# Patient Record
Sex: FEMALE | Race: WHITE | NOT HISPANIC OR LATINO
[De-identification: names, ages, dates, MRNs, and addresses within clinical notes are randomized per-mention and may not be internally consistent; named-entity substitution may affect disease eponyms.]

---

## 2017-01-27 ENCOUNTER — APPOINTMENT (OUTPATIENT)
Dept: ULTRASOUND IMAGING | Facility: CLINIC | Age: 77
End: 2017-01-27

## 2017-01-27 ENCOUNTER — APPOINTMENT (OUTPATIENT)
Dept: MAMMOGRAPHY | Facility: CLINIC | Age: 77
End: 2017-01-27

## 2017-01-27 ENCOUNTER — OUTPATIENT (OUTPATIENT)
Dept: OUTPATIENT SERVICES | Facility: HOSPITAL | Age: 77
LOS: 1 days | End: 2017-01-27
Payer: MEDICARE

## 2017-01-27 DIAGNOSIS — Z00.8 ENCOUNTER FOR OTHER GENERAL EXAMINATION: ICD-10-CM

## 2017-01-27 PROCEDURE — 77065 DX MAMMO INCL CAD UNI: CPT

## 2017-01-27 PROCEDURE — 76641 ULTRASOUND BREAST COMPLETE: CPT

## 2017-01-27 PROCEDURE — 77063 BREAST TOMOSYNTHESIS BI: CPT

## 2017-01-27 PROCEDURE — 77067 SCR MAMMO BI INCL CAD: CPT

## 2017-02-05 ENCOUNTER — RESULT REVIEW (OUTPATIENT)
Age: 77
End: 2017-02-05

## 2017-02-06 ENCOUNTER — APPOINTMENT (OUTPATIENT)
Dept: MAMMOGRAPHY | Facility: CLINIC | Age: 77
End: 2017-02-06

## 2017-02-06 ENCOUNTER — APPOINTMENT (OUTPATIENT)
Dept: ULTRASOUND IMAGING | Facility: CLINIC | Age: 77
End: 2017-02-06

## 2017-02-06 ENCOUNTER — OUTPATIENT (OUTPATIENT)
Dept: OUTPATIENT SERVICES | Facility: HOSPITAL | Age: 77
LOS: 1 days | End: 2017-02-06
Payer: MEDICARE

## 2017-02-06 DIAGNOSIS — Z00.8 ENCOUNTER FOR OTHER GENERAL EXAMINATION: ICD-10-CM

## 2017-02-06 PROCEDURE — 19083 BX BREAST 1ST LESION US IMAG: CPT

## 2017-02-06 PROCEDURE — 19081 BX BREAST 1ST LESION STRTCTC: CPT

## 2017-02-06 PROCEDURE — A4648: CPT

## 2017-02-06 PROCEDURE — 19084 BX BREAST ADD LESION US IMAG: CPT

## 2017-02-06 PROCEDURE — 77065 DX MAMMO INCL CAD UNI: CPT

## 2017-02-07 ENCOUNTER — RESULT REVIEW (OUTPATIENT)
Age: 77
End: 2017-02-07

## 2017-02-08 ENCOUNTER — OUTPATIENT (OUTPATIENT)
Dept: OUTPATIENT SERVICES | Facility: HOSPITAL | Age: 77
LOS: 1 days | End: 2017-02-08
Payer: MEDICARE

## 2017-02-08 ENCOUNTER — APPOINTMENT (OUTPATIENT)
Dept: MAMMOGRAPHY | Facility: CLINIC | Age: 77
End: 2017-02-08

## 2017-02-08 DIAGNOSIS — Z00.8 ENCOUNTER FOR OTHER GENERAL EXAMINATION: ICD-10-CM

## 2017-02-08 PROCEDURE — 77065 DX MAMMO INCL CAD UNI: CPT

## 2017-02-08 PROCEDURE — 19081 BX BREAST 1ST LESION STRTCTC: CPT

## 2017-02-10 DIAGNOSIS — R92.0 MAMMOGRAPHIC MICROCALCIFICATION FOUND ON DIAGNOSTIC IMAGING OF BREAST: ICD-10-CM

## 2017-02-10 DIAGNOSIS — R92.8 OTHER ABNORMAL AND INCONCLUSIVE FINDINGS ON DIAGNOSTIC IMAGING OF BREAST: ICD-10-CM

## 2017-02-16 ENCOUNTER — OUTPATIENT (OUTPATIENT)
Dept: OUTPATIENT SERVICES | Facility: HOSPITAL | Age: 77
LOS: 1 days | Discharge: HOME | End: 2017-02-16
Payer: MEDICARE

## 2017-02-16 VITALS
DIASTOLIC BLOOD PRESSURE: 55 MMHG | SYSTOLIC BLOOD PRESSURE: 137 MMHG | RESPIRATION RATE: 18 BRPM | HEIGHT: 63.5 IN | HEART RATE: 75 BPM | TEMPERATURE: 98 F | OXYGEN SATURATION: 98 % | WEIGHT: 175.05 LBS

## 2017-02-16 DIAGNOSIS — Z98.42 CATARACT EXTRACTION STATUS, LEFT EYE: ICD-10-CM

## 2017-02-16 DIAGNOSIS — H35.30 UNSPECIFIED MACULAR DEGENERATION: ICD-10-CM

## 2017-02-16 DIAGNOSIS — Z98.890 OTHER SPECIFIED POSTPROCEDURAL STATES: Chronic | ICD-10-CM

## 2017-02-16 DIAGNOSIS — C50.812 MALIGNANT NEOPLASM OF OVERLAPPING SITES OF LEFT FEMALE BREAST: ICD-10-CM

## 2017-02-16 DIAGNOSIS — Z90.11 ACQUIRED ABSENCE OF RIGHT BREAST AND NIPPLE: Chronic | ICD-10-CM

## 2017-02-16 DIAGNOSIS — M17.0 BILATERAL PRIMARY OSTEOARTHRITIS OF KNEE: ICD-10-CM

## 2017-02-16 DIAGNOSIS — M48.06 SPINAL STENOSIS, LUMBAR REGION: ICD-10-CM

## 2017-02-16 DIAGNOSIS — C50.912 MALIGNANT NEOPLASM OF UNSPECIFIED SITE OF LEFT FEMALE BREAST: ICD-10-CM

## 2017-02-16 DIAGNOSIS — D04.9 CARCINOMA IN SITU OF SKIN, UNSPECIFIED: Chronic | ICD-10-CM

## 2017-02-16 DIAGNOSIS — Z01.818 ENCOUNTER FOR OTHER PREPROCEDURAL EXAMINATION: ICD-10-CM

## 2017-02-16 DIAGNOSIS — C77.3 SECONDARY AND UNSPECIFIED MALIGNANT NEOPLASM OF AXILLA AND UPPER LIMB LYMPH NODES: ICD-10-CM

## 2017-02-16 DIAGNOSIS — Z98.41 CATARACT EXTRACTION STATUS, RIGHT EYE: ICD-10-CM

## 2017-02-16 DIAGNOSIS — Z90.12 ACQUIRED ABSENCE OF LEFT BREAST AND NIPPLE: Chronic | ICD-10-CM

## 2017-02-16 LAB
ANION GAP SERPL CALC-SCNC: 9 MMOL/L — SIGNIFICANT CHANGE UP (ref 5–17)
BASOPHILS # BLD AUTO: 0.1 K/UL — SIGNIFICANT CHANGE UP (ref 0–0.2)
BASOPHILS NFR BLD AUTO: 1.3 % — SIGNIFICANT CHANGE UP (ref 0–2)
BUN SERPL-MCNC: 15 MG/DL — SIGNIFICANT CHANGE UP (ref 7–23)
CALCIUM SERPL-MCNC: 9.1 MG/DL — SIGNIFICANT CHANGE UP (ref 8.5–10.1)
CHLORIDE SERPL-SCNC: 107 MMOL/L — SIGNIFICANT CHANGE UP (ref 96–108)
CO2 SERPL-SCNC: 26 MMOL/L — SIGNIFICANT CHANGE UP (ref 22–31)
CREAT SERPL-MCNC: 0.55 MG/DL — SIGNIFICANT CHANGE UP (ref 0.5–1.3)
EOSINOPHIL # BLD AUTO: 0.4 K/UL — SIGNIFICANT CHANGE UP (ref 0–0.5)
EOSINOPHIL NFR BLD AUTO: 4.1 % — SIGNIFICANT CHANGE UP (ref 0–6)
GLUCOSE SERPL-MCNC: 100 MG/DL — HIGH (ref 70–99)
HCT VFR BLD CALC: 38.2 % — SIGNIFICANT CHANGE UP (ref 34.5–45)
HGB BLD-MCNC: 13.1 G/DL — SIGNIFICANT CHANGE UP (ref 11.5–15.5)
LYMPHOCYTES # BLD AUTO: 3.2 K/UL — SIGNIFICANT CHANGE UP (ref 1–3.3)
LYMPHOCYTES # BLD AUTO: 33.7 % — SIGNIFICANT CHANGE UP (ref 13–44)
MCHC RBC-ENTMCNC: 31.9 PG — SIGNIFICANT CHANGE UP (ref 27–34)
MCHC RBC-ENTMCNC: 34.4 GM/DL — SIGNIFICANT CHANGE UP (ref 32–36)
MCV RBC AUTO: 92.9 FL — SIGNIFICANT CHANGE UP (ref 80–100)
MONOCYTES # BLD AUTO: 0.6 K/UL — SIGNIFICANT CHANGE UP (ref 0–0.9)
MONOCYTES NFR BLD AUTO: 5.8 % — SIGNIFICANT CHANGE UP (ref 2–14)
NEUTROPHILS # BLD AUTO: 5.3 K/UL — SIGNIFICANT CHANGE UP (ref 1.8–7.4)
NEUTROPHILS NFR BLD AUTO: 55.1 % — SIGNIFICANT CHANGE UP (ref 43–77)
PLATELET # BLD AUTO: 364 K/UL — SIGNIFICANT CHANGE UP (ref 150–400)
POTASSIUM SERPL-MCNC: 3.6 MMOL/L — SIGNIFICANT CHANGE UP (ref 3.5–5.3)
POTASSIUM SERPL-SCNC: 3.6 MMOL/L — SIGNIFICANT CHANGE UP (ref 3.5–5.3)
RBC # BLD: 4.11 M/UL — SIGNIFICANT CHANGE UP (ref 3.8–5.2)
RBC # FLD: 12.3 % — SIGNIFICANT CHANGE UP (ref 10.3–14.5)
SODIUM SERPL-SCNC: 142 MMOL/L — SIGNIFICANT CHANGE UP (ref 135–145)
WBC # BLD: 9.6 K/UL — SIGNIFICANT CHANGE UP (ref 3.8–10.5)
WBC # FLD AUTO: 9.6 K/UL — SIGNIFICANT CHANGE UP (ref 3.8–10.5)

## 2017-02-16 PROCEDURE — 93010 ELECTROCARDIOGRAM REPORT: CPT

## 2017-02-16 NOTE — H&P PST ADULT - HISTORY OF PRESENT ILLNESS
77 years old female with an abnormal mammogram . Patient with "three small spots" to breast  and lymph node. Lymph node mass positive for cancer. patient with a history of breast cancer x 2. Planned mastectomy.

## 2017-02-16 NOTE — H&P PST ADULT - PSH
Early stage skin cancer  basal cell carcinoma removed from left side of neck.  H/O colonoscopy  2012  H/O mastectomy, right  with immediate reconstruction with fat grafting. 1988  History of lumpectomy of left breast  1985  S/P left knee arthroscopy  2009

## 2017-02-16 NOTE — H&P PST ADULT - FAMILY HISTORY
Father  Still living? No  Family history of temporal arteritis, Age at diagnosis: Age Unknown     Mother  Still living? No  Family history of stroke, Age at diagnosis: Age Unknown

## 2017-02-16 NOTE — H&P PST ADULT - ASSESSMENT
77 years old female present to PST prior to mastectomy of left breast with axillary node dissection  with Dr. Bennett Smith.  Plan   1. NPO after midnight  2. Take the following medications with sips of water on the day of procedure:  3. Use E-Z sponge as directed  5. Drink a quart of extra  fluids the day before your surgery.  6 Medical clearance with Dr. HAYDEN Reinoso  7. CBC, BMP sent to lab  8. EKG done

## 2017-02-16 NOTE — H&P PST ADULT - PMH
Basal cell carcinoma in situ of skin  removed from neck  Bilateral malignant neoplasm of breast in female, unspecified site of breast  x2 . 1985, 1988  Gastroesophageal reflux disease without esophagitis    History of cataract  bilateral extraction  History of macular degeneration  bilateral  Insomnia, unspecified type    Malignant neoplasm of left female breast, unspecified site of breast  with Lymph node involvement  Osteoarthritis of both knees, unspecified osteoarthritis type    Spinal stenosis of lumbar region    Stress incontinence in female

## 2017-02-21 ENCOUNTER — RESULT REVIEW (OUTPATIENT)
Age: 77
End: 2017-02-21

## 2017-02-21 RX ORDER — FAMOTIDINE 10 MG/ML
20 INJECTION INTRAVENOUS ONCE
Qty: 0 | Refills: 0 | Status: COMPLETED | OUTPATIENT
Start: 2017-02-22 | End: 2017-02-22

## 2017-02-21 RX ORDER — SODIUM CHLORIDE 9 MG/ML
3 INJECTION INTRAMUSCULAR; INTRAVENOUS; SUBCUTANEOUS EVERY 8 HOURS
Qty: 0 | Refills: 0 | Status: DISCONTINUED | OUTPATIENT
Start: 2017-02-22 | End: 2017-02-23

## 2017-02-21 RX ORDER — ACETAMINOPHEN 500 MG
975 TABLET ORAL ONCE
Qty: 0 | Refills: 0 | Status: COMPLETED | OUTPATIENT
Start: 2017-02-22 | End: 2017-02-22

## 2017-02-21 RX ORDER — METOCLOPRAMIDE HCL 10 MG
10 TABLET ORAL ONCE
Qty: 0 | Refills: 0 | Status: DISCONTINUED | OUTPATIENT
Start: 2017-02-22 | End: 2017-02-23

## 2017-02-22 ENCOUNTER — INPATIENT (INPATIENT)
Facility: HOSPITAL | Age: 77
LOS: 0 days | Discharge: HOME | End: 2017-02-23
Attending: SURGERY | Admitting: SURGERY
Payer: MEDICARE

## 2017-02-22 VITALS
OXYGEN SATURATION: 96 % | HEIGHT: 63.5 IN | SYSTOLIC BLOOD PRESSURE: 115 MMHG | WEIGHT: 175.05 LBS | DIASTOLIC BLOOD PRESSURE: 50 MMHG | TEMPERATURE: 98 F | HEART RATE: 66 BPM | RESPIRATION RATE: 16 BRPM

## 2017-02-22 DIAGNOSIS — Z90.11 ACQUIRED ABSENCE OF RIGHT BREAST AND NIPPLE: Chronic | ICD-10-CM

## 2017-02-22 DIAGNOSIS — Z98.890 OTHER SPECIFIED POSTPROCEDURAL STATES: Chronic | ICD-10-CM

## 2017-02-22 DIAGNOSIS — Z90.12 ACQUIRED ABSENCE OF LEFT BREAST AND NIPPLE: Chronic | ICD-10-CM

## 2017-02-22 DIAGNOSIS — D04.9 CARCINOMA IN SITU OF SKIN, UNSPECIFIED: Chronic | ICD-10-CM

## 2017-02-22 PROCEDURE — 88329 PATH CONSLTJ DRG SURG: CPT

## 2017-02-22 PROCEDURE — 88305 TISSUE EXAM BY PATHOLOGIST: CPT | Mod: 26

## 2017-02-22 PROCEDURE — 88307 TISSUE EXAM BY PATHOLOGIST: CPT | Mod: 26

## 2017-02-22 PROCEDURE — 88342 IMHCHEM/IMCYTCHM 1ST ANTB: CPT | Mod: 26

## 2017-02-22 RX ORDER — ONDANSETRON 8 MG/1
4 TABLET, FILM COATED ORAL ONCE
Qty: 0 | Refills: 0 | Status: COMPLETED | OUTPATIENT
Start: 2017-02-22 | End: 2017-02-22

## 2017-02-22 RX ORDER — SODIUM CHLORIDE 9 MG/ML
1000 INJECTION, SOLUTION INTRAVENOUS
Qty: 0 | Refills: 0 | Status: DISCONTINUED | OUTPATIENT
Start: 2017-02-22 | End: 2017-02-22

## 2017-02-22 RX ORDER — FENTANYL CITRATE 50 UG/ML
25 INJECTION INTRAVENOUS
Qty: 0 | Refills: 0 | Status: DISCONTINUED | OUTPATIENT
Start: 2017-02-22 | End: 2017-02-23

## 2017-02-22 RX ORDER — ZOLPIDEM TARTRATE 10 MG/1
5 TABLET ORAL AT BEDTIME
Qty: 0 | Refills: 0 | Status: DISCONTINUED | OUTPATIENT
Start: 2017-02-22 | End: 2017-02-23

## 2017-02-22 RX ORDER — HYDROMORPHONE HYDROCHLORIDE 2 MG/ML
0.5 INJECTION INTRAMUSCULAR; INTRAVENOUS; SUBCUTANEOUS
Qty: 0 | Refills: 0 | Status: DISCONTINUED | OUTPATIENT
Start: 2017-02-22 | End: 2017-02-23

## 2017-02-22 RX ORDER — HYDROMORPHONE HYDROCHLORIDE 2 MG/ML
0.5 INJECTION INTRAMUSCULAR; INTRAVENOUS; SUBCUTANEOUS
Qty: 0 | Refills: 0 | Status: DISCONTINUED | OUTPATIENT
Start: 2017-02-22 | End: 2017-02-22

## 2017-02-22 RX ORDER — DIPHENHYDRAMINE HCL 50 MG
25 CAPSULE ORAL ONCE
Qty: 0 | Refills: 0 | Status: COMPLETED | OUTPATIENT
Start: 2017-02-22 | End: 2017-02-22

## 2017-02-22 RX ORDER — SODIUM CHLORIDE 9 MG/ML
1000 INJECTION INTRAMUSCULAR; INTRAVENOUS; SUBCUTANEOUS
Qty: 0 | Refills: 0 | Status: DISCONTINUED | OUTPATIENT
Start: 2017-02-22 | End: 2017-02-23

## 2017-02-22 RX ORDER — ONDANSETRON 8 MG/1
4 TABLET, FILM COATED ORAL ONCE
Qty: 0 | Refills: 0 | Status: DISCONTINUED | OUTPATIENT
Start: 2017-02-22 | End: 2017-02-23

## 2017-02-22 RX ADMIN — SODIUM CHLORIDE 100 MILLILITER(S): 9 INJECTION INTRAMUSCULAR; INTRAVENOUS; SUBCUTANEOUS at 13:42

## 2017-02-22 RX ADMIN — Medication 25 MILLIGRAM(S): at 10:41

## 2017-02-22 RX ADMIN — SODIUM CHLORIDE 100 MILLILITER(S): 9 INJECTION, SOLUTION INTRAVENOUS at 10:49

## 2017-02-22 RX ADMIN — Medication 975 MILLIGRAM(S): at 08:30

## 2017-02-22 RX ADMIN — HYDROMORPHONE HYDROCHLORIDE 0.5 MILLIGRAM(S): 2 INJECTION INTRAMUSCULAR; INTRAVENOUS; SUBCUTANEOUS at 10:30

## 2017-02-22 RX ADMIN — FAMOTIDINE 20 MILLIGRAM(S): 10 INJECTION INTRAVENOUS at 08:30

## 2017-02-22 RX ADMIN — SODIUM CHLORIDE 100 MILLILITER(S): 9 INJECTION INTRAMUSCULAR; INTRAVENOUS; SUBCUTANEOUS at 23:13

## 2017-02-22 RX ADMIN — SODIUM CHLORIDE 3 MILLILITER(S): 9 INJECTION INTRAMUSCULAR; INTRAVENOUS; SUBCUTANEOUS at 13:34

## 2017-02-22 RX ADMIN — ONDANSETRON 4 MILLIGRAM(S): 8 TABLET, FILM COATED ORAL at 10:25

## 2017-02-22 RX ADMIN — ZOLPIDEM TARTRATE 5 MILLIGRAM(S): 10 TABLET ORAL at 23:00

## 2017-02-23 VITALS
HEART RATE: 77 BPM | RESPIRATION RATE: 20 BRPM | OXYGEN SATURATION: 96 % | SYSTOLIC BLOOD PRESSURE: 125 MMHG | TEMPERATURE: 99 F | DIASTOLIC BLOOD PRESSURE: 89 MMHG

## 2017-02-23 RX ORDER — OXYCODONE HYDROCHLORIDE 5 MG/1
1 TABLET ORAL
Qty: 30 | Refills: 0 | OUTPATIENT
Start: 2017-02-23 | End: 2017-02-28

## 2017-02-23 NOTE — DISCHARGE NOTE ADULT - MEDICATION SUMMARY - MEDICATIONS TO TAKE
I will START or STAY ON the medications listed below when I get home from the hospital:    oxyCODONE 5 mg oral tablet  -- 1 tab(s) by mouth every 4 hours, As Needed -for moderate pain MDD:6  -- Caution federal law prohibits the transfer of this drug to any person other  than the person for whom it was prescribed.  It is very important that you take or use this exactly as directed.  Do not skip doses or discontinue unless directed by your doctor.  May cause drowsiness.  Alcohol may intensify this effect.  Use care when operating dangerous machinery.  This prescription cannot be refilled.  Using more of this medication than prescribed may cause serious breathing problems.    -- Indication: For MALIGNANT NEOPLASM LEFT BREAST    Ambien 5 mg oral tablet  -- 1 tab(s) by mouth once a day (at bedtime), As Needed  -- for insomnia  -- Indication: For MALIGNANT NEOPLASM LEFT BREAST

## 2017-02-23 NOTE — DISCHARGE NOTE ADULT - CARE PROVIDER_API CALL
Bennett Smith), Surgery; Surgical Critical Care  [address] [place] [address]  Phone: [phone]  Fax: [phone]

## 2017-02-23 NOTE — DISCHARGE NOTE ADULT - CARE PLAN
Principal Discharge DX:	Malignant neoplasm of left female breast, unspecified site of breast  Goal:	recovery from surgery  Instructions for follow-up, activity and diet:	empty drains daily  f/u tues office

## 2017-02-23 NOTE — DISCHARGE NOTE ADULT - PATIENT PORTAL LINK FT
“You can access the FollowHealth Patient Portal, offered by [facility], by registering with the following website: [email or web address]”

## 2017-02-27 LAB — SURGICAL PATHOLOGY FINAL REPORT - CH: SIGNIFICANT CHANGE UP

## 2017-03-07 DIAGNOSIS — C77.3 SECONDARY AND UNSPECIFIED MALIGNANT NEOPLASM OF AXILLA AND UPPER LIMB LYMPH NODES: ICD-10-CM

## 2017-03-07 DIAGNOSIS — E66.9 OBESITY, UNSPECIFIED: ICD-10-CM

## 2017-03-07 DIAGNOSIS — M17.0 BILATERAL PRIMARY OSTEOARTHRITIS OF KNEE: ICD-10-CM

## 2017-03-07 DIAGNOSIS — M48.06 SPINAL STENOSIS, LUMBAR REGION: ICD-10-CM

## 2017-03-07 DIAGNOSIS — Z85.3 PERSONAL HISTORY OF MALIGNANT NEOPLASM OF BREAST: ICD-10-CM

## 2017-03-07 DIAGNOSIS — C50.812 MALIGNANT NEOPLASM OF OVERLAPPING SITES OF LEFT FEMALE BREAST: ICD-10-CM

## 2017-03-07 DIAGNOSIS — Z87.891 PERSONAL HISTORY OF NICOTINE DEPENDENCE: ICD-10-CM

## 2017-03-07 DIAGNOSIS — Z85.828 PERSONAL HISTORY OF OTHER MALIGNANT NEOPLASM OF SKIN: ICD-10-CM

## 2017-03-07 DIAGNOSIS — N39.3 STRESS INCONTINENCE (FEMALE) (MALE): ICD-10-CM

## 2017-03-07 DIAGNOSIS — Z98.41 CATARACT EXTRACTION STATUS, RIGHT EYE: ICD-10-CM

## 2017-03-07 DIAGNOSIS — H35.30 UNSPECIFIED MACULAR DEGENERATION: ICD-10-CM

## 2017-03-07 DIAGNOSIS — Z98.42 CATARACT EXTRACTION STATUS, LEFT EYE: ICD-10-CM

## 2017-08-10 ENCOUNTER — OUTPATIENT (OUTPATIENT)
Dept: OUTPATIENT SERVICES | Facility: HOSPITAL | Age: 77
LOS: 1 days | End: 2017-08-10
Payer: MEDICARE

## 2017-08-10 ENCOUNTER — APPOINTMENT (OUTPATIENT)
Dept: MRI IMAGING | Facility: CLINIC | Age: 77
End: 2017-08-10
Payer: MEDICARE

## 2017-08-10 DIAGNOSIS — Z00.8 ENCOUNTER FOR OTHER GENERAL EXAMINATION: ICD-10-CM

## 2017-08-10 PROCEDURE — 73725 MR ANG LWR EXT W OR W/O DYE: CPT | Mod: 26,50

## 2017-08-10 PROCEDURE — C8914: CPT

## 2017-08-10 PROCEDURE — A9585: CPT

## 2018-09-05 PROBLEM — K21.9 GASTRO-ESOPHAGEAL REFLUX DISEASE WITHOUT ESOPHAGITIS: Chronic | Status: ACTIVE | Noted: 2017-02-16

## 2018-09-05 PROBLEM — M48.06 SPINAL STENOSIS, LUMBAR REGION: Chronic | Status: ACTIVE | Noted: 2017-02-16

## 2018-09-05 PROBLEM — C50.912 MALIGNANT NEOPLASM OF UNSPECIFIED SITE OF LEFT FEMALE BREAST: Chronic | Status: ACTIVE | Noted: 2017-02-16

## 2018-09-05 PROBLEM — Z86.69 PERSONAL HISTORY OF OTHER DISEASES OF THE NERVOUS SYSTEM AND SENSE ORGANS: Chronic | Status: ACTIVE | Noted: 2017-02-16

## 2018-09-05 PROBLEM — M17.0 BILATERAL PRIMARY OSTEOARTHRITIS OF KNEE: Chronic | Status: ACTIVE | Noted: 2017-02-16

## 2018-09-05 PROBLEM — D04.9 CARCINOMA IN SITU OF SKIN, UNSPECIFIED: Chronic | Status: ACTIVE | Noted: 2017-02-16

## 2018-09-05 PROBLEM — G47.00 INSOMNIA, UNSPECIFIED: Chronic | Status: ACTIVE | Noted: 2017-02-16

## 2018-09-07 ENCOUNTER — OUTPATIENT (OUTPATIENT)
Dept: OUTPATIENT SERVICES | Facility: HOSPITAL | Age: 78
LOS: 1 days | Discharge: HOME | End: 2018-09-07
Payer: MEDICARE

## 2018-09-07 DIAGNOSIS — D48.1 NEOPLASM OF UNCERTAIN BEHAVIOR OF CONNECTIVE AND OTHER SOFT TISSUE: ICD-10-CM

## 2018-09-07 DIAGNOSIS — Z90.11 ACQUIRED ABSENCE OF RIGHT BREAST AND NIPPLE: Chronic | ICD-10-CM

## 2018-09-07 DIAGNOSIS — Z98.890 OTHER SPECIFIED POSTPROCEDURAL STATES: Chronic | ICD-10-CM

## 2018-09-07 DIAGNOSIS — C50.912 MALIGNANT NEOPLASM OF UNSPECIFIED SITE OF LEFT FEMALE BREAST: ICD-10-CM

## 2018-09-07 DIAGNOSIS — Z90.12 ACQUIRED ABSENCE OF LEFT BREAST AND NIPPLE: Chronic | ICD-10-CM

## 2018-09-07 DIAGNOSIS — D17.1 BENIGN LIPOMATOUS NEOPLASM OF SKIN AND SUBCUTANEOUS TISSUE OF TRUNK: ICD-10-CM

## 2018-09-07 DIAGNOSIS — D04.9 CARCINOMA IN SITU OF SKIN, UNSPECIFIED: Chronic | ICD-10-CM

## 2018-09-07 LAB
ANION GAP SERPL CALC-SCNC: 8 MMOL/L — SIGNIFICANT CHANGE UP (ref 5–17)
BASOPHILS # BLD AUTO: 0.06 K/UL — SIGNIFICANT CHANGE UP (ref 0–0.2)
BASOPHILS NFR BLD AUTO: 0.7 % — SIGNIFICANT CHANGE UP (ref 0–2)
BUN SERPL-MCNC: 17 MG/DL — SIGNIFICANT CHANGE UP (ref 7–23)
CALCIUM SERPL-MCNC: 9 MG/DL — SIGNIFICANT CHANGE UP (ref 8.5–10.1)
CHLORIDE SERPL-SCNC: 104 MMOL/L — SIGNIFICANT CHANGE UP (ref 96–108)
CO2 SERPL-SCNC: 28 MMOL/L — SIGNIFICANT CHANGE UP (ref 22–31)
CREAT SERPL-MCNC: 0.71 MG/DL — SIGNIFICANT CHANGE UP (ref 0.5–1.3)
EOSINOPHIL # BLD AUTO: 0.35 K/UL — SIGNIFICANT CHANGE UP (ref 0–0.5)
EOSINOPHIL NFR BLD AUTO: 4 % — SIGNIFICANT CHANGE UP (ref 0–6)
GLUCOSE SERPL-MCNC: 161 MG/DL — HIGH (ref 70–99)
HCT VFR BLD CALC: 39.6 % — SIGNIFICANT CHANGE UP (ref 34.5–45)
HGB BLD-MCNC: 13 G/DL — SIGNIFICANT CHANGE UP (ref 11.5–15.5)
IMM GRANULOCYTES NFR BLD AUTO: 0.2 % — SIGNIFICANT CHANGE UP (ref 0–1.5)
LYMPHOCYTES # BLD AUTO: 1.76 K/UL — SIGNIFICANT CHANGE UP (ref 1–3.3)
LYMPHOCYTES # BLD AUTO: 20.3 % — SIGNIFICANT CHANGE UP (ref 13–44)
MCHC RBC-ENTMCNC: 31.5 PG — SIGNIFICANT CHANGE UP (ref 27–34)
MCHC RBC-ENTMCNC: 32.8 GM/DL — SIGNIFICANT CHANGE UP (ref 32–36)
MCV RBC AUTO: 95.9 FL — SIGNIFICANT CHANGE UP (ref 80–100)
MONOCYTES # BLD AUTO: 0.8 K/UL — SIGNIFICANT CHANGE UP (ref 0–0.9)
MONOCYTES NFR BLD AUTO: 9.2 % — SIGNIFICANT CHANGE UP (ref 2–14)
NEUTROPHILS # BLD AUTO: 5.66 K/UL — SIGNIFICANT CHANGE UP (ref 1.8–7.4)
NEUTROPHILS NFR BLD AUTO: 65.6 % — SIGNIFICANT CHANGE UP (ref 43–77)
PLATELET # BLD AUTO: 366 K/UL — SIGNIFICANT CHANGE UP (ref 150–400)
POTASSIUM SERPL-MCNC: 3.8 MMOL/L — SIGNIFICANT CHANGE UP (ref 3.5–5.3)
POTASSIUM SERPL-SCNC: 3.8 MMOL/L — SIGNIFICANT CHANGE UP (ref 3.5–5.3)
RBC # BLD: 4.13 M/UL — SIGNIFICANT CHANGE UP (ref 3.8–5.2)
RBC # FLD: 13.6 % — SIGNIFICANT CHANGE UP (ref 10.3–14.5)
SODIUM SERPL-SCNC: 140 MMOL/L — SIGNIFICANT CHANGE UP (ref 135–145)
WBC # BLD: 8.65 K/UL — SIGNIFICANT CHANGE UP (ref 3.8–10.5)
WBC # FLD AUTO: 8.65 K/UL — SIGNIFICANT CHANGE UP (ref 3.8–10.5)

## 2018-09-07 PROCEDURE — 93010 ELECTROCARDIOGRAM REPORT: CPT

## 2018-09-07 RX ORDER — ZOLPIDEM TARTRATE 10 MG/1
1 TABLET ORAL
Qty: 0 | Refills: 0 | COMMUNITY

## 2018-09-07 NOTE — ASU PATIENT PROFILE, ADULT - PMH
Basal cell carcinoma in situ of skin  removed from neck  Bilateral malignant neoplasm of breast in female, unspecified site of breast  b/l  Gastroesophageal reflux disease without esophagitis    History of macular degeneration  bilateral  Insomnia, unspecified type    Malignant neoplasm of left female breast, unspecified site of breast  with Lymph node involvement  Neoplasm by body site  left anterior chest wall  Osteoarthritis of both knees, unspecified osteoarthritis type    Spinal stenosis of lumbar region    Urinary frequency Basal cell carcinoma in situ of skin  removed from neck  Bilateral malignant neoplasm of breast in female, unspecified site of breast  b/l  Gastroesophageal reflux disease without esophagitis    History of macular degeneration  bilateral  Insomnia, unspecified type    Malignant neoplasm of left female breast, unspecified site of breast  with Lymph node involvement  Neoplasm by body site  left anterior chest wall  Obesity    Osteoarthritis of both knees, unspecified osteoarthritis type    Spinal stenosis of lumbar region    Urinary frequency

## 2018-09-07 NOTE — ASU PATIENT PROFILE, ADULT - PSH
Cataract  b/l  Early stage skin cancer  basal cell carcinoma removed from left side of neck.  H/O bilateral breast biopsy    H/O colonoscopy  multiple  H/O left mastectomy  2016  H/O mastectomy, right  1993 with immediate reconstruction with fat grafting  History of esophagogastroduodenoscopy (EGD)    History of lumpectomy of left breast  1990  Macular Hole  repair b/l eyes  S/P left knee arthroscopy  2009

## 2018-09-07 NOTE — CHART NOTE - NSCHARTNOTEFT_GEN_A_CORE
BP left arm sitting 149/55  HR 78 bpm  Liliana 98.1 F  RR 14/min  O2 sat 98% on RA    79 yo female scheduled for excision of soft tissue mass left chest wall on 9/10/18  with Dr. Smith    1. Labs as per surgeon  2. EKG  3. Medical clearance with PCP Dr OWEN Reinoso  4. discussed EZ sponges & day of procedure instructions  5. Do NOT use left arm for BPs, IVs or blood draws. apply pink band to left arm on admit

## 2018-09-08 PROBLEM — C50.911 MALIGNANT NEOPLASM OF UNSPECIFIED SITE OF RIGHT FEMALE BREAST: Chronic | Status: ACTIVE | Noted: 2017-02-16

## 2018-09-08 PROBLEM — N39.3 STRESS INCONTINENCE (FEMALE) (MALE): Chronic | Status: INACTIVE | Noted: 2017-02-16 | Resolved: 2018-09-07

## 2018-09-08 PROBLEM — Z86.69 PERSONAL HISTORY OF OTHER DISEASES OF THE NERVOUS SYSTEM AND SENSE ORGANS: Chronic | Status: INACTIVE | Noted: 2017-02-16 | Resolved: 2018-09-07

## 2018-09-10 ENCOUNTER — RESULT REVIEW (OUTPATIENT)
Age: 78
End: 2018-09-10

## 2018-09-10 ENCOUNTER — OUTPATIENT (OUTPATIENT)
Dept: OUTPATIENT SERVICES | Facility: HOSPITAL | Age: 78
LOS: 1 days | Discharge: HOME | End: 2018-09-10
Payer: MEDICARE

## 2018-09-10 VITALS
SYSTOLIC BLOOD PRESSURE: 140 MMHG | TEMPERATURE: 98 F | RESPIRATION RATE: 16 BRPM | DIASTOLIC BLOOD PRESSURE: 72 MMHG | HEART RATE: 68 BPM

## 2018-09-10 VITALS
HEIGHT: 64 IN | OXYGEN SATURATION: 98 % | WEIGHT: 179.9 LBS | RESPIRATION RATE: 14 BRPM | HEART RATE: 63 BPM | DIASTOLIC BLOOD PRESSURE: 53 MMHG | SYSTOLIC BLOOD PRESSURE: 124 MMHG | TEMPERATURE: 98 F

## 2018-09-10 DIAGNOSIS — Z90.12 ACQUIRED ABSENCE OF LEFT BREAST AND NIPPLE: Chronic | ICD-10-CM

## 2018-09-10 DIAGNOSIS — D04.9 CARCINOMA IN SITU OF SKIN, UNSPECIFIED: Chronic | ICD-10-CM

## 2018-09-10 DIAGNOSIS — Z98.890 OTHER SPECIFIED POSTPROCEDURAL STATES: Chronic | ICD-10-CM

## 2018-09-10 DIAGNOSIS — Z90.11 ACQUIRED ABSENCE OF RIGHT BREAST AND NIPPLE: Chronic | ICD-10-CM

## 2018-09-10 PROCEDURE — 88305 TISSUE EXAM BY PATHOLOGIST: CPT | Mod: 26

## 2018-09-10 RX ORDER — OXYCODONE HYDROCHLORIDE 5 MG/1
5 TABLET ORAL EVERY 6 HOURS
Qty: 0 | Refills: 0 | Status: DISCONTINUED | OUTPATIENT
Start: 2018-09-10 | End: 2018-09-10

## 2018-09-10 NOTE — ASU DISCHARGE PLAN (ADULT/PEDIATRIC). - MEDICATION SUMMARY - MEDICATIONS TO TAKE
I will START or STAY ON the medications listed below when I get home from the hospital:    Aleve PM 25 mg-220 mg oral tablet  -- 1 tab(s) by mouth once (at bedtime)  -- Indication: For SOFT TISSUE MAS LEFT CHEST WALL WITH HISTORY OF BREAST CANCER    exemestane 25 mg oral tablet  -- 1 tab(s) by mouth once a day  -- Indication: For SOFT TISSUE MAS LEFT CHEST WALL WITH HISTORY OF BREAST CANCER    oxybutynin 10 mg/24 hr oral tablet, extended release  -- 1 tab(s) by mouth once a day  -- Indication: For SOFT TISSUE MAS LEFT CHEST WALL WITH HISTORY OF BREAST CANCER    Vitamin D3 5000 intl units oral tablet  -- 1 tab(s) by mouth once a day  -- Indication: For SOFT TISSUE MAS LEFT CHEST WALL WITH HISTORY OF BREAST CANCER

## 2018-09-10 NOTE — ASU DISCHARGE PLAN (ADULT/PEDIATRIC). - NURSING INSTRUCTIONS
Review home care information sheet Begin with liquids and light food ( tea, toast, Jello, soups). Advance to what you normally eat. Liquids should taken in adequate amounts today.Refer to multi color post op discharge instruction sheet     CALL the DOCTOR:    -Fever greater than  101F  - Signs  of infection such as : increase pain,swelling,redness,or a bad  smell coming from the wound.  -Excessive amount of bleeding.  - Any pain that appears to be getting worse.  - Vomiting  -  If you have  not urinated 8 hours after surgery or have any difficulty urinating.     A responsible adult should be with you for the rest of the day and night for your safety and to help you if you needed.    Review attached FACT SHEET if applicable. For any problems or concerns,contact your doctor. [facility] patients should call the [facility]. If you cannot reach the doctor or clinic, call [facility] Emergency Department at [phone] or go to your local Emergency Department.  A responsible adult should be with you for the rest of the day and night for your safety and to help you if you needed. Resume your medications as listed on the attached Medication Record.

## 2018-09-10 NOTE — ASU PATIENT PROFILE, ADULT - PMH
Basal cell carcinoma in situ of skin  removed from neck  Bilateral malignant neoplasm of breast in female, unspecified site of breast  b/l  Gastroesophageal reflux disease without esophagitis    History of macular degeneration  bilateral  Insomnia, unspecified type    Malignant neoplasm of left female breast, unspecified site of breast  with Lymph node involvement  Neoplasm by body site  left anterior chest wall  Obesity    Osteoarthritis of both knees, unspecified osteoarthritis type    Spinal stenosis of lumbar region    Urinary frequency

## 2018-09-13 LAB — SURGICAL PATHOLOGY FINAL REPORT - CH: SIGNIFICANT CHANGE UP

## 2018-09-17 DIAGNOSIS — R22.2 LOCALIZED SWELLING, MASS AND LUMP, TRUNK: ICD-10-CM

## 2018-09-17 DIAGNOSIS — Z90.12 ACQUIRED ABSENCE OF LEFT BREAST AND NIPPLE: ICD-10-CM

## 2018-09-17 DIAGNOSIS — Z92.3 PERSONAL HISTORY OF IRRADIATION: ICD-10-CM

## 2018-09-17 DIAGNOSIS — C44.519 BASAL CELL CARCINOMA OF SKIN OF OTHER PART OF TRUNK: ICD-10-CM

## 2018-09-17 DIAGNOSIS — Z85.3 PERSONAL HISTORY OF MALIGNANT NEOPLASM OF BREAST: ICD-10-CM

## 2018-09-20 DIAGNOSIS — C44.519 BASAL CELL CARCINOMA OF SKIN OF OTHER PART OF TRUNK: ICD-10-CM

## 2018-09-20 DIAGNOSIS — Z01.818 ENCOUNTER FOR OTHER PREPROCEDURAL EXAMINATION: ICD-10-CM

## 2018-09-20 DIAGNOSIS — Z92.3 PERSONAL HISTORY OF IRRADIATION: ICD-10-CM

## 2018-09-20 DIAGNOSIS — Z90.12 ACQUIRED ABSENCE OF LEFT BREAST AND NIPPLE: ICD-10-CM

## 2018-09-20 DIAGNOSIS — Z85.3 PERSONAL HISTORY OF MALIGNANT NEOPLASM OF BREAST: ICD-10-CM

## 2019-06-27 ENCOUNTER — EMERGENCY (EMERGENCY)
Facility: HOSPITAL | Age: 79
LOS: 1 days | Discharge: HOME | End: 2019-06-27
Attending: EMERGENCY MEDICINE
Payer: MEDICARE

## 2019-06-27 VITALS
DIASTOLIC BLOOD PRESSURE: 82 MMHG | TEMPERATURE: 99 F | HEART RATE: 81 BPM | HEIGHT: 62 IN | SYSTOLIC BLOOD PRESSURE: 119 MMHG | OXYGEN SATURATION: 99 % | RESPIRATION RATE: 18 BRPM | WEIGHT: 156.09 LBS

## 2019-06-27 VITALS
SYSTOLIC BLOOD PRESSURE: 112 MMHG | DIASTOLIC BLOOD PRESSURE: 80 MMHG | HEART RATE: 77 BPM | OXYGEN SATURATION: 99 % | TEMPERATURE: 99 F | RESPIRATION RATE: 18 BRPM

## 2019-06-27 LAB
ALBUMIN SERPL ELPH-MCNC: 4.2 G/DL — SIGNIFICANT CHANGE UP (ref 3.3–5)
ALP SERPL-CCNC: 69 U/L — SIGNIFICANT CHANGE UP (ref 40–120)
ALT FLD-CCNC: 18 U/L — SIGNIFICANT CHANGE UP (ref 10–45)
ANION GAP SERPL CALC-SCNC: 12 MMOL/L — SIGNIFICANT CHANGE UP (ref 5–17)
APPEARANCE UR: CLEAR — SIGNIFICANT CHANGE UP
AST SERPL-CCNC: 20 U/L — SIGNIFICANT CHANGE UP (ref 10–40)
BACTERIA # UR AUTO: ABNORMAL
BASOPHILS # BLD AUTO: 0 K/UL — SIGNIFICANT CHANGE UP (ref 0–0.2)
BASOPHILS NFR BLD AUTO: 0.2 % — SIGNIFICANT CHANGE UP (ref 0–2)
BILIRUB SERPL-MCNC: 0.5 MG/DL — SIGNIFICANT CHANGE UP (ref 0.2–1.2)
BILIRUB UR-MCNC: NEGATIVE — SIGNIFICANT CHANGE UP
BUN SERPL-MCNC: 18 MG/DL — SIGNIFICANT CHANGE UP (ref 7–23)
CALCIUM SERPL-MCNC: 10 MG/DL — SIGNIFICANT CHANGE UP (ref 8.4–10.5)
CHLORIDE SERPL-SCNC: 104 MMOL/L — SIGNIFICANT CHANGE UP (ref 96–108)
CO2 SERPL-SCNC: 25 MMOL/L — SIGNIFICANT CHANGE UP (ref 22–31)
COD CRY URNS QL: ABNORMAL
COLOR SPEC: YELLOW — SIGNIFICANT CHANGE UP
CREAT SERPL-MCNC: 0.77 MG/DL — SIGNIFICANT CHANGE UP (ref 0.5–1.3)
DIFF PNL FLD: ABNORMAL
EOSINOPHIL # BLD AUTO: 0.1 K/UL — SIGNIFICANT CHANGE UP (ref 0–0.5)
EOSINOPHIL NFR BLD AUTO: 0.6 % — SIGNIFICANT CHANGE UP (ref 0–6)
EPI CELLS # UR: SIGNIFICANT CHANGE UP
GAS PNL BLDV: SIGNIFICANT CHANGE UP
GLUCOSE SERPL-MCNC: 167 MG/DL — HIGH (ref 70–99)
GLUCOSE UR QL: NEGATIVE — SIGNIFICANT CHANGE UP
HCT VFR BLD CALC: 40.5 % — SIGNIFICANT CHANGE UP (ref 34.5–45)
HGB BLD-MCNC: 13.3 G/DL — SIGNIFICANT CHANGE UP (ref 11.5–15.5)
INR BLD: 1.03 RATIO — SIGNIFICANT CHANGE UP (ref 0.88–1.16)
KETONES UR-MCNC: NEGATIVE — SIGNIFICANT CHANGE UP
LEUKOCYTE ESTERASE UR-ACNC: SIGNIFICANT CHANGE UP
LIDOCAIN IGE QN: 17 U/L — SIGNIFICANT CHANGE UP (ref 7–60)
LYMPHOCYTES # BLD AUTO: 1 K/UL — SIGNIFICANT CHANGE UP (ref 1–3.3)
LYMPHOCYTES # BLD AUTO: 11 % — LOW (ref 13–44)
MCHC RBC-ENTMCNC: 27.8 PG — SIGNIFICANT CHANGE UP (ref 27–34)
MCHC RBC-ENTMCNC: 32.8 GM/DL — SIGNIFICANT CHANGE UP (ref 32–36)
MCV RBC AUTO: 84.9 FL — SIGNIFICANT CHANGE UP (ref 80–100)
MONOCYTES # BLD AUTO: 0.4 K/UL — SIGNIFICANT CHANGE UP (ref 0–0.9)
MONOCYTES NFR BLD AUTO: 4 % — SIGNIFICANT CHANGE UP (ref 2–14)
NEUTROPHILS # BLD AUTO: 7.6 K/UL — HIGH (ref 1.8–7.4)
NEUTROPHILS NFR BLD AUTO: 84.2 % — HIGH (ref 43–77)
NITRITE UR-MCNC: NEGATIVE — SIGNIFICANT CHANGE UP
PH UR: 6.5 — SIGNIFICANT CHANGE UP (ref 5–8)
PLATELET # BLD AUTO: 273 K/UL — SIGNIFICANT CHANGE UP (ref 150–400)
POTASSIUM SERPL-MCNC: 4.8 MMOL/L — SIGNIFICANT CHANGE UP (ref 3.5–5.3)
POTASSIUM SERPL-SCNC: 4.8 MMOL/L — SIGNIFICANT CHANGE UP (ref 3.5–5.3)
PROT SERPL-MCNC: 6.9 G/DL — SIGNIFICANT CHANGE UP (ref 6–8.3)
PROT UR-MCNC: ABNORMAL
PROTHROM AB SERPL-ACNC: 11.8 SEC — SIGNIFICANT CHANGE UP (ref 10–12.9)
RBC # BLD: 4.77 M/UL — SIGNIFICANT CHANGE UP (ref 3.8–5.2)
RBC # FLD: 12.6 % — SIGNIFICANT CHANGE UP (ref 10.3–14.5)
RBC CASTS # UR COMP ASSIST: SIGNIFICANT CHANGE UP /HPF (ref 0–4)
SODIUM SERPL-SCNC: 141 MMOL/L — SIGNIFICANT CHANGE UP (ref 135–145)
SP GR SPEC: 1.02 — SIGNIFICANT CHANGE UP (ref 1.01–1.02)
UROBILINOGEN FLD QL: NEGATIVE — SIGNIFICANT CHANGE UP
WBC # BLD: 9 K/UL — SIGNIFICANT CHANGE UP (ref 3.8–10.5)
WBC # FLD AUTO: 9 K/UL — SIGNIFICANT CHANGE UP (ref 3.8–10.5)
WBC UR QL: ABNORMAL

## 2019-06-27 PROCEDURE — 82330 ASSAY OF CALCIUM: CPT

## 2019-06-27 PROCEDURE — 74177 CT ABD & PELVIS W/CONTRAST: CPT | Mod: 26

## 2019-06-27 PROCEDURE — 96361 HYDRATE IV INFUSION ADD-ON: CPT

## 2019-06-27 PROCEDURE — 82435 ASSAY OF BLOOD CHLORIDE: CPT

## 2019-06-27 PROCEDURE — 82803 BLOOD GASES ANY COMBINATION: CPT

## 2019-06-27 PROCEDURE — 85610 PROTHROMBIN TIME: CPT

## 2019-06-27 PROCEDURE — 85014 HEMATOCRIT: CPT

## 2019-06-27 PROCEDURE — 85027 COMPLETE CBC AUTOMATED: CPT

## 2019-06-27 PROCEDURE — 85730 THROMBOPLASTIN TIME PARTIAL: CPT

## 2019-06-27 PROCEDURE — 84295 ASSAY OF SERUM SODIUM: CPT

## 2019-06-27 PROCEDURE — 81001 URINALYSIS AUTO W/SCOPE: CPT

## 2019-06-27 PROCEDURE — 99284 EMERGENCY DEPT VISIT MOD MDM: CPT | Mod: GC

## 2019-06-27 PROCEDURE — 83690 ASSAY OF LIPASE: CPT

## 2019-06-27 PROCEDURE — 99284 EMERGENCY DEPT VISIT MOD MDM: CPT | Mod: 25

## 2019-06-27 PROCEDURE — 96360 HYDRATION IV INFUSION INIT: CPT | Mod: XU

## 2019-06-27 PROCEDURE — 82947 ASSAY GLUCOSE BLOOD QUANT: CPT

## 2019-06-27 PROCEDURE — 83605 ASSAY OF LACTIC ACID: CPT

## 2019-06-27 PROCEDURE — 74177 CT ABD & PELVIS W/CONTRAST: CPT

## 2019-06-27 PROCEDURE — 87086 URINE CULTURE/COLONY COUNT: CPT

## 2019-06-27 PROCEDURE — 80053 COMPREHEN METABOLIC PANEL: CPT

## 2019-06-27 PROCEDURE — 84132 ASSAY OF SERUM POTASSIUM: CPT

## 2019-06-27 RX ORDER — CIPROFLOXACIN LACTATE 400MG/40ML
500 VIAL (ML) INTRAVENOUS ONCE
Refills: 0 | Status: COMPLETED | OUTPATIENT
Start: 2019-06-27 | End: 2019-06-27

## 2019-06-27 RX ORDER — SODIUM CHLORIDE 9 MG/ML
1000 INJECTION INTRAMUSCULAR; INTRAVENOUS; SUBCUTANEOUS ONCE
Refills: 0 | Status: COMPLETED | OUTPATIENT
Start: 2019-06-27 | End: 2019-06-27

## 2019-06-27 RX ORDER — POLYETHYLENE GLYCOL 3350 17 G/17G
17 POWDER, FOR SOLUTION ORAL
Qty: 170 | Refills: 0
Start: 2019-06-27 | End: 2019-07-03

## 2019-06-27 RX ORDER — CIPROFLOXACIN LACTATE 400MG/40ML
1 VIAL (ML) INTRAVENOUS
Qty: 20 | Refills: 0
Start: 2019-06-27 | End: 2019-07-06

## 2019-06-27 RX ORDER — POLYETHYLENE GLYCOL 3350 17 G/17G
17 POWDER, FOR SOLUTION ORAL ONCE
Refills: 0 | Status: COMPLETED | OUTPATIENT
Start: 2019-06-27 | End: 2019-06-27

## 2019-06-27 RX ORDER — METRONIDAZOLE 500 MG
500 TABLET ORAL ONCE
Refills: 0 | Status: COMPLETED | OUTPATIENT
Start: 2019-06-27 | End: 2019-06-27

## 2019-06-27 RX ORDER — METRONIDAZOLE 500 MG
1 TABLET ORAL
Qty: 21 | Refills: 0
Start: 2019-06-27 | End: 2019-07-03

## 2019-06-27 RX ADMIN — SODIUM CHLORIDE 1000 MILLILITER(S): 9 INJECTION INTRAMUSCULAR; INTRAVENOUS; SUBCUTANEOUS at 13:47

## 2019-06-27 RX ADMIN — SODIUM CHLORIDE 1000 MILLILITER(S): 9 INJECTION INTRAMUSCULAR; INTRAVENOUS; SUBCUTANEOUS at 10:57

## 2019-06-27 RX ADMIN — Medication 500 MILLIGRAM(S): at 13:46

## 2019-06-27 RX ADMIN — POLYETHYLENE GLYCOL 3350 17 GRAM(S): 17 POWDER, FOR SOLUTION ORAL at 14:16

## 2019-06-27 NOTE — ED PROVIDER NOTE - PHYSICAL EXAMINATION
GEN: NAD, awake, well appearing when standing, uncomfortable when sitting   HEENT: NCAT, MMM, normal conjunctiva, perrl  CHEST/LUNGS: Non-tachypneic, CTAB, bilateral breath sounds  CARDIAC: Non-tachycardic, normal perfusion  ABDOMEN: Soft, mild diffuse tenderness Chaperone RICHARD Garciaen : Rectal externally with stool   MSK: No edema, no gross deformity of extremities  SKIN: No rashes, no petechiae, no vesicles  NEURO: CN grossly intact, normal coordination, no focal motor or sensory deficits  PSYCH: Alert, appropriate, cooperative, with capacity and insight

## 2019-06-27 NOTE — ED PROCEDURE NOTE - PROCEDURE ADDITIONAL DETAILS
rectal vault emptied with some stool removed, still large stool unable to be reached with digital exam. Saline enema administered. rectal vault emptied with some stool removed, still large stool unable to be reached with digital exam. Saline enema administered.  Chaperone: RICHARD Smith

## 2019-06-27 NOTE — ED PROVIDER NOTE - OBJECTIVE STATEMENT
64F with pmh HTN, DM, GERD, complicated diverticulitis with resection presenting with abd pain with associated constipation and rectal pain x 1 week. States that she has had severe constipation in the past requiring a procedure at Dr. Mensah's office under sedation. Has been tolerating food with no nausea or vomiting. Denies fever, chills, cp, sob, dizziness, dysuria

## 2019-06-27 NOTE — ED PROCEDURE NOTE - ATTENDING CONTRIBUTION TO CARE
Attending MD Beltran: Risks, benefit and alternatives of procedure explained to patient and patient demonstrated verbal understanding and consent.  I was present during the key portions of the procedure.

## 2019-06-27 NOTE — ED PROVIDER NOTE - CLINICAL SUMMARY MEDICAL DECISION MAKING FREE TEXT BOX
Attending MD Beltran:  63 yo female with HTN, DM, "bowel resection" due to diverticulitis several years ago presents with abdominal pain, constipation and stool leakage.  Last normal BM 7-10 days.  Exam (MD Ruby): diffuse abd tenderness to palpation, rectum with stool leakage and external hemorrhoids.  Plan:  CT abd and pelvis, labs, pain control and re-eval. Attending MD Beltran:  73 yo female with HTN, DM, "bowel resection" due to diverticulitis several years ago presents with abdominal pain, constipation and stool leakage.  Last normal BM 7-10 days.  Exam (MD Ruby): diffuse abd tenderness to palpation, rectum with stool leakage and external hemorrhoids.  Plan:  CT abd and pelvis, labs, pain control and re-eval.

## 2019-06-27 NOTE — ED PROVIDER NOTE - PMH
Anemia  iron deficiency   on supplements  Boerhaave's syndrome    Diabetes mellitus  diet/oral meds  Diverticulitis  2/2011, had paris kumar drainage 2/2011  GERD (Gastroesophageal Reflux Disease)  2010  Heart murmur  "diagnosed as child"  HTN (Hypertension)  X 1 year, controlled  Renal calculi  dx 2 years ago  now has large one left side

## 2019-06-27 NOTE — ED PROVIDER NOTE - PSH
Esophageal Perforation  surgery x 2 about 5-6 yrs ago  Incarcerated Paraesophageal Hernia    S/P colon resection  3 yrs ago for diverticulitis  S/P cystoscopy  left ESWL and cystoscopy 2 years ago

## 2019-06-27 NOTE — ED ADULT NURSE NOTE - OBJECTIVE STATEMENT
65 y/o female patient presents ambulatory to ED with son at the bedside complaining of 65 y/o female patient presents ambulatory to ED with son at the bedside complaining of abdominal pain associated with rectal pain and constipation x 1 week but worsening over the last 3 days. Per patient she has been able to tolerate PO without nausea or vomiting. Patient states she has had severe constipation in the past which required "blockage removal procedure in Dr. Mensah (GI) office under sedation". Patient denies SOB and CP, N/V/D; afebrile in ED. PMHC HTN, DM, GERD, diverticulitis

## 2019-06-27 NOTE — ED ADULT NURSE NOTE - STOOL PATTERN
MD LISA More performed rectal exam at bedside- external hemorrhoids with stool leakage around rectum/constipation

## 2019-06-27 NOTE — ED PROVIDER NOTE - ATTENDING CONTRIBUTION TO CARE
Attending MD Beltran:  I personally have seen and examined this patient.  Resident note reviewed and agree on plan of care and except where noted.  See MDM for details.

## 2019-06-27 NOTE — ED ADULT NURSE NOTE - ED STAT RN HANDOFF DETAILS
Bedside report given to on coming nurse Kamla. Understands pmh, medications given and plan of care for patient. Patient in stable condition, vital signs updated, has no complaints at this time and has been updated on care plan. Explained to patient a new nurse is taking over, pt verbalized understanding.

## 2019-06-27 NOTE — ED ADULT NURSE NOTE - NSIMPLEMENTINTERV_GEN_ALL_ED
Implemented All Fall Risk Interventions:  [place] to call system. Call bell, personal items and telephone within reach. Instruct patient to call for assistance. Room bathroom lighting operational. Non-slip footwear when patient is off stretcher. Physically safe environment: no spills, clutter or unnecessary equipment. Stretcher in lowest position, wheels locked, appropriate side rails in place. Provide visual cue, wrist band, yellow gown, etc. Monitor gait and stability. Monitor for mental status changes and reorient to person, place, and time. Review medications for side effects contributing to fall risk. Reinforce activity limits and safety measures with patient and family.

## 2019-06-27 NOTE — ED PROVIDER NOTE - NSFOLLOWUPINSTRUCTIONS_ED_ALL_ED_FT
Follow up with your primary care doctor  Take antibiotics as prescribed   Take stool softeners as prescribed   Return to ED for any new or worsening symptoms

## 2019-06-28 LAB
CULTURE RESULTS: SIGNIFICANT CHANGE UP
SPECIMEN SOURCE: SIGNIFICANT CHANGE UP

## 2019-06-29 NOTE — ED POST DISCHARGE NOTE - DETAILS
Spoke to patient informed of results. States she did have dysuria since resolved. She is on cipro/flagyl for colitis. Advised to complete abx and have repeat urine sampling. Pt endorses understanding and agrees - Romina Marrufo PA-C

## 2019-07-15 NOTE — ASU DISCHARGE PLAN (ADULT/PEDIATRIC). - RN NAME (PRINT)_____________________________________________
Statement Selected
coagulation(Bleeding disorder R/T clinical cond/anti-coags)/age(85 years old or older)

## 2019-11-22 ENCOUNTER — EMERGENCY (EMERGENCY)
Facility: HOSPITAL | Age: 79
LOS: 0 days | Discharge: HOME | End: 2019-11-22
Attending: HOSPITALIST
Payer: MEDICARE

## 2019-11-22 VITALS
DIASTOLIC BLOOD PRESSURE: 71 MMHG | TEMPERATURE: 97 F | SYSTOLIC BLOOD PRESSURE: 149 MMHG | RESPIRATION RATE: 18 BRPM | OXYGEN SATURATION: 97 % | HEART RATE: 91 BPM

## 2019-11-22 VITALS — HEIGHT: 64 IN | WEIGHT: 169.98 LBS

## 2019-11-22 DIAGNOSIS — Z90.11 ACQUIRED ABSENCE OF RIGHT BREAST AND NIPPLE: Chronic | ICD-10-CM

## 2019-11-22 DIAGNOSIS — K21.9 GASTRO-ESOPHAGEAL REFLUX DISEASE WITHOUT ESOPHAGITIS: ICD-10-CM

## 2019-11-22 DIAGNOSIS — Z98.890 OTHER SPECIFIED POSTPROCEDURAL STATES: Chronic | ICD-10-CM
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
## 2019-11-22 DIAGNOSIS — S01.511A LACERATION WITHOUT FOREIGN BODY OF LIP, INITIAL ENCOUNTER: ICD-10-CM

## 2019-11-22 DIAGNOSIS — Z90.12 ACQUIRED ABSENCE OF LEFT BREAST AND NIPPLE: Chronic | ICD-10-CM

## 2019-11-22 DIAGNOSIS — M47.9 SPONDYLOSIS, UNSPECIFIED: ICD-10-CM

## 2019-11-22 DIAGNOSIS — Z85.3 PERSONAL HISTORY OF MALIGNANT NEOPLASM OF BREAST: ICD-10-CM
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
## 2019-11-22 DIAGNOSIS — D04.9 CARCINOMA IN SITU OF SKIN, UNSPECIFIED: Chronic | ICD-10-CM

## 2019-11-22 PROBLEM — D49.9 NEOPLASM OF UNSPECIFIED BEHAVIOR OF UNSPECIFIED SITE: Chronic | Status: ACTIVE | Noted: 2018-09-07

## 2019-11-22 PROBLEM — E66.9 OBESITY, UNSPECIFIED: Chronic | Status: ACTIVE | Noted: 2018-09-07

## 2019-11-22 PROBLEM — R35.0 FREQUENCY OF MICTURITION: Chronic | Status: ACTIVE | Noted: 2018-09-07

## 2019-11-22 PROCEDURE — 70486 CT MAXILLOFACIAL W/O DYE: CPT | Mod: 26

## 2019-11-22 PROCEDURE — 12011 RPR F/E/E/N/L/M 2.5 CM/<: CPT

## 2019-11-22 PROCEDURE — 70486 CT MAXILLOFACIAL W/O DYE: CPT

## 2019-11-22 PROCEDURE — 90715 TDAP VACCINE 7 YRS/> IM: CPT

## 2019-11-22 PROCEDURE — 99284 EMERGENCY DEPT VISIT MOD MDM: CPT | Mod: 25

## 2019-11-22 PROCEDURE — 76376 3D RENDER W/INTRP POSTPROCES: CPT | Mod: 26

## 2019-11-22 PROCEDURE — 76376 3D RENDER W/INTRP POSTPROCES: CPT

## 2019-11-22 PROCEDURE — 99284 EMERGENCY DEPT VISIT MOD MDM: CPT

## 2019-11-22 PROCEDURE — 90471 IMMUNIZATION ADMIN: CPT | Mod: 25

## 2019-11-22 RX ORDER — TETANUS TOXOID, REDUCED DIPHTHERIA TOXOID AND ACELLULAR PERTUSSIS VACCINE, ADSORBED 5; 2.5; 8; 8; 2.5 [IU]/.5ML; [IU]/.5ML; UG/.5ML; UG/.5ML; UG/.5ML
0.5 SUSPENSION INTRAMUSCULAR ONCE
Refills: 0 | Status: COMPLETED | OUTPATIENT
Start: 2019-11-22 | End: 2019-11-22

## 2019-11-22 RX ORDER — BACITRACIN ZINC 500 UNIT/G
1 OINTMENT IN PACKET (EA) TOPICAL ONCE
Refills: 0 | Status: COMPLETED | OUTPATIENT
Start: 2019-11-22 | End: 2019-11-22

## 2019-11-22 RX ADMIN — Medication 1 APPLICATION(S): at 20:26

## 2019-11-22 RX ADMIN — TETANUS TOXOID, REDUCED DIPHTHERIA TOXOID AND ACELLULAR PERTUSSIS VACCINE, ADSORBED 0.5 MILLILITER(S): 5; 2.5; 8; 8; 2.5 SUSPENSION INTRAMUSCULAR at 20:23

## 2019-11-22 NOTE — ED STATDOCS - PROGRESS NOTE DETAILS
Patient initially seen and evaluated with ED attending at intake.  Ambulating without assistance.  +lac to R lip repaired.  CT with no acute traumatic injury.  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  Abnormality seen on CT likely from her surgery.  Eccymosis around R eye with no acute fracture, no pain to eye, no pain with EOM.  Patient denies any visual changes.  Reviewed symptom management for injuries, follow up and return precautions -Lisseth Pacheco PA-C Patient initially seen and evaluated with ED attending at intake.  Ambulating without assistance.  +lac to R lip repaired.  CT with no acute traumatic injury.  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  Abnormality seen on CT likely from her surgery.  Eccymosis around R eye with no acute fracture, no pain to eye, no pain with EOM.  Patient denies any visual changes.  IOP R eye is 14, IOP left eye is 13.  Augmentin to be given as the lacerations were caused by her teeth going into her lip.  Reviewed symptom management for injuries, follow up and return precautions -Lisseth Pacheco PA-C

## 2019-11-22 NOTE — ED STATDOCS - OBJECTIVE STATEMENT
78 y/o female with PMHx of skin CA, GERD, breast CA, OA, and spinal stenosis presents to the ED s/p trip and fall. No LOC, epistaxis, or fever. Was ambulatory after fall. Has +laceration to lower lip and +ecchymosis right eye. Reports her cracked tooth is old. Denies blood thinners. NKDA. PCP: Dr. Eren Reinoso.

## 2019-11-22 NOTE — ED ADULT TRIAGE NOTE - CHIEF COMPLAINT QUOTE
c/o trip and fall from standing position, hit face on concrete floor, denies LOC, pt states she does not take anticoagulants, jagged edge laceration to lower lip, bleeding controlled in triage, pt at baseline mental status in triage

## 2019-11-22 NOTE — ED STATDOCS - NSFOLLOWUPINSTRUCTIONS_ED_ALL_ED_FT
SUTURES SHOULD BE REMOVED IN 3-5 DAYS.  APPLY BACITRACIN OVER THE AREA 2-3X A DAY.  APPLY ICE TO ANY AREAS THAT ARE SWELLING.      Laceration    WHAT YOU NEED TO KNOW:    A laceration is an injury to the skin and the soft tissue underneath it. Lacerations happen when you are cut or hit by something. They can happen anywhere on the body.     DISCHARGE INSTRUCTIONS:    Return to the emergency department if:     You have heavy bleeding or bleeding that does not stop after 10 minutes of holding firm, direct pressure over the wound.       Your wound opens up.     Contact your healthcare provider if:     You have a fever or chills.       Your laceration is red, warm, or swollen.      You have red streaks on your skin coming from your wound.      You have white or yellow drainage from the wound that smells bad.      You have pain that gets worse, even after treatment.       You have questions or concerns about your condition or care.     Medicines:     Prescription pain medicine may be given. Ask how to take this medicine safely.       Antibiotics help treat or prevent a bacterial infection.       Take your medicine as directed. Contact your healthcare provider if you think your medicine is not helping or if you have side effects. Tell him or her if you are allergic to any medicine. Keep a list of the medicines, vitamins, and herbs you take. Include the amounts, and when and why you take them. Bring the list or the pill bottles to follow-up visits. Carry your medicine list with you in case of an emergency.    Care for your wound as directed:     Do not get your wound wet until your healthcare provider says it is okay. Do not soak your wound in water. Do not go swimming until your healthcare provider says it is okay. Carefully wash the wound with soap and water. Gently pat the area dry or allow it to air dry.       Change your bandages when they get wet, dirty, or after washing. Apply new, clean bandages as directed. Do not apply elastic bandages or tape too tight. Do not put powders or lotions over your incision.       Apply antibiotic ointment as directed. Your healthcare provider may give you antibiotic ointment to put over your wound if you have stitches. If you have strips of tape over your incision, let them dry up and fall off on their own. If they do not fall off within 14 days, gently remove them. If you have glue over your wound, do not remove or pick at it. If your glue comes off, do not replace it with glue that you have at home.       Check your wound every day for signs of infection such as swelling, redness, or pus.     Self-care:     Apply ice on your wound for 15 to 20 minutes every hour or as directed. Use an ice pack, or put crushed ice in a plastic bag. Cover it with a towel. Ice helps prevent tissue damage and decreases swelling and pain.      Use a splint as directed. A splint will decrease movement and stress on your wound. It may help it heal faster. A splint may be used for lacerations over joints or areas of your body that bend. Ask your healthcare provider how to apply and remove a splint.       Decrease scarring of your wound by applying ointments as directed. Do not apply ointments until your healthcare provider says it is okay. You may need to wait until your wound is healed. Ask which ointment to buy and how often to use it. After your wound is healed, use sunscreen over the area when you are out in the sun. You should do this for at least 6 months to 1 year after your injury.     Follow up with your healthcare provider as directed: You may need to follow up in 24 to 48 hours to have your wound checked for infection. You will need to return in 3 to 14 days if you have stitches or staples so they can be removed. Care for your wound as directed to prevent infection and help it heal. Write down your questions so you remember to ask them during your visits.

## 2019-11-22 NOTE — ED STATDOCS - CLINICAL SUMMARY MEDICAL DECISION MAKING FREE TEXT BOX
80 y/o female with PMHx of skin CA, GERD, breast CA, OA, and spinal stenosis presents to the ED s/p mechanical trip and fall with facial injury and lower lip laceration not crossing vermillion border. Will obtain CT max face, give Tdap, cleanse and repair wounds. Declining pain medication at this time.

## 2019-11-22 NOTE — ED STATDOCS - PATIENT PORTAL LINK FT
You can access the FollowMyHealth Patient Portal offered by [facility] by registering at the following website: [email or web address]. By joining [email or web address]’s FollowMyHealth portal, you will also be able to view your health information using other applications (apps) compatible with our system.

## 2019-11-22 NOTE — ED STATDOCS - NS_ ATTENDINGSCRIBEDETAILS _ED_A_ED_FT
Lisa Doherty MD: The history, relevant review of systems, past medical and surgical history, medical decision making, and physical examination was documented by the scribe in my presence and I attest to the accuracy of the documentation.

## 2019-11-22 NOTE — ED STATDOCS - ENMT, MLM
+swelling of right lower lip with healed laceration to inner lower lip and irregularly shaped laceration to exterior portion of right lower lip. +chronic broken teeth

## 2019-11-22 NOTE — ED PROCEDURE NOTE - CPROC ED WOUND CLOSURE1
1 6-0 nylon suture placed in lateral laceration, 2 6-0 nylon sutures placed in more medial laceration/skin suture

## 2020-02-06 NOTE — ASU DISCHARGE PLAN (ADULT/PEDIATRIC). - ACCOMPANYING ADULT'S SIGNATURE_______________________________________
"""Reassured patient that intraocular pressures (IOPs) are at target levels and other ocular findings are stable. Continue present management and/or medication(s).  Follow up as directed. """ Statement Selected

## 2020-08-17 ENCOUNTER — EMERGENCY (EMERGENCY)
Facility: HOSPITAL | Age: 80
LOS: 0 days | Discharge: HOME | End: 2020-08-17
Attending: HOSPITALIST
Payer: MEDICARE

## 2020-08-17 ENCOUNTER — TRANSCRIPTION ENCOUNTER (OUTPATIENT)
Age: 80
End: 2020-08-17

## 2020-08-17 VITALS
SYSTOLIC BLOOD PRESSURE: 135 MMHG | HEART RATE: 70 BPM | OXYGEN SATURATION: 96 % | TEMPERATURE: 98 F | DIASTOLIC BLOOD PRESSURE: 68 MMHG | RESPIRATION RATE: 18 BRPM

## 2020-08-17 VITALS — WEIGHT: 164.91 LBS | HEIGHT: 63 IN

## 2020-08-17 DIAGNOSIS — I10 ESSENTIAL (PRIMARY) HYPERTENSION: ICD-10-CM
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
## 2020-08-17 DIAGNOSIS — S00.81XA ABRASION OF OTHER PART OF HEAD, INITIAL ENCOUNTER: ICD-10-CM

## 2020-08-17 DIAGNOSIS — R01.1 CARDIAC MURMUR, UNSPECIFIED: ICD-10-CM

## 2020-08-17 DIAGNOSIS — C50.919 MALIGNANT NEOPLASM OF UNSPECIFIED SITE OF UNSPECIFIED FEMALE BREAST: ICD-10-CM
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
## 2020-08-17 DIAGNOSIS — G62.9 POLYNEUROPATHY, UNSPECIFIED: ICD-10-CM

## 2020-08-17 DIAGNOSIS — E11.40 TYPE 2 DIABETES MELLITUS WITH DIABETIC NEUROPATHY, UNSPECIFIED: ICD-10-CM

## 2020-08-17 DIAGNOSIS — K21.9 GASTRO-ESOPHAGEAL REFLUX DISEASE WITHOUT ESOPHAGITIS: ICD-10-CM

## 2020-08-17 DIAGNOSIS — S62.101B FRACTURE OF UNSPECIFIED CARPAL BONE, RIGHT WRIST, INITIAL ENCOUNTER FOR OPEN FRACTURE: ICD-10-CM

## 2020-08-17 DIAGNOSIS — S52.501B UNSPECIFIED FRACTURE OF THE LOWER END OF RIGHT RADIUS, INITIAL ENCOUNTER FOR OPEN FRACTURE TYPE I OR II: ICD-10-CM

## 2020-08-17 DIAGNOSIS — Z87.442 PERSONAL HISTORY OF URINARY CALCULI: ICD-10-CM

## 2020-08-17 DIAGNOSIS — D50.9 IRON DEFICIENCY ANEMIA, UNSPECIFIED: ICD-10-CM

## 2020-08-17 LAB
ADD ON TEST-SPECIMEN IN LAB: SIGNIFICANT CHANGE UP
ALBUMIN SERPL ELPH-MCNC: 3.7 G/DL — SIGNIFICANT CHANGE UP (ref 3.3–5)
ALP SERPL-CCNC: 93 U/L — SIGNIFICANT CHANGE UP (ref 40–120)
ALT FLD-CCNC: 25 U/L — SIGNIFICANT CHANGE UP (ref 12–78)
ANION GAP SERPL CALC-SCNC: 9 MMOL/L — SIGNIFICANT CHANGE UP (ref 5–17)
APPEARANCE UR: CLEAR — SIGNIFICANT CHANGE UP
APTT BLD: 30.9 SEC — SIGNIFICANT CHANGE UP (ref 27.5–35.5)
AST SERPL-CCNC: 20 U/L — SIGNIFICANT CHANGE UP (ref 15–37)
BASE EXCESS BLDV CALC-SCNC: 1.2 MMOL/L — SIGNIFICANT CHANGE UP (ref -2–2)
BASOPHILS # BLD AUTO: 0.12 K/UL — SIGNIFICANT CHANGE UP (ref 0–0.2)
BASOPHILS NFR BLD AUTO: 1.8 % — SIGNIFICANT CHANGE UP (ref 0–2)
BILIRUB SERPL-MCNC: 0.4 MG/DL — SIGNIFICANT CHANGE UP (ref 0.2–1.2)
BILIRUB UR-MCNC: NEGATIVE — SIGNIFICANT CHANGE UP
BUN SERPL-MCNC: 13 MG/DL — SIGNIFICANT CHANGE UP (ref 7–23)
CALCIUM SERPL-MCNC: 10 MG/DL — SIGNIFICANT CHANGE UP (ref 8.5–10.1)
CHLORIDE SERPL-SCNC: 102 MMOL/L — SIGNIFICANT CHANGE UP (ref 96–108)
CO2 SERPL-SCNC: 25 MMOL/L — SIGNIFICANT CHANGE UP (ref 22–31)
COLOR SPEC: YELLOW — SIGNIFICANT CHANGE UP
CREAT SERPL-MCNC: 0.67 MG/DL — SIGNIFICANT CHANGE UP (ref 0.5–1.3)
DIFF PNL FLD: ABNORMAL
EOSINOPHIL # BLD AUTO: 0.03 K/UL — SIGNIFICANT CHANGE UP (ref 0–0.5)
EOSINOPHIL NFR BLD AUTO: 0.4 % — SIGNIFICANT CHANGE UP (ref 0–6)
GAS PNL BLDV: SIGNIFICANT CHANGE UP
GLUCOSE SERPL-MCNC: 111 MG/DL — HIGH (ref 70–99)
GLUCOSE UR QL: NEGATIVE MG/DL — SIGNIFICANT CHANGE UP
HCO3 BLDV-SCNC: 24 MMOL/L — SIGNIFICANT CHANGE UP (ref 21–29)
HCT VFR BLD CALC: 35.8 % — SIGNIFICANT CHANGE UP (ref 34.5–45)
HGB BLD-MCNC: 12.5 G/DL — SIGNIFICANT CHANGE UP (ref 11.5–15.5)
IMM GRANULOCYTES NFR BLD AUTO: 0.3 % — SIGNIFICANT CHANGE UP (ref 0–1.5)
INR BLD: 1.01 RATIO — SIGNIFICANT CHANGE UP (ref 0.88–1.16)
KETONES UR-MCNC: ABNORMAL
LACTATE SERPL-SCNC: 1.5 MMOL/L — SIGNIFICANT CHANGE UP (ref 0.7–2)
LEUKOCYTE ESTERASE UR-ACNC: NEGATIVE — SIGNIFICANT CHANGE UP
LIDOCAIN IGE QN: 41 U/L — LOW (ref 73–393)
LYMPHOCYTES # BLD AUTO: 1.57 K/UL — SIGNIFICANT CHANGE UP (ref 1–3.3)
LYMPHOCYTES # BLD AUTO: 23.3 % — SIGNIFICANT CHANGE UP (ref 13–44)
MCHC RBC-ENTMCNC: 34.9 GM/DL — SIGNIFICANT CHANGE UP (ref 32–36)
MCHC RBC-ENTMCNC: 37.7 PG — HIGH (ref 27–34)
MCV RBC AUTO: 107.8 FL — HIGH (ref 80–100)
MONOCYTES # BLD AUTO: 0.88 K/UL — SIGNIFICANT CHANGE UP (ref 0–0.9)
MONOCYTES NFR BLD AUTO: 13.1 % — SIGNIFICANT CHANGE UP (ref 2–14)
NEUTROPHILS # BLD AUTO: 4.11 K/UL — SIGNIFICANT CHANGE UP (ref 1.8–7.4)
NEUTROPHILS NFR BLD AUTO: 61.1 % — SIGNIFICANT CHANGE UP (ref 43–77)
NITRITE UR-MCNC: POSITIVE
PCO2 BLDV: 32 MMHG — LOW (ref 35–50)
PH BLDV: 7.48 — HIGH (ref 7.35–7.45)
PH UR: 7 — SIGNIFICANT CHANGE UP (ref 5–8)
PLATELET # BLD AUTO: 359 K/UL — SIGNIFICANT CHANGE UP (ref 150–400)
PO2 BLDV: 78 MMHG — HIGH (ref 25–45)
POTASSIUM SERPL-MCNC: 3.8 MMOL/L — SIGNIFICANT CHANGE UP (ref 3.5–5.3)
POTASSIUM SERPL-SCNC: 3.8 MMOL/L — SIGNIFICANT CHANGE UP (ref 3.5–5.3)
PROT SERPL-MCNC: 7.9 GM/DL — SIGNIFICANT CHANGE UP (ref 6–8.3)
PROT UR-MCNC: NEGATIVE MG/DL — SIGNIFICANT CHANGE UP
PROTHROM AB SERPL-ACNC: 11.8 SEC — SIGNIFICANT CHANGE UP (ref 10.6–13.6)
RBC # BLD: 3.32 M/UL — LOW (ref 3.8–5.2)
RBC # FLD: 13.7 % — SIGNIFICANT CHANGE UP (ref 10.3–14.5)
SAO2 % BLDV: 96 % — HIGH (ref 67–88)
SARS-COV-2 RNA SPEC QL NAA+PROBE: SIGNIFICANT CHANGE UP
SODIUM SERPL-SCNC: 136 MMOL/L — SIGNIFICANT CHANGE UP (ref 135–145)
SP GR SPEC: 1.01 — SIGNIFICANT CHANGE UP (ref 1.01–1.02)
UROBILINOGEN FLD QL: NEGATIVE MG/DL — SIGNIFICANT CHANGE UP
WBC # BLD: 6.73 K/UL — SIGNIFICANT CHANGE UP (ref 3.8–10.5)
WBC # FLD AUTO: 6.73 K/UL — SIGNIFICANT CHANGE UP (ref 3.8–10.5)

## 2020-08-17 PROCEDURE — 72170 X-RAY EXAM OF PELVIS: CPT

## 2020-08-17 PROCEDURE — 99283 EMERGENCY DEPT VISIT LOW MDM: CPT

## 2020-08-17 PROCEDURE — 86870 RBC ANTIBODY IDENTIFICATION: CPT

## 2020-08-17 PROCEDURE — 72125 CT NECK SPINE W/O DYE: CPT | Mod: 26

## 2020-08-17 PROCEDURE — 85730 THROMBOPLASTIN TIME PARTIAL: CPT

## 2020-08-17 PROCEDURE — 73130 X-RAY EXAM OF HAND: CPT | Mod: 26,RT

## 2020-08-17 PROCEDURE — 80053 COMPREHEN METABOLIC PANEL: CPT

## 2020-08-17 PROCEDURE — 73090 X-RAY EXAM OF FOREARM: CPT | Mod: 26,RT

## 2020-08-17 PROCEDURE — 72170 X-RAY EXAM OF PELVIS: CPT | Mod: 26

## 2020-08-17 PROCEDURE — 70450 CT HEAD/BRAIN W/O DYE: CPT | Mod: 26

## 2020-08-17 PROCEDURE — 86900 BLOOD TYPING SEROLOGIC ABO: CPT

## 2020-08-17 PROCEDURE — 86850 RBC ANTIBODY SCREEN: CPT

## 2020-08-17 PROCEDURE — 83690 ASSAY OF LIPASE: CPT

## 2020-08-17 PROCEDURE — 86880 COOMBS TEST DIRECT: CPT

## 2020-08-17 PROCEDURE — 96375 TX/PRO/DX INJ NEW DRUG ADDON: CPT | Mod: XU

## 2020-08-17 PROCEDURE — 85025 COMPLETE CBC W/AUTO DIFF WBC: CPT

## 2020-08-17 PROCEDURE — 81001 URINALYSIS AUTO W/SCOPE: CPT

## 2020-08-17 PROCEDURE — 70450 CT HEAD/BRAIN W/O DYE: CPT

## 2020-08-17 PROCEDURE — 96374 THER/PROPH/DIAG INJ IV PUSH: CPT | Mod: XU

## 2020-08-17 PROCEDURE — 99285 EMERGENCY DEPT VISIT HI MDM: CPT | Mod: 25

## 2020-08-17 PROCEDURE — 85610 PROTHROMBIN TIME: CPT

## 2020-08-17 PROCEDURE — 73110 X-RAY EXAM OF WRIST: CPT | Mod: 59,RT

## 2020-08-17 PROCEDURE — 82803 BLOOD GASES ANY COMBINATION: CPT

## 2020-08-17 PROCEDURE — 12001 RPR S/N/AX/GEN/TRNK 2.5CM/<: CPT | Mod: XU

## 2020-08-17 PROCEDURE — 25600 CLTX DST RDL FX/EPHYS SEP WO: CPT | Mod: RT

## 2020-08-17 PROCEDURE — 83605 ASSAY OF LACTIC ACID: CPT

## 2020-08-17 PROCEDURE — 73110 X-RAY EXAM OF WRIST: CPT | Mod: 26,RT,76

## 2020-08-17 PROCEDURE — 73090 X-RAY EXAM OF FOREARM: CPT | Mod: RT

## 2020-08-17 PROCEDURE — 96365 THER/PROPH/DIAG IV INF INIT: CPT | Mod: XU

## 2020-08-17 PROCEDURE — 73130 X-RAY EXAM OF HAND: CPT | Mod: RT

## 2020-08-17 PROCEDURE — 72125 CT NECK SPINE W/O DYE: CPT

## 2020-08-17 PROCEDURE — 87635 SARS-COV-2 COVID-19 AMP PRB: CPT

## 2020-08-17 PROCEDURE — 86901 BLOOD TYPING SEROLOGIC RH(D): CPT

## 2020-08-17 PROCEDURE — 36415 COLL VENOUS BLD VENIPUNCTURE: CPT

## 2020-08-17 RX ORDER — CEPHALEXIN 500 MG
1 CAPSULE ORAL
Qty: 14 | Refills: 0
Start: 2020-08-17 | End: 2020-08-23

## 2020-08-17 RX ORDER — TETANUS TOXOID, REDUCED DIPHTHERIA TOXOID AND ACELLULAR PERTUSSIS VACCINE, ADSORBED 5; 2.5; 8; 8; 2.5 [IU]/.5ML; [IU]/.5ML; UG/.5ML; UG/.5ML; UG/.5ML
0.5 SUSPENSION INTRAMUSCULAR ONCE
Refills: 0 | Status: COMPLETED | OUTPATIENT
Start: 2020-08-17 | End: 2020-08-17

## 2020-08-17 RX ORDER — MORPHINE SULFATE 50 MG/1
2 CAPSULE, EXTENDED RELEASE ORAL ONCE
Refills: 0 | Status: DISCONTINUED | OUTPATIENT
Start: 2020-08-17 | End: 2020-08-17

## 2020-08-17 RX ORDER — CEFAZOLIN SODIUM 1 G
2000 VIAL (EA) INJECTION ONCE
Refills: 0 | Status: COMPLETED | OUTPATIENT
Start: 2020-08-17 | End: 2020-08-17

## 2020-08-17 RX ORDER — CEPHALEXIN 500 MG
1 CAPSULE ORAL
Qty: 28 | Refills: 0
Start: 2020-08-17 | End: 2020-08-23

## 2020-08-17 RX ADMIN — MORPHINE SULFATE 2 MILLIGRAM(S): 50 CAPSULE, EXTENDED RELEASE ORAL at 16:10

## 2020-08-17 RX ADMIN — Medication 100 MILLIGRAM(S): at 14:30

## 2020-08-17 RX ADMIN — Medication 2000 MILLIGRAM(S): at 15:00

## 2020-08-17 RX ADMIN — MORPHINE SULFATE 2 MILLIGRAM(S): 50 CAPSULE, EXTENDED RELEASE ORAL at 15:57

## 2020-08-17 NOTE — ED ADULT NURSE NOTE - CHIEF COMPLAINT QUOTE
open wrist fx from a fall this am. did not hit head. isolated wrist injury. sent for reduction with ortho.

## 2020-08-17 NOTE — ED PROVIDER NOTE - OBJECTIVE STATEMENT
73 y/o F with a PMHx of breast ca on chemo currently presents to ED s/p fall off of one step landing on right hand while walking dog. Pt able to move fingers, has a hx of neuropathy. Not on blood thinners. Denies any other pain. Pt got herself up and went back into house. Pt is left handed. PMD: Dr. Jerez.

## 2020-08-17 NOTE — ED PROVIDER NOTE - SKIN, MLM
Skin normal color for race, warm, dry and intact. No evidence of rash. +abrasion to left temporal area (old)

## 2020-08-17 NOTE — ED PROVIDER NOTE - CARE PROVIDER_API CALL
Nicholas Severino  ORTHOPAEDIC SURGERY  [address] [place] [address]  Phone: [phone]  Fax: [phone]  Follow Up Time: Urgent

## 2020-08-17 NOTE — ED ADULT NURSE NOTE - NSIMPLEMENTINTERV_GEN_ALL_ED
Implemented All Fall with Harm Risk Interventions:  [place] to call system. Call bell, personal items and telephone within reach. Instruct patient to call for assistance. Room bathroom lighting operational. Non-slip footwear when patient is off stretcher. Physically safe environment: no spills, clutter or unnecessary equipment. Stretcher in lowest position, wheels locked, appropriate side rails in place. Provide visual cue, wrist band, yellow gown, etc. Monitor gait and stability. Monitor for mental status changes and reorient to person, place, and time. Review medications for side effects contributing to fall risk. Reinforce activity limits and safety measures with patient and family. Provide visual clues: red socks.

## 2020-08-17 NOTE — CONSULT NOTE ADULT - SUBJECTIVE AND OBJECTIVE BOX
Orthopedics Consult Note:    This is a 80y Female who presents to the ED today with c/o right wrist wrist pain, swelling defromity, limited ROM and laceration ulnar wrist s/p trip and fall onto outstretched right hand climbing up a step today while walking her dog. Pt denies any other injuries. Pt denies head trauma or LOC. Pt denies any numbness, tingling or parethesias. Pt is LHD.    Pt received Ancef 2g IV in ED.     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VisitLink      Allergies:  No Known Allergies      Vital Signs:  T(C): 36.7 (08-17-20 @ 14:45), Max: 36.7 (08-17-20 @ 14:45)  HR: 77 (08-17-20 @ 14:45) (77 - 77)  BP: 136/61 (08-17-20 @ 14:45) (136/61 - 136/61)  RR: 18 (08-17-20 @ 14:45) (18 - 18)  SpO2: 96% (08-17-20 @ 14:45) (96% - 96%)    Labs:                        12.5   6.73  )-----------( 359      ( 17 Aug 2020 14:34 )             35.8   08-17    136  |  102  |  13  ----------------------------<  111<H>  3.8   |  25  |  0.67    Ca    10.0      17 Aug 2020 14:34    TPro  7.9  /  Alb  3.7  /  TBili  0.4  /  DBili  x   /  AST  20  /  ALT  25  /  AlkPhos  93  08-17    PT/INR - ( 17 Aug 2020 14:34 )   PT: 11.8 sec;   INR: 1.01 ratio         PTT - ( 17 Aug 2020 14:34 )  PTT:30.9 sec      Imaging:  X-rays of the right wrist demonstrate a dorsally displaced distal radius fracture.    PE right wrist:  +~2.5cm 'L-shaped' laceration over ulnar wrist, +deformity, +mild-moderate swelling, no ecchymosis, no erythema, skin intact, normothermic. +diffuse TTP. LROM 2' pain. Moving all fingers, sensation intact. Radial pulse 2+, cap refill <2secs.    Secondary Survey:  Right shoulder, elbow, LUE and B/L LEs with no swelling, no ecchymoses, no abrasions, no lacerations or any other signs of injury with full painless baseline ROM and no bony TTP. Able to SLR B/L LEs. No pain with axial loading and log roll B/L LEs, Sensation intact distally, moving all digits. Distal pulses intact.     Spine and ribs with no bony TTP.     Assessment:  80y Female with a right grade 2 open distal radius fracture s/p mechanical trip and fall today.    Procedure:  Ulnar wrist laceration copiously irrigated with betadine/normal saline mixture and again with saline. Wound edged prepped with ~8cc lidocaine and laceration closed using sterile technique and 4-0 nylon sutures. Wound dressed with xeroform and sterile 4x4.    Closed reduction of right wrist fracture and application of sugar tong splint performed after hematoma block with 10ccs of 1% lidocaine. Pt tolerated procedure well with improved pain, able to wiggle fingers, sensation intact, cap refill <2secs.    Plan:  Post-reduction of the right wrist demonstrate improved fracture alignment.  NWB RUE with sugar tong splint and sling.  Keep splint clean, dry and intact.  Pain control.  Ice application.  RUE elevation.  Recommend Tetanus shot.  Pt advised surgical fixation of right wrist fracture may be necessary.  Pt evaluated by Dr. Severino at bedside.    Remainder of plan to follow. Orthopedics Consult Note:    This is a 80y Female who presents to the ED today with c/o right wrist wrist pain, swelling defromity, limited ROM and laceration ulnar wrist s/p trip and fall onto outstretched right hand climbing up a step today while walking her dog. Pt denies any other injuries. Pt denies head trauma or LOC. Pt denies any numbness, tingling or parethesias. Pt is ambidextrous. Patient seen in ER with Dr. Severino    Pt received Ancef 2g IV in ED.     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[employer]      Allergies:  No Known Allergies      Vital Signs:  T(C): 36.7 (08-17-20 @ 14:45), Max: 36.7 (08-17-20 @ 14:45)  HR: 77 (08-17-20 @ 14:45) (77 - 77)  BP: 136/61 (08-17-20 @ 14:45) (136/61 - 136/61)  RR: 18 (08-17-20 @ 14:45) (18 - 18)  SpO2: 96% (08-17-20 @ 14:45) (96% - 96%)    Labs:                        12.5   6.73  )-----------( 359      ( 17 Aug 2020 14:34 )             35.8   08-17    136  |  102  |  13  ----------------------------<  111<H>  3.8   |  25  |  0.67    Ca    10.0      17 Aug 2020 14:34    TPro  7.9  /  Alb  3.7  /  TBili  0.4  /  DBili  x   /  AST  20  /  ALT  25  /  AlkPhos  93  08-17    PT/INR - ( 17 Aug 2020 14:34 )   PT: 11.8 sec;   INR: 1.01 ratio         PTT - ( 17 Aug 2020 14:34 )  PTT:30.9 sec      Imaging:  X-rays of the right wrist demonstrate a dorsally displaced distal radius fracture.    PE right wrist:  +~1.5cm 'L-shaped' laceration over ulnar wrist, +deformity, +mild-moderate swelling, no ecchymosis, no erythema, skin intact, normothermic. +diffuse TTP. LROM 2' pain. Moving all fingers, sensation intact. Radial pulse 2+, cap refill <2secs.    Secondary Survey:  Right shoulder, elbow, LUE and B/L LEs with no swelling, no ecchymoses, no abrasions, no lacerations or any other signs of injury with full painless baseline ROM and no bony TTP. Able to SLR B/L LEs. No pain with axial loading and log roll B/L LEs, Sensation intact distally, moving all digits. Distal pulses intact.     Spine and ribs with no bony TTP.     Assessment:  80y Female with a right grade 1/2 open distal radius fracture s/p mechanical trip and fall today.    Procedure:  Ulnar wrist laceration copiously irrigated with betadine/normal saline mixture and again with saline. Wound edged prepped with ~8cc lidocaine and laceration closed using sterile technique and 4-0 nylon sutures. Wound dressed with xeroform and sterile 4x4.    Closed reduction of right wrist fracture and application of sugar tong splint performed after hematoma block with 10ccs of 1% lidocaine. Pt tolerated procedure well with improved pain, able to wiggle fingers, sensation intact, cap refill <2secs.    Plan:  Post-reduction of the right wrist demonstrate improved fracture alignment.  NWB RUE with sugar tong splint and sling.  Keep splint clean, dry and intact.  Pain control.  Ice application.  RUE elevation.  Recommend Tetanus shot.  Pt advised surgical fixation of right wrist fracture may be necessary.  Pt evaluated by Dr. Severino at bedside.  She will see him tommorow morning for surgical planning. Orthopedics Consult Note:    This is a 80y Female who presents to the ED today with c/o right wrist wrist pain, swelling defromity, limited ROM and laceration ulnar wrist s/p trip and fall onto outstretched right hand climbing up a step today while walking her dog. Pt denies any other injuries. Pt denies head trauma or LOC. Pt denies any numbness, tingling or parethesias. Pt is ambidextrous. Patient seen in ER with Dr. Severino    Pt received Ancef 2g IV in ED.     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[employer]      Allergies:  No Known Allergies      Vital Signs:  T(C): 36.7 (08-17-20 @ 14:45), Max: 36.7 (08-17-20 @ 14:45)  HR: 77 (08-17-20 @ 14:45) (77 - 77)  BP: 136/61 (08-17-20 @ 14:45) (136/61 - 136/61)  RR: 18 (08-17-20 @ 14:45) (18 - 18)  SpO2: 96% (08-17-20 @ 14:45) (96% - 96%)    Labs:                        12.5   6.73  )-----------( 359      ( 17 Aug 2020 14:34 )             35.8   08-17    136  |  102  |  13  ----------------------------<  111<H>  3.8   |  25  |  0.67    Ca    10.0      17 Aug 2020 14:34    TPro  7.9  /  Alb  3.7  /  TBili  0.4  /  DBili  x   /  AST  20  /  ALT  25  /  AlkPhos  93  08-17    PT/INR - ( 17 Aug 2020 14:34 )   PT: 11.8 sec;   INR: 1.01 ratio         PTT - ( 17 Aug 2020 14:34 )  PTT:30.9 sec      Imaging:  X-rays of the right wrist demonstrate a dorsally displaced distal radius fracture.    PE right wrist:  +~1.5cm 'L-shaped' laceration over ulnar wrist, +deformity, +mild-moderate swelling, no ecchymosis, no erythema, skin intact, normothermic. +diffuse TTP. LROM 2' pain. Moving all fingers, sensation intact. Radial pulse 2+, cap refill <2secs.    Secondary Survey:  Right shoulder, elbow, LUE and B/L LEs with no swelling, no ecchymoses, no abrasions, no lacerations or any other signs of injury with full painless baseline ROM and no bony TTP. Able to SLR B/L LEs. No pain with axial loading and log roll B/L LEs, Sensation intact distally, moving all digits. Distal pulses intact.     Spine and ribs with no bony TTP.     Assessment:  80y Female with a right grade 1/2 open distal radius fracture s/p mechanical trip and fall today.    Procedure:  Ulnar wrist laceration copiously irrigated with betadine/normal saline mixture and again with saline. Wound edged prepped with ~8cc lidocaine and laceration closed using sterile technique and 4-0 nylon sutures. Wound dressed with xeroform and sterile 4x4.    Closed reduction of right wrist fracture and application of sugar tong splint performed after hematoma block with 10ccs of 1% lidocaine. Pt tolerated procedure well with improved pain, able to wiggle fingers, sensation intact, cap refill <2secs.    Plan:  Post-reduction of the right wrist demonstrate improved fracture alignment.  NWB RUE with sugar tong splint and sling.  Keep splint clean, dry and intact.  Pain control.  Ice application.  RUE elevation.  Recommend Tetanus shot.  DC home with Keflex 500mg PO QID x 7 days.  Pt advised surgical fixation of right wrist fracture may be necessary.  Follow-up with Dr. Severino in the office tomorrow AM for surgical planning.     Pt evaluated with and plan as per Dr. Severino.

## 2020-08-17 NOTE — ED ADULT TRIAGE NOTE - CHIEF COMPLAINT QUOTE
open wrist fx open wrist fx from a fall this am. did not hit head. isolated wrist injury. sent for reduction with ortho.

## 2020-08-17 NOTE — ED PROVIDER NOTE - PROGRESS NOTE DETAILS
Pt s/o to me.  Care assumed from Dr. Hairston.  Pt with wrist fx. Reduced by ortho in ED.  To f/u Dr. Severino.  NVI.  To start on abx.  Remainder of imaging unremarkable.  Pt comfortable with d/c home.  D/c home with strict return precautions and prompt outpatient f/u.  Sony Capellan M.D.

## 2020-08-17 NOTE — ED PROVIDER NOTE - NS_ ATTENDINGSCRIBEDETAILS _ED_A_ED_FT
Nicky Hairston MD: The history, relevant review of systems, past medical and surgical history, medical decision making, and physical examination was documented by the scribe in my presence and I attest to the accuracy of the documentation.

## 2020-08-17 NOTE — ED ADULT NURSE NOTE - OBJECTIVE STATEMENT
pt presents to ED with complaint sof right arm pain. pt went to urgent care s/p trip and fall today. pt states she was walking her dog when she fell. pt had x rays which showed fracture and pt was sent to ED for open fracture care. right arm deformity noted. 18 g placed to left AC. labs sent.

## 2020-08-17 NOTE — ED PROVIDER NOTE - PATIENT PORTAL LINK FT
You can access the FollowMyHealth Patient Portal offered by [facility] by registering at the following website: [email or web address]. By joining [employer]’s FollowMyHealth portal, you will also be able to view your health information using other applications (apps) compatible with our system.

## 2020-08-17 NOTE — ED PROVIDER NOTE - MUSCULOSKELETAL, MLM
Spine appears normal, range of motion is not limited. +obvious deformity of right wrist with lac to ulnar aspect  3cm with some fat protrusion

## 2020-08-17 NOTE — ED PROVIDER NOTE - CLINICAL SUMMARY MEDICAL DECISION MAKING FREE TEXT BOX
73 y/o left handed female s/p mechanical trip and fall down one step, landing on right wrist with obvious deformity and laceration. Trauma activated. Ancef, labs, imaging, orthopedics. tdap utd.

## 2020-11-01 NOTE — ED ADULT NURSE NOTE - PMH
- Subjective


Subjective: 


Overnight, again developed a headache and was given toradol which relieved her 

symptoms. She feels as though hyperglycemia is triggering her migraines. On 

initial examination today, she denied any additional complaints including CP, 

SOB, abdominal pain, edema. A short time later we were called back for an 

episode of chest pain and bradycardia to the 50s. EKG was obtained revealing 

sinus bradycardia and resolved on its own. Pt states she thought she was having 

some anxiety when her vitals were checked and she was noted to be bradycardic. 

She describes the pain as sharp, stabbing central pain that does not radiate. S

he denies any abdominal pain, sour taste in the mouth. Her headache is also 

improving at this time and she feels she would like to go home.





- Objective


Vital Signs & Weight: 


                             Vital Signs (12 hours)











  Pulse Ox


 


 10/31/20 20:00  96








                                     Weight











Admit Weight                   100.698 kg


 


Weight                         100.698 kg














I&O: 


                                        











 10/30/20 10/31/20 11/01/20





 06:59 06:59 05:59


 


Intake Total  340 1451


 


Balance  340 1451











Result Diagrams: 


                                 10/30/20 10:20





                                 10/30/20 10:20


EKG Reviewed by me: Yes (sinus bradycardia)





Phys Exam





- Physical Examination


Constitutional: NAD


HEENT: moist MMs, sclera anicteric


Neck: supple


Respiratory: no wheezing, no rales, no rhonchi, clear to auscultation bilateral


Cardiovascular: RRR, no significant murmur


Gastrointestinal: soft, non-tender, no distention, positive bowel sounds


Musculoskeletal: no edema


Neurological: non-focal, moves all 4 limbs


Psychiatric: normal affect, A&O x 3


Skin: no rash





Dx/Plan





- Plan


Plan: 





23yo w/ hx of MS presents with HA, N/V and weakness of L leg





#Acute exacerbation of MS


-dx of MS, currently not treated due to lack of insurance


-Brain CT: no acute process, MRI c/w MS and actively demylinating plaque in L 

posterior frontal lobe


-neurology consulted from ED, appreciate recs


- methylprednisolone given x 3 doses, will discontinue after 3rd dose today


- will need outpatient neurology f/u, CM consulted





#Migraine


-received HA cocktail in ED


-takes excedrin migraine at home


-tylenol prn, excedrin migraine prn


- toradol overnight resolved headache


- will discontinue further use of toradol due to suspected reflux/possible 

gastritis





#Hyperglycemia


Likely 2/2 high dose steroids


- f/u Hg1c to r/o pre-diabetes


- will monitor accuchecks this afternoon, possible DC home if not elevating 

further





#nausea/vomiting


-zofran prn


-diet as tolerated


-consider IVF if not able to tolerate po





#Anxiety


-aware, patient not on medication





PCP: none


Code: Full


Diet: Regular


IVF: SL


DVT ppx: lovenox





Dispo: Admit inpatient medical, neurology consulted, appreciate recs. Likely DC 

to home later today or tomorrow pending BS/headache. 





Addendum - Attending





- Attending Attestation


Date/Time: 11/01/20 5601





I personally evaluated the patient and discussed the management with Dr. Landers. 


I agree with the History, Examination, Assessment and Plan documented above with

any addition or exceptions noted below.





Patient feels well except for when her blood sugar is increased, then has 

headaches. She feels her neuro status is improving. Continue PT, last dose of 

Solumedrol today. Possible dc today or tomorrow. Needs CM assistance for 

medication assistance programs. Basal cell carcinoma in situ of skin  removed from neck  Bilateral malignant neoplasm of breast in female, unspecified site of breast  b/l  Gastroesophageal reflux disease without esophagitis    History of macular degeneration  bilateral  Insomnia, unspecified type    Malignant neoplasm of left female breast, unspecified site of breast  with Lymph node involvement  Neoplasm by body site  left anterior chest wall  Obesity    Osteoarthritis of both knees, unspecified osteoarthritis type    Spinal stenosis of lumbar region    Urinary frequency

## 2020-12-24 ENCOUNTER — EMERGENCY (EMERGENCY)
Facility: HOSPITAL | Age: 80
LOS: 0 days | Discharge: HOME | End: 2020-12-25
Attending: EMERGENCY MEDICINE
Payer: MEDICARE

## 2020-12-24 VITALS
SYSTOLIC BLOOD PRESSURE: 131 MMHG | TEMPERATURE: 98 F | RESPIRATION RATE: 18 BRPM | HEIGHT: 64 IN | OXYGEN SATURATION: 95 % | DIASTOLIC BLOOD PRESSURE: 58 MMHG | HEART RATE: 78 BPM

## 2020-12-24 DIAGNOSIS — Z98.890 OTHER SPECIFIED POSTPROCEDURAL STATES: Chronic | ICD-10-CM

## 2020-12-24 DIAGNOSIS — M17.0 BILATERAL PRIMARY OSTEOARTHRITIS OF KNEE: ICD-10-CM

## 2020-12-24 DIAGNOSIS — Z82.3 FAMILY HISTORY OF STROKE: ICD-10-CM

## 2020-12-24 DIAGNOSIS — K21.9 GASTRO-ESOPHAGEAL REFLUX DISEASE WITHOUT ESOPHAGITIS: ICD-10-CM

## 2020-12-24 DIAGNOSIS — Z90.12 ACQUIRED ABSENCE OF LEFT BREAST AND NIPPLE: Chronic | ICD-10-CM

## 2020-12-24 DIAGNOSIS — Z20.828 CONTACT WITH AND (SUSPECTED) EXPOSURE TO OTHER VIRAL COMMUNICABLE DISEASES: ICD-10-CM
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
## 2020-12-24 DIAGNOSIS — S00.91XA ABRASION OF UNSPECIFIED PART OF HEAD, INITIAL ENCOUNTER: ICD-10-CM

## 2020-12-24 DIAGNOSIS — Z85.828 PERSONAL HISTORY OF OTHER MALIGNANT NEOPLASM OF SKIN: ICD-10-CM

## 2020-12-24 DIAGNOSIS — D04.9 CARCINOMA IN SITU OF SKIN, UNSPECIFIED: Chronic | ICD-10-CM

## 2020-12-24 DIAGNOSIS — Z23 ENCOUNTER FOR IMMUNIZATION: ICD-10-CM

## 2020-12-24 DIAGNOSIS — Z90.11 ACQUIRED ABSENCE OF RIGHT BREAST AND NIPPLE: Chronic | ICD-10-CM

## 2020-12-24 DIAGNOSIS — S00.01XA ABRASION OF SCALP, INITIAL ENCOUNTER: ICD-10-CM
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
## 2020-12-24 DIAGNOSIS — S00.03XA CONTUSION OF SCALP, INITIAL ENCOUNTER: ICD-10-CM

## 2020-12-24 DIAGNOSIS — Z85.3 PERSONAL HISTORY OF MALIGNANT NEOPLASM OF BREAST: ICD-10-CM

## 2020-12-24 PROCEDURE — U0003: CPT

## 2020-12-24 PROCEDURE — 72125 CT NECK SPINE W/O DYE: CPT | Mod: 26

## 2020-12-24 PROCEDURE — 90471 IMMUNIZATION ADMIN: CPT

## 2020-12-24 PROCEDURE — 70450 CT HEAD/BRAIN W/O DYE: CPT | Mod: 26

## 2020-12-24 PROCEDURE — 99284 EMERGENCY DEPT VISIT MOD MDM: CPT | Mod: 25

## 2020-12-24 PROCEDURE — 72125 CT NECK SPINE W/O DYE: CPT

## 2020-12-24 PROCEDURE — 90715 TDAP VACCINE 7 YRS/> IM: CPT

## 2020-12-24 PROCEDURE — 99284 EMERGENCY DEPT VISIT MOD MDM: CPT

## 2020-12-24 PROCEDURE — 70450 CT HEAD/BRAIN W/O DYE: CPT

## 2020-12-25 VITALS
DIASTOLIC BLOOD PRESSURE: 53 MMHG | HEART RATE: 75 BPM | TEMPERATURE: 98 F | OXYGEN SATURATION: 96 % | SYSTOLIC BLOOD PRESSURE: 115 MMHG | RESPIRATION RATE: 17 BRPM

## 2020-12-25 LAB — SARS-COV-2 RNA SPEC QL NAA+PROBE: SIGNIFICANT CHANGE UP

## 2020-12-25 RX ORDER — TETANUS TOXOID, REDUCED DIPHTHERIA TOXOID AND ACELLULAR PERTUSSIS VACCINE, ADSORBED 5; 2.5; 8; 8; 2.5 [IU]/.5ML; [IU]/.5ML; UG/.5ML; UG/.5ML; UG/.5ML
0.5 SUSPENSION INTRAMUSCULAR ONCE
Refills: 0 | Status: COMPLETED | OUTPATIENT
Start: 2020-12-25 | End: 2020-12-25

## 2020-12-25 RX ORDER — ACETAMINOPHEN 500 MG
650 TABLET ORAL ONCE
Refills: 0 | Status: COMPLETED | OUTPATIENT
Start: 2020-12-25 | End: 2020-12-25

## 2020-12-25 RX ADMIN — Medication 650 MILLIGRAM(S): at 00:55

## 2020-12-25 RX ADMIN — TETANUS TOXOID, REDUCED DIPHTHERIA TOXOID AND ACELLULAR PERTUSSIS VACCINE, ADSORBED 0.5 MILLILITER(S): 5; 2.5; 8; 8; 2.5 SUSPENSION INTRAMUSCULAR at 00:55

## 2020-12-25 NOTE — ED ADULT NURSE NOTE - OBJECTIVE STATEMENT
Pt presents to the ED s/p witnessed mechanical fall at home. +ETOH. +headstrike, unknown LOC, -thinners. Pt states she "must have drank too much" and fell during dorina americo in the kitchen. pt with laceration to head. Alert and oriented, ambulatory with assistance. Offers no complaints at this time. All safety measures in place and call bell within reach. Will continue to monitor.

## 2020-12-25 NOTE — ED PROVIDER NOTE - ENMT, MLM
Airway patent, Nasal mucosa clear. Mouth with normal mucosa. Throat has no vesicles, no oropharyngeal exudates and uvula is midline. left parietal scalp swelling approx 7 cm with overlying abrasion no active bleeeding scabbed

## 2020-12-25 NOTE — ED PROVIDER NOTE - CARE PLAN
Principal Discharge DX:	Contusion of scalp, initial encounter  Secondary Diagnosis:	Scalp abrasion, initial encounter

## 2020-12-25 NOTE — ED PROVIDER NOTE - OBJECTIVE STATEMENT
81 yo female pw left sided head swelling with abrasion s/p mechanical fall. pt states she had a couple of glasses of alcohol tonight. pt denies loc or headache or hip/back pain

## 2020-12-25 NOTE — ED ADULT NURSE NOTE - NSIMPLEMENTINTERV_GEN_ALL_ED
Implemented All Fall Risk Interventions:  [place] to call system. Call bell, personal items and telephone within reach. Instruct patient to call for assistance. Room bathroom lighting operational. Non-slip footwear when patient is off stretcher. Physically safe environment: no spills, clutter or unnecessary equipment. Stretcher in lowest position, wheels locked, appropriate side rails in place. Provide visual cue, wrist band, yellow gown, etc. Monitor gait and stability. Monitor for mental status changes and reorient to person, place, and time. Review medications for side effects contributing to fall risk. Reinforce activity limits and safety measures with patient and family.

## 2020-12-25 NOTE — ED PROVIDER NOTE - PATIENT PORTAL LINK FT
You can access the FollowMyHealth Patient Portal offered by [facility] by registering at the following website: [email or web address]. By joining [employer]’s FollowMyHealth portal, you will also be able to view your health information using other applications (apps) compatible with our system.

## 2020-12-25 NOTE — ED PROVIDER NOTE - NSFOLLOWUPINSTRUCTIONS_ED_ALL_ED_FT
rest  tylenol for headache  follow up with your oncologist for the findings on the ct head  ice to area, keep head elevated

## 2021-02-18 NOTE — ASU PATIENT PROFILE, ADULT - MENTAL HEALTH CONDITIONS/SYMPTOMS, PROFILE
IC HOSPITALIST PROGRESS NOTE    Subjective    The patient knows where he is and what year it is. He denies any pain, states his breathing feels good and his edema has improved.  He is planning to go to [facility] rehab upon discharge.    Objective    Vitals  Vital Signs (last 24 hrs)_____ Last Charted___________Minimum____________ Maximum____________  Temp    L 97.5 (DEC 05 11:14) L 97.2 (DEC 04 16:18) L 97.7 (DEC 04 19:29)  Heart Rate   65  (DEC 05 11:14) L 50 (DEC 04 16:18) 77  (DEC 05 03:32)  Resp Rate       16  (DEC 05 11:14) 16  (DEC 05 11:14) H 24 (DEC 04 16:18)  SBP    94  (DEC 05 11:14) 94  (DEC 05 11:14) 117  (DEC 04 16:18)  DBP    66  (DEC 05 11:14) 64  (DEC 04 19:29) 79  (DEC 04 16:18)    Physical Examination  GENERAL: Alert, no acute distress  SKIN: Warm, dry, weeping edema to BLE  EYES: PERRL, EOMI  ENT: oral mucosa moist  CARDIOVASCULAR: Irregular rate and rhythm  RESPIRATORY: Diminished breath sounds at the lung bases, no crackles  GASTROINTESTINAL: Soft, nontender, non distended, normal BS  MUSCULOSKELETAL: + 1 edema  NEUROLOGIC: A&O x 4, no focal neuro deficits, no facial droop or pronator drift  HEAD: Normocephalic atraumatic    Laboratory Data/Radiology/Microbiology  Labs (Last four charted values)  WBC                  6.5 (NOV 30) 6.4 (NOV 28)   Hgb                  14.3 (NOV 30) 15.1 (NOV 28)   Hct                  45 (NOV 30) 47 (NOV 28)   Plt                  152 (NOV 30) 164 (NOV 28)   Na                   L 135 (DEC 05) L 133 (DEC 04) L 135 (DEC 03) L 134 (DEC 02)   K                    3.6 (DEC 05) 3.9 (DEC 04) 4.7 (DEC 03) 4.1 (DEC 02)   CO2                  26 (DEC 05) 24 (DEC 04) 23 (DEC 03) 23 (DEC 02)   Cl                   L 96 (DEC 05) L 96 (DEC 04) 99 (DEC 03) 100 (DEC 02)   Cr                   1.18 (DEC 05) H 1.37 (DEC 04) H 1.40 (DEC 03) H 1.35 (DEC 02)   BUN                  H 42 (DEC 05) H 37 (DEC 04) H 36 (DEC 03) H 36 (DEC 02)   Glucose              H 128 (DEC  05) H 130 (DEC 04) H 118 (DEC 03) H 120 (DEC 02)   Mg                   1.8 (DEC 05) 1.9 (DEC 04) 1.9 (DEC 03) 2.2 (DEC 02)   Phos                 3.1 (NOV 30)   Ca                   9.5 (DEC 05) 9.6 (DEC 04) 9.6 (DEC 03) 9.1 (DEC 02)     ASSESSMENT AND PLAN    Acute on chronic decompensated systolic heart failure, with cardiomyopathy   CXR (11/28) shows suspected atelectasis at the bases, and cardiomegaly    US Venous Doppler (11/29) unremarkable   ECHO (11/30) showed EF 15-20% , mild RV enlargement with mild/moderate RV abnormalities, moderate MR/TR, mild to moderate Pulmonary HTN   Oxygenating on room air   BNP was 3,045 as of 11/28   Transition to PO Bumex 2 mg BID and continue PO metalazone    Continue with Metoprolol and Aldactone.   Monitoring I&Os, daily weights, continue fluid restriction   PT/OT as able; recommending daily skilled therapy   Cardiology (Dr. Santos) following; can transition to PO diuresis. Hopefully discharge tomorrow.  Obtain records from [place] and [facility]   Physiatry (Dr. Monsalve) on consult, recommending subacute rehab at time of d/c.      Chronic Atrial Fibrillation   HR intermittently bradycardic over last 24 hours   Continue AC w/ Xarelto   Continue with metoprolol   Monitoring on telemetry     Hypertension   BP stable over last 24 hours   Continue w/ metoprolol    Monitoring VS closely     Acute Kidney Injury (Baseline 0.75-1)   Creatinine 1.37 --> 1.18   Monitoring renal function, avoid nephrotoxins     Asymptomatic bacteriuria   U/A (11/28) shows 1+ LE, 1-5 WBC, moderate bacteria   Monitoring off of antibiotics    Hypokalemia   Na 133 --> 135   Monitoring BMP     Weakness and Deconditioning - continue with PT/OT as tolerated     Hand Injury - Continue w/ Tylenol PRN    Hyperlipidemia- continue w/ simvastatin  Cardiomyopathy with hx of prior infarction - ECHO as above. Continuing medical management as per Cardio  Heart Murmur- continue metoprolol and Xarelto  BPH - Continue w/  Finasteride     DVT PPx: Xarelto, SCDs  GI PPx: PO Protonix    PCP: Dr. John Castro    Disposition: Pending clinical improvement; possibly tomorrow per cardiology. D/w dr. Interiano Will go [facility] for rehab at d/c.     All labs and imaging studies have been reviewed.  All patient questions have been answered.    Charting performed by Betito Youngblood for Dr. Sheela Albert    All medical record entries made by the scribe were at my direction. I have reviewed the chart and agree that the record accurately reflects my personal performance of the history, physical exam, hospital course, and assessment and plan.             Electronically Signed On 12.05.2018 14:10  ___________________________________________________   Bety BLAIR, Sheela White     none

## 2021-06-11 NOTE — ED PROVIDER NOTE - CPE EDP [facility] NORM
saxenda was denied. HIP will not pay for weight loss medication according to letter of denial. This is scanned in chart     Do you wish to stay on phentermine?  He is messaging he has to get the weight off but, he has been on phentermine for years    normal...

## 2021-08-05 ENCOUNTER — INPATIENT (INPATIENT)
Facility: HOSPITAL | Age: 81
LOS: 1 days | Discharge: HOME HEALTH | DRG: 87 | End: 2021-08-07
Attending: NEUROLOGICAL SURGERY | Admitting: NEUROLOGICAL SURGERY
Payer: MEDICARE

## 2021-08-05 VITALS
HEART RATE: 84 BPM | SYSTOLIC BLOOD PRESSURE: 169 MMHG | OXYGEN SATURATION: 100 % | RESPIRATION RATE: 18 BRPM | TEMPERATURE: 98 F | DIASTOLIC BLOOD PRESSURE: 84 MMHG | HEIGHT: 64 IN

## 2021-08-05 DIAGNOSIS — Z98.890 OTHER SPECIFIED POSTPROCEDURAL STATES: Chronic | ICD-10-CM

## 2021-08-05 DIAGNOSIS — D04.9 CARCINOMA IN SITU OF SKIN, UNSPECIFIED: Chronic | ICD-10-CM

## 2021-08-05 DIAGNOSIS — Z90.12 ACQUIRED ABSENCE OF LEFT BREAST AND NIPPLE: Chronic | ICD-10-CM

## 2021-08-05 DIAGNOSIS — Z90.11 ACQUIRED ABSENCE OF RIGHT BREAST AND NIPPLE: Chronic | ICD-10-CM

## 2021-08-05 LAB
ALBUMIN SERPL ELPH-MCNC: 3.3 G/DL — SIGNIFICANT CHANGE UP (ref 3.3–5)
ALP SERPL-CCNC: 106 U/L — SIGNIFICANT CHANGE UP (ref 40–120)
ALT FLD-CCNC: 21 U/L — SIGNIFICANT CHANGE UP (ref 12–78)
ANION GAP SERPL CALC-SCNC: 3 MMOL/L — LOW (ref 5–17)
AST SERPL-CCNC: 20 U/L — SIGNIFICANT CHANGE UP (ref 15–37)
BASOPHILS # BLD AUTO: 0.11 K/UL — SIGNIFICANT CHANGE UP (ref 0–0.2)
BASOPHILS NFR BLD AUTO: 2.4 % — HIGH (ref 0–2)
BILIRUB SERPL-MCNC: 0.2 MG/DL — SIGNIFICANT CHANGE UP (ref 0.2–1.2)
BUN SERPL-MCNC: 16 MG/DL — SIGNIFICANT CHANGE UP (ref 7–23)
CALCIUM SERPL-MCNC: 9.2 MG/DL — SIGNIFICANT CHANGE UP (ref 8.5–10.1)
CHLORIDE SERPL-SCNC: 105 MMOL/L — SIGNIFICANT CHANGE UP (ref 96–108)
CO2 SERPL-SCNC: 30 MMOL/L — SIGNIFICANT CHANGE UP (ref 22–31)
CREAT SERPL-MCNC: 0.63 MG/DL — SIGNIFICANT CHANGE UP (ref 0.5–1.3)
EOSINOPHIL # BLD AUTO: 0.17 K/UL — SIGNIFICANT CHANGE UP (ref 0–0.5)
EOSINOPHIL NFR BLD AUTO: 3.8 % — SIGNIFICANT CHANGE UP (ref 0–6)
GLUCOSE SERPL-MCNC: 102 MG/DL — HIGH (ref 70–99)
HCT VFR BLD CALC: 36.1 % — SIGNIFICANT CHANGE UP (ref 34.5–45)
HGB BLD-MCNC: 12.3 G/DL — SIGNIFICANT CHANGE UP (ref 11.5–15.5)
IMM GRANULOCYTES NFR BLD AUTO: 0.9 % — SIGNIFICANT CHANGE UP (ref 0–1.5)
LYMPHOCYTES # BLD AUTO: 1.66 K/UL — SIGNIFICANT CHANGE UP (ref 1–3.3)
LYMPHOCYTES # BLD AUTO: 37 % — SIGNIFICANT CHANGE UP (ref 13–44)
MCHC RBC-ENTMCNC: 34.1 GM/DL — SIGNIFICANT CHANGE UP (ref 32–36)
MCHC RBC-ENTMCNC: 38.6 PG — HIGH (ref 27–34)
MCV RBC AUTO: 113.2 FL — HIGH (ref 80–100)
MONOCYTES # BLD AUTO: 0.4 K/UL — SIGNIFICANT CHANGE UP (ref 0–0.9)
MONOCYTES NFR BLD AUTO: 8.9 % — SIGNIFICANT CHANGE UP (ref 2–14)
NEUTROPHILS # BLD AUTO: 2.11 K/UL — SIGNIFICANT CHANGE UP (ref 1.8–7.4)
NEUTROPHILS NFR BLD AUTO: 47 % — SIGNIFICANT CHANGE UP (ref 43–77)
PLATELET # BLD AUTO: 439 K/UL — HIGH (ref 150–400)
POTASSIUM SERPL-MCNC: 4 MMOL/L — SIGNIFICANT CHANGE UP (ref 3.5–5.3)
POTASSIUM SERPL-SCNC: 4 MMOL/L — SIGNIFICANT CHANGE UP (ref 3.5–5.3)
PROT SERPL-MCNC: 7.2 GM/DL — SIGNIFICANT CHANGE UP (ref 6–8.3)
RBC # BLD: 3.19 M/UL — LOW (ref 3.8–5.2)
RBC # FLD: 14 % — SIGNIFICANT CHANGE UP (ref 10.3–14.5)
SODIUM SERPL-SCNC: 138 MMOL/L — SIGNIFICANT CHANGE UP (ref 135–145)
TROPONIN I SERPL-MCNC: <0.015 NG/ML — SIGNIFICANT CHANGE UP (ref 0.01–0.04)
WBC # BLD: 4.49 K/UL — SIGNIFICANT CHANGE UP (ref 3.8–10.5)
WBC # FLD AUTO: 4.49 K/UL — SIGNIFICANT CHANGE UP (ref 3.8–10.5)

## 2021-08-05 PROCEDURE — 99291 CRITICAL CARE FIRST HOUR: CPT

## 2021-08-05 PROCEDURE — 93010 ELECTROCARDIOGRAM REPORT: CPT

## 2021-08-05 PROCEDURE — 71045 X-RAY EXAM CHEST 1 VIEW: CPT | Mod: 26

## 2021-08-05 PROCEDURE — 72125 CT NECK SPINE W/O DYE: CPT | Mod: 26,MA

## 2021-08-05 PROCEDURE — 70450 CT HEAD/BRAIN W/O DYE: CPT | Mod: 26,MA

## 2021-08-05 RX ORDER — NICARDIPINE HYDROCHLORIDE 30 MG/1
5 CAPSULE, EXTENDED RELEASE ORAL
Qty: 40 | Refills: 0 | Status: DISCONTINUED | OUTPATIENT
Start: 2021-08-05 | End: 2021-08-07

## 2021-08-05 RX ORDER — TETANUS TOXOID, REDUCED DIPHTHERIA TOXOID AND ACELLULAR PERTUSSIS VACCINE, ADSORBED 5; 2.5; 8; 8; 2.5 [IU]/.5ML; [IU]/.5ML; UG/.5ML; UG/.5ML; UG/.5ML
0.5 SUSPENSION INTRAMUSCULAR ONCE
Refills: 0 | Status: COMPLETED | OUTPATIENT
Start: 2021-08-05 | End: 2021-08-05

## 2021-08-05 RX ORDER — ONDANSETRON 8 MG/1
4 TABLET, FILM COATED ORAL ONCE
Refills: 0 | Status: COMPLETED | OUTPATIENT
Start: 2021-08-05 | End: 2021-08-05

## 2021-08-05 RX ADMIN — TETANUS TOXOID, REDUCED DIPHTHERIA TOXOID AND ACELLULAR PERTUSSIS VACCINE, ADSORBED 0.5 MILLILITER(S): 5; 2.5; 8; 8; 2.5 SUSPENSION INTRAMUSCULAR at 22:35

## 2021-08-05 NOTE — ED ADULT NURSE NOTE - OBJECTIVE STATEMENT
Pt A&Ox3, BIBEMS s/p unwitnessed fall at home. Pt found on floor by family. Laceration noted to left head, bleeding controlled. Pt denies blood thinner use, pain, CP, SOB.

## 2021-08-05 NOTE — ED PROVIDER NOTE - OBJECTIVE STATEMENT
80 y/o F with a PMHx of basal cell carcinoma, GERD, B/L malignant neoplasm of breast, macular degeneration B/L, insomnia, obesity, OA, spinal stenosis presents to the ED s/p presumed syncope. Pt was in other room when family heard a thud and found pt on the floor. Unclear LOC. Pt does not remember falling. Denies being on blood thinners. Hx is limited due to pt's condition.

## 2021-08-05 NOTE — ED PROVIDER NOTE - PRINCIPAL DIAGNOSIS
Intraparenchymal hematoma of brain with loss of consciousness of 30 minutes or less, unspecified laterality, initial encounter

## 2021-08-05 NOTE — ED ADULT NURSE NOTE - NSIMPLEMENTINTERV_GEN_ALL_ED
Detail Level: Simple Implemented All Fall Risk Interventions:  [place] to call system. Call bell, personal items and telephone within reach. Instruct patient to call for assistance. Room bathroom lighting operational. Non-slip footwear when patient is off stretcher. Physically safe environment: no spills, clutter or unnecessary equipment. Stretcher in lowest position, wheels locked, appropriate side rails in place. Provide visual cue, wrist band, yellow gown, etc. Monitor gait and stability. Monitor for mental status changes and reorient to person, place, and time. Review medications for side effects contributing to fall risk. Reinforce activity limits and safety measures with patient and family.

## 2021-08-05 NOTE — ED PROVIDER NOTE - CLINICAL SUMMARY MEDICAL DECISION MAKING FREE TEXT BOX
82 y/o F with a PMHx of basal cell carcinoma, GERD, B/L malignant neoplasm of breast, macular degeneration B/L, insomnia, obesity, OA, spinal stenosis presents to the ED s/p syncope vs. fall. Will call neuro alert. Obtain blood work. EKG. CT and reassess.

## 2021-08-05 NOTE — ED PROVIDER NOTE - SKIN, MLM
Skin normal color for race, warm, dry. No evidence of rash. swelling to L parietal area of head with small abrasion

## 2021-08-05 NOTE — ED ADULT TRIAGE NOTE - CHIEF COMPLAINT QUOTE
pt BIBEMS s/p unwitnessed fall at home. pt found of floor by family. laceration to L side of head, bleeding controlled. pt is not on blood thinners. GCS 14. pt is awake and alert. NA called. MD Pastor at bedside.

## 2021-08-05 NOTE — ED PROVIDER NOTE - PROGRESS NOTE DETAILS
Sukhjinder Robles: Neurosurgery is consulted. Sukhjinder Patel for Dr. Robles : CT scans reviews, no call from radiology. Concern for posttraumatic petechiae bleed. Neurosurgery paged and will reassess. cxr wet read: no pna. pt being signed out to oncoming MD, awaiting nsg eval Pt being admitted to ICU under service of Dr. Mitchell as she is on Nicardipine drip.  Neuro exam unchanged.  Estuardo Cordova, DO Pt evaluated by ICU, repeat CT unchanged.  Pt taken off Nicardipine drip and is stable for tele or step down unit.  Estuardo Cordova, DO

## 2021-08-05 NOTE — ED PROVIDER NOTE - CARE PLAN
Principal Discharge DX:	Intraparenchymal hematoma of brain with loss of consciousness of 30 minutes or less, unspecified laterality, initial encounter

## 2021-08-05 NOTE — ED PROVIDER NOTE - CRITICAL CARE ATTENDING CONTRIBUTION TO CARE
Nury ROSALES M.D. independently provided 35 minutes of critical care including but not limited to talking to consultants, speaking with patient and family and reviewing lab and radiology results.

## 2021-08-06 DIAGNOSIS — S06.361A TRAUMATIC HEMORRHAGE OF CEREBRUM, UNSPECIFIED, WITH LOSS OF CONSCIOUSNESS OF 30 MINUTES OR LESS, INITIAL ENCOUNTER: ICD-10-CM

## 2021-08-06 LAB
ALBUMIN SERPL ELPH-MCNC: 3 G/DL — LOW (ref 3.3–5)
ALP SERPL-CCNC: 89 U/L — SIGNIFICANT CHANGE UP (ref 40–120)
ALT FLD-CCNC: 19 U/L — SIGNIFICANT CHANGE UP (ref 12–78)
ANION GAP SERPL CALC-SCNC: 5 MMOL/L — SIGNIFICANT CHANGE UP (ref 5–17)
AST SERPL-CCNC: 17 U/L — SIGNIFICANT CHANGE UP (ref 15–37)
BASOPHILS # BLD AUTO: 0 K/UL — SIGNIFICANT CHANGE UP (ref 0–0.2)
BASOPHILS NFR BLD AUTO: 0 % — SIGNIFICANT CHANGE UP (ref 0–2)
BILIRUB SERPL-MCNC: 0.5 MG/DL — SIGNIFICANT CHANGE UP (ref 0.2–1.2)
BUN SERPL-MCNC: 13 MG/DL — SIGNIFICANT CHANGE UP (ref 7–23)
CALCIUM SERPL-MCNC: 9 MG/DL — SIGNIFICANT CHANGE UP (ref 8.5–10.1)
CHLORIDE SERPL-SCNC: 106 MMOL/L — SIGNIFICANT CHANGE UP (ref 96–108)
CO2 SERPL-SCNC: 28 MMOL/L — SIGNIFICANT CHANGE UP (ref 22–31)
COVID-19 SPIKE DOMAIN AB INTERP: POSITIVE
COVID-19 SPIKE DOMAIN ANTIBODY RESULT: >250 U/ML — HIGH
CREAT SERPL-MCNC: 0.59 MG/DL — SIGNIFICANT CHANGE UP (ref 0.5–1.3)
EOSINOPHIL # BLD AUTO: 0.05 K/UL — SIGNIFICANT CHANGE UP (ref 0–0.5)
EOSINOPHIL NFR BLD AUTO: 1 % — SIGNIFICANT CHANGE UP (ref 0–6)
GLUCOSE SERPL-MCNC: 118 MG/DL — HIGH (ref 70–99)
HCT VFR BLD CALC: 32.5 % — LOW (ref 34.5–45)
HGB BLD-MCNC: 10.9 G/DL — LOW (ref 11.5–15.5)
LYMPHOCYTES # BLD AUTO: 1.41 K/UL — SIGNIFICANT CHANGE UP (ref 1–3.3)
LYMPHOCYTES # BLD AUTO: 28 % — SIGNIFICANT CHANGE UP (ref 13–44)
MCHC RBC-ENTMCNC: 33.5 GM/DL — SIGNIFICANT CHANGE UP (ref 32–36)
MCHC RBC-ENTMCNC: 38 PG — HIGH (ref 27–34)
MCV RBC AUTO: 113.2 FL — HIGH (ref 80–100)
MONOCYTES # BLD AUTO: 0.25 K/UL — SIGNIFICANT CHANGE UP (ref 0–0.9)
MONOCYTES NFR BLD AUTO: 5 % — SIGNIFICANT CHANGE UP (ref 2–14)
NEUTROPHILS # BLD AUTO: 3.13 K/UL — SIGNIFICANT CHANGE UP (ref 1.8–7.4)
NEUTROPHILS NFR BLD AUTO: 62 % — SIGNIFICANT CHANGE UP (ref 43–77)
NRBC # BLD: SIGNIFICANT CHANGE UP /100 WBCS (ref 0–0)
PLATELET # BLD AUTO: 401 K/UL — HIGH (ref 150–400)
POTASSIUM SERPL-MCNC: 4.2 MMOL/L — SIGNIFICANT CHANGE UP (ref 3.5–5.3)
POTASSIUM SERPL-SCNC: 4.2 MMOL/L — SIGNIFICANT CHANGE UP (ref 3.5–5.3)
PROT SERPL-MCNC: 6.8 GM/DL — SIGNIFICANT CHANGE UP (ref 6–8.3)
RBC # BLD: 2.87 M/UL — LOW (ref 3.8–5.2)
RBC # FLD: 13.9 % — SIGNIFICANT CHANGE UP (ref 10.3–14.5)
SARS-COV-2 IGG+IGM SERPL QL IA: >250 U/ML — HIGH
SARS-COV-2 IGG+IGM SERPL QL IA: POSITIVE
SODIUM SERPL-SCNC: 139 MMOL/L — SIGNIFICANT CHANGE UP (ref 135–145)
TROPONIN I SERPL-MCNC: <0.015 NG/ML — SIGNIFICANT CHANGE UP (ref 0.01–0.04)
TSH SERPL-MCNC: 1.14 UU/ML — SIGNIFICANT CHANGE UP (ref 0.34–4.82)
WBC # BLD: 5.05 K/UL — SIGNIFICANT CHANGE UP (ref 3.8–10.5)
WBC # FLD AUTO: 5.05 K/UL — SIGNIFICANT CHANGE UP (ref 3.8–10.5)

## 2021-08-06 PROCEDURE — 74019 RADEX ABDOMEN 2 VIEWS: CPT

## 2021-08-06 PROCEDURE — 85027 COMPLETE CBC AUTOMATED: CPT

## 2021-08-06 PROCEDURE — 84443 ASSAY THYROID STIM HORMONE: CPT

## 2021-08-06 PROCEDURE — 80048 BASIC METABOLIC PNL TOTAL CA: CPT

## 2021-08-06 PROCEDURE — 99222 1ST HOSP IP/OBS MODERATE 55: CPT

## 2021-08-06 PROCEDURE — 93880 EXTRACRANIAL BILAT STUDY: CPT

## 2021-08-06 PROCEDURE — 36415 COLL VENOUS BLD VENIPUNCTURE: CPT

## 2021-08-06 PROCEDURE — 86769 SARS-COV-2 COVID-19 ANTIBODY: CPT

## 2021-08-06 PROCEDURE — 97162 PT EVAL MOD COMPLEX 30 MIN: CPT | Mod: GP

## 2021-08-06 PROCEDURE — 74019 RADEX ABDOMEN 2 VIEWS: CPT | Mod: 26

## 2021-08-06 PROCEDURE — 93306 TTE W/DOPPLER COMPLETE: CPT

## 2021-08-06 PROCEDURE — 70553 MRI BRAIN STEM W/O & W/DYE: CPT

## 2021-08-06 PROCEDURE — 99223 1ST HOSP IP/OBS HIGH 75: CPT

## 2021-08-06 PROCEDURE — 70450 CT HEAD/BRAIN W/O DYE: CPT

## 2021-08-06 PROCEDURE — A9579: CPT

## 2021-08-06 PROCEDURE — 70553 MRI BRAIN STEM W/O & W/DYE: CPT | Mod: 26

## 2021-08-06 PROCEDURE — 85025 COMPLETE CBC W/AUTO DIFF WBC: CPT

## 2021-08-06 PROCEDURE — 80053 COMPREHEN METABOLIC PANEL: CPT

## 2021-08-06 PROCEDURE — 97116 GAIT TRAINING THERAPY: CPT | Mod: GP

## 2021-08-06 PROCEDURE — 93010 ELECTROCARDIOGRAM REPORT: CPT

## 2021-08-06 PROCEDURE — 97530 THERAPEUTIC ACTIVITIES: CPT | Mod: GP

## 2021-08-06 PROCEDURE — 93880 EXTRACRANIAL BILAT STUDY: CPT | Mod: 26

## 2021-08-06 PROCEDURE — 70450 CT HEAD/BRAIN W/O DYE: CPT | Mod: 26

## 2021-08-06 PROCEDURE — 90471 IMMUNIZATION ADMIN: CPT

## 2021-08-06 PROCEDURE — 99291 CRITICAL CARE FIRST HOUR: CPT | Mod: 25

## 2021-08-06 RX ORDER — LANOLIN ALCOHOL/MO/W.PET/CERES
3 CREAM (GRAM) TOPICAL ONCE
Refills: 0 | Status: COMPLETED | OUTPATIENT
Start: 2021-08-06 | End: 2021-08-06

## 2021-08-06 RX ORDER — PALBOCICLIB 100 MG/1
125 CAPSULE ORAL DAILY
Refills: 0 | Status: DISCONTINUED | OUTPATIENT
Start: 2021-08-06 | End: 2021-08-06

## 2021-08-06 RX ORDER — OXYBUTYNIN CHLORIDE 5 MG
10 TABLET ORAL ONCE
Refills: 0 | Status: COMPLETED | OUTPATIENT
Start: 2021-08-06 | End: 2021-08-06

## 2021-08-06 RX ORDER — ACETAMINOPHEN 500 MG
650 TABLET ORAL EVERY 6 HOURS
Refills: 0 | Status: DISCONTINUED | OUTPATIENT
Start: 2021-08-06 | End: 2021-08-07

## 2021-08-06 RX ORDER — MIRABEGRON 50 MG/1
25 TABLET, EXTENDED RELEASE ORAL DAILY
Refills: 0 | Status: DISCONTINUED | OUTPATIENT
Start: 2021-08-06 | End: 2021-08-07

## 2021-08-06 RX ORDER — AMLODIPINE BESYLATE 2.5 MG/1
5 TABLET ORAL DAILY
Refills: 0 | Status: DISCONTINUED | OUTPATIENT
Start: 2021-08-06 | End: 2021-08-07

## 2021-08-06 RX ORDER — DULOXETINE HYDROCHLORIDE 30 MG/1
60 CAPSULE, DELAYED RELEASE ORAL DAILY
Refills: 0 | Status: DISCONTINUED | OUTPATIENT
Start: 2021-08-06 | End: 2021-08-07

## 2021-08-06 RX ORDER — ONDANSETRON 8 MG/1
4 TABLET, FILM COATED ORAL EVERY 6 HOURS
Refills: 0 | Status: DISCONTINUED | OUTPATIENT
Start: 2021-08-06 | End: 2021-08-07

## 2021-08-06 RX ORDER — PALBOCICLIB 100 MG/1
125 CAPSULE ORAL DAILY
Refills: 0 | Status: CANCELLED | OUTPATIENT
Start: 2021-08-22 | End: 2021-08-07

## 2021-08-06 RX ORDER — PALBOCICLIB 100 MG/1
125 CAPSULE ORAL DAILY
Refills: 0 | Status: DISCONTINUED | OUTPATIENT
Start: 2021-08-06 | End: 2021-08-07

## 2021-08-06 RX ORDER — ACETAMINOPHEN 500 MG
1000 TABLET ORAL ONCE
Refills: 0 | Status: COMPLETED | OUTPATIENT
Start: 2021-08-06 | End: 2021-08-06

## 2021-08-06 RX ADMIN — MIRABEGRON 25 MILLIGRAM(S): 50 TABLET, EXTENDED RELEASE ORAL at 16:32

## 2021-08-06 RX ADMIN — NICARDIPINE HYDROCHLORIDE 25 MG/HR: 30 CAPSULE, EXTENDED RELEASE ORAL at 00:24

## 2021-08-06 RX ADMIN — Medication 400 MILLIGRAM(S): at 00:25

## 2021-08-06 RX ADMIN — ONDANSETRON 4 MILLIGRAM(S): 8 TABLET, FILM COATED ORAL at 00:24

## 2021-08-06 RX ADMIN — PALBOCICLIB 125 MILLIGRAM(S): 100 CAPSULE ORAL at 16:34

## 2021-08-06 RX ADMIN — Medication 3 MILLIGRAM(S): at 23:31

## 2021-08-06 RX ADMIN — Medication 10 MILLIGRAM(S): at 02:48

## 2021-08-06 RX ADMIN — AMLODIPINE BESYLATE 5 MILLIGRAM(S): 2.5 TABLET ORAL at 13:44

## 2021-08-06 RX ADMIN — Medication 1000 MILLIGRAM(S): at 00:55

## 2021-08-06 RX ADMIN — DULOXETINE HYDROCHLORIDE 60 MILLIGRAM(S): 30 CAPSULE, DELAYED RELEASE ORAL at 16:32

## 2021-08-06 RX ADMIN — Medication 650 MILLIGRAM(S): at 13:44

## 2021-08-06 NOTE — H&P ADULT - NSHPPHYSICALEXAM_GEN_ALL_CORE
Constitutional: awake and alert.  HEENT: PERRLA, EOMI, Left occipital hematoma and abrasion noted   Neck: Supple. no pain to palp or w/ ROM   Respiratory: Breath sounds are clear bilaterally  Cardiovascular: S1 and S2, regular rhythm  Gastrointestinal: soft, nontender, distended   Extremities:  no edema  Musculoskeletal: no joint swelling/tenderness, no abnormal movements  Skin: No rashes    Neurological exam:  HF: A x O x 3. Appropriately interactive, normal affect. Speech fluent, No Aphasia or paraphasic errors. Naming /repetition intact   CN: POLLY, EOMI, VFF, facial sensation normal, no NLFD, tongue midline  Motor: No pronator drift, Strength 5/5 in all 4 ext, normal bulk and tone, no tremor, rigidity or bradykinesia.    Sens: Intact to light touch  Coord:  No FNFA, dysmetria,   Gait/Balance: Cannot test

## 2021-08-06 NOTE — H&P ADULT - NSICDXPASTSURGICALHX_GEN_ALL_CORE_FT
PAST SURGICAL HISTORY:  Cataract b/l    Early stage skin cancer basal cell carcinoma removed from left side of neck.    H/O bilateral breast biopsy     H/O colonoscopy multiple    H/O left mastectomy 2016    H/O mastectomy, right 1993 with immediate reconstruction with fat grafting    History of esophagogastroduodenoscopy (EGD)     History of lumpectomy of left breast 1990    Macular Hole repair b/l eyes    S/P left knee arthroscopy 2009

## 2021-08-06 NOTE — CONSULT NOTE ADULT - ASSESSMENT
81 year old female with small parietal hyperdenosity/ parenchymal hemorrhage who at this time is hemodynamically stable off nicardipine infusion with stable blood pressure, fully alert and oriented x3 with slight headache. Per neurosurgery no acute surgical intervention required for the parenchymal hemorrhage which is possible cavernoma who from a neurosurgical perspective is cleared to be admitted to stepdown.     At this time she dose not meet criteria for ICU admission given she is fully neurologic  intact without deficit fully A&Ox3 , not requiring nicardipine and per neurosurgery neuro checks do not need to be Q 1 hr . Recommend admitting the patient for observation. Medicine to take primary lead and complete syncope workup with neurosurgery following the parenchymal hemorrhage.  Please reconsult if blood pressure should begin to consistently stay high with goal BP less than 150     Time spent: 30 mins assessing presenting problems of acute illness that poses high probability  end organ damage/dysfunction.  Medical decision making inculding plan of daily care, reviewing data, reviewing radiology, discussing with multidisciplinary team, non inclusive of procedures, discussing goals of care with patient/family

## 2021-08-06 NOTE — CONSULT NOTE ADULT - ASSESSMENT
81 y.o. female with PMH breast Ca presented to the ER after having a syncopal event at home    1. Traumatic SAH - stable, cont neuro checks q4h, SBP<150    2. Syncope - unclear etiology - no events on telemetry, continue   - CUS, TTE, may need outpt monitoring    3. Hx of breast CA - resume home meds    4. Bladder incontinence - oxybutynin    Thank you for consult.  Will f/u 81 y.o. female with PMH breast Ca presented to the ER after having a syncopal event at home    1. Traumatic SAH - stable, cont neuro checks q4h, SBP<150    2. Syncope - unclear etiology - no events on telemetry, continue   - CUS, TTE, may need outpt monitoring    3. Hx of breast CA - resume home meds    4. Bladder incontinence - oxybutynin    P.S.: As discussed with daughter pt was walking when fell - likely tripped, if neg CUS/TTE  can be discharged home.    Thank you for consult.  Will f/u

## 2021-08-06 NOTE — ED ADULT NURSE REASSESSMENT NOTE - NS ED NURSE REASSESS COMMENT FT1
Pt A&Ox4, resting comfortably. Denies any pain, CP, SOB, headache. No N/V. VSS. Cardene gtt stopped for appropriate BP. ICU at the bedside assessing pt. Pending bed assignment.

## 2021-08-06 NOTE — PROGRESS NOTE ADULT - SUBJECTIVE AND OBJECTIVE BOX
HPI:  80 yo female with PMH breast Ca presented to the ER after having a syncopal event at home. pt states she was in her usual state of health prior to the event. Pt's daughter heard her fall and found pt on the floor. pt denies any CP, palpitations dizziness prior to synopsizing Pt c/o headache currently. Pt denies any numbness, tingling, weakness, Cp, SOB currently. Pt states she has synopsized in the past without known reason. Pt denies any cardiac history or a current cardiologist.   Head CT performed in the ER shows small high parietal hyperdensity/ parenchymal hemorrhage      8/6- Patient seen and examined in CICU, no acute events. Complains of headache no other new complaints.    Vital Signs Last 24 Hrs  T(C): 36.6 (06 Aug 2021 12:17), Max: 37.1 (06 Aug 2021 05:30)  T(F): 97.9 (06 Aug 2021 12:17), Max: 98.7 (06 Aug 2021 05:30)  HR: 72 (06 Aug 2021 12:00) (72 - 98)  BP: 148/70 (06 Aug 2021 12:00) (113/47 - 220/92)  BP(mean): 89 (06 Aug 2021 12:00) (64 - 122)  RR: 16 (06 Aug 2021 12:00) (16 - 24)  SpO2: 92% (06 Aug 2021 10:00) (91% - 100%)    MEDICATIONS  (STANDING):  niCARdipine Infusion 5 mG/Hr (25 mL/Hr) IV Continuous <Continuous>    MEDICATIONS  (PRN):  ondansetron Injectable 4 milliGRAM(s) IV Push every 6 hours PRN Nausea and/or Vomiting      PHYSICAL EXAM:  Constitutional: awake and alert  HEENT: PERRLA, EOMI,   Neck: Supple.  Respiratory: Breath sounds are clear bilaterally  Cardiovascular: S1 and S2, regular  rhythm  Gastrointestinal: soft, nontender  Extremities:  no edema  Vascular: Caritid Bruit - no  Musculoskeletal: no joint swelling/tenderness, no abnormal movements  Skin: No rashes    Neurological exam:  HF: A x O x 2-3 to person, time not place. Appropriately interactive, normal affect. Speech fluent, No Aphasia or paraphasic errors. Naming /repetition intact   CN: POLLY, EOMI, VFF, facial sensation normal, no NLFD, tongue midline, Palate moves equally, SCM equal bilaterally  Motor: No pronator drift, Strength 5/5 in all 4 ext, normal bulk and tone, no tremor, rigidity or bradykinesia.    Sens: Intact to light touch  Reflexes: Symmetric and normal, downgoing toes b/l  Coord:  No FNFA, dysmetria, JOVANNY intact   Gait/Balance: Cannot test            LABS:                         10.9   5.05  )-----------( 401      ( 06 Aug 2021 05:38 )             32.5     08-06    139  |  106  |  13  ----------------------------<  118<H>  4.2   |  28  |  0.59    Ca    9.0      06 Aug 2021 05:38    TPro  6.8  /  Alb  3.0<L>  /  TBili  0.5  /  DBili  x   /  AST  17  /  ALT  19  /  AlkPhos  89  08-06    LIVER FUNCTIONS - ( 06 Aug 2021 05:38 )  Alb: 3.0 g/dL / Pro: 6.8 gm/dL / ALK PHOS: 89 U/L / ALT: 19 U/L / AST: 17 U/L / GGT: x                 RADIOLOGY:  MR Head w/wo IV Cont (08.06.21 @ 11:22)  IMPRESSION:  Trace intracranial hemorrhage as above presumed posttraumatic

## 2021-08-06 NOTE — PROVIDER CONTACT NOTE (EICU) - RECOMMENDATIONS
- nicardipine drip to target SBP < 160  - follow up imaging to ensure stability  - ?underlying mass, may need MRI with pamela  - neuro checks  Discussed with bedside LOLA Pang.

## 2021-08-06 NOTE — CHART NOTE - NSCHARTNOTEFT_GEN_A_CORE
Patient has an estimated Caprini score of greater than 5.  However, the patient's unique clinical situation will be addressed in an individual manner to determine appropriate anticoagulation treatment, if any.

## 2021-08-06 NOTE — H&P ADULT - NSICDXFAMILYHX_GEN_ALL_CORE_FT
FAMILY HISTORY:  Father  Still living? No  Family history of temporal arteritis, Age at diagnosis: Age Unknown    Mother  Still living? No  Family history of stroke, Age at diagnosis: Age Unknown

## 2021-08-06 NOTE — PROVIDER CONTACT NOTE (EICU) - ASSESSMENT
Patient neurologically in tact as per ED attending.  Patient was apparently evaluated by neurosurgical service already.

## 2021-08-06 NOTE — ED ADULT NURSE REASSESSMENT NOTE - NS ED NURSE REASSESS COMMENT FT1
Pt resting comfortably, denies any pain. VSS. Respirations equal and unlabored. Report given to CICU. Pending transport. Pt updated on plan of care, verbalized understanding.

## 2021-08-06 NOTE — H&P ADULT - HISTORY OF PRESENT ILLNESS
80 yo female with PMH breast Ca presented to the ER after having a syncopal event at home. pt states she was in her usual state of health prior to the event. Pt's daughter heard her fall and found pt on the floor. pt denies any CP, palpitations dizziness prior to synopsizing Pt c/o headache currently. Pt denies any numbness, tingling, weakness, Cp, SOB currently. Pt states she has synopsized in the past without known reason. Pt denies any cardiac history or a current cardiologist.     Head CT performed in the ER shows small high parietal hyperdensity/ parenchymal hemorrhage

## 2021-08-06 NOTE — PHYSICAL THERAPY INITIAL EVALUATION ADULT - PERTINENT HX OF CURRENT PROBLEM, REHAB EVAL
HPI: 81 y.o. female with PMH breast Ca presented to the ER after having a syncopal event at home. pt states she was in her usual state of health prior to the event. Pt's daughter heard her fall and found pt on the floor. pt denies any CP, palpitations dizziness prior to synopsizing Pt c/o headache currently. MRI revealing: trace bleed

## 2021-08-06 NOTE — PHYSICAL THERAPY INITIAL EVALUATION ADULT - MODALITIES TREATMENT COMMENTS
The pt was received supine, eager to participate with PT. The pt responded well to transfer tx, ambulation tx, and therex review. The pt was in NAD at end of tx. CB, tray and phone in place.  Cont PT to increase functional mobility and improved efficiency with ADL's.

## 2021-08-06 NOTE — PROVIDER CONTACT NOTE (EICU) - SITUATION
80 yo female with hx of frequent falls, basal cell carcinoma, GERD, breast cancer, osteoarthritis, spinal stenosis, presented to the ED with a fall, possible syncope.  Found to have 7mm left parietal hemorrhage along with left scalp hematoma.  Blood pressure became elevated in the ED and patient started on nicardipine drip.

## 2021-08-06 NOTE — CONSULT NOTE ADULT - SUBJECTIVE AND OBJECTIVE BOX
CC: syncope (06 Aug 2021 13:02)    HPI: 81 y.o. female with PMH breast Ca presented to the ER after having a syncopal event at home. pt states she was in her usual state of health prior to the event. Pt's daughter heard her fall and found pt on the floor. pt denies any CP, palpitations dizziness prior to synopsizing Pt c/o headache currently. Pt denies any numbness, tingling, weakness, Cp, SOB currently. Pt states she has synopsized in the past without known reason. Pt denies any cardiac history or a current cardiologist.     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     PAST SURGICAL HISTORY:  Cataract b/l  Early stage skin cancer basal cell carcinoma removed from left side of neck.  H/O bilateral breast biopsy   H/O colonoscopy multiple  H/O left mastectomy 2016  H/O mastectomy, right 1993 with immediate reconstruction with fat grafting  History of esophagogastroduodenoscopy (EGD)   History of lumpectomy of left breast 1990  Macular Hole repair b/l eyes  S/P left knee arthroscopy 2009.     FAMILY HISTORY:  Family history of temporal arteritis father  Family history of stroke mother    SHx: occasional ETOh use, no IVDA, tobacco, resides with daughter    INTERVAL HPI/OVERNIGHT EVENTS: + mild HA, Other ROS reviewed and neg     Vital Signs Last 24 Hrs  T(C): 36.6 (06 Aug 2021 12:17), Max: 37.1 (06 Aug 2021 05:30)  T(F): 97.9 (06 Aug 2021 12:17), Max: 98.7 (06 Aug 2021 05:30)  HR: 72 (06 Aug 2021 12:00) (72 - 98)  BP: 148/70 (06 Aug 2021 12:00) (113/47 - 220/92)  BP(mean): 89 (06 Aug 2021 12:00) (64 - 122)  RR: 16 (06 Aug 2021 12:00) (16 - 24)  SpO2: 92% (06 Aug 2021 10:00) (91% - 100%)    CARDIAC MARKERS ( 06 Aug 2021 00:27 )  <0.015 ng/mL / x     / x     / x     / x      CARDIAC MARKERS ( 05 Aug 2021 20:55 )  <0.015 ng/mL / x     / x     / x     / x                            10.9   5.05  )-----------( 401      ( 06 Aug 2021 05:38 )             32.5     06 Aug 2021 05:38    139    |  106    |  13     ----------------------------<  118    4.2     |  28     |  0.59     Ca    9.0        06 Aug 2021 05:38    TPro  6.8    /  Alb  3.0    /  TBili  0.5    /  DBili  x      /  AST  17     /  ALT  19     /  AlkPhos  89     06 Aug 2021 05:38    LIVER FUNCTIONS - ( 06 Aug 2021 05:38 )  Alb: 3.0 g/dL / Pro: 6.8 gm/dL / ALK PHOS: 89 U/L / ALT: 19 U/L / AST: 17 U/L / GGT: x           MEDICATIONS  (STANDING):  niCARdipine Infusion 5 mG/Hr (25 mL/Hr) IV Continuous <Continuous>    MEDICATIONS  (PRN):  ondansetron Injectable 4 milliGRAM(s) IV Push every 6 hours PRN Nausea and/or Vomiting    RADIOLOGY & ADDITIONAL TESTS: personally visualized    All available previous medical records reviewed personally    PHYSICAL EXAM:    General: elderly female in no acute distress  Eyes: PERRLA, EOMI; conjunctiva and sclera clear  Head: Normocephalic; atraumatic  ENMT: No nasal discharge; airway clear  Neck: Supple; non tender; no masses  Respiratory: No wheezes, rales or rhonchi  Cardiovascular: Regular rate and rhythm. S1 and S2  Gastrointestinal: Soft non-tender non-distended; Normal bowel sounds  Genitourinary: No costovertebral angle tenderness  Extremities: Normal range of motion, No clubbing, cyanosis or edema  Vascular: Peripheral pulses palpable 2+ bilaterally  Neurological: Alert and oriented x4  Skin: Warm and dry.   Musculoskeletal: Normal tone, without deformities  Psychiatric: Cooperative and appropriate
  81y  Female  No Known Allergies    CC; Patient is a 81y old  Female who presents with a chief complaint of syncope and fall     HPI:  81 year old female with PMH breast Ca presented to the ER after having a syncopal event at home. Apparently her  daughter heard her fall and found pt on the floor and called EMS . On arrival to ER she denied palpitations or dizziness prior to synopsizing  but complained of headache. She denied and chest discomfort or SOB and reported having  syncope  in the past without known reason but Pt denied any cardiac history     Workup in the ER included a Head CT that showed a small high parietal hyperdenosity parenchymal hemorrhage and neurosurgery was called. The plan was liekly to admit to stepdown with medicine consult and while in the ER she became nauseous and vomited as well as hypertensive. Nicardipine was started and concern for increasing intracranial pressure was raised and she was taken to [facility] for repeat head CT and ICU consult was called.     By the time i was able to evaluate the patient in the ER she had the repeat CT brain which was unchanged from the previous, her mental  status was normal as she was fully awake alert and oriented to person place and time. Her blood pressure had normalized and had been off the nicardipine infusion for 90 mins with systolic blood pressure in the mid 120's to 130 and she had no real complaints or associated symptoms.     I was asked to evaluate her potential abdominal distension, my exam found her abdomin soft non tender distended but my interview with her found that her abdomin has been like this for many years. She has had in the past bilateral breast reconstructive surgery for breast cancer and states many years ado adipose tissue was taken from a region of the right lower quad and what appears to be a bulge has been there and is unchanged. On first glance it appears to be a ventral hernia but patient assures me it is normal for her.  She denies any bowel or urinary complaints /  issues. My clinical judgement is this dose not warrant a ct of the abdomin       PAST MEDICAL & SURGICAL HISTORY:  Bilateral malignant neoplasm of breast in female, unspecified site of breast  b/l  History of macular degeneration  bilateral  Gastroesophageal reflux disease without esophagitis  Osteoarthritis of both knees, unspecified osteoarthritis type  Spinal stenosis of lumbar region  Basal cell carcinoma in situ of skin  removed from neck  Malignant neoplasm of left female breast, unspecified site of breast  with Lymph node involvement  Insomnia, unspecified type  Urinary frequency  Neoplasm by body site  left anterior chest wall  Obesity  Cataract  b/l  Macular Hole  repair b/l eyes    S/P left knee arthroscopy  2009  Early stage skin cancer  basal cell carcinoma removed from left side of neck.  History of lumpectomy of left breast  1990  H/O mastectomy, right  1993 with immediate reconstruction with fat grafting  H/O colonoscopy  multiple  History of esophagogastroduodenoscopy (EGD)  H/O left mastectomy  2016  H/O bilateral breast biopsy      FAMILY HISTORY:  Family history of temporal arteritis (Father)  father    Family history of stroke (Mother)  mother    Vital Signs Last 24 Hrs  T(C): 36.7 (05 Aug 2021 20:50), Max: 36.7 (05 Aug 2021 20:50)  T(F): 98.1 (05 Aug 2021 20:50), Max: 98.1 (05 Aug 2021 20:50)  HR: 98 (06 Aug 2021 02:15) (84 - 98)  BP: 134/55 (06 Aug 2021 02:15) (113/47 - 220/92)  BP(mean): 75 (06 Aug 2021 02:15) (64 - 122)  RR: 19 (06 Aug 2021 02:05) (18 - 24)  SpO2: 92% (06 Aug 2021 02:05) (91% - 100%)      LABS  08-05    138  |  105  |  16  ----------------------------<  102<H>  4.0   |  30  |  0.63  Ca    9.2      05 Aug 2021 20:55  TPro  7.2  /  Alb  3.3  /  TBili  0.2  /  DBili  x   /  AST  20  /  ALT  21  /  AlkPhos  106  08-05                        12.3   4.49  )-----------( 439      ( 05 Aug 2021 20:55 )             36.1     CARDIAC MARKERS ( 06 Aug 2021 00:27 )  <0.015 ng/mL / x     / x     / x     / x      CARDIAC MARKERS ( 05 Aug 2021 20:55 )  <0.015 ng/mL / x     / x     / x     / x        LIVER FUNCTIONS - ( 05 Aug 2021 20:55 )  Alb: 3.3 g/dL / Pro: 7.2 gm/dL / ALK PHOS: 106 U/L / ALT: 21 U/L / AST: 20 U/L / GGT: x             MEDICATIONS  (STANDING):  niCARdipine Infusion off     REVIEW OF SYSTEMS:    CONSTITUTIONAL: No fever, weight loss, or fatigue  EYES: No eye pain, visual disturbances, or discharge  ENMT:  No difficulty hearing, tinnitus, vertigo; No sinus or throat pain  NECK: No pain or stiffness  BREASTS: No pain, masses, or nipple discharge  RESPIRATORY: No cough, wheezing, chills or hemoptysis; No shortness of breath  CARDIOVASCULAR: No chest pain, palpitations, dizziness, or leg swelling  GASTROINTESTINAL: No abdominal or epigastric pain. No nausea, vomiting, or hematemesis; No diarrhea or constipation. No melena or hematochezia.  GENITOURINARY: No dysuria, frequency, hematuria, or incontinence  NEUROLOGICAL: No headaches, memory loss, loss of strength, numbness, or tremors  SKIN: No itching, burning, rashes, or lesions   LYMPH NODES: No enlarged glands  ENDOCRINE: No heat or cold intolerance; No hair loss  MUSCULOSKELETAL: No joint pain or swelling; No muscle, back, or extremity pain  PSYCHIATRIC: No depression, anxiety, mood swings, or difficulty sleeping  HEME/LYMPH: No easy bruising, or bleeding gums  ALLERY AND IMMUNOLOGIC: No hives or eczema      Physicial Exam:     Constitutional: NAD, well-groomed, well-developed  HEENT: PERRLA, EOMI, no drainage or redness  Neck: No bruits; no thyromegaly or nodules,  No JVD  Back: Normal spine flexure, No CVA tenderness, No deformity or limitation of movement  Respiratory: Breath Sounds equal & clear to percussion & auscultation, no accessory muscle use  Cardiovascular: Regular rate & rhythm, normal S1, S2; no murmurs, gallops or rubs; no S3, S4  Gastrointestinal: Soft, non-tender, non distended no hepatosplenomegaly, normal bowel sounds  Extremities: No peripheral edema, No cyanosis, clubbing   Vascular: Equal and normal pulses: 2+ peripheral pulses throughout  Neurological: GCS:    A&O x 3; no sensory, motor  deficits, normal reflexes  Psychiatric: Normal mood, normal affect  Musculoskeletal: No joint pain, swelling or deformity; no limitation of movement  Skin: No rashes

## 2021-08-06 NOTE — H&P ADULT - NSHPLABSRESULTS_GEN_ALL_CORE
12.3   4.49  )-----------( 439      ( 05 Aug 2021 20:55 )             36.1   08-05    138  |  105  |  16  ----------------------------<  102<H>  4.0   |  30  |  0.63    Ca    9.2      05 Aug 2021 20:55    TPro  7.2  /  Alb  3.3  /  TBili  0.2  /  DBili  x   /  AST  20  /  ALT  21  /  AlkPhos  106  08-05    CARDIAC MARKERS ( 06 Aug 2021 00:27 )  <0.015 ng/mL / x     / x     / x     / x      CARDIAC MARKERS ( 05 Aug 2021 20:55 )  <0.015 ng/mL / x     / x     / x     / x

## 2021-08-06 NOTE — H&P ADULT - ASSESSMENT
82 yo female with PMH breast Ca presented to the ER after having a syncopal event at home.     Head CT performed in the ER shows small high parietal hyperdensity/ parenchymal hemorrhage      Addendum- pt became hypertensive in the ER and had episodes of vomiting, pt was started on cardene gtt, given zofran and IV tylenol, and stat rpt head CT was ordered     - admit pt to ICU   - Neuro checks q1 hr  - SBP <150 on cardene gtt  - MRI brain +/- contrast   - ICu consulted   - bedrest   - Abdominal distention- ABX xray ordered, ? ab/pelvis CT   - NPO   - zofran prn n/v  - IV tylenol prn pain   - all above d/w Dr. dorsey who formulated the plan

## 2021-08-07 ENCOUNTER — TRANSCRIPTION ENCOUNTER (OUTPATIENT)
Age: 81
End: 2021-08-07

## 2021-08-07 VITALS
HEART RATE: 69 BPM | SYSTOLIC BLOOD PRESSURE: 116 MMHG | RESPIRATION RATE: 22 BRPM | DIASTOLIC BLOOD PRESSURE: 44 MMHG | OXYGEN SATURATION: 99 %

## 2021-08-07 LAB
ANION GAP SERPL CALC-SCNC: 2 MMOL/L — LOW (ref 5–17)
BUN SERPL-MCNC: 11 MG/DL — SIGNIFICANT CHANGE UP (ref 7–23)
CALCIUM SERPL-MCNC: 8.8 MG/DL — SIGNIFICANT CHANGE UP (ref 8.5–10.1)
CHLORIDE SERPL-SCNC: 106 MMOL/L — SIGNIFICANT CHANGE UP (ref 96–108)
CO2 SERPL-SCNC: 28 MMOL/L — SIGNIFICANT CHANGE UP (ref 22–31)
CREAT SERPL-MCNC: 0.41 MG/DL — LOW (ref 0.5–1.3)
GLUCOSE SERPL-MCNC: 92 MG/DL — SIGNIFICANT CHANGE UP (ref 70–99)
HCT VFR BLD CALC: 34.6 % — SIGNIFICANT CHANGE UP (ref 34.5–45)
HGB BLD-MCNC: 12.2 G/DL — SIGNIFICANT CHANGE UP (ref 11.5–15.5)
MCHC RBC-ENTMCNC: 35.3 GM/DL — SIGNIFICANT CHANGE UP (ref 32–36)
MCHC RBC-ENTMCNC: 39.1 PG — HIGH (ref 27–34)
MCV RBC AUTO: 110.9 FL — HIGH (ref 80–100)
PLATELET # BLD AUTO: 383 K/UL — SIGNIFICANT CHANGE UP (ref 150–400)
POTASSIUM SERPL-MCNC: 3.5 MMOL/L — SIGNIFICANT CHANGE UP (ref 3.5–5.3)
POTASSIUM SERPL-SCNC: 3.5 MMOL/L — SIGNIFICANT CHANGE UP (ref 3.5–5.3)
RBC # BLD: 3.12 M/UL — LOW (ref 3.8–5.2)
RBC # FLD: 13.5 % — SIGNIFICANT CHANGE UP (ref 10.3–14.5)
SODIUM SERPL-SCNC: 136 MMOL/L — SIGNIFICANT CHANGE UP (ref 135–145)
WBC # BLD: 3.53 K/UL — LOW (ref 3.8–10.5)
WBC # FLD AUTO: 3.53 K/UL — LOW (ref 3.8–10.5)

## 2021-08-07 PROCEDURE — 93306 TTE W/DOPPLER COMPLETE: CPT | Mod: 26

## 2021-08-07 PROCEDURE — 99233 SBSQ HOSP IP/OBS HIGH 50: CPT

## 2021-08-07 PROCEDURE — 99239 HOSP IP/OBS DSCHRG MGMT >30: CPT

## 2021-08-07 RX ORDER — PALBOCICLIB 100 MG/1
1 CAPSULE ORAL
Qty: 0 | Refills: 0 | DISCHARGE
Start: 2021-08-07

## 2021-08-07 RX ORDER — DULOXETINE HYDROCHLORIDE 30 MG/1
1 CAPSULE, DELAYED RELEASE ORAL
Qty: 0 | Refills: 0 | DISCHARGE
Start: 2021-08-07

## 2021-08-07 RX ORDER — DIPHENHYDRAMINE HYDROCHLORIDE AND NAPROXEN SODIUM 25; 220 MG/1; MG/1
1 TABLET, FILM COATED ORAL
Qty: 0 | Refills: 0 | DISCHARGE

## 2021-08-07 RX ORDER — AMLODIPINE BESYLATE 2.5 MG/1
1 TABLET ORAL
Qty: 0 | Refills: 0 | DISCHARGE
Start: 2021-08-07

## 2021-08-07 RX ADMIN — DULOXETINE HYDROCHLORIDE 60 MILLIGRAM(S): 30 CAPSULE, DELAYED RELEASE ORAL at 11:04

## 2021-08-07 RX ADMIN — MIRABEGRON 25 MILLIGRAM(S): 50 TABLET, EXTENDED RELEASE ORAL at 11:04

## 2021-08-07 NOTE — DISCHARGE NOTE PROVIDER - HOSPITAL COURSE
82 yo female with PMH breast Ca presented to the ER after having a syncopal event at home. Patient states she was in her usual state of health prior to the event. Pt's daughter heard her fall and found pt on the floor. Patient denies any CP, palpitations dizziness prior to synopsizing. Patient complained of headache currently. Patient denied any numbness, tingling, weakness, Cp, SOB. Patient states she has synopsized in the past without known reason. Pt denies any cardiac history or a current cardiologist.  CT head demonstrated trace right parietal SAH, likely traumatic. MRI demonstrated no underlying lesion or vascular anomaly.  Patient evaluated for syncope without any evident cause, telemetry negative, echocardiogram performed and ICA US without findings.  Patient ambulating without assistance.  Follow-ed by hospitalist while admitted, cleared for discharge from medical standpoint and clear from neurosurgical point.     More than 30 minutes were spent educating the patient and family regarding condition, medications, follow up plans, signs and symptoms to be concerned with, preparing paperwork, and questions answered regarding discharge]

## 2021-08-07 NOTE — DISCHARGE NOTE NURSING/CASE MANAGEMENT/SOCIAL WORK - NSDCVIVACCINE_GEN_ALL_CORE_FT
Tdap; 22-Nov-2019 20:23; Mayra Alicea (RN); [employer]; [id number] (Exp. Date: 22-Nov-2021); IntraMuscular; Deltoid Right.; 0.5 milliLiter(s); VIS (VIS Published: 09-May-2013, VIS Presented: 22-Nov-2019);   Tdap; 25-Dec-2020 00:55; Kristen Johnson (RN); [employer]; [id number] (Exp. Date: 31-Jul-2022); IntraMuscular; Deltoid Right.; 0.5 milliLiter(s); VIS (VIS Published: 09-May-2013, VIS Presented: 25-Dec-2020);   Tdap; 05-Aug-2021 22:35; Stellwag, Emily (RN); [employer]; [id number] (Exp. Date: 18-Nov-2022); IntraMuscular; Deltoid Right.; 0.5 milliLiter(s); VIS (VIS Published: 09-May-2013, VIS Presented: 05-Aug-2021);

## 2021-08-07 NOTE — PROGRESS NOTE ADULT - SUBJECTIVE AND OBJECTIVE BOX
CC: syncope (06 Aug 2021 13:02)    HPI: 81 y.o. female with PMH breast Ca presented to the ER after having a syncopal event at home. pt states she was in her usual state of health prior to the event. Pt's daughter heard her fall and found pt on the floor. pt denies any CP, palpitations dizziness prior to synopsizing Pt c/o headache currently. Pt denies any numbness, tingling, weakness, Cp, SOB currently. Pt states she has synopsized in the past without known reason. Pt denies any cardiac history or a current cardiologist.     Head CT performed in the ER shows small high parietal hyperdensity/ parenchymal hemorrhage      INTERVAL HPI/OVERNIGHT EVENTS: HA resolved   Other ROS reviewed and neg     Vital Signs Last 24 Hrs  T(C): 36.4 (07 Aug 2021 12:36), Max: 36.9 (06 Aug 2021 22:00)  T(F): 97.5 (07 Aug 2021 12:36), Max: 98.4 (06 Aug 2021 22:00)  HR: 72 (07 Aug 2021 11:00) (67 - 81)  BP: 103/41 (07 Aug 2021 10:00) (96/70 - 142/53)  BP(mean): 55 (07 Aug 2021 10:00) (53 - 94)  RR: 20 (07 Aug 2021 08:00) (16 - 21)  SpO2: 94% (07 Aug 2021 08:00) (94% - 100%)  I&O's Detail    06 Aug 2021 07:01  -  07 Aug 2021 07:00  --------------------------------------------------------  IN:    Oral Fluid: 300 mL  Total IN: 300 mL    OUT:    Voided (mL): 1050 mL  Total OUT: 1050 mL    Total NET: -750 mL      07 Aug 2021 07:01  -  07 Aug 2021 16:08  --------------------------------------------------------  IN:  Total IN: 0 mL    OUT:    Voided (mL): 250 mL  Total OUT: 250 mL    Total NET: -250 mL    CARDIAC MARKERS ( 06 Aug 2021 00:27 )  <0.015 ng/mL / x     / x     / x     / x      CARDIAC MARKERS ( 05 Aug 2021 20:55 )  <0.015 ng/mL / x     / x     / x     / x                           12.2   3.53  )-----------( 383      ( 07 Aug 2021 06:08 )             34.6     07 Aug 2021 06:08    136    |  106    |  11     ----------------------------<  92     3.5     |  28     |  0.41     Ca    8.8        07 Aug 2021 06:08    TPro  6.8    /  Alb  3.0    /  TBili  0.5    /  DBili  x      /  AST  17     /  ALT  19     /  AlkPhos  89     06 Aug 2021 05:38    LIVER FUNCTIONS - ( 06 Aug 2021 05:38 )  Alb: 3.0 g/dL / Pro: 6.8 gm/dL / ALK PHOS: 89 U/L / ALT: 19 U/L / AST: 17 U/L / GGT: x         RADIOLOGY & ADDITIONAL TESTS: personally visualized    All available previous medical records reviewed personally    PHYSICAL EXAM:    General: elderly female in no acute distress  Eyes: PERRLA, EOMI; conjunctiva and sclera clear  Head: Normocephalic; atraumatic  ENMT: No nasal discharge; airway clear  Neck: Supple; non tender; no masses  Respiratory: No wheezes, rales or rhonchi  Cardiovascular: Regular rate and rhythm. S1 and S2  Gastrointestinal: Soft non-tender non-distended; Normal bowel sounds  Genitourinary: No costovertebral angle tenderness  Extremities: Normal range of motion, No clubbing, cyanosis or edema  Vascular: Peripheral pulses palpable 2+ bilaterally  Neurological: Alert and oriented x4  Skin: Warm and dry.   Musculoskeletal: Normal tone, without deformities  Psychiatric: Cooperative and appropriate

## 2021-08-07 NOTE — DISCHARGE NOTE PROVIDER - NSDCCPCAREPLAN_GEN_ALL_CORE_FT
PRINCIPAL DISCHARGE DIAGNOSIS  Diagnosis: Intraparenchymal hematoma of brain with loss of consciousness of 30 minutes or less, unspecified laterality, initial encounter  Assessment and Plan of Treatment: Upon a CT scan of your head after your fall, you were found to have a small intracranial bleed, stable on serial scans with no evidence of clear source.  Likely secondary to your fall.   Please follow up with Dr. Mitchell in 1-2 weeks as needed. Please call their office.   No strenous activity. No heavy lifting. Do not return to work or operate a motor vehicle until cleared by physician. DO NOT start aspirin / NSAIDS (motrin, advil ...) until cleared by your Neurosurgeon.  Please follow up with your Primary Care Physician as it is important to keep them updated on your health. Please call their office to make appointment.      SECONDARY DISCHARGE DIAGNOSES  Diagnosis: Syncope  Assessment and Plan of Treatment: You were noted to have a fall incident. It is important for us to determine if it was a mechanical trip or rule out any underlying heart / syncopal reasons.  Your heart was monitored and showed no arrythmias, an echocardiagram was performed to visualize the structures of your heart, please follow-up with your PCP for results of the echo.    Upon imaging of your Carotid Arteries, you were found to have Right ICA stenosis measuring 50-60%.  We advise you to also bring this to your cardiologist/ PCP attention.  Nothing of any urgent matters now.

## 2021-08-07 NOTE — DISCHARGE NOTE NURSING/CASE MANAGEMENT/SOCIAL WORK - PATIENT PORTAL LINK FT
You can access the FollowMyHealth Patient Portal offered by [facility] by registering at the following website: [email or web address]. By joining [employer]’s FollowMyHealth portal, you will also be able to view your health information using other applications (apps) compatible with our system.

## 2021-08-07 NOTE — DISCHARGE NOTE PROVIDER - CARE PROVIDER_API CALL
Luis Carlos Mitchell; PhD)  Neurosurgery  [address], [address]nd [place] [address]  Phone: [phone]  Fax: [phone]  Follow Up Time:

## 2021-08-07 NOTE — PROGRESS NOTE ADULT - ASSESSMENT
82 yo female with PMH breast Ca presented to the ER after having a syncopal event at home.   Head CT performed in the ER shows small high parietal hyperdensity/ parenchymal hemorrhage      Plan:  - No acute neurosurgical intervention  - MRI Brain +/- reviewed consistent with traumatic SAH, stable  - SBP <150  - Advance diet  - OOB/PT  - Hospitalist consult for syncope workup  - Ok from neurosurgical standpoint for transfer to telemetry  - Neuro checks Q4hour  - SCDs DVT ppx    Discussed with Dr. Mitchell
81 y.o. female with PMH breast Ca presented to the ER after having a syncopal event at home    1. Traumatic SAH - stable, further care as per NS    2. Syncope - doubt as per daughter pt was walking prior to fall - likely tripped - no events on telemetry   - CUS with 50-69% CHRIS - needs outpt follow up with vascular   - TTE done - outpt follow up with PMD    3. Hx of breast CA - resume home meds    4. Bladder incontinence - myrbetriq    Discussed with SATURNINO Kiser  Medically stable for DC    Time spent 48 min

## 2021-08-16 DIAGNOSIS — Z79.2 LONG TERM (CURRENT) USE OF ANTIBIOTICS: ICD-10-CM
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
## 2021-08-16 DIAGNOSIS — G47.00 INSOMNIA, UNSPECIFIED: ICD-10-CM

## 2021-08-16 DIAGNOSIS — C50.912 MALIGNANT NEOPLASM OF UNSPECIFIED SITE OF LEFT FEMALE BREAST: ICD-10-CM

## 2021-08-16 DIAGNOSIS — I10 ESSENTIAL (PRIMARY) HYPERTENSION: ICD-10-CM

## 2021-08-16 DIAGNOSIS — Z85.828 PERSONAL HISTORY OF OTHER MALIGNANT NEOPLASM OF SKIN: ICD-10-CM

## 2021-08-16 DIAGNOSIS — C50.911 MALIGNANT NEOPLASM OF UNSPECIFIED SITE OF RIGHT FEMALE BREAST: ICD-10-CM

## 2021-08-16 DIAGNOSIS — K21.9 GASTRO-ESOPHAGEAL REFLUX DISEASE WITHOUT ESOPHAGITIS: ICD-10-CM
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
## 2021-08-16 DIAGNOSIS — R55 SYNCOPE AND COLLAPSE: ICD-10-CM

## 2021-08-16 DIAGNOSIS — E66.9 OBESITY, UNSPECIFIED: ICD-10-CM

## 2021-08-16 DIAGNOSIS — Z90.13 ACQUIRED ABSENCE OF BILATERAL BREASTS AND NIPPLES: ICD-10-CM
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
## 2021-08-16 DIAGNOSIS — M17.0 BILATERAL PRIMARY OSTEOARTHRITIS OF KNEE: ICD-10-CM

## 2021-08-16 DIAGNOSIS — S06.6X1A TRAUMATIC SUBARACHNOID HEMORRHAGE WITH LOSS OF CONSCIOUSNESS OF 30 MINUTES OR LESS, INITIAL ENCOUNTER: ICD-10-CM

## 2021-08-16 DIAGNOSIS — H35.30 UNSPECIFIED MACULAR DEGENERATION: ICD-10-CM

## 2021-08-16 DIAGNOSIS — R32 UNSPECIFIED URINARY INCONTINENCE: ICD-10-CM
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
## 2021-08-16 DIAGNOSIS — M48.061 SPINAL STENOSIS, LUMBAR REGION WITHOUT NEUROGENIC CLAUDICATION: ICD-10-CM

## 2021-08-31 ENCOUNTER — APPOINTMENT (OUTPATIENT)
Dept: CT IMAGING | Facility: CLINIC | Age: 81
End: 2021-08-31
Payer: MEDICARE

## 2021-08-31 ENCOUNTER — RESULT REVIEW (OUTPATIENT)
Age: 81
End: 2021-08-31

## 2021-08-31 ENCOUNTER — APPOINTMENT (OUTPATIENT)
Dept: NEUROSURGERY | Facility: CLINIC | Age: 81
End: 2021-08-31
Payer: MEDICARE

## 2021-08-31 ENCOUNTER — OUTPATIENT (OUTPATIENT)
Dept: OUTPATIENT SERVICES | Facility: HOSPITAL | Age: 81
LOS: 1 days | End: 2021-08-31
Payer: MEDICARE

## 2021-08-31 VITALS
HEIGHT: 63 IN | WEIGHT: 142 LBS | OXYGEN SATURATION: 95 % | DIASTOLIC BLOOD PRESSURE: 70 MMHG | SYSTOLIC BLOOD PRESSURE: 132 MMHG | HEART RATE: 82 BPM | BODY MASS INDEX: 25.16 KG/M2

## 2021-08-31 DIAGNOSIS — Z98.890 OTHER SPECIFIED POSTPROCEDURAL STATES: Chronic | ICD-10-CM

## 2021-08-31 DIAGNOSIS — G31.84 MILD COGNITIVE IMPAIRMENT, SO STATED: ICD-10-CM

## 2021-08-31 DIAGNOSIS — Z90.11 ACQUIRED ABSENCE OF RIGHT BREAST AND NIPPLE: Chronic | ICD-10-CM

## 2021-08-31 DIAGNOSIS — S06.6X9A TRAUMATIC SUBARACHNOID HEMORRHAGE WITH LOSS OF CONSCIOUSNESS OF UNSPECIFIED DURATION, INITIAL ENCOUNTER: ICD-10-CM

## 2021-08-31 DIAGNOSIS — Z90.12 ACQUIRED ABSENCE OF LEFT BREAST AND NIPPLE: Chronic | ICD-10-CM

## 2021-08-31 DIAGNOSIS — R29.6 REPEATED FALLS: ICD-10-CM

## 2021-08-31 DIAGNOSIS — D04.9 CARCINOMA IN SITU OF SKIN, UNSPECIFIED: Chronic | ICD-10-CM

## 2021-08-31 DIAGNOSIS — R42 DIZZINESS AND GIDDINESS: ICD-10-CM

## 2021-08-31 PROCEDURE — 70450 CT HEAD/BRAIN W/O DYE: CPT | Mod: MH

## 2021-08-31 PROCEDURE — 70450 CT HEAD/BRAIN W/O DYE: CPT | Mod: 26,MH

## 2021-08-31 PROCEDURE — 99213 OFFICE O/P EST LOW 20 MIN: CPT

## 2021-09-02 PROBLEM — S06.6X9A TRAUMATIC SUBARACHNOID HEMORRHAGE: Status: ACTIVE | Noted: 2021-08-19

## 2021-09-02 NOTE — CONSULT LETTER
[Dear  ___] : Dear  [unfilled], [Courtesy Letter:] : I had the pleasure of seeing your patient, [unfilled], in my office today. [Sincerely,] : Sincerely, [FreeTextEntry2] : Eren Reinoso\par [address]\[place] [id number] [FreeTextEntry1] : Mrs. Allison is a very pleasant 81-year-old female patient who was seen in our office today in follow-up after a fall.\par \par Briefly, the patient presented to the emergency department after a fall at home.  Subsequent MRI scans and CT scans demonstrated a traumatic parietal subarachnoid hemorrhage.  Currently, the patient does not complain of headaches, vision difficulties, or personality changes.  The patient does complain of dizziness frequent falls which has been present prior to her most recent fall and difficulty with memory which has also been present prior to her most recent fall.\par \par On examination, the patient is alert, oriented, and compliant with the exam.  The patient demonstrates 5/5 grossly in the upper and lower extremities.  The patient ambulates slowly.  The patient's cranial nerve examination is grossly normal.  The patient does not have a pronator drift.\par \par The patient accompanied with a CT scan of the head performed on August 31, 2021.  These images demonstrate resolution of the patient's traumatic parietal subarachnoid hemorrhage.  The patient does have a small incidental hygroma.\par \par Taken together, the patient has a clinical history and radiographic findings most consistent with a resolved traumatic subarachnoid hemorrhage.  The patient has several neurologic issues present prior to her fall which she is looking to get a referral to a neurologist today.  I have provided a referral for this purpose as well as physical therapy for gait and balance training and falls risk assessment.  The patient is keen to resume driving and I explained to the patient that there is no neurosurgical reason why she cannot drive, but she may consult her primary care doctor and/or her new neurologist for clearance if needed.  The patient will be following up with us on an as-needed basis. [FreeTextEntry3] : Luis Carlos Mitchell MD, PhD, FRCPSC\par Attending Neurosurgeon\par [facility]\par [address], [address]nd floor \[place] [address]\par Office: [phone]\par Fax: [phone]\par

## 2021-11-29 ENCOUNTER — EMERGENCY (EMERGENCY)
Facility: HOSPITAL | Age: 81
LOS: 0 days | Discharge: HOME | End: 2021-11-29
Attending: EMERGENCY MEDICINE
Payer: MEDICARE

## 2021-11-29 VITALS
OXYGEN SATURATION: 97 % | TEMPERATURE: 98 F | HEART RATE: 76 BPM | DIASTOLIC BLOOD PRESSURE: 64 MMHG | RESPIRATION RATE: 16 BRPM | SYSTOLIC BLOOD PRESSURE: 134 MMHG

## 2021-11-29 VITALS — HEIGHT: 64 IN | WEIGHT: 141.98 LBS
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
## 2021-11-29 DIAGNOSIS — G47.00 INSOMNIA, UNSPECIFIED: ICD-10-CM

## 2021-11-29 DIAGNOSIS — Z98.890 OTHER SPECIFIED POSTPROCEDURAL STATES: Chronic | ICD-10-CM

## 2021-11-29 DIAGNOSIS — Z90.12 ACQUIRED ABSENCE OF LEFT BREAST AND NIPPLE: ICD-10-CM

## 2021-11-29 DIAGNOSIS — Z86.69 PERSONAL HISTORY OF OTHER DISEASES OF THE NERVOUS SYSTEM AND SENSE ORGANS: ICD-10-CM

## 2021-11-29 DIAGNOSIS — Z23 ENCOUNTER FOR IMMUNIZATION: ICD-10-CM

## 2021-11-29 DIAGNOSIS — S09.90XA UNSPECIFIED INJURY OF HEAD, INITIAL ENCOUNTER: ICD-10-CM

## 2021-11-29 DIAGNOSIS — Z90.11 ACQUIRED ABSENCE OF RIGHT BREAST AND NIPPLE: ICD-10-CM

## 2021-11-29 DIAGNOSIS — M17.0 BILATERAL PRIMARY OSTEOARTHRITIS OF KNEE: ICD-10-CM

## 2021-11-29 DIAGNOSIS — D04.9 CARCINOMA IN SITU OF SKIN, UNSPECIFIED: Chronic | ICD-10-CM
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
## 2021-11-29 DIAGNOSIS — Z90.12 ACQUIRED ABSENCE OF LEFT BREAST AND NIPPLE: Chronic | ICD-10-CM

## 2021-11-29 DIAGNOSIS — C50.911 MALIGNANT NEOPLASM OF UNSPECIFIED SITE OF RIGHT FEMALE BREAST: ICD-10-CM

## 2021-11-29 DIAGNOSIS — D04.9 CARCINOMA IN SITU OF SKIN, UNSPECIFIED: ICD-10-CM

## 2021-11-29 DIAGNOSIS — C50.912 MALIGNANT NEOPLASM OF UNSPECIFIED SITE OF LEFT FEMALE BREAST: ICD-10-CM

## 2021-11-29 DIAGNOSIS — K21.9 GASTRO-ESOPHAGEAL REFLUX DISEASE WITHOUT ESOPHAGITIS: ICD-10-CM

## 2021-11-29 DIAGNOSIS — Z90.11 ACQUIRED ABSENCE OF RIGHT BREAST AND NIPPLE: Chronic | ICD-10-CM

## 2021-11-29 DIAGNOSIS — S00.83XA CONTUSION OF OTHER PART OF HEAD, INITIAL ENCOUNTER: ICD-10-CM

## 2021-11-29 DIAGNOSIS — Z98.890 OTHER SPECIFIED POSTPROCEDURAL STATES: ICD-10-CM

## 2021-11-29 PROCEDURE — 93010 ELECTROCARDIOGRAM REPORT: CPT

## 2021-11-29 PROCEDURE — 93005 ELECTROCARDIOGRAM TRACING: CPT

## 2021-11-29 PROCEDURE — 90715 TDAP VACCINE 7 YRS/> IM: CPT

## 2021-11-29 PROCEDURE — 70486 CT MAXILLOFACIAL W/O DYE: CPT | Mod: MA

## 2021-11-29 PROCEDURE — 76376 3D RENDER W/INTRP POSTPROCES: CPT | Mod: 26

## 2021-11-29 PROCEDURE — 70450 CT HEAD/BRAIN W/O DYE: CPT | Mod: MA

## 2021-11-29 PROCEDURE — 99284 EMERGENCY DEPT VISIT MOD MDM: CPT | Mod: 25

## 2021-11-29 PROCEDURE — 90471 IMMUNIZATION ADMIN: CPT

## 2021-11-29 PROCEDURE — 70486 CT MAXILLOFACIAL W/O DYE: CPT | Mod: 26,MA

## 2021-11-29 PROCEDURE — 72125 CT NECK SPINE W/O DYE: CPT | Mod: MA

## 2021-11-29 PROCEDURE — 76376 3D RENDER W/INTRP POSTPROCES: CPT

## 2021-11-29 PROCEDURE — 70450 CT HEAD/BRAIN W/O DYE: CPT | Mod: 26,MA

## 2021-11-29 PROCEDURE — 72125 CT NECK SPINE W/O DYE: CPT | Mod: 26,MA

## 2021-11-29 PROCEDURE — 99284 EMERGENCY DEPT VISIT MOD MDM: CPT

## 2021-11-29 RX ORDER — TETANUS TOXOID, REDUCED DIPHTHERIA TOXOID AND ACELLULAR PERTUSSIS VACCINE, ADSORBED 5; 2.5; 8; 8; 2.5 [IU]/.5ML; [IU]/.5ML; UG/.5ML; UG/.5ML; UG/.5ML
0.5 SUSPENSION INTRAMUSCULAR ONCE
Refills: 0 | Status: COMPLETED | OUTPATIENT
Start: 2021-11-29 | End: 2021-11-29

## 2021-11-29 RX ADMIN — TETANUS TOXOID, REDUCED DIPHTHERIA TOXOID AND ACELLULAR PERTUSSIS VACCINE, ADSORBED 0.5 MILLILITER(S): 5; 2.5; 8; 8; 2.5 SUSPENSION INTRAMUSCULAR at 17:14

## 2021-11-29 NOTE — ED PROVIDER NOTE - PROGRESS NOTE DETAILS
PA: Patient is an 80 y/o female with PMHx of basal cell carcinoma, GERD, B/L malignant neoplasm of breast, macular degeneration B/L, insomnia, obesity, OA, spinal stenosis who presents to [facility] c/o head injury s/p trip and fall. Patient reports pain at the sight of hematoma on her forehead. No LOC. No neck injury. DENIES chest pain, SOB, back pain, abd pain, urinary complaints. Pt reports she has frequent falls due to previous L leg surgery which makes her leg stiffen causing her to fall. Pt denies being on blood thinners. ~He Anaya PA-C PA note: No acute findings on CT scans. All studies reviewed in details with patient. Patient re-examined and re-evaluated. Patient feels much better at this time. ED evaluation, Diagnosis and management discussed with the patient in detail. Workup results discussed with ED attending, OK to dc home. Close PMD follow up encouraged. TDAP given. Strict ED return instructions discussed in detail and patient given the opportunity to ask any questions about their discharge diagnosis and instructions. Patient verbalized understanding. ~ He Anaya PA-C

## 2021-11-29 NOTE — ED PROVIDER NOTE - NSDCPRINTRESULTS_ED_ALL_ED
Principal Discharge DX:	Eye pain   Patient requests all Lab, Cardiology, and Radiology Results on their Discharge Instructions

## 2021-11-29 NOTE — ED PROVIDER NOTE - CLINICAL SUMMARY MEDICAL DECISION MAKING FREE TEXT BOX
Imaging, EKG, observe, ambulation trial, and reassess. Imaging, EKG, observe, ambulation trial, and reassess.    PA note: No acute findings on CT scans. All studies reviewed in details with patient. Patient re-examined and re-evaluated. Patient feels much better at this time. ED evaluation, Diagnosis and management discussed with the patient in detail. Workup results discussed with ED attending, OK to dc home. Close PMD follow up encouraged. TDAP given. Strict ED return instructions discussed in detail and patient given the opportunity to ask any questions about their discharge diagnosis and instructions. Patient verbalized understanding. ~ He Anaya PA-C

## 2021-11-29 NOTE — ED PROVIDER NOTE - PATIENT PORTAL LINK FT
You can access the FollowMyHealth Patient Portal offered by [facility] by registering at the following website: [email or web address]. By joining [facility]’s FollowMyHealth portal, you will also be able to view your health information using other applications (apps) compatible with our system.

## 2021-11-29 NOTE — ED PROVIDER NOTE - SKIN, MLM
Skin normal color for race, warm, dry. No evidence of rash. (+) large hematoma to L frontal, skin intact

## 2021-11-29 NOTE — ED PROVIDER NOTE - NSICDXPASTMEDICALHX_GEN_ALL_CORE_FT
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

## 2021-11-29 NOTE — ED PROVIDER NOTE - NSFOLLOWUPINSTRUCTIONS_ED_ALL_ED_FT
Head Injury, Adult    There are many types of head injuries. Head injuries can be as minor as a small bump, or they can be a serious medical issue. More severe head injuries include:  •A jarring injury to the brain (concussion).      •A bruise (contusion) of the brain. This means there is bleeding in the brain that can cause swelling.      •A cracked skull (skull fracture).      •Bleeding in the brain that collects, clots, and forms a bump (hematoma).      After a head injury, most problems occur within the first 24 hours, but side effects may occur up to 7–10 days after the injury. It is important to watch your condition for any changes. You may need to be observed in the emergency department or urgent care, or you may be admitted to the hospital.      What are the causes?    There are many possible causes of a head injury. Serious head injuries may be caused by car accidents, bicycle or motorcycle accidents, sports injuries, falls, or being struck by an object.      What are the symptoms?  Symptoms of a head injury include a contusion, bump, or bleeding at the site of the injury. Other physical symptoms may include:  •Headache.      •Nausea or vomiting.      •Dizziness.      •Blurred or double vision.      •Being uncomfortable around bright lights or loud noises.      •Seizures.      •Feeling tired.      •Trouble being awakened.      •Loss of consciousness.    Mental or emotional symptoms may include:  •Irritability.      •Confusion and memory problems.      •Poor attention and concentration.      •Changes in eating or sleeping habits.      •Anxiety or depression.        How is this diagnosed?    This condition can usually be diagnosed based on your symptoms, a description of the injury, and a physical exam. You may also have imaging tests done, such as a CT scan or an MRI.      How is this treated?    Treatment for this condition depends on the severity and type of injury you have. The main goal of treatment is to prevent complications and allow the brain time to heal.    Mild head injury   If you have a mild head injury, you may be sent home, and treatment may include:  •Observation. A responsible adult should stay with you for 24 hours after your injury and check on you often.      •Physical rest.      •Brain rest.      •Pain medicines.      Severe head injury  If you have a severe head injury, treatment may include:•Close observation. This includes hospitalization with the following care:  •Frequent physical exams.      •Frequent checks of how your brain and nervous system are working (neurological status).      •Checking your blood pressure and oxygen levels.        •Medicines to relieve pain, prevent seizures, and decrease brain swelling.      •Airway protection and breathing support. This may include using a ventilator.      •Treatments that monitor and manage swelling inside the brain.    •Brain surgery. This may be needed to:  •Remove a collection of blood or blood clots.      •Stop the bleeding.      •Remove a part of the skull to allow room for the brain to swell.          Follow these instructions at home:    Activity     •Rest and avoid activities that are physically hard or tiring.      •Make sure you get enough sleep.    •Let your brain rest by limiting activities that require a lot of thought or attention, such as:  •Watching TV.      •Playing memory games and puzzles.      •Job-related work or homework.      •Working on the computer, using social media, and texting.        •Avoid activities that could cause another head injury, such as playing sports, until your health care provider approves. Having another head injury, especially before the first one has healed, can be dangerous.      •Ask your health care provider when it is safe for you to return to your regular activities, including work or school. Ask your health care provider for a step-by-step plan for gradually returning to activities.      •Ask your health care provider when you can drive, ride a bicycle, or use heavy machinery. Your ability to react may be slower after a brain injury. Do not do these activities if you are dizzy.        Lifestyle      • Do not drink alcohol until your health care provider approves. Do not use drugs. Alcohol and certain drugs may slow your recovery and can put you at risk of further injury.      •If it is harder than usual to remember things, write them down.      •If you are easily distracted, try to do one thing at a time.      •Talk with family members or close friends when making important decisions.      •Tell your friends, family, a trusted colleague, and  about your injury, symptoms, and restrictions. Have them watch for any new or worsening problems.      General instructions     •Take over-the-counter and prescription medicines only as told by your health care provider.      •Have someone stay with you for 24 hours after your head injury. This person should watch you for any changes in your symptoms and be ready to seek medical help.      •Keep all follow-up visits as told by your health care provider. This is important.        How is this prevented?    •Work on improving your balance and strength to avoid falls.      •Wear a seat belt when you are in a moving vehicle.      •Wear a helmet when riding a bicycle, skiing, or doing any other sport or activity that has a risk of injury.    •If you drink alcohol:•Limit how much you use to:  •0–1 drink a day for nonpregnant women.      •0–2 drinks a day for men.        •Be aware of how much alcohol is in your drink. In the [place] one drink equals one 12 oz bottle of beer (355 mL), one 5 oz glass of wine (148 mL), or one 1½ oz glass of hard liquor (44 mL).      •Take safety measures in your home, such as:  •Removing clutter and tripping hazards from floors and stairways.      •Using grab bars in bathrooms and handrails by stairs.      •Placing non-slip mats on floors and in bathtubs.      •Improving lighting in dim areas.          Where to find more information    •Centers for Disease Control and Prevention: www.cdc.gov        Get help right away if:  •You have:  •A severe headache that is not helped by medicine.      •Trouble walking or weakness in your arms and legs.      •Clear or bloody fluid coming from your nose or ears.      •Changes in your vision.      •A seizure.      •Increased confusion or irritability.        •Your symptoms get worse.      •You are sleepier than normal and have trouble staying awake.      •You lose your balance.      •Your pupils change size.      •Your speech is slurred.      •Your dizziness gets worse.      •You vomit.      These symptoms may represent a serious problem that is an emergency. Do not wait to see if the symptoms will go away. Get medical help right away. Call your local emergency services (911 in the [place] Do not drive yourself to the hospital.       Summary    •Head injuries can be minor, or they can be a serious medical issue requiring immediate attention.      •Treatment for this condition depends on the severity and type of injury you have.      •Have someone stay with you for 24 hours after your injury and check on you often.      •Ask your health care provider when it is safe for you to return to your regular activities, including work or school.      •Head injury prevention includes wearing a seat belt in a motor vehicle, using a helmet on a bicycle, limiting alcohol use, and taking safety measures in your home.      This information is not intended to replace advice given to you by your health care provider. Make sure you discuss any questions you have with your health care provider.                                                                                                                                                                 Hematoma      A hematoma is a collection of blood under the skin, in an organ, in a body space, in a joint space, or in other tissue. The blood can thicken (clot) to form a lump that you can see and feel. The lump is often firm and may become sore and tender. Most hematomas get better in a few days to weeks. However, some hematomas may be serious and require medical care. Hematomas can range from very small to very large.      What are the causes?  This condition is caused by:  •A blunt or penetrating injury.      •A leakage from a blood vessel under the skin.      •Some medical procedures, including surgeries, such as oral surgery, face lifts, and surgeries on the joints.      •Some medical conditions that cause bleeding or bruising. There may be multiple hematomas that appear in different areas of the body.        What increases the risk?  You are more likely to develop this condition if:  •You are an older adult.      •You use blood thinners.        What are the signs or symptoms?     Symptoms of this condition depend on where the hematoma is located.   Common symptoms of a hematoma that is under the skin include:  •A firm lump on the body.      •Pain and tenderness in the area.      •Bruising. Blue, dark blue, purple-red, or yellowish skin (discoloration) may appear at the site of the hematoma if the hematoma is close to the surface of the skin.    Common symptoms of a hematoma that is deep in the tissues or body spaces may be less obvious. They include:  •A collection of blood in the stomach (intra-abdominal hematoma). This may cause pain in the abdomen, weakness, fainting, and shortness of breath.       •A collection of blood in the head (intracranial hematoma). This may cause a headache or symptoms such as weakness, trouble speaking or understanding, or a change in consciousness.         How is this diagnosed?  This condition is diagnosed based on:  •Your medical history.      •A physical exam.      •Imaging tests, such as an ultrasound or CT scan. These may be needed if your health care provider suspects a hematoma in deeper tissues or body spaces.      •Blood tests. These may be needed if your health care provider believes that the hematoma is caused by a medical condition.        How is this treated?  Treatment for this condition depends on the cause, size, and location of the hematoma. Treatment may include:  •Doing nothing. The majority of hematomas do not need treatment as many of them go away on their own over time.      •Surgery or close monitoring. This may be needed for large hematomas or hematomas that affect vital organs.      •Medicines. Medicines may be given if there is an underlying medical cause for the hematoma.        Follow these instructions at home:      Managing pain, stiffness, and swelling    •If directed, put ice on the affected area.  •Put ice in a plastic bag.      •Place a towel between your skin and the bag.      •Leave the ice on for 20 minutes, 2–3 times a day for the first couple of days.      •If directed, apply heat to the affected area after applying ice for a couple of days. Use the heat source that your health care provider recommends, such as a moist heat pack or a heating pad.  •Place a towel between your skin and the heat source.       •Leave the heat on for 20–30 minutes.       •Remove the heat if your skin turns bright red. This is especially important if you are unable to feel pain, heat, or cold. You may have a greater risk of getting burned.        •Raise (elevate) the affected area above the level of your heart while you are sitting or lying down.      •If told, wrap the affected area with an elastic bandage. The bandage applies pressure (compression) to the area, which may help to reduce swelling and promote healing. Do not wrap the bandage too tightly around the affected area.      •If your hematoma is on a leg or foot (lower extremity) and is painful, your health care provider may recommend crutches. Use them as told by your health care provider.      General instructions     •Take over-the-counter and prescription medicines only as told by your health care provider.      •Keep all follow-up visits as told by your health care provider. This is important.        Contact a health care provider if:    •You have a fever.      •The swelling or discoloration gets worse.      •You develop more hematomas.        Get help right away if:    •Your pain is worse or your pain is not controlled with medicine.      •Your skin over the hematoma breaks or starts bleeding.      •Your hematoma is in your chest or abdomen and you have weakness, shortness of breath, or a change in consciousness.    •You have a hematoma on your scalp that is caused by a fall or injury, and you also have:  •A headache that gets worse.      •Trouble speaking or understanding speech.       •Weakness.      •Change in alertness or consciousness.          Summary    •A hematoma is a collection of blood under the skin, in an organ, in a body space, in a joint space, or in other tissue.      •This condition usually does not need treatment because many hematomas go away on their own over time.      •Large hematomas, or those that may affect vital organs, may need surgical drainage or monitoring. If the hematoma is caused by a medical condition, medicines may be prescribed.      •Get help right away if your hematoma breaks or starts to bleed, you have shortness of breath, or you have a headache or trouble speaking after a fall.      This information is not intended to replace advice given to you by your health care provider. Make sure you discuss any questions you have with your health care provider.

## 2021-11-29 NOTE — ED PROVIDER NOTE - ATTENDING CONTRIBUTION TO CARE
I, Linda Guillaume MD,  performed the initial face to face bedside interview with this patient regarding history of present illness, review of symptoms and relevant past medical, social and family history.  I completed an independent physical examination.  I was the initial provider who evaluated this patient. I have signed out the follow up of any pending tests (i.e. labs, radiological studies) to the ACP.  I have communicated the patient’s plan of care and disposition with the ACP.  The history, relevant review of systems, past medical and surgical history, medical decision making, and physical examination was documented by the scribe in my presence and I attest to the accuracy of the documentation.

## 2021-11-29 NOTE — ED PROVIDER NOTE - OBJECTIVE STATEMENT
80 y/o F with a PMHx of basal cell carcinoma, GERD, B/L malignant neoplasm of breast, macular degeneration B/L, insomnia, obesity, OA, spinal stenosis presents to the ED c/o head injury s/p trip and fall. Pt endorses stinging pain to sight of hematoma on L side of head. Denies chest pain, SOB, back pain, abd pain, urinary complaints. Pt reports she has frequent falls due to previous L leg surgery which makes her leg stiffen causing her to fall. Pt denies being on blood thinners.

## 2021-11-29 NOTE — ED PROVIDER NOTE - PHYSICAL EXAMINATION
PA NOTE: GEN: AOX3, NAD. HEENT: Throat clear. Airway is patent. EYES: PERRLA. EOMI. Head: +Small abrasion left side of forehead with small hematoma. No lacerations. No bleeding. NECK: Supple, No JVD. FROM. C-spine non-tender. CV:S1S2, RRR, LUNGS: Non-labored breathing, no tachypnea. O2sat 100% RA. CTA b/l. No w/r/r. CHEST: Equal chest expansion and rise. No deformity. ABD: Soft, NT/ND, no rebound, no guarding. No CVAT. EXT: No e/c/c. 2+ distal pulses. SKIN: No rashes. NEURO: No focal deficits. CN II-XII intact. FROM. 5/5 motor and sensory. ~He Anaya PA-C

## 2021-11-29 NOTE — ED ADULT NURSE REASSESSMENT NOTE - GENERAL PATIENT STATE
ambulated to BR slow steady gait/comfortable appearance/cooperative/improvement verbalized/smiling/interactive

## 2021-11-29 NOTE — ED ADULT TRIAGE NOTE - CHIEF COMPLAINT QUOTE
Pt reports trip and fall in parking lot. Denies LOC. Reports frequent falls. Reports hx of fall with bleed.  MD to pt with neuro alert called. Pt to CT. Pt denies anticoagulant use.

## 2021-11-29 NOTE — ED ADULT NURSE NOTE - NSIMPLEMENTINTERV_GEN_ALL_ED
Implemented All Fall Risk Interventions:  [place] to call system. Call bell, personal items and telephone within reach. Instruct patient to call for assistance. Room bathroom lighting operational. Non-slip footwear when patient is off stretcher. Physically safe environment: no spills, clutter or unnecessary equipment. Stretcher in lowest position, wheels locked, appropriate side rails in place. Provide visual cue, wrist band, yellow gown, etc. Monitor gait and stability. Monitor for mental status changes and reorient to person, place, and time. Review medications for side effects contributing to fall risk. Reinforce activity limits and safety measures with patient and family.

## 2021-11-29 NOTE — ED ADULT NURSE REASSESSMENT NOTE - NS ED NURSE REASSESS COMMENT FT1
Pt returned from CT scan, Lt forehead contusion noted, pain to contusion 5/10, denies h/a -- Pt JOSE CRUZ, in NAD. Will monitor.

## 2021-11-29 NOTE — ED STATDOCS - PROGRESS NOTE DETAILS
Attending Mitchell, called to evaluate pt in [place].  Pt here with daughter.  Trip and fall with hematoma left forehead.  Pt with hx of head bleed.  pt not on anticoagulants.  Pain head.  Denies pain arms/legs.  neuro-alert activated and ct orders placed.  Pt triaged to the main.

## 2021-11-29 NOTE — ED ADULT NURSE NOTE - OBJECTIVE STATEMENT
mechanical trip outside dr's office; hit head lt forehead contusion, denies loc, denies anticoags; JOSE CRUZ

## 2022-01-11 ENCOUNTER — INPATIENT (INPATIENT)
Facility: HOSPITAL | Age: 82
LOS: 2 days | Discharge: NURSING, REHAB OR LONG-TERM CARE | DRG: 641 | End: 2022-01-14
Attending: HOSPITALIST | Admitting: HOSPITALIST
Payer: MEDICARE

## 2022-01-11 VITALS
HEART RATE: 82 BPM | HEIGHT: 64 IN | TEMPERATURE: 98 F | OXYGEN SATURATION: 94 % | DIASTOLIC BLOOD PRESSURE: 73 MMHG | RESPIRATION RATE: 18 BRPM | WEIGHT: 160.06 LBS | SYSTOLIC BLOOD PRESSURE: 154 MMHG

## 2022-01-11 DIAGNOSIS — Z98.890 OTHER SPECIFIED POSTPROCEDURAL STATES: Chronic | ICD-10-CM

## 2022-01-11 DIAGNOSIS — E87.6 HYPOKALEMIA: ICD-10-CM

## 2022-01-11 DIAGNOSIS — D04.9 CARCINOMA IN SITU OF SKIN, UNSPECIFIED: Chronic | ICD-10-CM

## 2022-01-11 DIAGNOSIS — Z90.12 ACQUIRED ABSENCE OF LEFT BREAST AND NIPPLE: Chronic | ICD-10-CM

## 2022-01-11 DIAGNOSIS — Z90.11 ACQUIRED ABSENCE OF RIGHT BREAST AND NIPPLE: Chronic | ICD-10-CM

## 2022-01-11 LAB
ALBUMIN SERPL ELPH-MCNC: 2.9 G/DL — LOW (ref 3.3–5)
ALP SERPL-CCNC: 105 U/L — SIGNIFICANT CHANGE UP (ref 40–120)
ALT FLD-CCNC: 18 U/L — SIGNIFICANT CHANGE UP (ref 12–78)
ANION GAP SERPL CALC-SCNC: 4 MMOL/L — LOW (ref 5–17)
AST SERPL-CCNC: 20 U/L — SIGNIFICANT CHANGE UP (ref 15–37)
BASOPHILS # BLD AUTO: 0.1 K/UL — SIGNIFICANT CHANGE UP (ref 0–0.2)
BASOPHILS NFR BLD AUTO: 1.7 % — SIGNIFICANT CHANGE UP (ref 0–2)
BILIRUB SERPL-MCNC: 0.4 MG/DL — SIGNIFICANT CHANGE UP (ref 0.2–1.2)
BUN SERPL-MCNC: 11 MG/DL — SIGNIFICANT CHANGE UP (ref 7–23)
CALCIUM SERPL-MCNC: 11.3 MG/DL — HIGH (ref 8.5–10.1)
CHLORIDE SERPL-SCNC: 101 MMOL/L — SIGNIFICANT CHANGE UP (ref 96–108)
CO2 SERPL-SCNC: 33 MMOL/L — HIGH (ref 22–31)
CREAT SERPL-MCNC: 0.7 MG/DL — SIGNIFICANT CHANGE UP (ref 0.5–1.3)
EOSINOPHIL # BLD AUTO: 0.12 K/UL — SIGNIFICANT CHANGE UP (ref 0–0.5)
EOSINOPHIL NFR BLD AUTO: 2 % — SIGNIFICANT CHANGE UP (ref 0–6)
FLUAV AG NPH QL: SIGNIFICANT CHANGE UP
FLUBV AG NPH QL: SIGNIFICANT CHANGE UP
GLUCOSE SERPL-MCNC: 86 MG/DL — SIGNIFICANT CHANGE UP (ref 70–99)
HCT VFR BLD CALC: 32.6 % — LOW (ref 34.5–45)
HGB BLD-MCNC: 11.2 G/DL — LOW (ref 11.5–15.5)
IMM GRANULOCYTES NFR BLD AUTO: 0.3 % — SIGNIFICANT CHANGE UP (ref 0–1.5)
LYMPHOCYTES # BLD AUTO: 1.72 K/UL — SIGNIFICANT CHANGE UP (ref 1–3.3)
LYMPHOCYTES # BLD AUTO: 28.8 % — SIGNIFICANT CHANGE UP (ref 13–44)
MAGNESIUM SERPL-MCNC: 1.9 MG/DL — SIGNIFICANT CHANGE UP (ref 1.6–2.6)
MCHC RBC-ENTMCNC: 34.4 GM/DL — SIGNIFICANT CHANGE UP (ref 32–36)
MCHC RBC-ENTMCNC: 37.8 PG — HIGH (ref 27–34)
MCV RBC AUTO: 110.1 FL — HIGH (ref 80–100)
MONOCYTES # BLD AUTO: 0.82 K/UL — SIGNIFICANT CHANGE UP (ref 0–0.9)
MONOCYTES NFR BLD AUTO: 13.7 % — SIGNIFICANT CHANGE UP (ref 2–14)
NEUTROPHILS # BLD AUTO: 3.2 K/UL — SIGNIFICANT CHANGE UP (ref 1.8–7.4)
NEUTROPHILS NFR BLD AUTO: 53.5 % — SIGNIFICANT CHANGE UP (ref 43–77)
PHOSPHATE SERPL-MCNC: 2.8 MG/DL — SIGNIFICANT CHANGE UP (ref 2.5–4.5)
PLATELET # BLD AUTO: 430 K/UL — HIGH (ref 150–400)
POTASSIUM SERPL-MCNC: 2.6 MMOL/L — CRITICAL LOW (ref 3.5–5.3)
POTASSIUM SERPL-SCNC: 2.6 MMOL/L — CRITICAL LOW (ref 3.5–5.3)
PROT SERPL-MCNC: 6.8 GM/DL — SIGNIFICANT CHANGE UP (ref 6–8.3)
RBC # BLD: 2.96 M/UL — LOW (ref 3.8–5.2)
RBC # FLD: 12.5 % — SIGNIFICANT CHANGE UP (ref 10.3–14.5)
RSV RNA NPH QL NAA+NON-PROBE: SIGNIFICANT CHANGE UP
SARS-COV-2 RNA SPEC QL NAA+PROBE: SIGNIFICANT CHANGE UP
SODIUM SERPL-SCNC: 138 MMOL/L — SIGNIFICANT CHANGE UP (ref 135–145)
WBC # BLD: 5.98 K/UL — SIGNIFICANT CHANGE UP (ref 3.8–10.5)
WBC # FLD AUTO: 5.98 K/UL — SIGNIFICANT CHANGE UP (ref 3.8–10.5)

## 2022-01-11 PROCEDURE — 80053 COMPREHEN METABOLIC PANEL: CPT

## 2022-01-11 PROCEDURE — U0003: CPT

## 2022-01-11 PROCEDURE — 80048 BASIC METABOLIC PNL TOTAL CA: CPT

## 2022-01-11 PROCEDURE — 82607 VITAMIN B-12: CPT

## 2022-01-11 PROCEDURE — 93010 ELECTROCARDIOGRAM REPORT: CPT | Mod: 76

## 2022-01-11 PROCEDURE — 36415 COLL VENOUS BLD VENIPUNCTURE: CPT

## 2022-01-11 PROCEDURE — 80061 LIPID PANEL: CPT

## 2022-01-11 PROCEDURE — 85027 COMPLETE CBC AUTOMATED: CPT

## 2022-01-11 PROCEDURE — 97116 GAIT TRAINING THERAPY: CPT | Mod: GP

## 2022-01-11 PROCEDURE — 99223 1ST HOSP IP/OBS HIGH 75: CPT

## 2022-01-11 PROCEDURE — 85610 PROTHROMBIN TIME: CPT

## 2022-01-11 PROCEDURE — 93005 ELECTROCARDIOGRAM TRACING: CPT

## 2022-01-11 PROCEDURE — 85025 COMPLETE CBC W/AUTO DIFF WBC: CPT

## 2022-01-11 PROCEDURE — 0241U: CPT

## 2022-01-11 PROCEDURE — 70450 CT HEAD/BRAIN W/O DYE: CPT | Mod: 26,MA

## 2022-01-11 PROCEDURE — 12002 RPR S/N/AX/GEN/TRNK2.6-7.5CM: CPT | Mod: GC

## 2022-01-11 PROCEDURE — 97530 THERAPEUTIC ACTIVITIES: CPT | Mod: GP

## 2022-01-11 PROCEDURE — 84443 ASSAY THYROID STIM HORMONE: CPT

## 2022-01-11 PROCEDURE — 83036 HEMOGLOBIN GLYCOSYLATED A1C: CPT

## 2022-01-11 PROCEDURE — 71045 X-RAY EXAM CHEST 1 VIEW: CPT | Mod: 26

## 2022-01-11 PROCEDURE — 82306 VITAMIN D 25 HYDROXY: CPT

## 2022-01-11 PROCEDURE — U0005: CPT

## 2022-01-11 PROCEDURE — 82310 ASSAY OF CALCIUM: CPT

## 2022-01-11 PROCEDURE — 82652 VIT D 1 25-DIHYDROXY: CPT

## 2022-01-11 PROCEDURE — 97162 PT EVAL MOD COMPLEX 30 MIN: CPT | Mod: GP

## 2022-01-11 PROCEDURE — 82962 GLUCOSE BLOOD TEST: CPT

## 2022-01-11 PROCEDURE — 99285 EMERGENCY DEPT VISIT HI MDM: CPT | Mod: 25,GC

## 2022-01-11 PROCEDURE — 85730 THROMBOPLASTIN TIME PARTIAL: CPT

## 2022-01-11 PROCEDURE — 83970 ASSAY OF PARATHORMONE: CPT

## 2022-01-11 RX ORDER — EXEMESTANE 25 MG/1
1 TABLET, SUGAR COATED ORAL
Qty: 0 | Refills: 0 | DISCHARGE

## 2022-01-11 RX ORDER — DULOXETINE HYDROCHLORIDE 30 MG/1
60 CAPSULE, DELAYED RELEASE ORAL DAILY
Refills: 0 | Status: DISCONTINUED | OUTPATIENT
Start: 2022-01-11 | End: 2022-01-14

## 2022-01-11 RX ORDER — CHOLECALCIFEROL (VITAMIN D3) 125 MCG
1 CAPSULE ORAL
Qty: 0 | Refills: 0 | DISCHARGE

## 2022-01-11 RX ORDER — POTASSIUM CHLORIDE 20 MEQ
10 PACKET (EA) ORAL
Refills: 0 | Status: COMPLETED | OUTPATIENT
Start: 2022-01-11 | End: 2022-01-11

## 2022-01-11 RX ORDER — ZOLEDRONIC ACID 5 MG/100ML
4 INJECTION, SOLUTION INTRAVENOUS ONCE
Refills: 0 | Status: COMPLETED | OUTPATIENT
Start: 2022-01-11 | End: 2022-01-11

## 2022-01-11 RX ORDER — DEXTROSE 50 % IN WATER 50 %
25 SYRINGE (ML) INTRAVENOUS ONCE
Refills: 0 | Status: DISCONTINUED | OUTPATIENT
Start: 2022-01-11 | End: 2022-01-13

## 2022-01-11 RX ORDER — SODIUM CHLORIDE 9 MG/ML
1000 INJECTION, SOLUTION INTRAVENOUS
Refills: 0 | Status: DISCONTINUED | OUTPATIENT
Start: 2022-01-11 | End: 2022-01-13

## 2022-01-11 RX ORDER — DEXTROSE 50 % IN WATER 50 %
15 SYRINGE (ML) INTRAVENOUS ONCE
Refills: 0 | Status: DISCONTINUED | OUTPATIENT
Start: 2022-01-11 | End: 2022-01-13

## 2022-01-11 RX ORDER — GLUCAGON INJECTION, SOLUTION 0.5 MG/.1ML
1 INJECTION, SOLUTION SUBCUTANEOUS ONCE
Refills: 0 | Status: DISCONTINUED | OUTPATIENT
Start: 2022-01-11 | End: 2022-01-13

## 2022-01-11 RX ORDER — ACETAMINOPHEN 500 MG
650 TABLET ORAL EVERY 6 HOURS
Refills: 0 | Status: DISCONTINUED | OUTPATIENT
Start: 2022-01-11 | End: 2022-01-14

## 2022-01-11 RX ORDER — INSULIN LISPRO 100/ML
VIAL (ML) SUBCUTANEOUS AT BEDTIME
Refills: 0 | Status: DISCONTINUED | OUTPATIENT
Start: 2022-01-11 | End: 2022-01-13

## 2022-01-11 RX ORDER — DEXTROSE 50 % IN WATER 50 %
12.5 SYRINGE (ML) INTRAVENOUS ONCE
Refills: 0 | Status: DISCONTINUED | OUTPATIENT
Start: 2022-01-11 | End: 2022-01-13

## 2022-01-11 RX ORDER — SODIUM CHLORIDE 9 MG/ML
1000 INJECTION INTRAMUSCULAR; INTRAVENOUS; SUBCUTANEOUS
Refills: 0 | Status: DISCONTINUED | OUTPATIENT
Start: 2022-01-11 | End: 2022-01-14

## 2022-01-11 RX ORDER — OXYBUTYNIN CHLORIDE 5 MG
1 TABLET ORAL
Qty: 0 | Refills: 0 | DISCHARGE

## 2022-01-11 RX ORDER — INSULIN LISPRO 100/ML
VIAL (ML) SUBCUTANEOUS
Refills: 0 | Status: DISCONTINUED | OUTPATIENT
Start: 2022-01-11 | End: 2022-01-13

## 2022-01-11 RX ORDER — ATORVASTATIN CALCIUM 80 MG/1
20 TABLET, FILM COATED ORAL AT BEDTIME
Refills: 0 | Status: DISCONTINUED | OUTPATIENT
Start: 2022-01-11 | End: 2022-01-14

## 2022-01-11 RX ORDER — ENOXAPARIN SODIUM 100 MG/ML
40 INJECTION SUBCUTANEOUS DAILY
Refills: 0 | Status: DISCONTINUED | OUTPATIENT
Start: 2022-01-11 | End: 2022-01-14

## 2022-01-11 RX ORDER — POTASSIUM CHLORIDE 20 MEQ
40 PACKET (EA) ORAL ONCE
Refills: 0 | Status: COMPLETED | OUTPATIENT
Start: 2022-01-11 | End: 2022-01-11

## 2022-01-11 RX ADMIN — Medication 100 MILLIEQUIVALENT(S): at 23:51

## 2022-01-11 RX ADMIN — Medication 100 MILLIEQUIVALENT(S): at 19:27

## 2022-01-11 RX ADMIN — SODIUM CHLORIDE 150 MILLILITER(S): 9 INJECTION INTRAMUSCULAR; INTRAVENOUS; SUBCUTANEOUS at 23:55

## 2022-01-11 RX ADMIN — ZOLEDRONIC ACID 420 MILLIGRAM(S): 5 INJECTION, SOLUTION INTRAVENOUS at 23:58

## 2022-01-11 RX ADMIN — Medication 40 MILLIEQUIVALENT(S): at 23:59

## 2022-01-11 RX ADMIN — Medication 100 MILLIEQUIVALENT(S): at 20:53

## 2022-01-11 NOTE — PHARMACOTHERAPY INTERVENTION NOTE - COMMENTS
Medication reconciliation completed.  Patient was unable to provide medication information, spoke to daughter Leyla ([phone]) and they provided current medication list; confirmed with Dr. [facility].  Daughter Leyla confirms that pt received Xgeva and Faslodex infusion today.  Pt takes Ibrance 125mg once daily for 21 days, then off 7, then repeat; pt on day 3-4 of 21 days on.

## 2022-01-11 NOTE — ED ADULT TRIAGE NOTE - CHIEF COMPLAINT QUOTE
Patient presents in ED s/p fall at home this morning. Patient went to outpatient infusion and was sent for abnormal calcium level and laceration to head from fall. patient is not on blood thinners. Patient denies LOC. Patient has no complaints.

## 2022-01-11 NOTE — ED PROVIDER NOTE - OBJECTIVE STATEMENT
80 y/o female with PMHx of basal cell carcinoma, breast cancer s/p bilateral mastectomy, GERD, macular degeneration, insomnia, obesity, OA, spinal stenosis presents to the ED for evaluation s/p mechanical fall in the shower. Pt with a small laceration to her posterior scalp. States she went to get her infusion today for her breast cancer, was and was sent to the ED for evaluation. Pt also found to have an elevated calcium level when she went to get her infusion today. No LOC. Not on any blood thinners. 80 y/o female with PMHx of basal cell carcinoma, breast cancer s/p bilateral mastectomy, GERD, macular degeneration, insomnia, obesity, OA, spinal stenosis presents to the ED for evaluation s/p mechanical fall in the shower. Pt with a small laceration to the top of her scalp. States she went to get her infusion today for her breast cancer, was and was sent to the ED for evaluation. Pt also found to have an elevated calcium level when she went to get her infusion today. No LOC, nausea, vomiting. Not on any blood thinners.

## 2022-01-11 NOTE — H&P ADULT - ASSESSMENT
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   -IVF and trend calcium  -Ionized Ca / PTH / Vitamin D level  -Tele monitoring   -EKG reviewed   -Also found to have anemia / thrombocytosis   -CTH - Nonspecific lucency within R parietal calvairum mets cannot be excluided and possible L orbit mets  -Heme/onc consult  -Patient on Ibrance, will defer to heme/onc to resume   -Wound consult for scalp laceration     *S/p mechanical fall  -CTH suggesting possible normal pressure hydrocephalus   -Neuro consult   -Fall precautions  -Check Vitamin B12 / TSH     *Hypokalemia  -Replete and recheck  -Tele monitoring    *Reported to have COVID infection 1 week ago  -Isolation  -Pulse ox WNL     *H/o SAH / Basal cell carcinoma / GERD / macular degeneration / insomnia / obesity / spinal stenosis / OA  -C/w home meds and f/u outpatient for further management if conditions remain stable during hospitalization   -Patient doesn't recall meds, reconciled with Lauri, will need to confirm in AM     *DVT ppx   -Heparin 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   -IVF and trend calcium  -Will give Zoledronic acid stat  -Vitamin D level / PTH   -Tele monitoring   -EKG reviewed   -Also found to have anemia / thrombocytosis   -CTH - Nonspecific lucency within R parietal calvairum mets cannot be excluded and possible L orbit mets  -Heme/onc consult  -Patient on Ibrance, will defer to heme/onc to resume   -Wound consult for scalp laceration     *S/p mechanical fall  -CTH suggesting possible normal pressure hydrocephalus   -Neuro consult   -Fall precautions  -Check Vitamin B12 / TSH     *Hypokalemia  -Replete and recheck  -Tele monitoring    *Reported to have COVID infection 1 week ago  -Isolation  -Pulse ox WNL   -As per nursing supervision patient has to be on isolation for 21 days inpatient    *H/o SAH / Basal cell carcinoma / GERD / macular degeneration / insomnia / obesity / spinal stenosis / OA  -C/w home meds and f/u outpatient for further management if conditions remain stable during hospitalization   -Patient doesn't recall meds, reconciled with Lauri, will need to confirm in AM     *DVT ppx   -Lovenox

## 2022-01-11 NOTE — ED PROVIDER NOTE - CLINICAL SUMMARY MEDICAL DECISION MAKING FREE TEXT BOX
82 y/o female s/p mechanical fall. Vital signs stable. Pt looks well-appearing. Low suspicion for traumatic injury other than superficial laceration. Given out-patient abnormality in calcium, will get labs to evaluate for possible hypercalcemia. Will also get imaging, repair laceration. Pt tetanus UTD. Will reassess.

## 2022-01-11 NOTE — ED PROVIDER NOTE - PROGRESS NOTE DETAILS
Joseph Frankel PGY3: Lab called that patient has critically low potassium. Also with hypercalcemia. Will replete potassium and then give fluids. As this is an acute change will most likely admit for further work up. Patient stable at this time.

## 2022-01-11 NOTE — ED PROVIDER NOTE - ENMT, MLM
Airway patent, Nasal mucosa clear. Mouth with normal mucosa. Throat has no vesicles, no oropharyngeal exudates and uvula is midline. Head: +3cm laceration at the top of her scalp.

## 2022-01-11 NOTE — H&P ADULT - HISTORY OF PRESENT ILLNESS
80 y/o F w/ PMH of SAH, basal cell carcinoma, breast cancer s/p mastectomy , GERD, macular degeneration, insomnia, obesity, spinal stenosis, OA, p/w mechanical fall. Patient states she was taking a shower and she fell. Doesn't believe that she passed out, but also not able to describe how she fell. States she was able to get up after the fall. States she was at Dr. Joyce's office today and was told she had to go to ED, but is not sure why. Patient states she's also had other mechanical falls previously. Patient also found to have hyperkalemia / hypercalcemia.  Denies cough, runny nose, sore throat, nausea, vomiting, abdominal pain, fever, chills, CP, SOB. Patient has laceration to scalp, which has been stapled.     PSH: mastectomy, breast lumpectomy, L knee arthroscopy     Social Hx: Tobacco - only when she was 20 years old, etoh / drugs - denies     Family Hx: Father - temporal arteritis, mother - CVA

## 2022-01-11 NOTE — ED PROVIDER NOTE - ATTENDING CONTRIBUTION TO CARE
I, Linda Guillaume MD,  performed the initial face to face bedside interview with this patient regarding history of present illness, review of symptoms and relevant past medical, social and family history.  I completed an independent physical examination.  I was the initial provider who evaluated this patient. I have signed out the follow up of any pending tests (i.e. labs, radiological studies) to the resident.  I have communicated the patient’s plan of care and disposition with the resident.

## 2022-01-11 NOTE — ED ADULT NURSE NOTE - NSIMPLEMENTINTERV_GEN_ALL_ED
Implemented All Fall with Harm Risk Interventions:  [place] to call system. Call bell, personal items and telephone within reach. Instruct patient to call for assistance. Room bathroom lighting operational. Non-slip footwear when patient is off stretcher. Physically safe environment: no spills, clutter or unnecessary equipment. Stretcher in lowest position, wheels locked, appropriate side rails in place. Provide visual cue, wrist band, yellow gown, etc. Monitor gait and stability. Monitor for mental status changes and reorient to person, place, and time. Review medications for side effects contributing to fall risk. Reinforce activity limits and safety measures with patient and family. Provide visual clues: red socks.

## 2022-01-11 NOTE — ED PROVIDER NOTE - MUSCULOSKELETAL, MLM
Spine appears normal, range of motion is not limited, no muscle or joint tenderness. Pelvis stable. Neck non-tender. Extremities with good ROM, strength, sensation, and pulses. LE equal in length and not rotated. No LE edema.

## 2022-01-12 DIAGNOSIS — E83.52 HYPERCALCEMIA: ICD-10-CM

## 2022-01-12 LAB
24R-OH-CALCIDIOL SERPL-MCNC: 62.7 NG/ML — SIGNIFICANT CHANGE UP (ref 30–80)
A1C WITH ESTIMATED AVERAGE GLUCOSE RESULT: 5.3 % — SIGNIFICANT CHANGE UP (ref 4–5.6)
ALBUMIN SERPL ELPH-MCNC: 2.3 G/DL — LOW (ref 3.3–5)
ALP SERPL-CCNC: 92 U/L — SIGNIFICANT CHANGE UP (ref 40–120)
ALT FLD-CCNC: 16 U/L — SIGNIFICANT CHANGE UP (ref 12–78)
ANION GAP SERPL CALC-SCNC: 6 MMOL/L — SIGNIFICANT CHANGE UP (ref 5–17)
APTT BLD: 30.1 SEC — SIGNIFICANT CHANGE UP (ref 27.5–35.5)
AST SERPL-CCNC: 25 U/L — SIGNIFICANT CHANGE UP (ref 15–37)
BASOPHILS # BLD AUTO: 0.06 K/UL — SIGNIFICANT CHANGE UP (ref 0–0.2)
BASOPHILS NFR BLD AUTO: 1.6 % — SIGNIFICANT CHANGE UP (ref 0–2)
BILIRUB SERPL-MCNC: 0.5 MG/DL — SIGNIFICANT CHANGE UP (ref 0.2–1.2)
BUN SERPL-MCNC: 7 MG/DL — SIGNIFICANT CHANGE UP (ref 7–23)
CA-I BLD-SCNC: 1.56 MMOL/L — HIGH (ref 1.15–1.33)
CALCIUM SERPL-MCNC: 10 MG/DL — SIGNIFICANT CHANGE UP (ref 8.5–10.1)
CALCIUM SERPL-MCNC: 9.9 MG/DL — SIGNIFICANT CHANGE UP (ref 8.4–10.5)
CHLORIDE SERPL-SCNC: 106 MMOL/L — SIGNIFICANT CHANGE UP (ref 96–108)
CHOLEST SERPL-MCNC: 121 MG/DL — SIGNIFICANT CHANGE UP
CO2 SERPL-SCNC: 27 MMOL/L — SIGNIFICANT CHANGE UP (ref 22–31)
CREAT SERPL-MCNC: 0.58 MG/DL — SIGNIFICANT CHANGE UP (ref 0.5–1.3)
EOSINOPHIL # BLD AUTO: 0.09 K/UL — SIGNIFICANT CHANGE UP (ref 0–0.5)
EOSINOPHIL NFR BLD AUTO: 2.3 % — SIGNIFICANT CHANGE UP (ref 0–6)
ESTIMATED AVERAGE GLUCOSE: 105 MG/DL — SIGNIFICANT CHANGE UP (ref 68–114)
GLUCOSE SERPL-MCNC: 85 MG/DL — SIGNIFICANT CHANGE UP (ref 70–99)
HCT VFR BLD CALC: 30.4 % — LOW (ref 34.5–45)
HDLC SERPL-MCNC: 57 MG/DL — SIGNIFICANT CHANGE UP
HGB BLD-MCNC: 10.4 G/DL — LOW (ref 11.5–15.5)
IMM GRANULOCYTES NFR BLD AUTO: 0.5 % — SIGNIFICANT CHANGE UP (ref 0–1.5)
INR BLD: 1.08 RATIO — SIGNIFICANT CHANGE UP (ref 0.88–1.16)
LIPID PNL WITH DIRECT LDL SERPL: 46 MG/DL — SIGNIFICANT CHANGE UP
LYMPHOCYTES # BLD AUTO: 1.28 K/UL — SIGNIFICANT CHANGE UP (ref 1–3.3)
LYMPHOCYTES # BLD AUTO: 33.3 % — SIGNIFICANT CHANGE UP (ref 13–44)
MCHC RBC-ENTMCNC: 34.2 GM/DL — SIGNIFICANT CHANGE UP (ref 32–36)
MCHC RBC-ENTMCNC: 38.1 PG — HIGH (ref 27–34)
MCV RBC AUTO: 111.4 FL — HIGH (ref 80–100)
MONOCYTES # BLD AUTO: 0.53 K/UL — SIGNIFICANT CHANGE UP (ref 0–0.9)
MONOCYTES NFR BLD AUTO: 13.8 % — SIGNIFICANT CHANGE UP (ref 2–14)
NEUTROPHILS # BLD AUTO: 1.86 K/UL — SIGNIFICANT CHANGE UP (ref 1.8–7.4)
NEUTROPHILS NFR BLD AUTO: 48.5 % — SIGNIFICANT CHANGE UP (ref 43–77)
NON HDL CHOLESTEROL: 63 MG/DL — SIGNIFICANT CHANGE UP
PLATELET # BLD AUTO: 392 K/UL — SIGNIFICANT CHANGE UP (ref 150–400)
POTASSIUM SERPL-MCNC: 3 MMOL/L — LOW (ref 3.5–5.3)
POTASSIUM SERPL-SCNC: 3 MMOL/L — LOW (ref 3.5–5.3)
PROT SERPL-MCNC: 6 GM/DL — SIGNIFICANT CHANGE UP (ref 6–8.3)
PROTHROM AB SERPL-ACNC: 12.5 SEC — SIGNIFICANT CHANGE UP (ref 10.6–13.6)
PTH-INTACT FLD-MCNC: 20 PG/ML — SIGNIFICANT CHANGE UP (ref 15–65)
RBC # BLD: 2.73 M/UL — LOW (ref 3.8–5.2)
RBC # FLD: 12.7 % — SIGNIFICANT CHANGE UP (ref 10.3–14.5)
SARS-COV-2 RNA SPEC QL NAA+PROBE: SIGNIFICANT CHANGE UP
SODIUM SERPL-SCNC: 139 MMOL/L — SIGNIFICANT CHANGE UP (ref 135–145)
TRIGL SERPL-MCNC: 89 MG/DL — SIGNIFICANT CHANGE UP
TSH SERPL-MCNC: 2.06 UU/ML — SIGNIFICANT CHANGE UP (ref 0.34–4.82)
VIT B12 SERPL-MCNC: >2000 PG/ML — HIGH (ref 232–1245)
VIT D25+D1,25 OH+D1,25 PNL SERPL-MCNC: 25.4 PG/ML — SIGNIFICANT CHANGE UP (ref 19.9–79.3)
WBC # BLD: 3.84 K/UL — SIGNIFICANT CHANGE UP (ref 3.8–10.5)
WBC # FLD AUTO: 3.84 K/UL — SIGNIFICANT CHANGE UP (ref 3.8–10.5)

## 2022-01-12 PROCEDURE — 99233 SBSQ HOSP IP/OBS HIGH 50: CPT

## 2022-01-12 PROCEDURE — 93010 ELECTROCARDIOGRAM REPORT: CPT

## 2022-01-12 PROCEDURE — 99223 1ST HOSP IP/OBS HIGH 75: CPT

## 2022-01-12 RX ORDER — HALOPERIDOL DECANOATE 100 MG/ML
1 INJECTION INTRAMUSCULAR EVERY 6 HOURS
Refills: 0 | Status: DISCONTINUED | OUTPATIENT
Start: 2022-01-12 | End: 2022-01-14

## 2022-01-12 RX ORDER — POTASSIUM CHLORIDE 20 MEQ
40 PACKET (EA) ORAL EVERY 4 HOURS
Refills: 0 | Status: COMPLETED | OUTPATIENT
Start: 2022-01-12 | End: 2022-01-12

## 2022-01-12 RX ORDER — PALBOCICLIB 100 MG/1
125 CAPSULE ORAL DAILY
Refills: 0 | Status: DISCONTINUED | OUTPATIENT
Start: 2022-01-12 | End: 2022-01-14

## 2022-01-12 RX ADMIN — DULOXETINE HYDROCHLORIDE 60 MILLIGRAM(S): 30 CAPSULE, DELAYED RELEASE ORAL at 08:52

## 2022-01-12 RX ADMIN — ENOXAPARIN SODIUM 40 MILLIGRAM(S): 100 INJECTION SUBCUTANEOUS at 08:52

## 2022-01-12 RX ADMIN — ATORVASTATIN CALCIUM 20 MILLIGRAM(S): 80 TABLET, FILM COATED ORAL at 22:12

## 2022-01-12 RX ADMIN — ATORVASTATIN CALCIUM 20 MILLIGRAM(S): 80 TABLET, FILM COATED ORAL at 00:00

## 2022-01-12 RX ADMIN — Medication 40 MILLIEQUIVALENT(S): at 12:59

## 2022-01-12 RX ADMIN — Medication 40 MILLIEQUIVALENT(S): at 16:13

## 2022-01-12 RX ADMIN — HALOPERIDOL DECANOATE 1 MILLIGRAM(S): 100 INJECTION INTRAMUSCULAR at 16:13

## 2022-01-12 NOTE — PHYSICAL THERAPY INITIAL EVALUATION ADULT - PERSONAL SAFETY AND JUDGMENT, REHAB EVAL
Pt moving impulsively at this visit, movements that would apparently cause falls/impaired/at risk behaviors demonstrated

## 2022-01-12 NOTE — PHYSICAL THERAPY INITIAL EVALUATION ADULT - ADL SKILLS, REHAB EVAL
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     Procedure: Procedure(s):  Axplantation infected graft, femoral to femoral bypass with cadaveric artery, bilateral SATORIUS MUSCLE FLAP CREATION, evacuation of absece, vacuum closure  Surgeon: Surgeon(s) and Role:     * Raven Lewis MD - Primary     * Devendra Salgado MD - Fellow - Assisting  Anesthesia: General   Estimated blood loss: 1000 mL  Drains: None  Specimens:   ID Type Source Tests Collected by Time Destination   1 : Left groin wound abscess Wound Other ABSCESS CULTURE AEROBIC BACTERIAL, ANAEROBIC BACTERIAL CULTURE, FUNGUS CULTURE, GRAM STAIN Raven Lewis MD 4/25/2021  6:34 PM    2 : infected graft Wound Other ANAEROBIC BACTERIAL CULTURE, FUNGUS CULTURE, GRAM STAIN, WOUND CULTURE AEROBIC BACTERIAL Raven Lewis MD 4/25/2021  6:36 PM    A : infected graft Other (specify in comments) Other SURGICAL PATHOLOGY EXAM Raven Lewis MD 4/25/2021  6:37 PM    B : infected graft Other (specify in comments) Other SURGICAL PATHOLOGY EXAM Raven Lewis MD 4/25/2021  8:11 PM      Findings:   Infected bilateral groin wounds with purulent fluid expressed from bilateral groin sites as well as from previous subcutaneous tunnel. Femoral to femoral bypass tunneled retropubic. Bilateral sartorius muscle flaps. Wound vacs placed in bilateral groins.   Complications: None.  Implants:   Implant Name Type Inv. Item Serial No.  Lot No. LRB No. Used Action   CyroArtery Femoral- Popliteal Artery Human Allograft   [id number]   Right 1 Implanted            needed assist

## 2022-01-12 NOTE — PATIENT PROFILE ADULT - NSPROGENBLOODRESTRICT_GEN_A_NUR
"Alan Fairbanks Jr. is a 69 y.o. male patient.    Temp: 97.9 °F (36.6 °C) (18 0700)  Pulse: 82 (18 1000)  Resp: (!) 28 (18 1000)  BP: (!) 103/58 (18 1000)  SpO2: 100 % (18 1000)  Weight: 119.3 kg (263 lb 0.1 oz) (18 2331)  Height: 5' 11" (180.3 cm) (18 1603)       Arterial Line  Date/Time: 2018 10:16 AM  Location procedure was performed: [facility] SURGICAL ICU (SICU)  Performed by: MARIN LARSEN  Authorized by: MARIN LARSEN   Consent Done: Yes  Consent: Written consent obtained.  Consent given by: spouse  Patient identity confirmed: , MRN and name  Time out: Immediately prior to procedure a "time out" was called to verify the correct patient, procedure, equipment, support staff and site/side marked as required.  Preparation: Patient was prepped and draped in the usual sterile fashion.  Indications: hemodynamic monitoring  Location: left radial  Needle gauge: 20  Seldinger technique: Seldinger technique used  Number of attempts: 1  Complications: No  Estimated blood loss (mL): 2  Post-procedure: line sutured and dressing applied  Patient tolerance: Patient tolerated the procedure well with no immediate complications  Comments: 20G art line placed in L radial artery with U/S guidance. Good tracing          Marin Larsen  2018  "
Radiology Post-Procedure Note    Pre Op Diagnosis: anterior abdominal abscess  Post Op Diagnosis: Same    Procedure: CT guided abscess drainage    Procedure performed by: Milad Caldwell MD    Written Informed Consent Obtained: Yes  Specimen Removed: YES 80cc brown-mustard colored pus  Estimated Blood Loss: Minimal    Findings:   8F drain placed with good evacuation of abscess.  Tube left to grenade drainage.     Patient tolerated procedure well.    Milad Caldwell MD  Attending Radiologist  Interventional Neuroradiology    
none

## 2022-01-12 NOTE — PATIENT PROFILE ADULT - FALL HARM RISK - HARM RISK INTERVENTIONS
Assistance with ambulation/Assistance OOB with selected safe patient handling equipment/Communicate Risk of Fall with Harm to all staff/Reinforce activity limits and safety measures with patient and family/Review medications for side effects contributing to fall risk/Sit up slowly, dangle for a short time, stand at bedside before walking/Tailored Fall Risk Interventions/Toileting schedule using arm’s reach rule for commode and bathroom/Visual Cue: Yellow wristband and red socks/Bed in lowest position, wheels locked, appropriate side rails in place/Call bell, personal items and telephone in reach/Instruct patient to call for assistance before getting out of bed or chair/Non-slip footwear when patient is out of bed/[place] to call system/Physically safe environment - no spills, clutter or unnecessary equipment/Purposeful Proactive Rounding/Room/bathroom lighting operational, light cord in reach

## 2022-01-12 NOTE — PHYSICAL THERAPY INITIAL EVALUATION ADULT - GAIT DISTANCE, PT EVAL
in room, pt had 2 times LOB at the start of ambulation, but when she listened to PT and followed through w/ RW balance improved./75 feet

## 2022-01-12 NOTE — CONSULT NOTE ADULT - SUBJECTIVE AND OBJECTIVE BOX
CC: 81y old  Female who presents with a chief complaint of mechanical fall (11 Jan 2022 21:11)      HPI:  80 y/o F w/ PMH of SAH, BCCA, breast cancer s/p mastectomy , GERD, macular degeneration, insomnia, obesity, spinal stenosis, OA, p/w mechanical fall. Patient states she was taking a shower and she fell,  Doesn't believe that she passed out, but also not able to describe how she fell. States she was able to get up after the fall. States she was at Dr. Joyce's office today and was told she had to go to ED, but is not sure why. Patient states she's also had other mechanical falls previously. Patient also found to have hyperkalemia / hypercalcemia.  Denies recent fever, chills, CP, SOB,     scalp laceration to scalp, which has been stapled.       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  History of lumpectomy of left breast 1990  H/O mastectomy, [id number] with immediate reconstruction with fat grafting  H/O colonoscopy   History of esophagogastroduodenoscopy (EGD)  H/O left mastectomy 2016  H/O bilateral breast biopsy        FAMILY HISTORY:  Family history of stroke (Mother)  Father - temporal arteritis, mother - CVA       Social Hx: Tobacco - only when she was 20 years old, etoh / drugs - denies       MEDICATIONS  (STANDING):  atorvastatin 20 milliGRAM(s) Oral at bedtime  dextrose 40% Gel 15 Gram(s) Oral once  dextrose 5%. 1000 milliLiter(s) (50 mL/Hr) IV Continuous <Continuous>  dextrose 5%. 1000 milliLiter(s) (100 mL/Hr) IV Continuous <Continuous>  dextrose 50% Injectable 25 Gram(s) IV Push once  dextrose 50% Injectable 12.5 Gram(s) IV Push once  dextrose 50% Injectable 25 Gram(s) IV Push once  DULoxetine 60 milliGRAM(s) Oral daily  enoxaparin Injectable 40 milliGRAM(s) SubCutaneous daily  glucagon  Injectable 1 milliGRAM(s) IntraMuscular once  insulin lispro (ADMELOG) corrective regimen sliding scale   SubCutaneous three times a day before meals  insulin lispro (ADMELOG) corrective regimen sliding scale   SubCutaneous at bedtime  potassium chloride    Tablet ER 40 milliEquivalent(s) Oral every 4 hours  sodium chloride 0.9%. 1000 milliLiter(s) (150 mL/Hr) IV Continuous <Continuous>       Allergies  No Known Allergies      ROS: Pertinent positives in HPI, all other ROS were reviewed and are negative.        Vital Signs Last 24 Hrs  T(C): 36.7 (12 Jan 2022 08:18), Max: 36.7 (11 Jan 2022 21:25)  T(F): 98.1 (12 Jan 2022 08:18), Max: 98.1 (12 Jan 2022 00:17)  HR: 67 (12 Jan 2022 08:18) (67 - 82)  BP: 122/66 (12 Jan 2022 08:18) (122/66 - 154/73)  BP(mean): 100 (12 Jan 2022 01:19) (91 - 100)  RR: 18 (12 Jan 2022 08:18) (17 - 18)  SpO2: 96% (12 Jan 2022 08:18) (94% - 96%)      Gen exam:  Normocephalic, in no distress, awake and alert.  HEENT: PERRLA, EOMI,   Neck: Supple.  Respiratory: Breath sounds are clear bilaterally  Cardiovascular: S1 and S2, regular   Extremities:  no edema  Vascular: Caritid Bruit - no  Musculoskeletal: no abnormal movements  Skin: No rashes      Neurological exam:  HF: A x O x 3. Appropriately interactive, normal affect. Speech fluent, No Aphasia or paraphasic errors. Naming /repetition intact   CN: POLLY, EOMI, VFF, facial sensation normal, no NLFD, tongue midline, Palate moves equally, SCM equal bilaterally  Motor: No pronator drift, Strength 5/5 in all 4 ext, normal bulk and tone, no tremor, rigidity or bradykinesia.    Sens: Intact to light touch / PP/ VS/ JS    Reflexes: Symmetric and normal, downgoing toes b/l  Coord:  No FNFA, dysmetria, JOVANNY intact   Gait/Balance: Not tested    NIHSS:      Labs:   01-12    139  |  106  |  7   ----------------------------<  85  3.0<L>   |  27  |  0.58    Ca    10.0      12 Jan 2022 08:44  Phos  2.8     01-11  Mg     1.9     01-11    TPro  6.0  /  Alb  2.3<L>  /  TBili  0.5  /  DBili  x   /  AST  25  /  ALT  16  /  AlkPhos  92  01-12                        10.4   3.84  )-----------( 392      ( 12 Jan 2022 08:44 )             30.4       Radiology:  < from: CT Head No Cont (01.11.22 @ 19:07) >  FINDINGS: There is no acute intracranial hemorrhage, mass effect, shift   of the midline structures, herniation, extra-axial fluid collection, or   hydrocephalus.    Ventriculomegaly is seen out of portion to sulcal prominence. Variable   sulcal prominence is also noted. There is chronic of the cerebral sulci   at the vertex. The callosal angle appears sharp at the level of the   posterior commissure measured in the coronal plane.    There is diffuse cerebral volume loss with prominence of the sulci,   fissures, and cisternal spaces which is normal for the patient's age.   There is moderate patchy confluent deep and periventricular white matter   hypoattenuation statistically compatible with microvascular changes given   calcific atherosclerotic disease of the intracranial arteries.    The paranasal sinuses and mastoid air cells are clear. The calvarium is   intact. A nonspecific lucency is seen within the right parietal   calvarium. Calvarial metastatic disease cannot be excluded.    A vertex scalp laceration is seen with surgical skin staples in place.   There is evidence of bilateral cataract removal. Bilateral senile scleral   plaques are noted.    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 Nonspecific soft tissue attenuation is   seen within the retrobulbar compartment of the left orbit.    IMPRESSION: No acute intracranial hemorrhage, mass effect, or shift of   the midline structures.    Vertex scalp laceration with surgical skin staples in place.    Similar-appearing moderateseverity chronic white matter microvascular   type changes.    Unchanged imaging findings for which normal pressure hydrocephalus can be   considered. Clinical correlation is required for this diagnosis.    Nonspecific lucency within the right parietal calvarium for which   metastatic disease cannot be excluded.    Imaging findings for which metastatic disease to the left orbit cannot be   excluded. Recommended imaging contrast enhanced MRI studies of the brain   and orbits for further evaluation.      < end of copied text >   CC: 81y old  Female who presents with a chief complaint of mechanical fall (11 Jan 2022 21:11)      HPI:  80 y/o F w/ PMH of SAH, BCCA, breast cancer s/p mastectomy , GERD, macular degeneration, insomnia, obesity, spinal stenosis, OA, presented to ED S/P mechanical fall. Patient states she was taking a shower and her left leg gave way and she fell, denies passing out but is unable to elaborate on whether she has any premonition, diaphoresis  or aura, she reports she was able to pull herself up and was not confused but has sustained a laceration to the scalp. Scalp was sutured in ED, CT head showed no acute intracranial hemorrhage, mass effect, or shift, atrophy and ventriculomegaly, ventriculomegaly is seen out of portion to sulcal prominence, also Vertex scalp laceration with surgical skin staples in place.    Patient states she's had number of falls previously, has been seen by neurologist at [facility] [place], work-up was done, (she does not recall what tests) was advised to use a Walker, she also admits that she has short term memory issues and uses diapers at night, she is not a ggod historian.    I tried conatcting patients daughter, could not reach her.       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  History of lumpectomy of left breast 1990  H/O mastectomy, [id number] with immediate reconstruction with fat grafting  H/O colonoscopy   History of esophagogastroduodenoscopy (EGD)  H/O left mastectomy 2016  H/O bilateral breast biopsy        FAMILY HISTORY:  Family history of stroke (Mother)  Father - temporal arteritis, mother - CVA       Social Hx: Tobacco - only when she was 20 years old, etoh / drugs - denies       MEDICATIONS  (STANDING):  atorvastatin 20 milliGRAM(s) Oral at bedtime  dextrose 40% Gel 15 Gram(s) Oral once  dextrose 5%. 1000 milliLiter(s) (50 mL/Hr) IV Continuous <Continuous>  dextrose 5%. 1000 milliLiter(s) (100 mL/Hr) IV Continuous <Continuous>  dextrose 50% Injectable 25 Gram(s) IV Push once  dextrose 50% Injectable 12.5 Gram(s) IV Push once  dextrose 50% Injectable 25 Gram(s) IV Push once  DULoxetine 60 milliGRAM(s) Oral daily  enoxaparin Injectable 40 milliGRAM(s) SubCutaneous daily  glucagon  Injectable 1 milliGRAM(s) IntraMuscular once  insulin lispro (ADMELOG) corrective regimen sliding scale   SubCutaneous three times a day before meals  insulin lispro (ADMELOG) corrective regimen sliding scale   SubCutaneous at bedtime  potassium chloride    Tablet ER 40 milliEquivalent(s) Oral every 4 hours  sodium chloride 0.9%. 1000 milliLiter(s) (150 mL/Hr) IV Continuous <Continuous>       Allergies  No Known Allergies      ROS: Pertinent positives in HPI, all other ROS were reviewed and are negative.        Vital Signs Last 24 Hrs  T(C): 36.7 (12 Jan 2022 08:18), Max: 36.7 (11 Jan 2022 21:25)  T(F): 98.1 (12 Jan 2022 08:18), Max: 98.1 (12 Jan 2022 00:17)  HR: 67 (12 Jan 2022 08:18) (67 - 82)  BP: 122/66 (12 Jan 2022 08:18) (122/66 - 154/73)  BP(mean): 100 (12 Jan 2022 01:19) (91 - 100)  RR: 18 (12 Jan 2022 08:18) (17 - 18)  SpO2: 96% (12 Jan 2022 08:18) (94% - 96%)      Gen exam:  Normocephalic, in no distress, awake and alert.  HEENT: PERRLA, EOMI,   Neck: Supple.  Respiratory: Breath sounds are clear bilaterally  Cardiovascular: S1 and S2, regular   Extremities:  no edema  Vascular: Caritid Bruit - no  Musculoskeletal: no abnormal movements  Skin: No rashes      Neurological exam:  HF: A x O x self, hospital, month/year. Speech fluent, No Aphasia. Naming /repetition intact.  Decraesed recall, concentartion  CN: POLLY, EOMI, VFF, facial sensation normal, no NLFD, tongue midline, Palate moves equally, SCM equal bilaterally  Motor: No pronator drift, Strength 5/5 in all 4 ext, normal bulk and tone, no tremor, rigidity.    Sens: Intact to light touch / PP    Reflexes: Symmetric and normal, downgoing toes b/l  Coord:  No FNFA, dysmetria, JOVANNY intact   Gait/Balance: Not tested      Labs:   01-12    139  |  106  |  7   ----------------------------<  85  3.0<L>   |  27  |  0.58    Ca    10.0      12 Jan 2022 08:44  Phos  2.8     01-11  Mg     1.9     01-11    TPro  6.0  /  Alb  2.3<L>  /  TBili  0.5  /  DBili  x   /  AST  25  /  ALT  16  /  AlkPhos  92  01-12                        10.4   3.84  )-----------( 392      ( 12 Jan 2022 08:44 )             30.4       Radiology:  < from: CT Head No Cont (01.11.22 @ 19:07) >  FINDINGS: There is no acute intracranial hemorrhage, mass effect, shift   of the midline structures, herniation, extra-axial fluid collection, or   hydrocephalus.    Ventriculomegaly is seen out of portion to sulcal prominence. Variable   sulcal prominence is also noted. There is chronic of the cerebral sulci   at the vertex. The callosal angle appears sharp at the level of the   posterior commissure measured in the coronal plane.    There is diffuse cerebral volume loss with prominence of the sulci,   fissures, and cisternal spaces which is normal for the patient's age.   There is moderate patchy confluent deep and periventricular white matter   hypoattenuation statistically compatible with microvascular changes given   calcific atherosclerotic disease of the intracranial arteries.    The paranasal sinuses and mastoid air cells are clear. The calvarium is   intact. A nonspecific lucency is seen within the right parietal   calvarium. Calvarial metastatic disease cannot be excluded.    A vertex scalp laceration is seen with surgical skin staples in place.   There is evidence of bilateral cataract removal. Bilateral senile scleral   plaques are noted.    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 Nonspecific soft tissue attenuation is   seen within the retrobulbar compartment of the left orbit.    IMPRESSION: No acute intracranial hemorrhage, mass effect, or shift of   the midline structures.    Vertex scalp laceration with surgical skin staples in place.    Similar-appearing moderateseverity chronic white matter microvascular   type changes.    Unchanged imaging findings for which normal pressure hydrocephalus can be   considered. Clinical correlation is required for this diagnosis.    Nonspecific lucency within the right parietal calvarium for which   metastatic disease cannot be excluded.    Imaging findings for which metastatic disease to the left orbit cannot be   excluded. Recommended imaging contrast enhanced MRI studies of the brain   and orbits for further evaluation.      < end of copied text >

## 2022-01-12 NOTE — CONSULT NOTE ADULT - SUBJECTIVE AND OBJECTIVE BOX
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 being treated with Faslodex and Palbociclib, primary oncologist Dr Joyce  sent in for hypercalcemia with ca 12.3.     s/p zometa in hospital    she also has GERD, macular degeneration, insomnia, obesity, spinal stenosis, OA, p/w mechanical fall. Patient states she was taking a shower and she fell.     she was covid 19 + about a week back      PSH: mastectomy, breast lumpectomy, L knee arthroscopy     Social Hx: Tobacco - only when she was 20 years old, etoh / drugs - denies     Family Hx: Father - temporal arteritis, mother - CVA   (11 Jan 2022 21:11)      PAST MEDICAL & SURGICAL HISTORY:  Bilateral malignant neoplasm of breast in female, unspecified site of breast  b/l    History of macular degeneration  bilateral    Gastroesophageal reflux disease without esophagitis    Osteoarthritis of both knees, unspecified osteoarthritis type    Spinal stenosis of lumbar region    Basal cell carcinoma in situ of skin  removed from neck    Malignant neoplasm of left female breast, unspecified site of breast  with Lymph node involvement    Insomnia, unspecified type    Urinary frequency    Neoplasm by body site  left anterior chest wall    Obesity    Cataract  b/l    Macular Hole  repair b/l eyes    S/P left knee arthroscopy  2009    Early stage skin cancer  basal cell carcinoma removed from left side of neck.    History of lumpectomy of left breast  1990    H/O mastectomy, right  1993 with immediate reconstruction with fat grafting    H/O colonoscopy  multiple    History of esophagogastroduodenoscopy (EGD)    H/O left mastectomy  2016    H/O bilateral breast biopsy        REVIEW OF SYSTEMS    General:	  tired  falls  no shortness of breath  no abdominal pain        No Known Allergies    Intolerances          FAMILY HISTORY:  Family history of temporal arteritis (Father)  father    Family history of stroke (Mother)  mother        Home Medications:  atorvastatin 20 mg oral tablet: 1 tab(s) orally once a day (11 Jan 2022 22:42)  calcium (as carbonate) 600 mg oral tablet: 1 tab(s) orally once a day (11 Jan 2022 22:42)  Dulcolax Laxative 5 mg oral delayed release tablet: 1 tab(s) orally once a day, As Needed (11 Jan 2022 22:42)  DULoxetine 60 mg oral delayed release capsule: 1 cap(s) orally once a day (11 Jan 2022 22:42)  Faslodex 50 mg/mL intramuscular solution: pt recieved infusion today (11 Jan 2022 22:42)  Melatonin 5 mg oral tablet: 1 tab(s) orally once a day (at bedtime), As Needed (11 Jan 2022 22:42)  Myrbetriq 25 mg oral tablet, extended release: 1 tab(s) orally once a day (11 Jan 2022 22:42)  palbociclib 125 mg oral capsule: 1 cap(s) orally once a day on days 1-21 of 28 day cycle  ***pt currently on day 3 or 4 of current cycle*** (11 Jan 2022 22:42)  traMADol 50 mg oral tablet: 1 tab(s) orally every 8 hours, As Needed (11 Jan 2022 22:42)  Vitamin B6 100 mg oral tablet: 1 tab(s) orally once a day (11 Jan 2022 22:42)  Vitamin D3 125 mcg (5000 intl units) oral tablet: 1 tab(s) orally once a day (11 Jan 2022 22:42)  Xgeva 120 mg/1.7 mL subcutaneous solution: pt recieved infusion today  (11 Jan 2022 22:42)      MEDICATIONS  (STANDING):  atorvastatin 20 milliGRAM(s) Oral at bedtime  dextrose 40% Gel 15 Gram(s) Oral once  dextrose 5%. 1000 milliLiter(s) (50 mL/Hr) IV Continuous <Continuous>  dextrose 5%. 1000 milliLiter(s) (100 mL/Hr) IV Continuous <Continuous>  dextrose 50% Injectable 25 Gram(s) IV Push once  dextrose 50% Injectable 12.5 Gram(s) IV Push once  dextrose 50% Injectable 25 Gram(s) IV Push once  DULoxetine 60 milliGRAM(s) Oral daily  enoxaparin Injectable 40 milliGRAM(s) SubCutaneous daily  glucagon  Injectable 1 milliGRAM(s) IntraMuscular once  insulin lispro (ADMELOG) corrective regimen sliding scale   SubCutaneous three times a day before meals  insulin lispro (ADMELOG) corrective regimen sliding scale   SubCutaneous at bedtime  sodium chloride 0.9%. 1000 milliLiter(s) (150 mL/Hr) IV Continuous <Continuous>    MEDICATIONS  (PRN):  acetaminophen     Tablet .. 650 milliGRAM(s) Oral every 6 hours PRN Mild Pain (1 - 3)  aluminum hydroxide/magnesium hydroxide/simethicone Suspension 30 milliLiter(s) Oral every 4 hours PRN Dyspepsia  haloperidol    Injectable 1 milliGRAM(s) IntraMuscular every 6 hours PRN Agitation      PHYSICAL EXAM:  Vital Signs Last 24 Hrs  T(C): 36.4 (12 Jan 2022 17:07), Max: 36.7 (11 Jan 2022 21:25)  T(F): 97.5 (12 Jan 2022 17:07), Max: 98.1 (12 Jan 2022 00:17)  HR: 74 (12 Jan 2022 17:07) (67 - 82)  BP: 141/54 (12 Jan 2022 17:07) (122/66 - 154/73)  BP(mean): 79 (12 Jan 2022 17:07) (79 - 100)  RR: 18 (12 Jan 2022 17:07) (17 - 18)  SpO2: 100% (12 Jan 2022 17:07) (94% - 100%)      Gen:   well  no LNs  chest clear  H h2  abdomen soft  alexa KHALIF      LABS:                        10.4   3.84  )-----------( 392      ( 12 Jan 2022 08:44 )             30.4     12 Jan 2022 08:44    139    |  106    |  7      ----------------------------<  85     3.0     |  27     |  0.58     Ca    10.0       12 Jan 2022 08:44  Phos  2.8       11 Jan 2022 18:05  Mg     1.9       11 Jan 2022 18:05    TPro  6.0    /  Alb  2.3    /  TBili  0.5    /  DBili  x      /  AST  25     /  ALT  16     /  AlkPhos  92     12 Jan 2022 08:44    LIVER FUNCTIONS - ( 12 Jan 2022 08:44 )  Alb: 2.3 g/dL / Pro: 6.0 gm/dL / ALK PHOS: 92 U/L / ALT: 16 U/L / AST: 25 U/L / GGT: x           CA 12.3 as OP and 11.3 on admission    PT/INR - ( 12 Jan 2022 08:44 )   PT: 12.5 sec;   INR: 1.08 ratio         PTT - ( 12 Jan 2022 08:44 )  PTT:30.1 sec      RADIOLOGY & ADDITIONAL STUDIES:

## 2022-01-12 NOTE — PHYSICAL THERAPY INITIAL EVALUATION ADULT - PATIENT/FAMILY/SIGNIFICANT OTHER GOALS STATEMENT, PT EVAL
Pt would like to recover at home but states she is alone during the day that her dgt works. Pt would not like rehab and would prefer home instead.

## 2022-01-12 NOTE — CONSULT NOTE ADULT - PROBLEM SELECTOR RECOMMENDATION 2
metastatic invasive lobular  continue palbociclib for now until status of disease clear  faslodex as OP

## 2022-01-12 NOTE — PHYSICAL THERAPY INITIAL EVALUATION ADULT - PRECAUTIONS/LIMITATIONS, REHAB EVAL
IMAGING- No acute intracranial hemorrhage, mass effect, or shift of the midline structures. Vertex scalp laceration with surgical skin staples in place. Similar-appearing moderate severity chronic white matter microvascular type changes. Unchanged imaging findings for which normal pressure hydrocephalus can be considered. Clinical correlation is required for this diagnosis. Nonspecific lucency within the right parietal calvarium for which metastatic disease cannot be excluded. Imaging findings for which metastatic disease to the left orbit cannot be excluded. Recommended imaging contrast enhanced MRI studies of the brain and orbits for further evaluation./fall precautions

## 2022-01-12 NOTE — PHYSICAL THERAPY INITIAL EVALUATION ADULT - PERTINENT HX OF CURRENT PROBLEM, REHAB EVAL
82 y/o F w/ PMH of SAH, basal cell carcinoma, breast cancer s/p mastectomy , GERD, macular degeneration, insomnia, obesity, spinal stenosis, OA, p/w mechanical fall. Patient states she was taking a shower and she fell. 82 y/o F,  p/w mechanical fall while  taking a shower.  L anterior scalp hematoma+.  PMH of SAH, basal cell carcinoma, breast cancer s/p mastectomy , GERD, macular degeneration, insomnia, obesity, spinal stenosis, OA, 82 y/o F,  p/w mechanical fall while  taking a shower.   PMH of SAH, basal cell carcinoma, breast cancer s/p mastectomy , GERD, macular degeneration, insomnia, obesity, spinal stenosis, OA,

## 2022-01-12 NOTE — CONSULT NOTE ADULT - PROBLEM SELECTOR RECOMMENDATION 9
now resolved with zometa  shall need outpatient imaging studies to evaluate status on bone mets  previous PET CT had shown response

## 2022-01-12 NOTE — PHYSICAL THERAPY INITIAL EVALUATION ADULT - BALANCE DISTURBANCE, IDENTIFIED IMPAIRMENT CONTRIBUTE, REHAB EVAL
motor sequencing impairment noted during bed mobility, transfers to stand and initiating gait. Once in sequence w/ PT verbal assistance balance improved to fair/impaired motor control/decreased strength

## 2022-01-12 NOTE — PHYSICAL THERAPY INITIAL EVALUATION ADULT - LIVES WITH, PROFILE
children Pt lives w/ her dgt and her dgt's family in a house w/ 2 ASTRID and 2 steps inside to bedroom and bathroom./children

## 2022-01-12 NOTE — ED ADULT NURSE REASSESSMENT NOTE - NS ED NURSE REASSESS COMMENT FT1
received pt from JOURDAN Sandy, pt is a&o, airway is patent and shows no s/s of respiratory distress, color is pink, pt is on purwick, pt was cleaned, skin is intact, vs are WNL.

## 2022-01-12 NOTE — PHYSICAL THERAPY INITIAL EVALUATION ADULT - GENERAL OBSERVATIONS, REHAB EVAL
Pt on Covid Isolation, had Covid + last week now tested neg, awaiting another swab before off isolation. Pt alert, cooperative and willing to participate. +IV. Pt moving impulsively and out of sequence for good motor control and good balance. Pt rolling to the right to get out of bed on the Left side and to stand pt rolling to right to stand up. When PT corrected this movement pt's balance improved.

## 2022-01-12 NOTE — PHYSICAL THERAPY INITIAL EVALUATION ADULT - RISK REDUCTION/PREVENTION, PT EVAL
Pt is a fall risk at this time. PT recommended to pt that she should not be alone during the day at this time./risk factors/recurrence of condition

## 2022-01-12 NOTE — CONSULT NOTE ADULT - ASSESSMENT
82 y/o F w/ PMH of SAH, BCCA, BR cancer s/p mastectomy , GERD, macular degeneration, insomnia, obesity, spinal stenosis, OA, and history of multiple falls; presented to ED S/P mechanical fall, pt was taking a shower and her left leg gave way and she fell, denies passing out, no premonition, diaphoresis  or aura, was able to pull herself up and was not confused, sustained a laceration to the scalp.     CT head - no acute findings, atrophy and ventriculomegaly, Ventriculomegaly is out of portion to sulcal prominence; also Vertex scalp laceration with surgical skin staples in place.    # Multiple falls + mild cog impairment and urinary increased frequency ? incontinence and CT findings mar raise possibility of NPH    # Giat instability / falls - rule out brain stem / cerebellar CVA    - MRI brain  - Labs: B12, TSH  - PT eval for gait apraxia  - Pt has had w/u done by neurologist at [facility] [place], (she does not recall what tests) was advised to use a Walker, she is not a good historian. I tried contacting her daughter, could not reach her.     Above was D/W patient and Dr. Tejada

## 2022-01-12 NOTE — PHYSICAL THERAPY INITIAL EVALUATION ADULT - MODALITIES TREATMENT COMMENTS
Pt returned to supine, +alarm, CBIR, +IV, +HM, HR 82 before session and HR 79 after session, Pt on RA, no SOB noted for session. Pt denies dizziness or pain this session.

## 2022-01-12 NOTE — PHYSICAL THERAPY INITIAL EVALUATION ADULT - IMPAIRMENTS CONTRIBUTING TO GAIT DEVIATIONS, PT EVAL
PT encouraged 3 point gait w/ RW, L LE then R LE sequence, due to L LE weaker > R LE./impaired balance/decreased strength

## 2022-01-13 ENCOUNTER — TRANSCRIPTION ENCOUNTER (OUTPATIENT)
Age: 82
End: 2022-01-13

## 2022-01-13 LAB
ANION GAP SERPL CALC-SCNC: 7 MMOL/L — SIGNIFICANT CHANGE UP (ref 5–17)
BUN SERPL-MCNC: 6 MG/DL — LOW (ref 7–23)
CALCIUM SERPL-MCNC: 8.8 MG/DL — SIGNIFICANT CHANGE UP (ref 8.5–10.1)
CHLORIDE SERPL-SCNC: 108 MMOL/L — SIGNIFICANT CHANGE UP (ref 96–108)
CO2 SERPL-SCNC: 25 MMOL/L — SIGNIFICANT CHANGE UP (ref 22–31)
CREAT SERPL-MCNC: 0.5 MG/DL — SIGNIFICANT CHANGE UP (ref 0.5–1.3)
GLUCOSE SERPL-MCNC: 87 MG/DL — SIGNIFICANT CHANGE UP (ref 70–99)
HCT VFR BLD CALC: 29.8 % — LOW (ref 34.5–45)
HGB BLD-MCNC: 10.4 G/DL — LOW (ref 11.5–15.5)
MCHC RBC-ENTMCNC: 34.9 GM/DL — SIGNIFICANT CHANGE UP (ref 32–36)
MCHC RBC-ENTMCNC: 38.1 PG — HIGH (ref 27–34)
MCV RBC AUTO: 109.2 FL — HIGH (ref 80–100)
PLATELET # BLD AUTO: 373 K/UL — SIGNIFICANT CHANGE UP (ref 150–400)
POTASSIUM SERPL-MCNC: 3.3 MMOL/L — LOW (ref 3.5–5.3)
POTASSIUM SERPL-SCNC: 3.3 MMOL/L — LOW (ref 3.5–5.3)
RBC # BLD: 2.73 M/UL — LOW (ref 3.8–5.2)
RBC # FLD: 12.4 % — SIGNIFICANT CHANGE UP (ref 10.3–14.5)
SODIUM SERPL-SCNC: 140 MMOL/L — SIGNIFICANT CHANGE UP (ref 135–145)
WBC # BLD: 3.36 K/UL — LOW (ref 3.8–10.5)
WBC # FLD AUTO: 3.36 K/UL — LOW (ref 3.8–10.5)

## 2022-01-13 PROCEDURE — 99232 SBSQ HOSP IP/OBS MODERATE 35: CPT

## 2022-01-13 RX ORDER — POTASSIUM CHLORIDE 20 MEQ
40 PACKET (EA) ORAL EVERY 4 HOURS
Refills: 0 | Status: COMPLETED | OUTPATIENT
Start: 2022-01-13 | End: 2022-01-13

## 2022-01-13 RX ADMIN — Medication 40 MILLIEQUIVALENT(S): at 15:57

## 2022-01-13 RX ADMIN — DULOXETINE HYDROCHLORIDE 60 MILLIGRAM(S): 30 CAPSULE, DELAYED RELEASE ORAL at 09:43

## 2022-01-13 RX ADMIN — Medication 40 MILLIEQUIVALENT(S): at 19:08

## 2022-01-13 RX ADMIN — Medication 650 MILLIGRAM(S): at 01:34

## 2022-01-13 RX ADMIN — ATORVASTATIN CALCIUM 20 MILLIGRAM(S): 80 TABLET, FILM COATED ORAL at 21:14

## 2022-01-13 RX ADMIN — ENOXAPARIN SODIUM 40 MILLIGRAM(S): 100 INJECTION SUBCUTANEOUS at 09:44

## 2022-01-13 NOTE — DISCHARGE NOTE PROVIDER - CARE PROVIDER_API CALL
Rachell Gonzalez N  HEMATOLOGY  [address] [facility], [address] [place] [address]  Phone: [phone]  Fax: [phone]  Follow Up Time: 2 weeks    md associated with facility in a day,   Phone: (   )    -  Fax: (   )    -  Follow Up Time:

## 2022-01-13 NOTE — DISCHARGE NOTE PROVIDER - NSDCMRMEDTOKEN_GEN_ALL_CORE_FT
atorvastatin 20 mg oral tablet: 1 tab(s) orally once a day  calcium (as carbonate) 600 mg oral tablet: 1 tab(s) orally once a day  Dulcolax Laxative 5 mg oral delayed release tablet: 1 tab(s) orally once a day, As Needed  DULoxetine 60 mg oral delayed release capsule: 1 cap(s) orally once a day  Faslodex 50 mg/mL intramuscular solution: pt recieved infusion today  Melatonin 5 mg oral tablet: 1 tab(s) orally once a day (at bedtime), As Needed  Myrbetriq 25 mg oral tablet, extended release: 1 tab(s) orally once a day  palbociclib 125 mg oral capsule: 1 cap(s) orally once a day on days 1-21 of 28 day cycle  ***pt currently on day 3 or 4 of current cycle***  traMADol 50 mg oral tablet: 1 tab(s) orally every 8 hours, As Needed  Vitamin B6 100 mg oral tablet: 1 tab(s) orally once a day  Vitamin D3 125 mcg (5000 intl units) oral tablet: 1 tab(s) orally once a day  Xgeva 120 mg/1.7 mL subcutaneous solution: pt recieved infusion today    atorvastatin 20 mg oral tablet: 1 tab(s) orally once a day  Dulcolax Laxative 5 mg oral delayed release tablet: 1 tab(s) orally once a day, As Needed  DULoxetine 60 mg oral delayed release capsule: 1 cap(s) orally once a day  Melatonin 5 mg oral tablet: 1 tab(s) orally once a day (at bedtime), As Needed  Myrbetriq 25 mg oral tablet, extended release: 1 tab(s) orally once a day  palbociclib 125 mg oral capsule: 1 cap(s) orally once a day on days 1-21 of 28 day cycle    traMADol 50 mg oral tablet: 1 tab(s) orally every 8 hours, As Needed  Vitamin B6 100 mg oral tablet: 1 tab(s) orally once a day  Vitamin D3 125 mcg (5000 intl units) oral tablet: 1 tab(s) orally once a day

## 2022-01-13 NOTE — DISCHARGE NOTE PROVIDER - HOSPITAL COURSE
Subjective   74 yr old female with Left hand skin tear from a dog bite.  She states it was a friend of hers dog that scratched her and caused a skin tear on her left hand.      History provided by:  Patient   used: No        Review of Systems   Skin:        Left hand skin tear   All other systems reviewed and are negative.      Past Medical History:   Diagnosis Date   • Abdominal pain    • Acute bronchitis    • Ankle pain    • Anxiety 1980   • Atopic dermatitis    • Bronchiectasis (CMS/HCC)    • Cataract, bilateral    • Chest pain     STATES HAS OCCASSIONALLY.  STATES LAST TIME WAS LAST WEEK.  STATES SEES DR. RICH.  STATES HE HAS DONE NUMEROUS TESTS ON HER AND HAS NOT BEEN ABLE TO FIND OUT CAUSE.     • Chronic obstructive lung disease (CMS/HCC) 2008   • Colon cancer screening    • COPD (chronic obstructive pulmonary disease) (CMS/HCC)    • Cystocele    • Depressed    • Depression 1980   • Fatigue     Chronic pafigue 2012   • Fibromyalgia 2008   • Full dentures    • GERD (gastroesophageal reflux disease)    • Gout    • Heartburn     Chronic historu of epigastric heartburn currently controlled on Prilesec 40 mg every morning along with Zantac 300 Mg daily at bedtime   • High cholesterol 2012   • Hip pain    • Hyperlipidemia    • Hypertension    • Impaired functional mobility, balance, gait, and endurance    • Insomnia    • Joint pain    • Kidney infection    • Loss of appetite    • Low back pain 1995   • Melena    • Nausea and vomiting    • On home oxygen therapy     2L/NC PRN (STATES SHE HAS BEEN USING CONTINUOSLY LATELY)   • Osteoarthritis 2010   • Pain in limb    • Palpitations    • Pinched nerve     Pinched nerves 2011   • Pneumonia    • Problems with swallowing     HAS TO EAT SLOW AND CHEW FOOD WELL   • Recurrent urinary tract infection    • Sciatica [id number]   • Shortness of breath    • Sinus problem    • Sleep apnea 1998    NO CPAP   • Upset stomach    • Valvular heart disease    • Vitamin  B12 deficiency    • Wears glasses    • Weight loss, abnormal     Weight loss is stabel. On pund weight gain since 01/2016       Allergies   Allergen Reactions   • Dust Mite Extract Other (See Comments)     Dust  SINUS   • Grass Other (See Comments)     SINUS   • Mold Extract [Trichophyton] Other (See Comments)     SINUS       Past Surgical History:   Procedure Laterality Date   • ABDOMINAL HERNIA REPAIR  2016   • APPENDECTOMY  1965   • BACK SURGERY  2005   • BRONCHOSCOPY N/A 7/5/2018    Procedure: BRONCHOSCOPY w/ WASHINGS/BRUSHINGS with MAC;  Surgeon: Rebeka Esparza MD;  Location: [facility];  Service: Pulmonary   • CATARACT EXTRACTION Bilateral     Put in implants    • CHOLECYSTECTOMY  1985   • COLONOSCOPY     • ENDOSCOPY     • KNEE SURGERY Left 1979    Knee Cap   • TUBAL ABDOMINAL LIGATION  1971       Family History   Problem Relation Age of Onset   • Ovarian cancer Mother    • Cancer Mother    • Lung cancer Father    • Heart disease Brother        Social History     Socioeconomic History   • Marital status: Single     Spouse name: Not on file   • Number of children: Not on file   • Years of education: Not on file   • Highest education level: Not on file   Tobacco Use   • Smoking status: Former Smoker     Packs/day: 0.00     Years: 35.00     Pack years: 0.00     Last attempt to quit: 7/5/2017     Years since quitting: 3.0   • Smokeless tobacco: Never Used   Substance and Sexual Activity   • Alcohol use: No   • Drug use: No   • Sexual activity: Defer           Objective   Physical Exam   Constitutional: She is oriented to person, place, and time. She appears well-developed and well-nourished.   Eyes: EOM are normal.   Neck: Normal range of motion. Neck supple.   Musculoskeletal: Normal range of motion.   Neurological: She is alert and oriented to person, place, and time. She has normal reflexes.   Skin:        2cm skin tear   Psychiatric: She has a normal mood and affect.   Nursing note and vitals  reviewed.      Laceration Repair  Date/Time: 7/13/2020 8:51 PM  Performed by: Main Tee Jr., PA-C  Authorized by: Jaydon Bishop DO     Consent:     Consent obtained:  Verbal    Consent given by:  Patient    Risks discussed:  Pain, poor cosmetic result and poor wound healing  Anesthesia (see MAR for exact dosages):     Anesthesia method:  None  Laceration details:     Location:  Hand    Hand location:  L hand, dorsum    Length (cm):  2  Repair type:     Repair type:  Simple  Exploration:     Hemostasis achieved with:  Direct pressure    Wound exploration: wound explored through full range of motion      Contaminated: no    Treatment:     Area cleansed with:  Hibiclens    Amount of cleaning:  Standard  Skin repair:     Repair method:  Steri-Strips and tissue adhesive    Number of Steri-Strips:  5  Approximation:     Approximation:  Close  Post-procedure details:     Dressing:  Open (no dressing)    Patient tolerance of procedure:  Tolerated well, no immediate complications               ED Course                                           MDM  Number of Diagnoses or Management Options  Laceration of left hand, foreign body presence unspecified, initial encounter: new and requires workup  Risk of Complications, Morbidity, and/or Mortality  Presenting problems: minimal  Management options: minimal    Patient Progress  Patient progress: stable      Final diagnoses:   Laceration of left hand, foreign body presence unspecified, initial encounter            Main Tee Jr., PA-C  07/13/20 [id number]       SUBJECTIVE:   HPI:  82 y/o F w/ PMH of SAH, basal cell carcinoma, breast cancer s/p mastectomy , GERD, macular degeneration, insomnia, obesity, spinal stenosis, OA, p/w mechanical fall. Patient states she was taking a shower and she fell. Doesn't believe that she passed out, but also not able to describe how she fell. States she was able to get up after the fall. States she was at Dr. Joyce's office today and was told she had to go to ED, but is not sure why. Patient states she's also had other mechanical falls previously. Patient also found to have hyperkalemia / hypercalcemia.  Denies cough, runny nose, sore throat, nausea, vomiting, abdominal pain, fever, chills, CP, SOB. Patient has laceration to scalp, which has been stapled.   PHYSICAL EXAM:    Constitutional: NAD, awake and alert,   HEENT: PERR, EOMI, Normal Hearing, MMM  Neck: Soft and supple, No LAD, No JVD  Respiratory: Breath sounds are clear bilaterally, No wheezing, rales or rhonchi  Cardiovascular: S1 and S2, regular rate and rhythm, no Murmurs, gallops or rubs  Gastrointestinal: Bowel Sounds present, soft, nontender, nondistended, no guarding, no rebound  Extremities: No peripheral edema  Vascular: 2+ peripheral pulses  Neurological: A/O x 3, no focal deficits  Musculoskeletal: 5/5 strength b/l upper and lower extremities  Skin: No rashes  LABS: All Labs Reviewed:  Assessment and Plan:   · Assessment    82 y/o F w/ PMH of SAH, basal cell carcinoma, breast cancer s/p mastectomy , GERD, macular degeneration, insomnia, obesity, spinal stenosis, OA, p/w mechanical fall    *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   -IVF and trend calcium  -s/p ivf/zometa  -hypercalcemia resolved  -a result of chemo  -dc tele  -Also found to have anemia / thrombocytosis   -CTH - Nonspecific lucency within R parietal calvairum mets cannot be excluded and possible L orbit mets  -Heme/onc consult pending  -MRI head as o/p. Pt recently had mri brain in late december, her condition is chronic and stable0> can f/u with Dr Singh as o/p  -Patient on Ibrance, will defer to heme/onc to resume . O/P PET per Dr Joyce  -Wound consult for scalp laceration : would need staples removed in 14 days    *S/p mechanical fall  -neuro exam non focal  -CTH suggesting possible normal pressure hydrocephalus   -Neuro consult appreciated. Pt with long standing chronic neuropathy. NPH is being worked up as o/p, can have mri as o/p  -Awaiting PT - CATE  -Fall precautions  *Hypokalemia  -Replete and recheck  -Tele monitoring    *Reported to have COVID infection 1 week ago  -Isolation  -Pulse ox WNL   -awaiting repeat covid pcr    *H/o SAH / Basal cell carcinoma / GERD / macular degeneration / insomnia / obesity / spinal stenosis / OA  -C/w home meds and f/u outpatient for further management if conditions remain stable during hospitalization       *DVT ppx   -Lovenox     Dispo: DC to [facility] tomorrow, needs 24 hours off constant obs. D/W Daughter at length

## 2022-01-13 NOTE — PROGRESS NOTE ADULT - SUBJECTIVE AND OBJECTIVE BOX
Patient is a 81y old  Female who presents with a chief complaint of mechanical fall (12 Jan 2022 12:19)      SUBJECTIVE:   HPI:  80 y/o F w/ PMH of SAH, basal cell carcinoma, breast cancer s/p mastectomy , GERD, macular degeneration, insomnia, obesity, spinal stenosis, OA, p/w mechanical fall. Patient states she was taking a shower and she fell. Doesn't believe that she passed out, but also not able to describe how she fell. States she was able to get up after the fall. States she was at Dr. Joyce's office today and was told she had to go to ED, but is not sure why. Patient states she's also had other mechanical falls previously. Patient also found to have hyperkalemia / hypercalcemia.  Denies cough, runny nose, sore throat, nausea, vomiting, abdominal pain, fever, chills, CP, SOB. Patient has laceration to scalp, which has been stapled.       sub: pt had a period of sundowning last night. She is presently alert and oriented.     PSH: mastectomy, breast lumpectomy, L knee arthroscopy     Social Hx: Tobacco - only when she was 20 years old, etoh / drugs - denies     Family Hx: Father - temporal arteritis, mother - CVA   (11 Jan 2022 21:11)        REVIEW OF SYSTEMS:    CONSTITUTIONAL: No weakness, fevers or chills  EYES/ENT: No visual changes;  No vertigo or throat pain   NECK: No pain or stiffness  RESPIRATORY: No cough, wheezing, hemoptysis; No shortness of breath  CARDIOVASCULAR: No chest pain or palpitations  GASTROINTESTINAL: No abdominal or epigastric pain. No nausea, vomiting, or hematemesis; No diarrhea or constipation. No melena or hematochezia.  GENITOURINARY: No dysuria, frequency or hematuria  NEUROLOGICAL: No numbness or weakness  SKIN: No itching, burning, rashes, or lesions   All other review of systems is negative unless indicated above    ICU Vital Signs Last 24 Hrs  T(C): 36.3 (13 Jan 2022 09:28), Max: 37.7 (12 Jan 2022 23:22)  T(F): 97.3 (13 Jan 2022 09:28), Max: 99.9 (12 Jan 2022 23:22)  HR: 86 (13 Jan 2022 09:31) (74 - 88)  BP: 120/59 (13 Jan 2022 09:31) (96/41 - 146/72)  BP(mean): 93 (12 Jan 2022 23:22) (79 - 93)  ABP: --  ABP(mean): --  RR: 18 (12 Jan 2022 17:07) (18 - 18)  SpO2: 96% (12 Jan 2022 23:22) (96% - 100%)            PHYSICAL EXAM:    Constitutional: NAD, awake and alert,   HEENT: PERR, EOMI, Normal Hearing, MMM  Neck: Soft and supple, No LAD, No JVD  Respiratory: Breath sounds are clear bilaterally, No wheezing, rales or rhonchi  Cardiovascular: S1 and S2, regular rate and rhythm, no Murmurs, gallops or rubs  Gastrointestinal: Bowel Sounds present, soft, nontender, nondistended, no guarding, no rebound  Extremities: No peripheral edema  Vascular: 2+ peripheral pulses  Neurological: A/O x 3, no focal deficits  Musculoskeletal: 5/5 strength b/l upper and lower extremities  Skin: No rashes    MEDICATIONS:  MEDICATIONS  (STANDING):  atorvastatin 20 milliGRAM(s) Oral at bedtime  dextrose 40% Gel 15 Gram(s) Oral once  dextrose 5%. 1000 milliLiter(s) (50 mL/Hr) IV Continuous <Continuous>  dextrose 5%. 1000 milliLiter(s) (100 mL/Hr) IV Continuous <Continuous>  dextrose 50% Injectable 25 Gram(s) IV Push once  dextrose 50% Injectable 12.5 Gram(s) IV Push once  dextrose 50% Injectable 25 Gram(s) IV Push once  DULoxetine 60 milliGRAM(s) Oral daily  enoxaparin Injectable 40 milliGRAM(s) SubCutaneous daily  glucagon  Injectable 1 milliGRAM(s) IntraMuscular once  insulin lispro (ADMELOG) corrective regimen sliding scale   SubCutaneous three times a day before meals  insulin lispro (ADMELOG) corrective regimen sliding scale   SubCutaneous at bedtime  potassium chloride    Tablet ER 40 milliEquivalent(s) Oral every 4 hours  sodium chloride 0.9%. 1000 milliLiter(s) (150 mL/Hr) IV Continuous <Continuous>      LABS: All Labs Reviewed:                        10.4   3.84  )-----------( 392      ( 12 Jan 2022 08:44 )             30.4     01-12    139  |  106  |  7   ----------------------------<  85  3.0<L>   |  27  |  0.58    Ca    10.0      12 Jan 2022 08:44  Phos  2.8     01-11  Mg     1.9     01-11    TPro  6.0  /  Alb  2.3<L>  /  TBili  0.5  /  DBili  x   /  AST  25  /  ALT  16  /  AlkPhos  92  01-12    PT/INR - ( 12 Jan 2022 08:44 )   PT: 12.5 sec;   INR: 1.08 ratio         PTT - ( 12 Jan 2022 08:44 )  PTT:30.1 sec          Blood Culture:     RADIOLOGY/EKG: reviewed        
Patient is a 81y old  Female who presents with a chief complaint of mechanical fall (12 Jan 2022 12:19)      SUBJECTIVE:   HPI:  82 y/o F w/ PMH of SAH, basal cell carcinoma, breast cancer s/p mastectomy , GERD, macular degeneration, insomnia, obesity, spinal stenosis, OA, p/w mechanical fall. Patient states she was taking a shower and she fell. Doesn't believe that she passed out, but also not able to describe how she fell. States she was able to get up after the fall. States she was at Dr. Joyce's office today and was told she had to go to ED, but is not sure why. Patient states she's also had other mechanical falls previously. Patient also found to have hyperkalemia / hypercalcemia.  Denies cough, runny nose, sore throat, nausea, vomiting, abdominal pain, fever, chills, CP, SOB. Patient has laceration to scalp, which has been stapled.     PSH: mastectomy, breast lumpectomy, L knee arthroscopy     Social Hx: Tobacco - only when she was 20 years old, etoh / drugs - denies     Family Hx: Father - temporal arteritis, mother - CVA   (11 Jan 2022 21:11)        REVIEW OF SYSTEMS:    CONSTITUTIONAL: No weakness, fevers or chills  EYES/ENT: No visual changes;  No vertigo or throat pain   NECK: No pain or stiffness  RESPIRATORY: No cough, wheezing, hemoptysis; No shortness of breath  CARDIOVASCULAR: No chest pain or palpitations  GASTROINTESTINAL: No abdominal or epigastric pain. No nausea, vomiting, or hematemesis; No diarrhea or constipation. No melena or hematochezia.  GENITOURINARY: No dysuria, frequency or hematuria  NEUROLOGICAL: No numbness or weakness  SKIN: No itching, burning, rashes, or lesions   All other review of systems is negative unless indicated above    Height (cm): 162.6 (01-11 @ 17:39)  Weight (kg): 72.6 (01-11 @ 17:39)  BMI (kg/m2): 27.5 (01-11 @ 17:39)  BSA (m2): 1.78 (01-11 @ 17:39)    Vital Signs Last 24 Hrs  T(C): 36.7 (12 Jan 2022 08:18), Max: 36.7 (11 Jan 2022 21:25)  T(F): 98.1 (12 Jan 2022 08:18), Max: 98.1 (12 Jan 2022 00:17)  HR: 67 (12 Jan 2022 08:18) (67 - 82)  BP: 122/66 (12 Jan 2022 08:18) (122/66 - 154/73)  BP(mean): 100 (12 Jan 2022 01:19) (91 - 100)  RR: 18 (12 Jan 2022 08:18) (17 - 18)  SpO2: 96% (12 Jan 2022 08:18) (94% - 96%)    I&O's Summary    11 Jan 2022 07:01  -  12 Jan 2022 07:00  --------------------------------------------------------  IN: 0 mL / OUT: 100 mL / NET: -100 mL        CAPILLARY BLOOD GLUCOSE      POCT Blood Glucose.: 109 mg/dL (12 Jan 2022 12:22)  POCT Blood Glucose.: 90 mg/dL (12 Jan 2022 08:37)  POCT Blood Glucose.: 147 mg/dL (11 Jan 2022 22:11)      PHYSICAL EXAM:    Constitutional: NAD, awake and alert,   HEENT: PERR, EOMI, Normal Hearing, MMM  Neck: Soft and supple, No LAD, No JVD  Respiratory: Breath sounds are clear bilaterally, No wheezing, rales or rhonchi  Cardiovascular: S1 and S2, regular rate and rhythm, no Murmurs, gallops or rubs  Gastrointestinal: Bowel Sounds present, soft, nontender, nondistended, no guarding, no rebound  Extremities: No peripheral edema  Vascular: 2+ peripheral pulses  Neurological: A/O x 3, no focal deficits  Musculoskeletal: 5/5 strength b/l upper and lower extremities  Skin: No rashes    MEDICATIONS:  MEDICATIONS  (STANDING):  atorvastatin 20 milliGRAM(s) Oral at bedtime  dextrose 40% Gel 15 Gram(s) Oral once  dextrose 5%. 1000 milliLiter(s) (50 mL/Hr) IV Continuous <Continuous>  dextrose 5%. 1000 milliLiter(s) (100 mL/Hr) IV Continuous <Continuous>  dextrose 50% Injectable 25 Gram(s) IV Push once  dextrose 50% Injectable 12.5 Gram(s) IV Push once  dextrose 50% Injectable 25 Gram(s) IV Push once  DULoxetine 60 milliGRAM(s) Oral daily  enoxaparin Injectable 40 milliGRAM(s) SubCutaneous daily  glucagon  Injectable 1 milliGRAM(s) IntraMuscular once  insulin lispro (ADMELOG) corrective regimen sliding scale   SubCutaneous three times a day before meals  insulin lispro (ADMELOG) corrective regimen sliding scale   SubCutaneous at bedtime  potassium chloride    Tablet ER 40 milliEquivalent(s) Oral every 4 hours  sodium chloride 0.9%. 1000 milliLiter(s) (150 mL/Hr) IV Continuous <Continuous>      LABS: All Labs Reviewed:                        10.4   3.84  )-----------( 392      ( 12 Jan 2022 08:44 )             30.4     01-12    139  |  106  |  7   ----------------------------<  85  3.0<L>   |  27  |  0.58    Ca    10.0      12 Jan 2022 08:44  Phos  2.8     01-11  Mg     1.9     01-11    TPro  6.0  /  Alb  2.3<L>  /  TBili  0.5  /  DBili  x   /  AST  25  /  ALT  16  /  AlkPhos  92  01-12    PT/INR - ( 12 Jan 2022 08:44 )   PT: 12.5 sec;   INR: 1.08 ratio         PTT - ( 12 Jan 2022 08:44 )  PTT:30.1 sec          Blood Culture:     RADIOLOGY/EKG: reviewed

## 2022-01-13 NOTE — DISCHARGE NOTE PROVIDER - NSDCCPCAREPLAN_GEN_ALL_CORE_FT
PRINCIPAL DISCHARGE DIAGNOSIS  Diagnosis: Accident due to mechanical fall without injury  Assessment and Plan of Treatment:       SECONDARY DISCHARGE DIAGNOSES  Diagnosis: Hypercalcemia  Assessment and Plan of Treatment:      Diarrhea    Diarrhea is frequent loose or watery bowel movements that has many causes. Diarrhea can make you feel weak and cause you to become dehydrated. Diarrhea typically lasts 2–3 days, but can last longer if it is a sign of something more serious. Drink clear fluids to prevent dehydration. Eat bland, easy-to-digest foods as tolerated.     SEEK IMMEDIATE MEDICAL CARE IF YOU HAVE ANY OF THE FOLLOWING SYMPTOMS: high fevers, lightheadedness/dizziness, chest pain, black or bloody stools, shortness of breath, severe abdominal or back pain, or any signs of dehydration.

## 2022-01-13 NOTE — DISCHARGE NOTE PROVIDER - PROVIDER TOKENS
PROVIDER:[TOKEN:[[id number]],FOLLOWUP:[2 weeks]],FREE:[LAST:[md associated with facility in a day],PHONE:[(   )    -],FAX:[(   )    -]]

## 2022-01-14 ENCOUNTER — TRANSCRIPTION ENCOUNTER (OUTPATIENT)
Age: 82
End: 2022-01-14

## 2022-01-14 VITALS
DIASTOLIC BLOOD PRESSURE: 58 MMHG | TEMPERATURE: 98 F | HEART RATE: 78 BPM | SYSTOLIC BLOOD PRESSURE: 108 MMHG | RESPIRATION RATE: 18 BRPM | OXYGEN SATURATION: 99 %

## 2022-01-14 PROCEDURE — 99239 HOSP IP/OBS DSCHRG MGMT >30: CPT

## 2022-01-14 RX ORDER — DENOSUMAB 60 MG/ML
0 INJECTION SUBCUTANEOUS
Qty: 0 | Refills: 0 | DISCHARGE

## 2022-01-14 RX ORDER — LANOLIN ALCOHOL/MO/W.PET/CERES
5 CREAM (GRAM) TOPICAL AT BEDTIME
Refills: 0 | Status: DISCONTINUED | OUTPATIENT
Start: 2022-01-14 | End: 2022-01-14

## 2022-01-14 RX ORDER — FULVESTRANT 50 MG/ML
0 INJECTION INTRAMUSCULAR
Qty: 0 | Refills: 0 | DISCHARGE

## 2022-01-14 RX ORDER — CALCIUM CARBONATE 500(1250)
1 TABLET ORAL
Qty: 0 | Refills: 0 | DISCHARGE

## 2022-01-14 RX ADMIN — ENOXAPARIN SODIUM 40 MILLIGRAM(S): 100 INJECTION SUBCUTANEOUS at 09:22

## 2022-01-14 RX ADMIN — DULOXETINE HYDROCHLORIDE 60 MILLIGRAM(S): 30 CAPSULE, DELAYED RELEASE ORAL at 09:22

## 2022-01-14 RX ADMIN — Medication 5 MILLIGRAM(S): at 01:13

## 2022-01-14 NOTE — PROGRESS NOTE ADULT - ASSESSMENT
HPI:  80 y/o F w/ PMH of SAH, basal cell carcinoma, breast cancer s/p mastectomy , GERD, macular degeneration, insomnia, obesity, spinal stenosis, OA, p/w mechanical fall.           Review of Systems:  CONSTITUTIONAL: No weakness, fevers or chills  EYES/ENT: No visual changes;  No vertigo or throat pain   NECK: No pain or stiffness  RESPIRATORY: No cough, wheezing, hemoptysis; No shortness of breath,   CARDIOVASCULAR: No chest pain or palpitations  GASTROINTESTINAL: No abdominal or epigastric pain. No nausea, vomiting, or hematemesis; No diarrhea or constipation.   GENITOURINARY: No dysuria, frequency or hematuria  NEUROLOGICAL: No numbness or weakness  SKIN: No itching, burning, rashes, or lesions   All other review of systems is negative unless indicated above    PHYSICAL EXAM:    Vital Signs Last 24 Hrs  T(C): 36.6 (14 Jan 2022 08:10), Max: 36.8 (13 Jan 2022 21:05)  T(F): 97.8 (14 Jan 2022 08:10), Max: 98.2 (13 Jan 2022 21:05)  HR: 88 (14 Jan 2022 08:10) (76 - 88)  BP: 111/55 (14 Jan 2022 08:10) (111/55 - 120/58)  BP(mean): --  RR: 18 (14 Jan 2022 08:10) (18 - 19)  SpO2: 97% (14 Jan 2022 08:10) (97% - 97%)    GENERAL: comfortable   HEAD:  Atraumatic, Normocephalic  EYES: EOMI, PERRLA, conjunctiva and sclera clear  HEENT: Moist mucous membranes  NECK: Supple, No JVD  NERVOUS SYSTEM:  Alert, follows command  CHEST/LUNG: Clear to auscultation bilaterally; No rales, rhonchi, wheezing, or rubs  HEART:S1S2 normal, no murmer  ABDOMEN: Soft, Nontender, Nondistended; Bowel sounds present  GENITOURINARY- Voiding, no palpable bladder  EXTREMITIES:  2+ Peripheral Pulses, No clubbing, cyanosis, or edema  MUSCULOSKELETAL No muscle tenderness, Muscle tone normal, No joint tenderness, no Joint swelling, Joint range of motion-normal  SKIN-no rash, no lesion  PSYCH- Mood stable  LYMPH Node- No palpable lymph node    LABS:                        10.4   3.36  )-----------( 373      ( 13 Jan 2022 07:37 )             29.8     01-13    140  |  108  |  6<L>  ----------------------------<  87  3.3<L>   |  25  |  0.50    Ca    8.8      13 Jan 2022 07:37            CAPILLARY BLOOD GLUCOSE                Standing medicine  acetaminophen     Tablet .. 650 milliGRAM(s) Oral every 6 hours PRN  aluminum hydroxide/magnesium hydroxide/simethicone Suspension 30 milliLiter(s) Oral every 4 hours PRN  atorvastatin 20 milliGRAM(s) Oral at bedtime  DULoxetine 60 milliGRAM(s) Oral daily  enoxaparin Injectable 40 milliGRAM(s) SubCutaneous daily  haloperidol    Injectable 1 milliGRAM(s) IntraMuscular every 6 hours PRN  melatonin 5 milliGRAM(s) Oral at bedtime  palbociclib 125 milliGRAM(s) Oral daily  sodium chloride 0.9%. 1000 milliLiter(s) IV Continuous <Continuous>      A/P    #pt is being discharged on rehab, please see d/c summary done by Dr. Tejada for detail. Time spent 45 minutes     #medrec has been completed in consultation with Dr. Simeon   
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 Pt recently had mri brain in late december, her condition is chronic and stable0> can f/u with Dr Singh as o/p  -Patient on Ibrance, will defer to heme/onc to resume . O/P PET per Dr Joyce  -Wound consult for scalp laceration : would need staples removed in 14 days    *S/p mechanical fall  -neuro exam non focal  -CTH suggesting possible normal pressure hydrocephalus   -Neuro consult appreciated. Pt with long standing chronic neuropathy. NPH is being worked up as o/p, can have mri as o/p  -Awaiting PT - [facility]  -Fall precautions      *Hypokalemia  -Replete and recheck  -Tele monitoring    *Reported to have COVID infection 1 week ago  -Isolation  -Pulse ox WNL   -awaiting repeat covid pcr    *H/o SAH / Basal cell carcinoma / GERD / macular degeneration / insomnia / obesity / spinal stenosis / OA  -C/w home meds and f/u outpatient for further management if conditions remain stable during hospitalization       *DVT ppx   -Lovenox     Dispo: DC to [facility] tomorrow, needs 24 hours off constant obs. D/W Daughter at length
82 y/o F w/ PMH of SAH, basal cell carcinoma, breast cancer s/p mastectomy , GERD, macular degeneration, insomnia, obesity, spinal stenosis, OA, p/w mechanical fall    *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   -IVF and trend calcium  -s/p ivf/zometa  -hypercalcemia resolved  -a result of chemo  -dc tele  -Also found to have anemia / thrombocytosis   -CTH - Nonspecific lucency within R parietal calvairum mets cannot be excluded and possible L orbit mets  -Heme/onc consult pending  -MRI head  -Patient on Ibrance, will defer to heme/onc to resume   -Wound consult for scalp laceration : would need staples removed in 14 days    *S/p mechanical fall  -neuro exam non focal  -CTH suggesting possible normal pressure hydrocephalus   -Neuro consult appreciated. Pt with long standing chronic neuropathy. NPH is being worked up as o/p, can have mri as o/p  -Awaiting PT - (awaiting covid test)  -Fall precautions      *Hypokalemia  -Replete and recheck  -Tele monitoring    *Reported to have COVID infection 1 week ago  -Isolation  -Pulse ox WNL   -awaiting repeat covid pcr    *H/o SAH / Basal cell carcinoma / GERD / macular degeneration / insomnia / obesity / spinal stenosis / OA  -C/w home meds and f/u outpatient for further management if conditions remain stable during hospitalization       *DVT ppx   -Lovenox

## 2022-01-14 NOTE — DISCHARGE NOTE NURSING/CASE MANAGEMENT/SOCIAL WORK - NSDCVIVACCINE_GEN_ALL_CORE_FT
Tdap; 22-Nov-2019 20:23; Mayra Alicea (RN); [employer]; [id number] (Exp. Date: 22-Nov-2021); IntraMuscular; Deltoid Right.; 0.5 milliLiter(s); VIS (VIS Published: 09-May-2013, VIS Presented: 22-Nov-2019);   Tdap; 25-Dec-2020 00:55; Kristen Johnson (RN); [employer]; [id number] (Exp. Date: 31-Jul-2022); IntraMuscular; Deltoid Right.; 0.5 milliLiter(s); VIS (VIS Published: 09-May-2013, VIS Presented: 25-Dec-2020);   Tdap; 05-Aug-2021 22:35; Stellwag, Emily (RN); [employer]; [id number] (Exp. Date: 18-Nov-2022); IntraMuscular; Deltoid Right.; 0.5 milliLiter(s); VIS (VIS Published: 09-May-2013, VIS Presented: 05-Aug-2021);   Tdap; 29-Nov-2021 17:14; Jasmin Stewart (RN); [employer]; [id number] (Exp. Date: 09-Sep-2023); IntraMuscular; Deltoid Left.; 0.5 milliLiter(s); VIS (VIS Published: 09-May-2013, VIS Presented: 29-Nov-2021);

## 2022-01-14 NOTE — DISCHARGE NOTE NURSING/CASE MANAGEMENT/SOCIAL WORK - PATIENT PORTAL LINK FT
You can access the FollowMyHealth Patient Portal offered by [facility] by registering at the following website: [email or web address]. By joining [employer]’s FollowMyHealth portal, you will also be able to view your health information using other applications (apps) compatible with our system.

## 2022-01-14 NOTE — DISCHARGE NOTE NURSING/CASE MANAGEMENT/SOCIAL WORK - NSDCPEFALRISK_GEN_ALL_CORE
For information on Fall & Injury Prevention, visit: [email or web address] OR  [email or web address] OR  https://www.cdc.gov/steadi/patient.html

## 2022-01-19 DIAGNOSIS — H35.30 UNSPECIFIED MACULAR DEGENERATION: ICD-10-CM

## 2022-01-19 DIAGNOSIS — E66.9 OBESITY, UNSPECIFIED: ICD-10-CM

## 2022-01-19 DIAGNOSIS — R26.81 UNSTEADINESS ON FEET: ICD-10-CM
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
## 2022-01-19 DIAGNOSIS — G47.00 INSOMNIA, UNSPECIFIED: ICD-10-CM

## 2022-01-19 DIAGNOSIS — G91.9 HYDROCEPHALUS, UNSPECIFIED: ICD-10-CM

## 2022-01-19 DIAGNOSIS — C50.919 MALIGNANT NEOPLASM OF UNSPECIFIED SITE OF UNSPECIFIED FEMALE BREAST: ICD-10-CM

## 2022-01-19 DIAGNOSIS — R29.6 REPEATED FALLS: ICD-10-CM

## 2022-01-19 DIAGNOSIS — Z85.828 PERSONAL HISTORY OF OTHER MALIGNANT NEOPLASM OF SKIN: ICD-10-CM

## 2022-01-19 DIAGNOSIS — C79.51 SECONDARY MALIGNANT NEOPLASM OF BONE: ICD-10-CM

## 2022-01-19 DIAGNOSIS — T45.1X5A ADVERSE EFFECT OF ANTINEOPLASTIC AND IMMUNOSUPPRESSIVE DRUGS, INITIAL ENCOUNTER: ICD-10-CM

## 2022-01-19 DIAGNOSIS — G31.84 MILD COGNITIVE IMPAIRMENT OF UNCERTAIN OR UNKNOWN ETIOLOGY: ICD-10-CM

## 2022-01-19 DIAGNOSIS — Z86.16 PERSONAL HISTORY OF COVID-19: ICD-10-CM
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
## 2022-01-19 DIAGNOSIS — D64.9 ANEMIA, UNSPECIFIED: ICD-10-CM
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
## 2022-01-19 DIAGNOSIS — D69.6 THROMBOCYTOPENIA, UNSPECIFIED: ICD-10-CM

## 2022-01-19 DIAGNOSIS — K21.9 GASTRO-ESOPHAGEAL REFLUX DISEASE WITHOUT ESOPHAGITIS: ICD-10-CM

## 2022-01-19 DIAGNOSIS — M48.00 SPINAL STENOSIS, SITE UNSPECIFIED: ICD-10-CM

## 2022-01-19 DIAGNOSIS — S01.01XA LACERATION WITHOUT FOREIGN BODY OF SCALP, INITIAL ENCOUNTER: ICD-10-CM

## 2022-01-19 DIAGNOSIS — G62.9 POLYNEUROPATHY, UNSPECIFIED: ICD-10-CM

## 2022-01-19 DIAGNOSIS — M19.90 UNSPECIFIED OSTEOARTHRITIS, UNSPECIFIED SITE: ICD-10-CM
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
## 2022-01-19 DIAGNOSIS — E87.6 HYPOKALEMIA: ICD-10-CM

## 2022-01-19 DIAGNOSIS — E83.52 HYPERCALCEMIA: ICD-10-CM

## 2022-02-18 NOTE — BRIEF OPERATIVE NOTE - PROCEDURE
108.9 <<-----Click on this checkbox to enter Procedure Breast biopsy, left  09/10/2018  Left chest wall, prior mastectomy biopsy  Active  [id number]

## 2022-02-18 NOTE — ED PROCEDURE NOTE - GENERAL INDICATIONS
Pt Aox4. Glasses at bedside. Chronic lesion on R nostril. VSS, afebrile. Maintaining o2 sats >90% on 6L at rest, 15L with exertion. Tele, NSR. Lovenox. Urinal at bedside, no episodes of diarrhea. No c/o N/V. PRN pain meds given for generalized pain. Up S/b for O2 needs. Regular diet tolerated. IV abx. L foot mepilex, c/d/I. Pt updated on POC. Will continue to wean O2 as tolerated. Call light within reach. No further pt needs at this time. Will continue to round on pt.     Problem: Patient/Family Goals  Goal: Patient/Family Long Term Goal  Description: Patient's Long Term Goal:   \"I CAME IN FOR TREATMENT SO I CAN G HOME HEALTHY AGAIN\"    Interventions:  IV STEROID, IV ANTIBX  CARRY OUT PLAN OF CARE  - See additional Care Plan goals for specific interventions  Outcome: Progressing  Goal: Patient/Family Short Term Goal  Description: Patient's Short Term Goal:   02/12/2022 - KEEP SATS ABOVE 90%  02/13/2022 - KEEP SATS ABOVE 90%, WEAN O2 as ABLE  2/14: wean o2 as tolerated   02/14/2022- WEAN O2 as ABLE  2/15 Am: wean O2 as tolerated and  the room   2/15: wean 02 needs  2/17 NOC: wean O2    Interventions:   - SUPPLEMENTAL O2 NEEDED  - See additional Care Plan goals for specific interventions  Outcome: Progressing     Problem: RESPIRATORY - ADULT  Goal: Achieves optimal ventilation and oxygenation  Description: INTERVENTIONS:  - Assess for changes in respiratory status  - Assess for changes in mentation and behavior  - Position to facilitate oxygenation and minimize respiratory effort  - Oxygen supplementation based on oxygen saturation or ABGs  - Provide Smoking Cessation handout, if applicable  - Encourage broncho-pulmonary hygiene including cough, deep breathe, Incentive Spirometry  - Assess the need for suctioning and perform as needed  - Assess and instruct to report SOB or any respiratory difficulty  - Respiratory Therapy support as indicated  - Manage/alleviate anxiety  - Monitor for signs/symptoms of CO2 retention  Outcome: Progressing     Problem: RISK FOR INFECTION - ADULT  Goal: Absence of fever/infection during anticipated neutropenic period  Description: INTERVENTIONS  - Monitor WBC  - Administer growth factors as ordered  - Implement neutropenic guidelines  Outcome: Progressing     Problem: SAFETY ADULT - FALL  Goal: Free from fall injury  Description: INTERVENTIONS:  - Assess pt frequently for physical needs  - Identify cognitive and physical deficits and behaviors that affect risk of falls.   - [place] fall precautions as indicated by assessment.  - Educate pt/family on patient safety including physical limitations  - Instruct pt to call for assistance with activity based on assessment  - Modify environment to reduce risk of injury  - Provide assistive devices as appropriate  - Consider OT/PT consult to assist with strengthening/mobility  - Encourage toileting schedule  Outcome: Progressing     Problem: DISCHARGE PLANNING  Goal: Discharge to home or other facility with appropriate resources  Description: INTERVENTIONS:  - Identify barriers to discharge w/pt and caregiver  - Include patient/family/discharge partner in discharge planning  - Arrange for needed discharge resources and transportation as appropriate  - Identify discharge learning needs (meds, wound care, etc)  - Arrange for interpreters to assist at discharge as needed  - Consider post-discharge preferences of patient/family/discharge partner  - Complete POLST form as appropriate  - Assess patient's ability to be responsible for managing their own health  - Refer to Case Management Department for coordinating discharge planning if the patient needs post-hospital services based on physician/LIP order or complex needs related to functional status, cognitive ability or social support system  Outcome: Progressing constipation, stercocolitis

## 2023-02-19 ENCOUNTER — EMERGENCY (EMERGENCY)
Facility: HOSPITAL | Age: 83
LOS: 0 days | Discharge: HOME | End: 2023-02-19
Attending: EMERGENCY MEDICINE
Payer: MEDICARE

## 2023-02-19 VITALS
HEART RATE: 74 BPM | TEMPERATURE: 98 F | RESPIRATION RATE: 17 BRPM | SYSTOLIC BLOOD PRESSURE: 148 MMHG | WEIGHT: 160.06 LBS | HEIGHT: 66 IN | OXYGEN SATURATION: 97 % | DIASTOLIC BLOOD PRESSURE: 86 MMHG

## 2023-02-19 DIAGNOSIS — Z98.890 OTHER SPECIFIED POSTPROCEDURAL STATES: Chronic | ICD-10-CM

## 2023-02-19 DIAGNOSIS — Z90.13 ACQUIRED ABSENCE OF BILATERAL BREASTS AND NIPPLES: ICD-10-CM

## 2023-02-19 DIAGNOSIS — F03.90 UNSPECIFIED DEMENTIA, UNSPECIFIED SEVERITY, WITHOUT BEHAVIORAL DISTURBANCE, PSYCHOTIC DISTURBANCE, MOOD DISTURBANCE, AND ANXIETY: ICD-10-CM

## 2023-02-19 DIAGNOSIS — S01.01XA LACERATION WITHOUT FOREIGN BODY OF SCALP, INITIAL ENCOUNTER: ICD-10-CM

## 2023-02-19 DIAGNOSIS — C50.912 MALIGNANT NEOPLASM OF UNSPECIFIED SITE OF LEFT FEMALE BREAST: ICD-10-CM
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
## 2023-02-19 DIAGNOSIS — I10 ESSENTIAL (PRIMARY) HYPERTENSION: ICD-10-CM

## 2023-02-19 DIAGNOSIS — D04.9 CARCINOMA IN SITU OF SKIN, UNSPECIFIED: Chronic | ICD-10-CM

## 2023-02-19 DIAGNOSIS — Z90.12 ACQUIRED ABSENCE OF LEFT BREAST AND NIPPLE: Chronic | ICD-10-CM

## 2023-02-19 DIAGNOSIS — Z92.21 PERSONAL HISTORY OF ANTINEOPLASTIC CHEMOTHERAPY: ICD-10-CM

## 2023-02-19 DIAGNOSIS — C50.911 MALIGNANT NEOPLASM OF UNSPECIFIED SITE OF RIGHT FEMALE BREAST: ICD-10-CM

## 2023-02-19 DIAGNOSIS — M17.0 BILATERAL PRIMARY OSTEOARTHRITIS OF KNEE: ICD-10-CM

## 2023-02-19 DIAGNOSIS — N39.0 URINARY TRACT INFECTION, SITE NOT SPECIFIED: ICD-10-CM
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
## 2023-02-19 DIAGNOSIS — Z23 ENCOUNTER FOR IMMUNIZATION: ICD-10-CM
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
## 2023-02-19 DIAGNOSIS — G47.00 INSOMNIA, UNSPECIFIED: ICD-10-CM

## 2023-02-19 DIAGNOSIS — Z86.008 PERSONAL HISTORY OF IN-SITU NEOPLASM OF OTHER SITE: ICD-10-CM

## 2023-02-19 DIAGNOSIS — Z85.3 PERSONAL HISTORY OF MALIGNANT NEOPLASM OF BREAST: ICD-10-CM

## 2023-02-19 DIAGNOSIS — Z90.11 ACQUIRED ABSENCE OF RIGHT BREAST AND NIPPLE: Chronic | ICD-10-CM

## 2023-02-19 DIAGNOSIS — Z85.00 PERSONAL HISTORY OF MALIGNANT NEOPLASM OF UNSPECIFIED DIGESTIVE ORGAN: ICD-10-CM

## 2023-02-19 DIAGNOSIS — Z87.19 PERSONAL HISTORY OF OTHER DISEASES OF THE DIGESTIVE SYSTEM: ICD-10-CM

## 2023-02-19 LAB
ALBUMIN SERPL ELPH-MCNC: 3.3 G/DL — SIGNIFICANT CHANGE UP (ref 3.3–5)
ALP SERPL-CCNC: 102 U/L — SIGNIFICANT CHANGE UP (ref 40–120)
ALT FLD-CCNC: 23 U/L — SIGNIFICANT CHANGE UP (ref 12–78)
ANION GAP SERPL CALC-SCNC: 8 MMOL/L — SIGNIFICANT CHANGE UP (ref 5–17)
ANISOCYTOSIS BLD QL: SLIGHT — SIGNIFICANT CHANGE UP
APPEARANCE UR: ABNORMAL
APTT BLD: 29.2 SEC — SIGNIFICANT CHANGE UP (ref 27.5–35.5)
AST SERPL-CCNC: 26 U/L — SIGNIFICANT CHANGE UP (ref 15–37)
BACTERIA # UR AUTO: ABNORMAL
BASOPHILS # BLD AUTO: 0.09 K/UL — SIGNIFICANT CHANGE UP (ref 0–0.2)
BASOPHILS NFR BLD AUTO: 2.1 % — HIGH (ref 0–2)
BILIRUB SERPL-MCNC: 0.2 MG/DL — SIGNIFICANT CHANGE UP (ref 0.2–1.2)
BILIRUB UR-MCNC: NEGATIVE — SIGNIFICANT CHANGE UP
BUN SERPL-MCNC: 13 MG/DL — SIGNIFICANT CHANGE UP (ref 7–23)
CALCIUM SERPL-MCNC: 10.2 MG/DL — HIGH (ref 8.5–10.1)
CHLORIDE SERPL-SCNC: 103 MMOL/L — SIGNIFICANT CHANGE UP (ref 96–108)
CO2 SERPL-SCNC: 26 MMOL/L — SIGNIFICANT CHANGE UP (ref 22–31)
COLOR SPEC: YELLOW — SIGNIFICANT CHANGE UP
CREAT SERPL-MCNC: 0.6 MG/DL — SIGNIFICANT CHANGE UP (ref 0.5–1.3)
DIFF PNL FLD: ABNORMAL
EGFR: 89 ML/MIN/1.73M2 — SIGNIFICANT CHANGE UP
EOSINOPHIL # BLD AUTO: 0.16 K/UL — SIGNIFICANT CHANGE UP (ref 0–0.5)
EOSINOPHIL NFR BLD AUTO: 3.8 % — SIGNIFICANT CHANGE UP (ref 0–6)
EPI CELLS # UR: SIGNIFICANT CHANGE UP
ETHANOL SERPL-MCNC: 138 MG/DL — HIGH (ref 0–10)
GLUCOSE SERPL-MCNC: 95 MG/DL — SIGNIFICANT CHANGE UP (ref 70–99)
GLUCOSE UR QL: NEGATIVE — SIGNIFICANT CHANGE UP
HCT VFR BLD CALC: 34.4 % — LOW (ref 34.5–45)
HGB BLD-MCNC: 12 G/DL — SIGNIFICANT CHANGE UP (ref 11.5–15.5)
IMM GRANULOCYTES NFR BLD AUTO: 0.7 % — SIGNIFICANT CHANGE UP (ref 0–0.9)
INR BLD: 1.01 RATIO — SIGNIFICANT CHANGE UP (ref 0.88–1.16)
KETONES UR-MCNC: NEGATIVE — SIGNIFICANT CHANGE UP
LEUKOCYTE ESTERASE UR-ACNC: ABNORMAL
LYMPHOCYTES # BLD AUTO: 1.32 K/UL — SIGNIFICANT CHANGE UP (ref 1–3.3)
LYMPHOCYTES # BLD AUTO: 31.3 % — SIGNIFICANT CHANGE UP (ref 13–44)
MACROCYTES BLD QL: SLIGHT — SIGNIFICANT CHANGE UP
MANUAL SMEAR VERIFICATION: SIGNIFICANT CHANGE UP
MCHC RBC-ENTMCNC: 34.9 GM/DL — SIGNIFICANT CHANGE UP (ref 32–36)
MCHC RBC-ENTMCNC: 39 PG — HIGH (ref 27–34)
MCV RBC AUTO: 111.7 FL — HIGH (ref 80–100)
MONOCYTES # BLD AUTO: 0.35 K/UL — SIGNIFICANT CHANGE UP (ref 0–0.9)
MONOCYTES NFR BLD AUTO: 8.3 % — SIGNIFICANT CHANGE UP (ref 2–14)
NEUTROPHILS # BLD AUTO: 2.27 K/UL — SIGNIFICANT CHANGE UP (ref 1.8–7.4)
NEUTROPHILS NFR BLD AUTO: 53.8 % — SIGNIFICANT CHANGE UP (ref 43–77)
NITRITE UR-MCNC: POSITIVE
PH UR: 5 — SIGNIFICANT CHANGE UP (ref 5–8)
PLAT MORPH BLD: NORMAL — SIGNIFICANT CHANGE UP
PLATELET # BLD AUTO: 347 K/UL — SIGNIFICANT CHANGE UP (ref 150–400)
PLATELET COUNT - ESTIMATE: NORMAL — SIGNIFICANT CHANGE UP
POIKILOCYTOSIS BLD QL AUTO: SLIGHT — SIGNIFICANT CHANGE UP
POTASSIUM SERPL-MCNC: 3.6 MMOL/L — SIGNIFICANT CHANGE UP (ref 3.5–5.3)
POTASSIUM SERPL-SCNC: 3.6 MMOL/L — SIGNIFICANT CHANGE UP (ref 3.5–5.3)
PROT SERPL-MCNC: 7.6 GM/DL — SIGNIFICANT CHANGE UP (ref 6–8.3)
PROT UR-MCNC: NEGATIVE — SIGNIFICANT CHANGE UP
PROTHROM AB SERPL-ACNC: 11.7 SEC — SIGNIFICANT CHANGE UP (ref 10.5–13.4)
RBC # BLD: 3.08 M/UL — LOW (ref 3.8–5.2)
RBC # FLD: 13.3 % — SIGNIFICANT CHANGE UP (ref 10.3–14.5)
RBC BLD AUTO: ABNORMAL
RBC CASTS # UR COMP ASSIST: SIGNIFICANT CHANGE UP /HPF (ref 0–4)
SODIUM SERPL-SCNC: 137 MMOL/L — SIGNIFICANT CHANGE UP (ref 135–145)
SP GR SPEC: 1.02 — SIGNIFICANT CHANGE UP (ref 1.01–1.02)
TROPONIN I, HIGH SENSITIVITY RESULT: 9.47 NG/L — SIGNIFICANT CHANGE UP
UROBILINOGEN FLD QL: NEGATIVE — SIGNIFICANT CHANGE UP
WBC # BLD: 4.22 K/UL — SIGNIFICANT CHANGE UP (ref 3.8–10.5)
WBC # FLD AUTO: 4.22 K/UL — SIGNIFICANT CHANGE UP (ref 3.8–10.5)
WBC UR QL: SIGNIFICANT CHANGE UP /HPF (ref 0–5)

## 2023-02-19 PROCEDURE — 80307 DRUG TEST PRSMV CHEM ANLYZR: CPT

## 2023-02-19 PROCEDURE — 12001 RPR S/N/AX/GEN/TRNK 2.5CM/<: CPT

## 2023-02-19 PROCEDURE — 85025 COMPLETE CBC W/AUTO DIFF WBC: CPT

## 2023-02-19 PROCEDURE — 84484 ASSAY OF TROPONIN QUANT: CPT

## 2023-02-19 PROCEDURE — 72170 X-RAY EXAM OF PELVIS: CPT | Mod: 26

## 2023-02-19 PROCEDURE — 90715 TDAP VACCINE 7 YRS/> IM: CPT

## 2023-02-19 PROCEDURE — 70450 CT HEAD/BRAIN W/O DYE: CPT | Mod: MA

## 2023-02-19 PROCEDURE — 99285 EMERGENCY DEPT VISIT HI MDM: CPT | Mod: FS,25

## 2023-02-19 PROCEDURE — 96374 THER/PROPH/DIAG INJ IV PUSH: CPT | Mod: XU

## 2023-02-19 PROCEDURE — 70450 CT HEAD/BRAIN W/O DYE: CPT | Mod: 26,MA

## 2023-02-19 PROCEDURE — 93005 ELECTROCARDIOGRAM TRACING: CPT | Mod: XU

## 2023-02-19 PROCEDURE — 72125 CT NECK SPINE W/O DYE: CPT | Mod: 26,MA

## 2023-02-19 PROCEDURE — 72125 CT NECK SPINE W/O DYE: CPT | Mod: MA

## 2023-02-19 PROCEDURE — 71045 X-RAY EXAM CHEST 1 VIEW: CPT | Mod: 26

## 2023-02-19 PROCEDURE — 99285 EMERGENCY DEPT VISIT HI MDM: CPT | Mod: 25

## 2023-02-19 PROCEDURE — 93010 ELECTROCARDIOGRAM REPORT: CPT

## 2023-02-19 PROCEDURE — 80053 COMPREHEN METABOLIC PANEL: CPT

## 2023-02-19 PROCEDURE — 72170 X-RAY EXAM OF PELVIS: CPT

## 2023-02-19 PROCEDURE — 85610 PROTHROMBIN TIME: CPT

## 2023-02-19 PROCEDURE — 90471 IMMUNIZATION ADMIN: CPT

## 2023-02-19 PROCEDURE — 96361 HYDRATE IV INFUSION ADD-ON: CPT | Mod: XU

## 2023-02-19 PROCEDURE — 36415 COLL VENOUS BLD VENIPUNCTURE: CPT

## 2023-02-19 PROCEDURE — 82962 GLUCOSE BLOOD TEST: CPT

## 2023-02-19 PROCEDURE — 81001 URINALYSIS AUTO W/SCOPE: CPT

## 2023-02-19 PROCEDURE — 71045 X-RAY EXAM CHEST 1 VIEW: CPT

## 2023-02-19 PROCEDURE — 85730 THROMBOPLASTIN TIME PARTIAL: CPT

## 2023-02-19 RX ORDER — CEFUROXIME AXETIL 250 MG
1 TABLET ORAL
Qty: 14 | Refills: 0
Start: 2023-02-19 | End: 2023-02-25

## 2023-02-19 RX ORDER — CEFTRIAXONE 500 MG/1
1000 INJECTION, POWDER, FOR SOLUTION INTRAMUSCULAR; INTRAVENOUS ONCE
Refills: 0 | Status: COMPLETED | OUTPATIENT
Start: 2023-02-19 | End: 2023-02-19

## 2023-02-19 RX ORDER — TETANUS TOXOID, REDUCED DIPHTHERIA TOXOID AND ACELLULAR PERTUSSIS VACCINE, ADSORBED 5; 2.5; 8; 8; 2.5 [IU]/.5ML; [IU]/.5ML; UG/.5ML; UG/.5ML; UG/.5ML
0.5 SUSPENSION INTRAMUSCULAR ONCE
Refills: 0 | Status: COMPLETED | OUTPATIENT
Start: 2023-02-19 | End: 2023-02-19

## 2023-02-19 RX ORDER — SODIUM CHLORIDE 9 MG/ML
250 INJECTION INTRAMUSCULAR; INTRAVENOUS; SUBCUTANEOUS ONCE
Refills: 0 | Status: COMPLETED | OUTPATIENT
Start: 2023-02-19 | End: 2023-02-19

## 2023-02-19 RX ADMIN — CEFTRIAXONE 1000 MILLIGRAM(S): 500 INJECTION, POWDER, FOR SOLUTION INTRAMUSCULAR; INTRAVENOUS at 22:05

## 2023-02-19 RX ADMIN — TETANUS TOXOID, REDUCED DIPHTHERIA TOXOID AND ACELLULAR PERTUSSIS VACCINE, ADSORBED 0.5 MILLILITER(S): 5; 2.5; 8; 8; 2.5 SUSPENSION INTRAMUSCULAR at 22:34

## 2023-02-19 RX ADMIN — SODIUM CHLORIDE 250 MILLILITER(S): 9 INJECTION INTRAMUSCULAR; INTRAVENOUS; SUBCUTANEOUS at 21:15

## 2023-02-19 RX ADMIN — SODIUM CHLORIDE 250 MILLILITER(S): 9 INJECTION INTRAMUSCULAR; INTRAVENOUS; SUBCUTANEOUS at 23:00

## 2023-02-19 NOTE — ED ADULT NURSE NOTE - OBJECTIVE STATEMENT
Patient presented to ED s/p fall at home; unsure how many steps (2-10). Family heard but did not witness fall. Patient stated she may have lost her balance. Also endorsed drinking 2-3 glasses of wine prior to dinner. Trauma alert called at 2011; patient taken to CT for non Contrast scans. Abrasion seen to posterior scalp, pressure applied dt active bleeding.

## 2023-02-19 NOTE — ED ADULT TRIAGE NOTE - CHIEF COMPLAINT QUOTE
pt bibems from home with family s/p fall down 2 stairs. lac present to back of head. unknown LOC. pmh of breast ca, HTN, dementia. truama alert ordered by LOLA Villarreal at 2011. -allergies

## 2023-02-19 NOTE — ED PROVIDER NOTE - ATTENDING APP SHARED VISIT CONTRIBUTION OF CARE
I, Zac Ley MD, personally saw the patient with PEDRO PABLO.  I have personally performed a face to face diagnostic evaluation on this patient.  I have reviewed the PEDRO PABLO note and agree with the history, exam, and plan of care, except as noted.

## 2023-02-19 NOTE — ED PROVIDER NOTE - OBJECTIVE STATEMENT
84 yo female with a PMH of breast cancer on chemo, dementia, htn, gerd, NOT on any AC was BIBA from home s/p fall. Per son-in-law, pt was walking down the stairs and had an unwitnessed fall. Family found pt at the bottom of the stairs, appeared lightheaded but came to after a few minutes. +bleeding from the fall. Pt with dementia; therefore history was limited from her; however, pt denies neck or back pain, abd pain, n/v.

## 2023-02-19 NOTE — ED ADULT NURSE NOTE - NSFALLRSKHRMRISKTYPE_ED_ALL_ED
Please advise patient there may be increased sleepiness with zolpidem, she can start with 2.5 mg at bedtime, rather than the 5 mg if she likes.  Please ask pharmacy to fill script.  Patient not reporting drowsiness or fatigue with the paliperidone.    Thank you,  Erin Montgomery MD  [facility]  [facility]    
This RN called pharmacy per provider directive & oriented that provider is aware of possible interaction and would like the medication filled today.    [facility].    Georgia Felton RN on 10/18/2021 at 11:28 AM    
This RN completed to chart review to see if there are any encounters from pharmacy calling clinic or other information. Nothing noted in chart.    Wtr checked  - no ambien filled at all on list.    Wtr called pharmacy to check on pt report of cancellation of prescription. Pharmacy indicates that there is a drug interaction Zolpidem:Invega - they sent on 10/18 & other dates. Wtr oriented her that this RN will consult w/ provider then get back to them.    Wtr checked interaction in [employer]: no Drug/drug interactions found.    Wtr checked faxes and none found.    Will route to provider for consult.    Georgia Felton RN on 10/18/2021 at 10:05 AM      
other

## 2023-02-19 NOTE — ED PROVIDER NOTE - CONSTITUTIONAL, MLM
Frial elderly white female appearing, awake, alert, oriented to person, place (at baseline per family) and in no apparent distress. normal...

## 2023-02-19 NOTE — ED PROVIDER NOTE - NS ED ATTENDING STATEMENT MOD
This was a shared visit with the PEDRO PABLO. I reviewed and verified the documentation and independently performed the documented:

## 2023-02-19 NOTE — ED ADULT NURSE NOTE - NSIMPLEMENTINTERV_GEN_ALL_ED
Implemented All Fall with Harm Risk Interventions:  [place] to call system. Call bell, personal items and telephone within reach. Instruct patient to call for assistance. Room bathroom lighting operational. Non-slip footwear when patient is off stretcher. Physically safe environment: no spills, clutter or unnecessary equipment. Stretcher in lowest position, wheels locked, appropriate side rails in place. Provide visual cue, wrist band, yellow gown, etc. Monitor gait and stability. Monitor for mental status changes and reorient to person, place, and time. Review medications for side effects contributing to fall risk. Reinforce activity limits and safety measures with patient and family. Provide visual clues: red socks.

## 2023-02-19 NOTE — ED PROVIDER NOTE - PROGRESS NOTE DETAILS
Pt and daughter were informed of the CT results and the UA which shows signs of UTI. Pt did mention that she was having urinary complaints. Daughter also states that she has f/u with Dr. Mitchell (neurosx) after she had an intracranial hemorrhage and can f/u with the enlarged ventricles/? NPH.   Pt has been able to ambulate prior to dc and usually uses a walker at home.   Pending remainder of labs. -Phil Stewart PA-C CMP unremarkable. Will d/c home. -Phil Stewart PA-C

## 2023-02-19 NOTE — ED PROVIDER NOTE - CARE PLAN
Principal Discharge DX:	Scalp laceration   1 Principal Discharge DX:	Scalp laceration  Secondary Diagnosis:	Acute UTI  Secondary Diagnosis:	Fall at home

## 2023-02-19 NOTE — ED PROVIDER NOTE - NSFOLLOWUPINSTRUCTIONS_ED_ALL_ED_FT
Follow up with your neurosurgeon for further evaluation of the CT findings.   Take tylenol for pain.   Take the antibiotics as directed for the urine infection.   Drink plenty of water.   Stop drinking alcohol/wine    Return to the ER for any new or other concerns.       Urinary Tract Infection, Adult  ImageA urinary tract infection (UTI) is an infection of any part of the urinary tract, which includes the kidneys, ureters, bladder, and urethra. These organs make, store, and get rid of urine in the body. UTI can be a bladder infection (cystitis) or kidney infection (pyelonephritis).    What are the causes?  This infection may be caused by fungi, viruses, or bacteria. Bacteria are the most common cause of UTIs. This condition can also be caused by repeated incomplete emptying of the bladder during urination.    What increases the risk?  This condition is more likely to develop if:    You ignore your need to urinate or hold urine for long periods of time.  You do not empty your bladder completely during urination.  You wipe back to front after urinating or having a bowel movement, if you are female.  You are uncircumcised, if you are male.  You are constipated.  You have a urinary catheter that stays in place (indwelling).  You have a weak defense (immune) system.  You have a medical condition that affects your bowels, kidneys, or bladder.  You have diabetes.  You take antibiotic medicines frequently or for long periods of time, and the antibiotics no longer work well against certain types of infections (antibiotic resistance).  You take medicines that irritate your urinary tract.  You are exposed to chemicals that irritate your urinary tract.  You are female.    What are the signs or symptoms?  Symptoms of this condition include:    Fever.  Frequent urination or passing small amounts of urine frequently.  Needing to urinate urgently.  Pain or burning with urination.  Urine that smells bad or unusual.  Cloudy urine.  Pain in the lower abdomen or back.  Trouble urinating.  Blood in the urine.  Vomiting or being less hungry than normal.  Diarrhea or abdominal pain.  Vaginal discharge, if you are female.    How is this diagnosed?  This condition is diagnosed with a medical history and physical exam. You will also need to provide a urine sample to test your urine. Other tests may be done, including:    Blood tests.  Sexually transmitted disease (STD) testing.    If you have had more than one UTI, a cystoscopy or imaging studies may be done to determine the cause of the infections.    How is this treated?  Treatment for this condition often includes a combination of two or more of the following:    Antibiotic medicine.  Other medicines to treat less common causes of UTI.  Over-the-counter medicines to treat pain.  Drinking enough water to stay hydrated.    Follow these instructions at home:  Take over-the-counter and prescription medicines only as told by your health care provider.  If you were prescribed an antibiotic, take it as told by your health care provider. Do not stop taking the antibiotic even if you start to feel better.  Avoid alcohol, caffeine, tea, and carbonated beverages. They can irritate your bladder.  Drink enough fluid to keep your urine clear or pale yellow.  Keep all follow-up visits as told by your health care provider. This is important.  ImageMake sure to:    Empty your bladder often and completely. Do not hold urine for long periods of time.  Empty your bladder before and after sex.  Wipe from front to back after a bowel movement if you are female. Use each tissue one time when you wipe.    Contact a health care provider if:  You have back pain.  You have a fever.  You feel nauseous or vomit.  Your symptoms do not get better after 3 days.  Your symptoms go away and then return.  Get help right away if:  You have severe back pain or lower abdominal pain.  You are vomiting and cannot keep down any medicines or water.  This information is not intended to replace advice given to you by your health care provider. Make sure you discuss any questions you have with your health care provider.

## 2023-02-19 NOTE — ED PROVIDER NOTE - CLINICAL SUMMARY MEDICAL DECISION MAKING FREE TEXT BOX
DVT of leg (deep venous thrombosis) 82 yo female presents s/p unwitnessed fall down the stairs at home. Pt found at the bottom of the stairs. Denies LOC and AC use.   Laceration to occipital scalp. No midline ttp to the neck or back. No ttp to the chest, upper extremities, or lower extremities. FROM of the b/l UEs and LEs.   Will check labs, CTs, ekg, UA ,and lac repair. -Phil Stewart PA-C 82 yo female presents s/p unwitnessed fall down the stairs at home. Pt found at the bottom of the stairs. Denies LOC and AC use.   Laceration to occipital scalp. No midline ttp to the neck or back. No ttp to the chest, upper extremities, or lower extremities. FROM of the b/l UEs and LEs.   Will check labs, CTs, ekg, UA ,and lac repair. -Phil Stewart PA-C  -Jil: Pt now back to baseline. Has had increased falls recently, has had ?progression of dementia however given possibility of NPH on CT will need NSG referral. Made daughter aware of this. +UTI but no sepsis. No tolerating PO, ambulatory with steady gait observed. Has multiple fu appts this wk. Stable for DC at this time with otpt fu

## 2023-02-19 NOTE — ED PROVIDER NOTE - NSDCPRINTRESULTS_ED_ALL_ED
Improved Patient requests all Lab, Cardiology, and Radiology Results on their Discharge Instructions

## 2023-02-19 NOTE — ED PROVIDER NOTE - PATIENT PORTAL LINK FT
You can access the FollowMyHealth Patient Portal offered by [facility] by registering at the following website: [email or web address]. By joining [employer]’s FollowMyHealth portal, you will also be able to view your health information using other applications (apps) compatible with our system.

## 2023-03-04 ENCOUNTER — EMERGENCY (EMERGENCY)
Facility: HOSPITAL | Age: 83
LOS: 0 days | Discharge: HOME | End: 2023-03-04
Attending: EMERGENCY MEDICINE
Payer: MEDICARE

## 2023-03-04 VITALS
SYSTOLIC BLOOD PRESSURE: 156 MMHG | HEART RATE: 86 BPM | RESPIRATION RATE: 17 BRPM | OXYGEN SATURATION: 98 % | TEMPERATURE: 98 F | DIASTOLIC BLOOD PRESSURE: 65 MMHG

## 2023-03-04 VITALS — WEIGHT: 145.95 LBS | HEIGHT: 66 IN

## 2023-03-04 DIAGNOSIS — S01.01XD LACERATION WITHOUT FOREIGN BODY OF SCALP, SUBSEQUENT ENCOUNTER: ICD-10-CM

## 2023-03-04 DIAGNOSIS — Z98.890 OTHER SPECIFIED POSTPROCEDURAL STATES: Chronic | ICD-10-CM
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
## 2023-03-04 DIAGNOSIS — Z90.12 ACQUIRED ABSENCE OF LEFT BREAST AND NIPPLE: Chronic | ICD-10-CM

## 2023-03-04 DIAGNOSIS — Z90.11 ACQUIRED ABSENCE OF RIGHT BREAST AND NIPPLE: Chronic | ICD-10-CM

## 2023-03-04 DIAGNOSIS — D04.9 CARCINOMA IN SITU OF SKIN, UNSPECIFIED: Chronic | ICD-10-CM

## 2023-03-04 PROCEDURE — 99212 OFFICE O/P EST SF 10 MIN: CPT

## 2023-03-04 PROCEDURE — L9995: CPT

## 2023-03-04 NOTE — ED STATDOCS - PATIENT PORTAL LINK FT
You can access the FollowMyHealth Patient Portal offered by [facility] by registering at the following website: [email or web address]. By joining [employer]’s FollowMyHealth portal, you will also be able to view your health information using other applications (apps) compatible with our system.

## 2023-03-04 NOTE — ED STATDOCS - NS ED ROS FT
Constitutional: No fever or chills  Eyes: No visual changes  HEENT: No throat pain. +Staple removal from head.   CV: No chest pain  Resp: No SOB no cough  GI: No abd pain, nausea or vomiting  : No dysuria  MSK: No musculoskeletal pain  Skin: No rash  Neuro: No headache

## 2023-03-04 NOTE — ED STATDOCS - OBJECTIVE STATEMENT
82 y/o female with PMHx of GERD, macular degeneration, bilateral malignant neoplasm of breast, osteoarthritis of both knees, spinal stenosis of lumbar region, basal cell carcinoma, urinary frequency, insomnia, obesity presents to the ED for staple removal from back of head that had been placed in ED a few days ago after patient hit her head. She was told to return 2 days later for staple removal.

## 2023-03-04 NOTE — ED STATDOCS - PROGRESS NOTE DETAILS
pt staples removed from right scalp, wound edges well healed, pt well appearing o dc and agrees with plan. -Vivienne Serna PA-C

## 2023-03-04 NOTE — ED STATDOCS - ATTENDING APP SHARED VISIT CONTRIBUTION OF CARE
I, Nury Robles MD, performed the initial face to face bedside interview with this patient regarding history of present illness, review of symptoms and relevant past medical, social and family history.  I completed an independent physical examination.  I was the initial provider who evaluated this patient. I have signed out the follow up of any pending tests (i.e. labs, radiological studies) to the ACP.  I have communicated the patient’s plan of care and disposition with the ACP.  The history, relevant review of systems, past medical and surgical history, medical decision making, and physical examination was documented by the scribe in my presence and I attest to the accuracy of the documentation.

## 2023-06-23 ENCOUNTER — APPOINTMENT (OUTPATIENT)
Dept: NEUROSURGERY | Facility: CLINIC | Age: 83
End: 2023-06-23
Payer: MEDICARE

## 2023-06-23 VITALS
DIASTOLIC BLOOD PRESSURE: 69 MMHG | WEIGHT: 120 LBS | SYSTOLIC BLOOD PRESSURE: 131 MMHG | HEIGHT: 63 IN | HEART RATE: 80 BPM | OXYGEN SATURATION: 100 % | BODY MASS INDEX: 21.26 KG/M2

## 2023-06-23 DIAGNOSIS — Z85.3 PERSONAL HISTORY OF MALIGNANT NEOPLASM OF BREAST: ICD-10-CM

## 2023-06-23 DIAGNOSIS — R26.89 OTHER ABNORMALITIES OF GAIT AND MOBILITY: ICD-10-CM

## 2023-06-23 PROCEDURE — 99215 OFFICE O/P EST HI 40 MIN: CPT

## 2023-07-03 PROBLEM — Z85.3 HISTORY OF BREAST CANCER: Status: ACTIVE | Noted: 2023-07-03

## 2023-07-03 NOTE — CONSULT LETTER
[Dear  ___] : Dear  [unfilled], [Courtesy Letter:] : I had the pleasure of seeing your patient, [unfilled], in my office today. [Sincerely,] : Sincerely, [FreeTextEntry2] : Eren Reinoso MD\par [address]  [address]\[place] [address] [FreeTextEntry1] : I had the pleasure of seeing your patient, NIDHI ALLISON, in my office today. Nidhi Allison is a 83-year-old female who presents today for balance difficulties. Patient with significant history of bilateral breast cancer with mets to bones. Patient currently on oral chemotherapy. Patient with a history of dementia and spinal lumbar stenosis. Unfortunately patient has had multiple falls. The last fall was sustained on 2/19/2023 and was unwitnessed. She was found at the bottom of the stairs by her family. Patient was evaluated at [facility] emergency room. At that time, she had underwent CT of the cervical spine which indicated no fractures. CT of the brain indicated no evidence of acute intracranial hemorrhage however also indicated "Nonspecific lucency noted in the right parietal calvarium and ventriculomegaly seen out of proportion to the sulcal prominence."\par \par Patient denies headaches, vision changes, nausea or vomiting. Patient reports dizziness however she attributes this to medication. She denies weakness. Patient reports difficulties with balance and walking. She uses a walker when she is out of the house. She reports tripping over her feet, right greater than left. Patient displays difficulties with short-term memory however has good long-term memory. Patient reports urine incontinence. She is under the care of urologist and she is taking Myrbetriq. \par \par Patient denies any neck pain. However she endorses numbness and tingling to all fingertips. She reports dexterity difficulties and dropping objects from her hands, right side greater than left. She denies any arm pain. Denies any upper extremity weakness. Patient reports lower back pain. She also experiences paresthesias to all toes. She denies any lower extremity weakness or pain.\par \par Patient is alert. No distress noted. Cranial nerves are intact. Negative pronator drift. Pupils equal and reactive to light. Finger-to-nose testing intact. Negative Shyam's. Negative clonus. 1+ bilateral upper extremity reflexes. Left Achilles tendon reflex absent. Remaining lower extremity reflexes present. Strength to bilateral arms 5/5. 1/5 strength noted to right EHL. Remaining lower extremity strength 5/5. Mini-Mental state exam performed. Patient scored with follow-up with 20/30. Patient scored 13/28 on Tinetti balance assessment score. \par \par Patient recently underwent PET/CT on 4/13/2023 at [facility].  Images reviewed with Dr. Mitchell.  At this time given history of breast cancer would recommend MRI of the brain and entire spinal column (cervical, thoracic, and lumbar spine)all with and without contrast.  Ventricles appear stable when compared to previous imaging performed in 2021. LM with daughter to call back office regarding plan.  Awaiting phone call back.   [FreeTextEntry3] : Cordelia Saunders, MSN, FNP-BC\par Nurse Practitioner\par Neurosurgery\par [address] [place] [address], [address]nd floor \[place] [address] \par Office: [phone] \par Fax: [phone]\par \par

## 2023-07-19 ENCOUNTER — NON-APPOINTMENT (OUTPATIENT)
Age: 83
End: 2023-07-19

## 2023-08-31 NOTE — ED PROVIDER NOTE - CONDITION AT DISCHARGE:
Assessment/Plan/Follow up information       Diagnosis ICD-10-CM Associated Orders   1. Prediabetes  R73.03       2. Hypertension, unspecified type  I10       3. Establishing care with new doctor, encounter for  Z76.89       4. Erectile dysfunction, unspecified erectile dysfunction type  N52.9       5. Orthopnea  R06.01 XR chest pa & lateral     Echo complete w/ contrast if indicated         Patient in agreement with the plan, all questions and concerns were answered/addressed. Advised to contact me or the office with any concerns or questions. In the event of an emergency, and unable to contact a provider they are to go to the emergency room. Subjective    HPI: This is a pleasant 42-year-old male who presents the office for establishment of care. Overall patient states he is doing well. His only complaint at this time is that he has been having chronic shortness of breath, dyspnea on exertion and difficulty laying flat at nighttime secondary to the sensation of choking. He states this is never been worked up in the past.      Review of Systems   Constitutional: Negative for activity change, appetite change, chills, fatigue and fever. HENT: Negative for congestion, dental problem, drooling, ear discharge, ear pain, facial swelling, postnasal drip, rhinorrhea and sinus pain. Eyes: Negative for photophobia, pain, discharge and itching. Respiratory: Negative for apnea, cough, chest tightness and shortness of breath. Cardiovascular: Negative for chest pain and leg swelling. Gastrointestinal: Negative for abdominal distention, abdominal pain, anal bleeding, constipation, diarrhea and nausea. Endocrine: Negative for cold intolerance, heat intolerance and polydipsia. Genitourinary: Negative for difficulty urinating, dysuria, enuresis, flank pain, frequency, genital sores, hematuria, penile discharge and penile pain.    Musculoskeletal: Negative for arthralgias, gait problem, joint swelling and myalgias. Skin: Negative for color change and pallor. Allergic/Immunologic: Negative for immunocompromised state. Neurological: Negative for dizziness, seizures, facial asymmetry, weakness, light-headedness, numbness and headaches. Psychiatric/Behavioral: Negative for agitation, behavioral problems, confusion, decreased concentration and dysphoric mood. All other systems reviewed and are negative. Objective    Vitals:    08/31/23 1339   BP: 130/68   Pulse: 82   SpO2: 94%         Physical Exam  Vitals and nursing note reviewed. Constitutional:       Appearance: He is well-developed. HENT:      Head: Normocephalic. Eyes:      Pupils: Pupils are equal, round, and reactive to light. Cardiovascular:      Rate and Rhythm: Normal rate and regular rhythm. Pulmonary:      Effort: Pulmonary effort is normal.      Breath sounds: Normal breath sounds. Abdominal:      General: Bowel sounds are normal.      Palpations: Abdomen is soft. Musculoskeletal:         General: Normal range of motion. Cervical back: Normal range of motion and neck supple. Right lower leg: Edema present. Left lower leg: Edema present. Skin:     General: Skin is warm. Portions of the record may have been created with voice recognition software. Occasional wrong word or "sound a like" substitutions may have occurred due to the inherent limitations of voice recognition software. Read the chart carefully and recognize, using context, where substitutions have occurred. Contact me with any questions.        iKm Giraldo MD 08/31/23 Improved

## 2023-09-07 ENCOUNTER — NON-APPOINTMENT (OUTPATIENT)
Age: 83
End: 2023-09-07

## 2023-10-27 ENCOUNTER — NON-APPOINTMENT (OUTPATIENT)
Age: 83
End: 2023-10-27

## 2023-11-07 ENCOUNTER — NON-APPOINTMENT (OUTPATIENT)
Age: 83
End: 2023-11-07

## 2024-01-09 NOTE — ED PROVIDER NOTE - WET READ LAUNCH FT
Specimens verified per policy. There are no Wet Read(s) to document. There are 2 Wet Read(s) to document.

## 2024-01-11 ENCOUNTER — INPATIENT (INPATIENT)
Facility: HOSPITAL | Age: 84
LOS: 4 days | Discharge: OTHER INSTITUTION | DRG: 871 | End: 2024-01-16
Attending: STUDENT IN AN ORGANIZED HEALTH CARE EDUCATION/TRAINING PROGRAM | Admitting: STUDENT IN AN ORGANIZED HEALTH CARE EDUCATION/TRAINING PROGRAM
Payer: MEDICARE

## 2024-01-11 VITALS
OXYGEN SATURATION: 95 % | SYSTOLIC BLOOD PRESSURE: 96 MMHG | RESPIRATION RATE: 18 BRPM | HEART RATE: 85 BPM | TEMPERATURE: 102 F | DIASTOLIC BLOOD PRESSURE: 43 MMHG

## 2024-01-11 DIAGNOSIS — Z98.890 OTHER SPECIFIED POSTPROCEDURAL STATES: Chronic | ICD-10-CM

## 2024-01-11 DIAGNOSIS — N39.0 URINARY TRACT INFECTION, SITE NOT SPECIFIED: ICD-10-CM

## 2024-01-11 DIAGNOSIS — Z90.12 ACQUIRED ABSENCE OF LEFT BREAST AND NIPPLE: Chronic | ICD-10-CM

## 2024-01-11 DIAGNOSIS — Z90.11 ACQUIRED ABSENCE OF RIGHT BREAST AND NIPPLE: Chronic | ICD-10-CM

## 2024-01-11 DIAGNOSIS — D04.9 CARCINOMA IN SITU OF SKIN, UNSPECIFIED: Chronic | ICD-10-CM

## 2024-01-11 DIAGNOSIS — G93.41 METABOLIC ENCEPHALOPATHY: ICD-10-CM

## 2024-01-11 DIAGNOSIS — Z29.9 ENCOUNTER FOR PROPHYLACTIC MEASURES, UNSPECIFIED: ICD-10-CM

## 2024-01-11 DIAGNOSIS — R53.1 WEAKNESS: ICD-10-CM

## 2024-01-11 LAB
ALBUMIN SERPL ELPH-MCNC: 2.5 G/DL — LOW (ref 3.3–5)
ALBUMIN SERPL ELPH-MCNC: 2.5 G/DL — LOW (ref 3.3–5)
ALP SERPL-CCNC: 108 U/L — SIGNIFICANT CHANGE UP (ref 40–120)
ALP SERPL-CCNC: 108 U/L — SIGNIFICANT CHANGE UP (ref 40–120)
ALT FLD-CCNC: 14 U/L — SIGNIFICANT CHANGE UP (ref 12–78)
ALT FLD-CCNC: 14 U/L — SIGNIFICANT CHANGE UP (ref 12–78)
ANION GAP SERPL CALC-SCNC: 8 MMOL/L — SIGNIFICANT CHANGE UP (ref 5–17)
ANION GAP SERPL CALC-SCNC: 8 MMOL/L — SIGNIFICANT CHANGE UP (ref 5–17)
APPEARANCE UR: ABNORMAL
APPEARANCE UR: ABNORMAL
APTT BLD: 28.4 SEC — SIGNIFICANT CHANGE UP (ref 24.5–35.6)
APTT BLD: 28.4 SEC — SIGNIFICANT CHANGE UP (ref 24.5–35.6)
AST SERPL-CCNC: 20 U/L — SIGNIFICANT CHANGE UP (ref 15–37)
AST SERPL-CCNC: 20 U/L — SIGNIFICANT CHANGE UP (ref 15–37)
BACTERIA # UR AUTO: ABNORMAL /HPF
BACTERIA # UR AUTO: ABNORMAL /HPF
BASOPHILS # BLD AUTO: 0.03 K/UL — SIGNIFICANT CHANGE UP (ref 0–0.2)
BASOPHILS # BLD AUTO: 0.03 K/UL — SIGNIFICANT CHANGE UP (ref 0–0.2)
BASOPHILS NFR BLD AUTO: 0.3 % — SIGNIFICANT CHANGE UP (ref 0–2)
BASOPHILS NFR BLD AUTO: 0.3 % — SIGNIFICANT CHANGE UP (ref 0–2)
BILIRUB SERPL-MCNC: 0.5 MG/DL — SIGNIFICANT CHANGE UP (ref 0.2–1.2)
BILIRUB SERPL-MCNC: 0.5 MG/DL — SIGNIFICANT CHANGE UP (ref 0.2–1.2)
BILIRUB UR-MCNC: NEGATIVE — SIGNIFICANT CHANGE UP
BILIRUB UR-MCNC: NEGATIVE — SIGNIFICANT CHANGE UP
BUN SERPL-MCNC: 28 MG/DL — HIGH (ref 7–23)
BUN SERPL-MCNC: 28 MG/DL — HIGH (ref 7–23)
CALCIUM SERPL-MCNC: 9 MG/DL — SIGNIFICANT CHANGE UP (ref 8.5–10.1)
CALCIUM SERPL-MCNC: 9 MG/DL — SIGNIFICANT CHANGE UP (ref 8.5–10.1)
CHLORIDE SERPL-SCNC: 99 MMOL/L — SIGNIFICANT CHANGE UP (ref 96–108)
CHLORIDE SERPL-SCNC: 99 MMOL/L — SIGNIFICANT CHANGE UP (ref 96–108)
CK SERPL-CCNC: 51 U/L — SIGNIFICANT CHANGE UP (ref 26–192)
CK SERPL-CCNC: 51 U/L — SIGNIFICANT CHANGE UP (ref 26–192)
CO2 SERPL-SCNC: 25 MMOL/L — SIGNIFICANT CHANGE UP (ref 22–31)
CO2 SERPL-SCNC: 25 MMOL/L — SIGNIFICANT CHANGE UP (ref 22–31)
COLOR SPEC: YELLOW — SIGNIFICANT CHANGE UP
COLOR SPEC: YELLOW — SIGNIFICANT CHANGE UP
CREAT SERPL-MCNC: 0.77 MG/DL — SIGNIFICANT CHANGE UP (ref 0.5–1.3)
CREAT SERPL-MCNC: 0.77 MG/DL — SIGNIFICANT CHANGE UP (ref 0.5–1.3)
DIFF PNL FLD: ABNORMAL
DIFF PNL FLD: ABNORMAL
EGFR: 76 ML/MIN/1.73M2 — SIGNIFICANT CHANGE UP
EGFR: 76 ML/MIN/1.73M2 — SIGNIFICANT CHANGE UP
EOSINOPHIL # BLD AUTO: 0.01 K/UL — SIGNIFICANT CHANGE UP (ref 0–0.5)
EOSINOPHIL # BLD AUTO: 0.01 K/UL — SIGNIFICANT CHANGE UP (ref 0–0.5)
EOSINOPHIL NFR BLD AUTO: 0.1 % — SIGNIFICANT CHANGE UP (ref 0–6)
EOSINOPHIL NFR BLD AUTO: 0.1 % — SIGNIFICANT CHANGE UP (ref 0–6)
EPI CELLS # UR: PRESENT
EPI CELLS # UR: PRESENT
FLUAV AG NPH QL: SIGNIFICANT CHANGE UP
FLUAV AG NPH QL: SIGNIFICANT CHANGE UP
FLUBV AG NPH QL: SIGNIFICANT CHANGE UP
FLUBV AG NPH QL: SIGNIFICANT CHANGE UP
GLUCOSE SERPL-MCNC: 154 MG/DL — HIGH (ref 70–99)
GLUCOSE SERPL-MCNC: 154 MG/DL — HIGH (ref 70–99)
GLUCOSE UR QL: NEGATIVE MG/DL — SIGNIFICANT CHANGE UP
GLUCOSE UR QL: NEGATIVE MG/DL — SIGNIFICANT CHANGE UP
HCT VFR BLD CALC: 34 % — LOW (ref 34.5–45)
HCT VFR BLD CALC: 34 % — LOW (ref 34.5–45)
HGB BLD-MCNC: 11.6 G/DL — SIGNIFICANT CHANGE UP (ref 11.5–15.5)
HGB BLD-MCNC: 11.6 G/DL — SIGNIFICANT CHANGE UP (ref 11.5–15.5)
IMM GRANULOCYTES NFR BLD AUTO: 0.4 % — SIGNIFICANT CHANGE UP (ref 0–0.9)
IMM GRANULOCYTES NFR BLD AUTO: 0.4 % — SIGNIFICANT CHANGE UP (ref 0–0.9)
INR BLD: 1.04 RATIO — SIGNIFICANT CHANGE UP (ref 0.85–1.18)
INR BLD: 1.04 RATIO — SIGNIFICANT CHANGE UP (ref 0.85–1.18)
KETONES UR-MCNC: ABNORMAL MG/DL
KETONES UR-MCNC: ABNORMAL MG/DL
LACTATE SERPL-SCNC: 1.1 MMOL/L — SIGNIFICANT CHANGE UP (ref 0.7–2)
LACTATE SERPL-SCNC: 1.1 MMOL/L — SIGNIFICANT CHANGE UP (ref 0.7–2)
LEUKOCYTE ESTERASE UR-ACNC: ABNORMAL
LEUKOCYTE ESTERASE UR-ACNC: ABNORMAL
LIDOCAIN IGE QN: 13 U/L — SIGNIFICANT CHANGE UP (ref 13–75)
LIDOCAIN IGE QN: 13 U/L — SIGNIFICANT CHANGE UP (ref 13–75)
LYMPHOCYTES # BLD AUTO: 0.46 K/UL — LOW (ref 1–3.3)
LYMPHOCYTES # BLD AUTO: 0.46 K/UL — LOW (ref 1–3.3)
LYMPHOCYTES # BLD AUTO: 4.5 % — LOW (ref 13–44)
LYMPHOCYTES # BLD AUTO: 4.5 % — LOW (ref 13–44)
MANUAL SMEAR VERIFICATION: SIGNIFICANT CHANGE UP
MANUAL SMEAR VERIFICATION: SIGNIFICANT CHANGE UP
MCHC RBC-ENTMCNC: 32.8 PG — SIGNIFICANT CHANGE UP (ref 27–34)
MCHC RBC-ENTMCNC: 32.8 PG — SIGNIFICANT CHANGE UP (ref 27–34)
MCHC RBC-ENTMCNC: 34.1 GM/DL — SIGNIFICANT CHANGE UP (ref 32–36)
MCHC RBC-ENTMCNC: 34.1 GM/DL — SIGNIFICANT CHANGE UP (ref 32–36)
MCV RBC AUTO: 96 FL — SIGNIFICANT CHANGE UP (ref 80–100)
MCV RBC AUTO: 96 FL — SIGNIFICANT CHANGE UP (ref 80–100)
MONOCYTES # BLD AUTO: 1.49 K/UL — HIGH (ref 0–0.9)
MONOCYTES # BLD AUTO: 1.49 K/UL — HIGH (ref 0–0.9)
MONOCYTES NFR BLD AUTO: 14.5 % — HIGH (ref 2–14)
MONOCYTES NFR BLD AUTO: 14.5 % — HIGH (ref 2–14)
NEUTROPHILS # BLD AUTO: 8.22 K/UL — HIGH (ref 1.8–7.4)
NEUTROPHILS # BLD AUTO: 8.22 K/UL — HIGH (ref 1.8–7.4)
NEUTROPHILS NFR BLD AUTO: 80.2 % — HIGH (ref 43–77)
NEUTROPHILS NFR BLD AUTO: 80.2 % — HIGH (ref 43–77)
NITRITE UR-MCNC: POSITIVE
NITRITE UR-MCNC: POSITIVE
NRBC # BLD: 0 /100 WBCS — SIGNIFICANT CHANGE UP (ref 0–0)
NRBC # BLD: 0 /100 WBCS — SIGNIFICANT CHANGE UP (ref 0–0)
NT-PROBNP SERPL-SCNC: 819 PG/ML — HIGH (ref 0–450)
NT-PROBNP SERPL-SCNC: 819 PG/ML — HIGH (ref 0–450)
PH UR: 5.5 — SIGNIFICANT CHANGE UP (ref 5–8)
PH UR: 5.5 — SIGNIFICANT CHANGE UP (ref 5–8)
PLAT MORPH BLD: NORMAL — SIGNIFICANT CHANGE UP
PLAT MORPH BLD: NORMAL — SIGNIFICANT CHANGE UP
PLATELET # BLD AUTO: 329 K/UL — SIGNIFICANT CHANGE UP (ref 150–400)
PLATELET # BLD AUTO: 329 K/UL — SIGNIFICANT CHANGE UP (ref 150–400)
POTASSIUM SERPL-MCNC: 3.7 MMOL/L — SIGNIFICANT CHANGE UP (ref 3.5–5.3)
POTASSIUM SERPL-MCNC: 3.7 MMOL/L — SIGNIFICANT CHANGE UP (ref 3.5–5.3)
POTASSIUM SERPL-SCNC: 3.7 MMOL/L — SIGNIFICANT CHANGE UP (ref 3.5–5.3)
POTASSIUM SERPL-SCNC: 3.7 MMOL/L — SIGNIFICANT CHANGE UP (ref 3.5–5.3)
PROT SERPL-MCNC: 6.9 G/DL — SIGNIFICANT CHANGE UP (ref 6–8.3)
PROT SERPL-MCNC: 6.9 G/DL — SIGNIFICANT CHANGE UP (ref 6–8.3)
PROT UR-MCNC: 30 MG/DL
PROT UR-MCNC: 30 MG/DL
PROTHROM AB SERPL-ACNC: 12.1 SEC — SIGNIFICANT CHANGE UP (ref 9.5–13)
PROTHROM AB SERPL-ACNC: 12.1 SEC — SIGNIFICANT CHANGE UP (ref 9.5–13)
RBC # BLD: 3.54 M/UL — LOW (ref 3.8–5.2)
RBC # BLD: 3.54 M/UL — LOW (ref 3.8–5.2)
RBC # FLD: 15.7 % — HIGH (ref 10.3–14.5)
RBC # FLD: 15.7 % — HIGH (ref 10.3–14.5)
RBC BLD AUTO: NORMAL — SIGNIFICANT CHANGE UP
RBC BLD AUTO: NORMAL — SIGNIFICANT CHANGE UP
RBC CASTS # UR COMP ASSIST: SIGNIFICANT CHANGE UP /HPF (ref 0–4)
RBC CASTS # UR COMP ASSIST: SIGNIFICANT CHANGE UP /HPF (ref 0–4)
RSV RNA NPH QL NAA+NON-PROBE: SIGNIFICANT CHANGE UP
RSV RNA NPH QL NAA+NON-PROBE: SIGNIFICANT CHANGE UP
SARS-COV-2 RNA SPEC QL NAA+PROBE: SIGNIFICANT CHANGE UP
SARS-COV-2 RNA SPEC QL NAA+PROBE: SIGNIFICANT CHANGE UP
SODIUM SERPL-SCNC: 132 MMOL/L — LOW (ref 135–145)
SODIUM SERPL-SCNC: 132 MMOL/L — LOW (ref 135–145)
SP GR SPEC: 1.02 — SIGNIFICANT CHANGE UP (ref 1–1.03)
SP GR SPEC: 1.02 — SIGNIFICANT CHANGE UP (ref 1–1.03)
TROPONIN I, HIGH SENSITIVITY RESULT: 8.9 NG/L — SIGNIFICANT CHANGE UP
TROPONIN I, HIGH SENSITIVITY RESULT: 8.9 NG/L — SIGNIFICANT CHANGE UP
UROBILINOGEN FLD QL: 0.2 MG/DL — SIGNIFICANT CHANGE UP (ref 0.2–1)
UROBILINOGEN FLD QL: 0.2 MG/DL — SIGNIFICANT CHANGE UP (ref 0.2–1)
WBC # BLD: 10.25 K/UL — SIGNIFICANT CHANGE UP (ref 3.8–10.5)
WBC # BLD: 10.25 K/UL — SIGNIFICANT CHANGE UP (ref 3.8–10.5)
WBC # FLD AUTO: 10.25 K/UL — SIGNIFICANT CHANGE UP (ref 3.8–10.5)
WBC # FLD AUTO: 10.25 K/UL — SIGNIFICANT CHANGE UP (ref 3.8–10.5)
WBC UR QL: >50 /HPF — SIGNIFICANT CHANGE UP (ref 0–5)
WBC UR QL: >50 /HPF — SIGNIFICANT CHANGE UP (ref 0–5)

## 2024-01-11 PROCEDURE — 99285 EMERGENCY DEPT VISIT HI MDM: CPT

## 2024-01-11 PROCEDURE — 70450 CT HEAD/BRAIN W/O DYE: CPT | Mod: 26,MA

## 2024-01-11 PROCEDURE — 93010 ELECTROCARDIOGRAM REPORT: CPT

## 2024-01-11 PROCEDURE — 99223 1ST HOSP IP/OBS HIGH 75: CPT | Mod: GC,AI

## 2024-01-11 PROCEDURE — 71045 X-RAY EXAM CHEST 1 VIEW: CPT | Mod: 26

## 2024-01-11 RX ORDER — SODIUM CHLORIDE 9 MG/ML
1000 INJECTION INTRAMUSCULAR; INTRAVENOUS; SUBCUTANEOUS ONCE
Refills: 0 | Status: COMPLETED | OUTPATIENT
Start: 2024-01-11 | End: 2024-01-11

## 2024-01-11 RX ORDER — CEFTRIAXONE 500 MG/1
1000 INJECTION, POWDER, FOR SOLUTION INTRAMUSCULAR; INTRAVENOUS EVERY 24 HOURS
Refills: 0 | Status: DISCONTINUED | OUTPATIENT
Start: 2024-01-11 | End: 2024-01-12

## 2024-01-11 RX ORDER — PIPERACILLIN AND TAZOBACTAM 4; .5 G/20ML; G/20ML
3.38 INJECTION, POWDER, LYOPHILIZED, FOR SOLUTION INTRAVENOUS ONCE
Refills: 0 | Status: COMPLETED | OUTPATIENT
Start: 2024-01-11 | End: 2024-01-11

## 2024-01-11 RX ORDER — ENOXAPARIN SODIUM 100 MG/ML
40 INJECTION SUBCUTANEOUS EVERY 24 HOURS
Refills: 0 | Status: DISCONTINUED | OUTPATIENT
Start: 2024-01-11 | End: 2024-01-16

## 2024-01-11 RX ORDER — ACETAMINOPHEN 500 MG
1000 TABLET ORAL ONCE
Refills: 0 | Status: COMPLETED | OUTPATIENT
Start: 2024-01-11 | End: 2024-01-11

## 2024-01-11 RX ORDER — ACETAMINOPHEN 500 MG
650 TABLET ORAL EVERY 6 HOURS
Refills: 0 | Status: DISCONTINUED | OUTPATIENT
Start: 2024-01-11 | End: 2024-01-16

## 2024-01-11 RX ADMIN — SODIUM CHLORIDE 1000 MILLILITER(S): 9 INJECTION INTRAMUSCULAR; INTRAVENOUS; SUBCUTANEOUS at 22:24

## 2024-01-11 RX ADMIN — Medication 1000 MILLIGRAM(S): at 23:54

## 2024-01-11 RX ADMIN — SODIUM CHLORIDE 1000 MILLILITER(S): 9 INJECTION INTRAMUSCULAR; INTRAVENOUS; SUBCUTANEOUS at 21:25

## 2024-01-11 RX ADMIN — Medication 1000 MILLIGRAM(S): at 22:16

## 2024-01-11 RX ADMIN — Medication 400 MILLIGRAM(S): at 22:00

## 2024-01-11 RX ADMIN — SODIUM CHLORIDE 1000 MILLILITER(S): 9 INJECTION INTRAMUSCULAR; INTRAVENOUS; SUBCUTANEOUS at 22:55

## 2024-01-11 NOTE — H&P ADULT - PROBLEM SELECTOR PLAN 2
- Hx of breat CA s/p mastectomies with mets to bone  - On Capecitabine every other week, to be started on 1/13/24- nonformulary, pt's daughter instructed to bring med from home  - Follows with Heme/Onc Dr. Crystal Joyce

## 2024-01-11 NOTE — H&P ADULT - PROBLEM SELECTOR PLAN 1
Patient presents with generalized weakness and metabolic encephalopathy 2/2 UTI  - UA: cloudy, trace ketone, 30 protein, positive nitrite, large LE moderate blood, bacteria TNTC, epithelial cells present  - Lactate wnl  - Given IV Zosyn x1 dose, IV NS 1L bolus x 2 in ED  - Start IV Rocephin  - Tylenol 650 mg PO q6h PRN fever or mild pain  - Trend WBC and monitor for fever  - F/u urine and blood cultures  - ID (Dr. Byers) consulted, f/u recs Patient presents with generalized weakness and metabolic encephalopathy 2/2 UTI  - UA: cloudy, trace ketone, 30 protein, positive nitrite, large LE moderate blood, bacteria TNTC, epithelial cells present  - Given IV Zosyn x1 dose, IV NS 1L bolus x 2 in ED  - Start IV Rocephin  - Tylenol 650 mg PO q6h PRN fever or mild pain  - Trend WBC and monitor for fever  - F/u urine and blood cultures  - ID (Dr. Byers) consulted, f/u recs

## 2024-01-11 NOTE — H&P ADULT - HISTORY OF PRESENT ILLNESS
CHARTING IN PROGRESS     84 yo F with PMH of Hypertension, Hyperlipidemia, breast CA presents to ED c/o generalized weakness x days.       ED course:  Vitals: T 101.8F, HR 85, BP 96/43, RR 18, SpO2 95% on RA  Labs: RBC 3.54, Na 132, pro-  UA: cloudy, trace ketone, 30 protein, positive nitrite, large LE moderate blood, bacteria TNTC, epithelial cells present  EKG: pending  CXR: no acute chest disease per wet read  CT head: negative  Given: IV Ofirmev x1, IV Zosyn x1, 1L IV NS bolus x2   CHARTING IN PROGRESS     82 yo F with PMH of Hypertension, Hyperlipidemia, breast CA presents to ED c/o generalized weakness x days.       ED course:  Vitals: T 101.8F, HR 85, BP 96/43, RR 18, SpO2 95% on RA  Labs: RBC 3.54, Na 132, pro-  UA: cloudy, trace ketone, 30 protein, positive nitrite, large LE moderate blood, bacteria TNTC, epithelial cells present  EKG: pending  CXR: no acute chest disease per wet read  CT head: negative  Given: IV Ofirmev x1, IV Zosyn x1, 1L IV NS bolus x2 82 yo F with PMH of hyperlipidemia, breast CA s/p mastectomies with mets to bone, neuropathy presents to ED c/o generalized weakness, confusion since Wednesday. History is obtained from pt's daughter (Leyla) as pt is a poor historian. Since Wednesday afternoon, pt has been more lethargic, confused and weak than usual. Yesterday evening, pt had trouble coming down the stairs for dinner and required assistance from her family. Today, pt had trouble ambulating and was stumbling. She was brought to PCP where she tested negative for Flu. She had trouble eating dinner earlier this evening due to confusion. EMS was called and pt was found to be febrile Tmax 101F. Denies any sick contacts or recent travel.     ED course:  Vitals: T 101.8F, HR 85, BP 96/43, RR 18, SpO2 95% on RA  Labs: RBC 3.54, Na 132, pro-  UA: cloudy, trace ketone, 30 protein, positive nitrite, large LE moderate blood, bacteria TNTC, epithelial cells present  EKG: pending  CXR: no acute chest disease per wet read  CT head: negative  Given: IV Ofirmev x1, IV Zosyn x1, 1L IV NS bolus x2 84 yo F with PMH of hyperlipidemia, breast CA s/p mastectomies with mets to bone, neuropathy presents to ED c/o generalized weakness, confusion since Wednesday. History is obtained from pt's daughter (Leyla) as pt is a poor historian. Since Wednesday afternoon, pt has been more lethargic, confused and weak than usual. Yesterday evening, pt had trouble coming down the stairs for dinner and required assistance from her family. Today, pt had trouble ambulating and was stumbling. She was brought to PCP where she tested negative for Flu. She had trouble eating dinner earlier this evening due to confusion. EMS was called and pt was found to be febrile Tmax 101F. Denies any sick contacts or recent travel.     ED course:  Vitals: T 101.8F, HR 85, BP 96/43, RR 18, SpO2 95% on RA  Labs: RBC 3.54, Na 132, pro-  UA: cloudy, trace ketone, 30 protein, positive nitrite, large LE moderate blood, bacteria TNTC, epithelial cells present  EKG: pending  CXR: no acute chest disease per wet read  CT head: negative  Given: IV Ofirmev x1, IV Zosyn x1, 1L IV NS bolus x2 82 yo F with PMH of hyperlipidemia, breast CA s/p mastectomies with mets to bone, neuropathy presents to ED c/o generalized weakness, confusion since Wednesday. History is obtained from pt's daughter (Leyla) as pt is a poor historian. Since Wednesday afternoon, pt has been more lethargic, confused and weak than usual. Yesterday evening, pt had trouble coming down the stairs for dinner and required assistance from her family. Today, pt had trouble ambulating and was stumbling. She was brought to PCP where she tested negative for Flu. She had trouble eating dinner earlier this evening due to confusion. EMS was called and pt was found to be febrile Tmax 101F. Denies any sick contacts or recent travel.   ED course:  Vitals: T 101.8F, HR 85, BP 96/43, RR 18, SpO2 95% on RA  Labs: RBC 3.54, Na 132, pro-  UA: cloudy, trace ketone, 30 protein, positive nitrite, large LE moderate blood, bacteria TNTC, epithelial cells present  CXR: no acute chest disease per wet read  CT head: negative  Given: IV Ofirmev x1, IV Zosyn x1, 1L IV NS bolus x2 84 yo F with PMH of hyperlipidemia, breast CA s/p mastectomies with mets to bone, neuropathy presents to ED c/o generalized weakness, confusion since Wednesday. History is obtained from pt's daughter (Leyla) as pt is a poor historian. Since Wednesday afternoon, pt has been more lethargic, confused and weak than usual. Yesterday evening, pt had trouble coming down the stairs for dinner and required assistance from her family. Today, pt had trouble ambulating and was stumbling. She was brought to PCP where she tested negative for Flu. She had trouble eating dinner earlier this evening due to confusion. EMS was called and pt was found to be febrile Tmax 101F. Denies any sick contacts or recent travel.   ED course:  Vitals: T 101.8F, HR 85, BP 96/43, RR 18, SpO2 95% on RA  Labs: RBC 3.54, Na 132, pro-  UA: cloudy, trace ketone, 30 protein, positive nitrite, large LE moderate blood, bacteria TNTC, epithelial cells present  CXR: no acute chest disease per wet read  CT head: negative  Given: IV Ofirmev x1, IV Zosyn x1, 1L IV NS bolus x2

## 2024-01-11 NOTE — H&P ADULT - NSHPSOCIALHISTORY_GEN_ALL_CORE
Tobacco: smoked 2 ppd for ~20 years, quit smoking 40 years ago  EtOH: denies  Recreational drug use: denies  Lives with: daughter at home  Ambulates: with cane or walker  ADLs: with assistance

## 2024-01-11 NOTE — H&P ADULT - ATTENDING COMMENTS
84 yo F with PMH of hyperlipidemia, breast CA s/p mastectomies with mets to bone, neuropathy presents to ED c/o generalized weakness, confusion since Wednesday, admitted for metabolic encephalopathy in the setting of UTI.    Agree with above. Edited where appropriate 82 yo F with PMH of hyperlipidemia, breast CA s/p mastectomies with mets to bone, neuropathy presents to ED c/o generalized weakness, confusion since Wednesday, admitted for metabolic encephalopathy in the setting of UTI.    Agree with above. Edited where appropriate

## 2024-01-11 NOTE — H&P ADULT - ASSESSMENT
82 yo F with PMH of hyperlipidemia, breast CA s/p mastectomies with mets to bone, neuropathy presents to ED c/o generalized weakness, confusion since Wednesday, admitted for metabolic encephalopathy in the setting of UTI. 84 yo F with PMH of hyperlipidemia, breast CA s/p mastectomies with mets to bone, neuropathy presents to ED c/o generalized weakness, confusion since Wednesday, admitted for metabolic encephalopathy in the setting of UTI.

## 2024-01-11 NOTE — ED PROVIDER NOTE - DIFFERENTIAL DIAGNOSIS
Differential Diagnosis Rule out acute infection, electrolyte abnormality, leukocytosis, intracranial hemorrhage, pneumonia, flu/COVID, other acute pathology

## 2024-01-11 NOTE — ED PROVIDER NOTE - CARE PLAN
Principal Discharge DX:	Acute weakness  Secondary Diagnosis:	Acute UTI  Secondary Diagnosis:	Altered mental status   1

## 2024-01-11 NOTE — H&P ADULT - NSHPPHYSICALEXAM_GEN_ALL_CORE
VITALS:   T(C): 36.3 (01-11-24 @ 23:54), Max: 38.8 (01-11-24 @ 20:58)  HR: 66 (01-11-24 @ 23:54) (66 - 85)  BP: 95/65 (01-11-24 @ 23:54) (95/65 - 96/43)  RR: 18 (01-11-24 @ 23:54) (18 - 18)  SpO2: 98% (01-11-24 @ 23:54) (95% - 98%)    GENERAL: NAD, lying in bed comfortably  HEAD:  Atraumatic, Normocephalic  EYES: EOMI, PERRLA, conjunctiva and sclera clear  ENT: Moist mucous membranes, no pharyngeal exudates  NECK: Supple, No JVD  CHEST/LUNG: Clear to auscultation bilaterally; No rales, rhonchi, wheezing, or rubs. Unlabored respirations  HEART: Regular rate and rhythm; No murmurs, rubs, or gallops  ABDOMEN: Soft, nontender, nondistended  EXTREMITIES: No clubbing, cyanosis, or edema  NERVOUS SYSTEM:  AAOx1-2, no focal deficits   SKIN: No rashes or lesions VITALS:   T(C): 36.3 (01-11-24 @ 23:54), Max: 38.8 (01-11-24 @ 20:58)  HR: 66 (01-11-24 @ 23:54) (66 - 85)  BP: 95/65 (01-11-24 @ 23:54) (95/65 - 96/43)  RR: 18 (01-11-24 @ 23:54) (18 - 18)  SpO2: 98% (01-11-24 @ 23:54) (95% - 98%)  GENERAL: NAD, lying in bed comfortably  HEAD:  Atraumatic, Normocephalic  EYES: EOMI, PERRLA, conjunctiva and sclera clear  ENT: Moist mucous membranes, no pharyngeal exudates  NECK: Supple, No JVD  CHEST/LUNG: Clear to auscultation bilaterally; No rales, rhonchi, wheezing, or rubs. Unlabored respirations  HEART: Regular rate and rhythm; No murmurs, rubs, or gallops  ABDOMEN: Soft, nontender, nondistended  EXTREMITIES: No clubbing, cyanosis, or edema  NERVOUS SYSTEM:  AAOx1-2, no focal deficits   SKIN: No rashes or lesions

## 2024-01-11 NOTE — ED PROVIDER NOTE - CLINICAL SUMMARY MEDICAL DECISION MAKING FREE TEXT BOX
Generalized weakness and malaise, altered mental status with fever in the ED.  Will check labs, x-ray, IV fluids, antibiotics, flu/COVID, admission

## 2024-01-11 NOTE — ED PROVIDER NOTE - OBJECTIVE STATEMENT
83-year-old female with a history of breast cancer, high cholesterol, hypertension, GERD presents with generalized weakness, malaise over past couple days.  Patient reportedly having increased weakness and slight confusion from baseline since yesterday afternoon.  No known fall or trauma.  No cough/URI known.  Patient denies any acute pain at this time.  No aggravating or alleviating factors otherwise noted.  No other history available.

## 2024-01-11 NOTE — ED ADULT NURSE NOTE - NSFALLHARMRISKINTERV_ED_ALL_ED
Assistance OOB with selected safe patient handling equipment if applicable/Assistance with ambulation/Communicate risk of Fall with Harm to all staff, patient, and family/Monitor gait and stability/Provide patient with walking aids/Provide visual cue: red socks, yellow wristband, yellow gown, etc/Reinforce activity limits and safety measures with patient and family/Bed in lowest position, wheels locked, appropriate side rails in place/Call bell, personal items and telephone in reach/Instruct patient to call for assistance before getting out of bed/chair/stretcher/Non-slip footwear applied when patient is off stretcher/[place] to call system/Physically safe environment - no spills, clutter or unnecessary equipment/Purposeful Proactive Rounding/Room/bathroom lighting operational, light cord in reach Assistance OOB with selected safe patient handling equipment if applicable/Assistance with ambulation/Communicate risk of Fall with Harm to all staff, patient, and family/Monitor gait and stability/Provide patient with walking aids/Provide visual cue: red socks, yellow wristband, yellow gown, etc/Reinforce activity limits and safety measures with patient and family/Bed in lowest position, wheels locked, appropriate side rails in place/Call bell, personal items and telephone in reach/Instruct patient to call for assistance before getting out of bed/chair/stretcher/Non-slip footwear applied when patient is off stretcher/[place] to call system/Physically safe environment - no spills, clutter or unnecessary equipment/Purposeful Proactive Rounding/Room/bathroom lighting operational, light cord in reach

## 2024-01-11 NOTE — ED ADULT NURSE NOTE - OBJECTIVE STATEMENT
Brought in by ambulance accompanied by daughter none Brought in by ambulance accompanied by daughter reports patient having generalized weakness/ malaise and  mild confusion since yesterday. Patient noted with fever while being triage @ 101.8F. Denies nausea/ vomiting, pain/ SOB.

## 2024-01-12 DIAGNOSIS — E78.5 HYPERLIPIDEMIA, UNSPECIFIED: ICD-10-CM

## 2024-01-12 DIAGNOSIS — G62.9 POLYNEUROPATHY, UNSPECIFIED: ICD-10-CM

## 2024-01-12 DIAGNOSIS — C50.919 MALIGNANT NEOPLASM OF UNSPECIFIED SITE OF UNSPECIFIED FEMALE BREAST: ICD-10-CM

## 2024-01-12 LAB
-  CTX-M RESISTANCE MARKER: SIGNIFICANT CHANGE UP
-  CTX-M RESISTANCE MARKER: SIGNIFICANT CHANGE UP
-  ESBL: SIGNIFICANT CHANGE UP
-  ESBL: SIGNIFICANT CHANGE UP
ALBUMIN SERPL ELPH-MCNC: 2.3 G/DL — LOW (ref 3.3–5)
ALBUMIN SERPL ELPH-MCNC: 2.3 G/DL — LOW (ref 3.3–5)
ALP SERPL-CCNC: 99 U/L — SIGNIFICANT CHANGE UP (ref 40–120)
ALP SERPL-CCNC: 99 U/L — SIGNIFICANT CHANGE UP (ref 40–120)
ALT FLD-CCNC: 10 U/L — LOW (ref 12–78)
ALT FLD-CCNC: 10 U/L — LOW (ref 12–78)
ANION GAP SERPL CALC-SCNC: 6 MMOL/L — SIGNIFICANT CHANGE UP (ref 5–17)
ANION GAP SERPL CALC-SCNC: 6 MMOL/L — SIGNIFICANT CHANGE UP (ref 5–17)
AST SERPL-CCNC: 16 U/L — SIGNIFICANT CHANGE UP (ref 15–37)
AST SERPL-CCNC: 16 U/L — SIGNIFICANT CHANGE UP (ref 15–37)
BASOPHILS # BLD AUTO: 0.02 K/UL — SIGNIFICANT CHANGE UP (ref 0–0.2)
BASOPHILS # BLD AUTO: 0.02 K/UL — SIGNIFICANT CHANGE UP (ref 0–0.2)
BASOPHILS NFR BLD AUTO: 0.2 % — SIGNIFICANT CHANGE UP (ref 0–2)
BASOPHILS NFR BLD AUTO: 0.2 % — SIGNIFICANT CHANGE UP (ref 0–2)
BILIRUB SERPL-MCNC: 0.3 MG/DL — SIGNIFICANT CHANGE UP (ref 0.2–1.2)
BILIRUB SERPL-MCNC: 0.3 MG/DL — SIGNIFICANT CHANGE UP (ref 0.2–1.2)
BUN SERPL-MCNC: 19 MG/DL — SIGNIFICANT CHANGE UP (ref 7–23)
BUN SERPL-MCNC: 19 MG/DL — SIGNIFICANT CHANGE UP (ref 7–23)
CALCIUM SERPL-MCNC: 8.8 MG/DL — SIGNIFICANT CHANGE UP (ref 8.5–10.1)
CALCIUM SERPL-MCNC: 8.8 MG/DL — SIGNIFICANT CHANGE UP (ref 8.5–10.1)
CHLORIDE SERPL-SCNC: 104 MMOL/L — SIGNIFICANT CHANGE UP (ref 96–108)
CHLORIDE SERPL-SCNC: 104 MMOL/L — SIGNIFICANT CHANGE UP (ref 96–108)
CO2 SERPL-SCNC: 26 MMOL/L — SIGNIFICANT CHANGE UP (ref 22–31)
CO2 SERPL-SCNC: 26 MMOL/L — SIGNIFICANT CHANGE UP (ref 22–31)
CREAT SERPL-MCNC: 0.64 MG/DL — SIGNIFICANT CHANGE UP (ref 0.5–1.3)
CREAT SERPL-MCNC: 0.64 MG/DL — SIGNIFICANT CHANGE UP (ref 0.5–1.3)
E COLI DNA BLD POS QL NAA+NON-PROBE: SIGNIFICANT CHANGE UP
E COLI DNA BLD POS QL NAA+NON-PROBE: SIGNIFICANT CHANGE UP
EGFR: 88 ML/MIN/1.73M2 — SIGNIFICANT CHANGE UP
EGFR: 88 ML/MIN/1.73M2 — SIGNIFICANT CHANGE UP
EOSINOPHIL # BLD AUTO: 0.04 K/UL — SIGNIFICANT CHANGE UP (ref 0–0.5)
EOSINOPHIL # BLD AUTO: 0.04 K/UL — SIGNIFICANT CHANGE UP (ref 0–0.5)
EOSINOPHIL NFR BLD AUTO: 0.5 % — SIGNIFICANT CHANGE UP (ref 0–6)
EOSINOPHIL NFR BLD AUTO: 0.5 % — SIGNIFICANT CHANGE UP (ref 0–6)
GLUCOSE SERPL-MCNC: 159 MG/DL — HIGH (ref 70–99)
GLUCOSE SERPL-MCNC: 159 MG/DL — HIGH (ref 70–99)
GRAM STN FLD: ABNORMAL
GRAM STN FLD: ABNORMAL
HCT VFR BLD CALC: 34.8 % — SIGNIFICANT CHANGE UP (ref 34.5–45)
HCT VFR BLD CALC: 34.8 % — SIGNIFICANT CHANGE UP (ref 34.5–45)
HGB BLD-MCNC: 11.7 G/DL — SIGNIFICANT CHANGE UP (ref 11.5–15.5)
HGB BLD-MCNC: 11.7 G/DL — SIGNIFICANT CHANGE UP (ref 11.5–15.5)
IMM GRANULOCYTES NFR BLD AUTO: 0.5 % — SIGNIFICANT CHANGE UP (ref 0–0.9)
IMM GRANULOCYTES NFR BLD AUTO: 0.5 % — SIGNIFICANT CHANGE UP (ref 0–0.9)
LYMPHOCYTES # BLD AUTO: 0.65 K/UL — LOW (ref 1–3.3)
LYMPHOCYTES # BLD AUTO: 0.65 K/UL — LOW (ref 1–3.3)
LYMPHOCYTES # BLD AUTO: 7.5 % — LOW (ref 13–44)
LYMPHOCYTES # BLD AUTO: 7.5 % — LOW (ref 13–44)
MCHC RBC-ENTMCNC: 32.8 PG — SIGNIFICANT CHANGE UP (ref 27–34)
MCHC RBC-ENTMCNC: 32.8 PG — SIGNIFICANT CHANGE UP (ref 27–34)
MCHC RBC-ENTMCNC: 33.6 GM/DL — SIGNIFICANT CHANGE UP (ref 32–36)
MCHC RBC-ENTMCNC: 33.6 GM/DL — SIGNIFICANT CHANGE UP (ref 32–36)
MCV RBC AUTO: 97.5 FL — SIGNIFICANT CHANGE UP (ref 80–100)
MCV RBC AUTO: 97.5 FL — SIGNIFICANT CHANGE UP (ref 80–100)
METHOD TYPE: SIGNIFICANT CHANGE UP
METHOD TYPE: SIGNIFICANT CHANGE UP
MONOCYTES # BLD AUTO: 0.96 K/UL — HIGH (ref 0–0.9)
MONOCYTES # BLD AUTO: 0.96 K/UL — HIGH (ref 0–0.9)
MONOCYTES NFR BLD AUTO: 11 % — SIGNIFICANT CHANGE UP (ref 2–14)
MONOCYTES NFR BLD AUTO: 11 % — SIGNIFICANT CHANGE UP (ref 2–14)
NEUTROPHILS # BLD AUTO: 7 K/UL — SIGNIFICANT CHANGE UP (ref 1.8–7.4)
NEUTROPHILS # BLD AUTO: 7 K/UL — SIGNIFICANT CHANGE UP (ref 1.8–7.4)
NEUTROPHILS NFR BLD AUTO: 80.3 % — HIGH (ref 43–77)
NEUTROPHILS NFR BLD AUTO: 80.3 % — HIGH (ref 43–77)
NRBC # BLD: 0 /100 WBCS — SIGNIFICANT CHANGE UP (ref 0–0)
NRBC # BLD: 0 /100 WBCS — SIGNIFICANT CHANGE UP (ref 0–0)
PLATELET # BLD AUTO: 339 K/UL — SIGNIFICANT CHANGE UP (ref 150–400)
PLATELET # BLD AUTO: 339 K/UL — SIGNIFICANT CHANGE UP (ref 150–400)
POTASSIUM SERPL-MCNC: 3.3 MMOL/L — LOW (ref 3.5–5.3)
POTASSIUM SERPL-MCNC: 3.3 MMOL/L — LOW (ref 3.5–5.3)
POTASSIUM SERPL-SCNC: 3.3 MMOL/L — LOW (ref 3.5–5.3)
POTASSIUM SERPL-SCNC: 3.3 MMOL/L — LOW (ref 3.5–5.3)
PROT SERPL-MCNC: 6.5 G/DL — SIGNIFICANT CHANGE UP (ref 6–8.3)
PROT SERPL-MCNC: 6.5 G/DL — SIGNIFICANT CHANGE UP (ref 6–8.3)
RAPID RVP RESULT: SIGNIFICANT CHANGE UP
RAPID RVP RESULT: SIGNIFICANT CHANGE UP
RBC # BLD: 3.57 M/UL — LOW (ref 3.8–5.2)
RBC # BLD: 3.57 M/UL — LOW (ref 3.8–5.2)
RBC # FLD: 15.9 % — HIGH (ref 10.3–14.5)
RBC # FLD: 15.9 % — HIGH (ref 10.3–14.5)
SARS-COV-2 RNA SPEC QL NAA+PROBE: SIGNIFICANT CHANGE UP
SARS-COV-2 RNA SPEC QL NAA+PROBE: SIGNIFICANT CHANGE UP
SODIUM SERPL-SCNC: 136 MMOL/L — SIGNIFICANT CHANGE UP (ref 135–145)
SODIUM SERPL-SCNC: 136 MMOL/L — SIGNIFICANT CHANGE UP (ref 135–145)
SPECIMEN SOURCE: SIGNIFICANT CHANGE UP
SPECIMEN SOURCE: SIGNIFICANT CHANGE UP
WBC # BLD: 8.71 K/UL — SIGNIFICANT CHANGE UP (ref 3.8–10.5)
WBC # BLD: 8.71 K/UL — SIGNIFICANT CHANGE UP (ref 3.8–10.5)
WBC # FLD AUTO: 8.71 K/UL — SIGNIFICANT CHANGE UP (ref 3.8–10.5)
WBC # FLD AUTO: 8.71 K/UL — SIGNIFICANT CHANGE UP (ref 3.8–10.5)

## 2024-01-12 PROCEDURE — 99232 SBSQ HOSP IP/OBS MODERATE 35: CPT

## 2024-01-12 RX ORDER — DOCUSATE SODIUM 100 MG
1 CAPSULE ORAL
Refills: 0 | DISCHARGE

## 2024-01-12 RX ORDER — POLYETHYLENE GLYCOL 3350 17 G/17G
17 POWDER, FOR SOLUTION ORAL DAILY
Refills: 0 | Status: DISCONTINUED | OUTPATIENT
Start: 2024-01-12 | End: 2024-01-16

## 2024-01-12 RX ORDER — ATORVASTATIN CALCIUM 80 MG/1
1 TABLET, FILM COATED ORAL
Qty: 0 | Refills: 0 | DISCHARGE

## 2024-01-12 RX ORDER — POTASSIUM CHLORIDE 20 MEQ
40 PACKET (EA) ORAL ONCE
Refills: 0 | Status: COMPLETED | OUTPATIENT
Start: 2024-01-12 | End: 2024-01-12

## 2024-01-12 RX ORDER — DULOXETINE HYDROCHLORIDE 30 MG/1
60 CAPSULE, DELAYED RELEASE ORAL DAILY
Refills: 0 | Status: DISCONTINUED | OUTPATIENT
Start: 2024-01-12 | End: 2024-01-16

## 2024-01-12 RX ORDER — OXYBUTYNIN CHLORIDE 5 MG
5 TABLET ORAL DAILY
Refills: 0 | Status: DISCONTINUED | OUTPATIENT
Start: 2024-01-12 | End: 2024-01-16

## 2024-01-12 RX ORDER — CHOLECALCIFEROL (VITAMIN D3) 125 MCG
1 CAPSULE ORAL
Qty: 0 | Refills: 0 | DISCHARGE

## 2024-01-12 RX ORDER — TRAMADOL HYDROCHLORIDE 50 MG/1
25 TABLET ORAL DAILY
Refills: 0 | Status: DISCONTINUED | OUTPATIENT
Start: 2024-01-12 | End: 2024-01-13

## 2024-01-12 RX ORDER — SENNA PLUS 8.6 MG/1
2 TABLET ORAL AT BEDTIME
Refills: 0 | Status: DISCONTINUED | OUTPATIENT
Start: 2024-01-12 | End: 2024-01-16

## 2024-01-12 RX ORDER — MIRABEGRON 50 MG/1
1 TABLET, EXTENDED RELEASE ORAL
Qty: 0 | Refills: 0 | DISCHARGE

## 2024-01-12 RX ORDER — PYRIDOXINE HCL (VITAMIN B6) 100 MG
1 TABLET ORAL
Qty: 0 | Refills: 0 | DISCHARGE

## 2024-01-12 RX ORDER — TRAMADOL HYDROCHLORIDE 50 MG/1
1 TABLET ORAL
Qty: 0 | Refills: 0 | DISCHARGE

## 2024-01-12 RX ORDER — INFLUENZA VIRUS VACCINE 15; 15; 15; 15 UG/.5ML; UG/.5ML; UG/.5ML; UG/.5ML
0.7 SUSPENSION INTRAMUSCULAR ONCE
Refills: 0 | Status: DISCONTINUED | OUTPATIENT
Start: 2024-01-12 | End: 2024-01-16

## 2024-01-12 RX ORDER — ERTAPENEM SODIUM 1 G/1
1000 INJECTION, POWDER, LYOPHILIZED, FOR SOLUTION INTRAMUSCULAR; INTRAVENOUS EVERY 24 HOURS
Refills: 0 | Status: DISCONTINUED | OUTPATIENT
Start: 2024-01-12 | End: 2024-01-16

## 2024-01-12 RX ORDER — OXYBUTYNIN CHLORIDE 5 MG
1 TABLET ORAL
Refills: 0 | DISCHARGE

## 2024-01-12 RX ORDER — ATORVASTATIN CALCIUM 80 MG/1
20 TABLET, FILM COATED ORAL AT BEDTIME
Refills: 0 | Status: DISCONTINUED | OUTPATIENT
Start: 2024-01-12 | End: 2024-01-16

## 2024-01-12 RX ORDER — LANOLIN ALCOHOL/MO/W.PET/CERES
1 CREAM (GRAM) TOPICAL
Qty: 0 | Refills: 0 | DISCHARGE

## 2024-01-12 RX ADMIN — DULOXETINE HYDROCHLORIDE 60 MILLIGRAM(S): 30 CAPSULE, DELAYED RELEASE ORAL at 11:17

## 2024-01-12 RX ADMIN — POLYETHYLENE GLYCOL 3350 17 GRAM(S): 17 POWDER, FOR SOLUTION ORAL at 11:17

## 2024-01-12 RX ADMIN — TRAMADOL HYDROCHLORIDE 25 MILLIGRAM(S): 50 TABLET ORAL at 11:17

## 2024-01-12 RX ADMIN — ATORVASTATIN CALCIUM 20 MILLIGRAM(S): 80 TABLET, FILM COATED ORAL at 22:56

## 2024-01-12 RX ADMIN — PIPERACILLIN AND TAZOBACTAM 200 GRAM(S): 4; .5 INJECTION, POWDER, LYOPHILIZED, FOR SOLUTION INTRAVENOUS at 00:02

## 2024-01-12 RX ADMIN — SENNA PLUS 2 TABLET(S): 8.6 TABLET ORAL at 22:55

## 2024-01-12 RX ADMIN — ERTAPENEM SODIUM 120 MILLIGRAM(S): 1 INJECTION, POWDER, LYOPHILIZED, FOR SOLUTION INTRAMUSCULAR; INTRAVENOUS at 19:54

## 2024-01-12 RX ADMIN — TRAMADOL HYDROCHLORIDE 25 MILLIGRAM(S): 50 TABLET ORAL at 11:25

## 2024-01-12 RX ADMIN — CEFTRIAXONE 100 MILLIGRAM(S): 500 INJECTION, POWDER, FOR SOLUTION INTRAMUSCULAR; INTRAVENOUS at 04:03

## 2024-01-12 RX ADMIN — Medication 40 MILLIEQUIVALENT(S): at 16:05

## 2024-01-12 RX ADMIN — SODIUM CHLORIDE 1000 MILLILITER(S): 9 INJECTION INTRAMUSCULAR; INTRAVENOUS; SUBCUTANEOUS at 00:06

## 2024-01-12 RX ADMIN — ENOXAPARIN SODIUM 40 MILLIGRAM(S): 100 INJECTION SUBCUTANEOUS at 04:03

## 2024-01-12 RX ADMIN — Medication 5 MILLIGRAM(S): at 11:17

## 2024-01-12 NOTE — PROGRESS NOTE ADULT - SUBJECTIVE AND OBJECTIVE BOX
neuro cons dict seen for ams  likely metabolic encephalopathy  doubt cva  UTI  antibiotic as per ID  would follow up

## 2024-01-12 NOTE — PROGRESS NOTE ADULT - SUBJECTIVE AND OBJECTIVE BOX
CHIEF COMPLAINT/INTERVAL HISTORY:  Pt. seen and evaluated for acute metabolic encephalopathy and UTI.  Pt. is in no distress.  Denies having any pain or SOB.  Tolerating IV antibiotics.  Pt. currently AAO x 0.  Had fever 101.8 yesterday.     REVIEW OF SYSTEMS:  +fever.  Pt. denies CP, SOB, or abdominal pain    Vital Signs Last 24 Hrs  T(C): 36.3 (2024 05:10), Max: 38.8 (2024 20:58)  T(F): 97.3 (2024 05:10), Max: 101.8 (2024 20:58)  HR: 55 (2024 05:10) (55 - 85)  BP: 112/51 (2024 05:10) (95/65 - 112/51)  BP(mean): 66 (2024 23:54) (66 - 66)  RR: 17 (2024 05:10) (16 - 18)  SpO2: 95% (2024 05:10) (95% - 98%)    Parameters below as of 2024 05:10  Patient On (Oxygen Delivery Method): room air        PHYSICAL EXAM:  GENERAL: NAD  HEENT: EOMI, hearing normal, conjunctiva and sclera clear  Chest: CTA bilaterally, no wheezing  CV: S1S2, RRR,   GI: soft, +BS, NT/ND  Musculoskeletal: no LE edema  Neuro:  CN II-XII intact, 5/5 strength of extremities  Psychiatric: affect nL, cooperative  Skin: warm and dry    LABS:                        11.6   10.25 )-----------( 329      ( 2024 21:20 )             34.0     01-11    132<L>  |  99  |  28<H>  ----------------------------<  154<H>  3.7   |  25  |  0.77    Ca    9.0      2024 21:20    TPro  6.9  /  Alb  2.5<L>  /  TBili  0.5  /  DBili  x   /  AST  20  /  ALT  14  /  AlkPhos  108  01-11    PT/INR - ( 2024 21:20 )   PT: 12.1 sec;   INR: 1.04 ratio         PTT - ( 2024 21:20 )  PTT:28.4 sec  Urinalysis Basic - ( 2024 23:02 )    Color: Yellow / Appearance: Cloudy / S.018 / pH: x  Gluc: x / Ketone: Trace mg/dL  / Bili: Negative / Urobili: 0.2 mg/dL   Blood: x / Protein: 30 mg/dL / Nitrite: Positive   Leuk Esterase: Large / RBC: 0-2 /HPF / WBC >50 /HPF   Sq Epi: x / Non Sq Epi: x / Bacteria: Too Numerous to count /HPF

## 2024-01-12 NOTE — PROGRESS NOTE ADULT - ASSESSMENT
84 yo F with PMH of hyperlipidemia, breast CA s/p mastectomies with mets to bone, neuropathy presents to ED c/o generalized weakness, confusion since Wednesday, admitted for metabolic encephalopathy in the setting of UTI.       Problem/Plan - 1:  ·  Problem: UTI (urinary tract infection).   ·  Plan: Patient presents with generalized weakness and metabolic encephalopathy 2/2 UTI  - UA: cloudy, trace ketone, 30 protein, positive nitrite, large LE moderate blood, bacteria TNTC, epithelial cells present  - Given IV Zosyn x1 dose, IV NS 1L bolus x 2 in ED  - Continue IV Rocephin  - Tylenol 650 mg PO q6h PRN fever or mild pain  - Trend WBC and monitor for fever  - F/u urine and blood cultures  - ID (Dr. Byers) consulted, f/u recs.  - Neurology consult     Problem/Plan - 2:  ·  Problem: Primary breast cancer with metastasis to other site.   ·  Plan: - Hx of breat CA s/p mastectomies with mets to bone  - On Capecitabine every other week, to be started on 1/13/24- nonformulary, pt's daughter instructed to bring med from home  - Follows with Heme/Onc Dr. Crystal Joyce.     Problem/Plan - 3:  ·  Problem: Neuropathy.   ·  Plan: - Continue home Duloxetine.     Problem/Plan - 4:  ·  Problem: Hyperlipidemia.   ·  Plan: - Continue home Atorvastatin.     Problem/Plan - 5:  ·  Problem: Need for prophylactic measure.   ·  Plan: DVT ppx: Lovenox.

## 2024-01-12 NOTE — CARE COORDINATION ASSESSMENT. - NSPASTMEDSURGHISTORY_GEN_ALL_CORE_FT
PAST MEDICAL & SURGICAL HISTORY:  Cataract  b/l      Macular Hole  repair b/l eyes      Insomnia, unspecified type      Malignant neoplasm of left female breast, unspecified site of breast  with Lymph node involvement      Basal cell carcinoma in situ of skin  removed from neck      Spinal stenosis of lumbar region      Osteoarthritis of both knees, unspecified osteoarthritis type      Gastroesophageal reflux disease without esophagitis      History of macular degeneration  bilateral      Bilateral malignant neoplasm of breast in female, unspecified site of breast  b/l      H/O colonoscopy  multiple      H/O mastectomy, right  1993 with immediate reconstruction with fat grafting      History of lumpectomy of left breast  1990      Early stage skin cancer  basal cell carcinoma removed from left side of neck.      S/P left knee arthroscopy  2009      Obesity      Neoplasm by body site  left anterior chest wall      Urinary frequency      H/O bilateral breast biopsy      H/O left mastectomy  2016      History of esophagogastroduodenoscopy (EGD)

## 2024-01-12 NOTE — PROGRESS NOTE ADULT - ASSESSMENT
82 yo F with PMH of hyperlipidemia, breast CA s/p mastectomies with mets to bone, neuropathy presents to ED c/o generalized weakness, confusion since Wednesday. Pt's daughter (Leyla).   In ED  Vitals: T 101.8F, HR 85, BP 96/43, RR 18, SpO2 95% on RA  UA: cloudy, trace ketone, 30 protein, positive nitrite, large LE moderate blood, bacteria TNTC, epithelial cells present  CXR: no acute chest disease    RECOMMENDATIONS  Story suggestive of pyelonephritis with AMS and noted leukocytosis, fever, pyuria and no other obv localization.   Growth in aerobic bottle: Gram Negative Rods PCR ID to follow  Repeat Blood cultures ordered for 1/12 afternoon  For now recommend  Ceftriaxone 1 gram IV daily started 1/12 (ordered late 1/11)  continue for now pending urine and blood micro and clinical course.    Thank you for consulting us and involving us in the management of this most interesting and challenging case.  We will follow along in the care of this patient. Please call us at [phone] or text me directly on my cell# at [phone] with any concerns.    Starting tomorrow Dr Brandie Marte will be assuming care of this patient so please contact her with any questions, concerns or new micro data.         84 yo F with PMH of hyperlipidemia, breast CA s/p mastectomies with mets to bone, neuropathy presents to ED c/o generalized weakness, confusion since Wednesday. Pt's daughter (Leyla).   In ED  Vitals: T 101.8F, HR 85, BP 96/43, RR 18, SpO2 95% on RA  UA: cloudy, trace ketone, 30 protein, positive nitrite, large LE moderate blood, bacteria TNTC, epithelial cells present  CXR: no acute chest disease    RECOMMENDATIONS  Story suggestive of pyelonephritis with AMS and noted leukocytosis, fever, pyuria and no other obv localization.   Growth in aerobic bottle: Gram Negative Rods PCR ID to follow  Repeat Blood cultures ordered for 1/12 afternoon  For now recommend  Ceftriaxone 1 gram IV daily started 1/12 (ordered late 1/11)  continue for now pending urine and blood micro and clinical course.    Thank you for consulting us and involving us in the management of this most interesting and challenging case.  We will follow along in the care of this patient. Please call us at [phone] or text me directly on my cell# at [phone] with any concerns.    Starting tomorrow Dr Brandie Marte will be assuming care of this patient so please contact her with any questions, concerns or new micro data.

## 2024-01-12 NOTE — PROGRESS NOTE ADULT - SUBJECTIVE AND OBJECTIVE BOX
Culture - Blood (01.11.24 @ 21:20)    Gram Stain:   Growth in aerobic bottle: Gram Negative Rods   Specimen Source: .Blood Blood-Peripheral   Culture Results:   Growth in aerobic bottle: Gram Negative Rods  Direct identification is available within approximately 3-5  hours either by Blood Panel Multiplexed PCR or Direct  MALDI-TOF. Details: [email or web address]     Culture - Blood (01.11.24 @ 21:20)    Gram Stain:   Growth in aerobic bottle: Gram Negative Rods   Specimen Source: .Blood Blood-Peripheral   Culture Results:   Growth in aerobic bottle: Gram Negative Rods  Direct identification is available within approximately 3-5  hours either by Blood Panel Multiplexed PCR or Direct  MALDI-TOF. Details: [email or web address]

## 2024-01-12 NOTE — CONSULT NOTE ADULT - ASSESSMENT
[facility]  Chart Reviewed-Full Consult to follow for any immediate concerns please fell free to contact us directly at  [phone] and have us paged or text my cell # [phone]  Joe Byers MD PhD   [facility]  Chart Reviewed-Full Consult to follow for any immediate concerns please fell free to contact us directly at  [phone] and have us paged or text my cell # [phone]  Joe Byers MD PhD   84 yo F with PMH of hyperlipidemia, breast CA s/p mastectomies with mets to bone, neuropathy presents to ED c/o generalized weakness, confusion since Wednesday. Pt's daughter (Leyla).   In ED  Vitals: T 101.8F, HR 85, BP 96/43, RR 18, SpO2 95% on RA  UA: cloudy, trace ketone, 30 protein, positive nitrite, large LE moderate blood, bacteria TNTC, epithelial cells present  CXR: no acute chest disease    RECOMMENDATIONS  Story suggestive of pyelonephritis with AMS and noted leukocytosis, fever, pyuria and no other obv localization. For now recommend  Ceftriaxone 1 gram IV daily started 1/12 (ordered late 1/11)  coninue for now pending urine and blood micro and clinical course.    Thank you for consulting us and involving us in the management of this most interesting and challenging case.  We will follow along in the care of this patient. Please call us at [phone] or text me directly on my cell# at [phone] with any concerns.    Starting tomorrow Dr Brandie Marte will be assuming care of this patient so please contact her with any questions, concerns or new micro data.         82 yo F with PMH of hyperlipidemia, breast CA s/p mastectomies with mets to bone, neuropathy presents to ED c/o generalized weakness, confusion since Wednesday. Pt's daughter (Leyla).   In ED  Vitals: T 101.8F, HR 85, BP 96/43, RR 18, SpO2 95% on RA  UA: cloudy, trace ketone, 30 protein, positive nitrite, large LE moderate blood, bacteria TNTC, epithelial cells present  CXR: no acute chest disease    RECOMMENDATIONS  Story suggestive of pyelonephritis with AMS and noted leukocytosis, fever, pyuria and no other obv localization. For now recommend  Ceftriaxone 1 gram IV daily started 1/12 (ordered late 1/11)  coninue for now pending urine and blood micro and clinical course.    Thank you for consulting us and involving us in the management of this most interesting and challenging case.  We will follow along in the care of this patient. Please call us at [phone] or text me directly on my cell# at [phone] with any concerns.    Starting tomorrow Dr Brandie Marte will be assuming care of this patient so please contact her with any questions, concerns or new micro data.

## 2024-01-12 NOTE — PATIENT PROFILE ADULT - FUNCTIONAL ASSESSMENT - BASIC MOBILITY 6.
2-calculated by average/Not able to assess (calculate score using [facility] averaging method)  2-calculated by average/Not able to assess (calculate score using [facility] averaging method)

## 2024-01-12 NOTE — CONSULT NOTE ADULT - SUBJECTIVE AND OBJECTIVE BOX
[facility] DIV[facility] of INFECTIOUS DISEASE  Joe Byers MD PhD, Ayla Basilio MD, Brandie Marte MD, Cedric Yusuf MD, Umer Waite MD  and providing coverage with Aneudy Martin MD  Providing Infectious Disease Consultations at [facility], [facility], [facility], [facility]'s    Office# [phone] to schedule follow up appointments  Answering Service for urgent calls or New Consults [phone]  Cell# to text for urgent issues Joe Byers [phone]     HPI:  84 yo F with PMH of hyperlipidemia, breast CA s/p mastectomies with mets to bone, neuropathy presents to ED c/o generalized weakness, confusion since Wednesday. Pt's daughter (Leyla). Since Wednesday afternoon 1/10, pt has been more lethargic, confused and weaker than usual. Then pt had trouble coming down the stairs for dinner and required assistance from her family. Then pt had trouble ambulating and was stumbling. She was brought to PCP where she tested negative for Flu. She had trouble eating dinner earlier this evening due to confusion. EMS was called and pt was found to be febrile Tmax 101F.  In ED  Vitals: T 101.8F, HR 85, BP 96/43, RR 18, SpO2 95% on RA  UA: cloudy, trace ketone, 30 protein, positive nitrite, large LE moderate blood, bacteria TNTC, epithelial cells present  CXR: no acute chest disease      PAST MEDICAL & SURGICAL HISTORY:  Bilateral malignant neoplasm of breast in female, unspecified site of breast  b/l      History of macular degeneration  bilateral      Gastroesophageal reflux disease without esophagitis      Osteoarthritis of both knees, unspecified osteoarthritis type      Spinal stenosis of lumbar region      Basal cell carcinoma in situ of skin  removed from neck      Malignant neoplasm of left female breast, unspecified site of breast  with Lymph node involvement      Insomnia, unspecified type      Urinary frequency      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      History of lumpectomy of left breast  1990      H/O mastectomy, right  1993 with immediate reconstruction with fat grafting      H/O colonoscopy  multiple      History of esophagogastroduodenoscopy (EGD)      H/O left mastectomy  2016      H/O bilateral breast biopsy          Antimicrobials  cefTRIAXone   IVPB 1000 milliGRAM(s) IV Intermittent every 24 hours      Immunological      Other  acetaminophen     Tablet .. 650 milliGRAM(s) Oral every 6 hours PRN  atorvastatin 20 milliGRAM(s) Oral at bedtime  DULoxetine 60 milliGRAM(s) Oral daily  enoxaparin Injectable 40 milliGRAM(s) SubCutaneous every 24 hours  oxybutynin 5 milliGRAM(s) Oral daily  polyethylene glycol 3350 17 Gram(s) Oral daily  senna 2 Tablet(s) Oral at bedtime  traMADol 25 milliGRAM(s) Oral daily      Allergies    No Known Allergies    Intolerances        SOCIAL HISTORY:  Tobacco: smoked 2 ppd for ~20 years, quit smoking 40 years ago  EtOH: denies  Recreational drug use: denies  Lives with: daughter at home  Ambulates: with cane or walker  ADLs: with assistance (11 Jan 2024 23:34)      FAMILY HISTORY:  Family history of temporal arteritis (Father)  father    Family history of stroke (Mother)  mother        ROS:  limited due to cognitive function    Vital Signs Last 24 Hrs  T(C): 36.3 (12 Jan 2024 05:10), Max: 38.8 (11 Jan 2024 20:58)  T(F): 97.3 (12 Jan 2024 05:10), Max: 101.8 (11 Jan 2024 20:58)  HR: 55 (12 Jan 2024 05:10) (55 - 85)  BP: 112/51 (12 Jan 2024 05:10) (95/65 - 112/51)  BP(mean): 66 (11 Jan 2024 23:54) (66 - 66)  RR: 17 (12 Jan 2024 05:10) (16 - 18)  SpO2: 95% (12 Jan 2024 05:10) (95% - 98%)    Parameters below as of 12 Jan 2024 05:10  Patient On (Oxygen Delivery Method): room air        PE:  In no distress  HEENT:  NC, PERRL, sclerae anicteric, conjunctivae clear, EOMI.  Sinuses nontender, no nasal exudate.  No buccal or pharyngeal lesions, erythema or exudate  Neck:  Supple, no adenopathy  Lungs:  No accessory muscle use, bilaterally clear to auscultation  Cor:  distant  Abd:  Symmetric, normoactive BS.  Soft, nontender, no masses, guarding or rebound.  Liver and spleen not enlarged  Extrem:  No cyanosis or edema  Skin:  No rashes.  Neuro: limited        LABS:                        11.7   8.71  )-----------( 339      ( 12 Jan 2024 09:20 )             34.8       WBC Count: 8.71 K/uL (01-12-24 @ 09:20)  WBC Count: 10.25 K/uL (01-11-24 @ 21:20)      01-12    136  |  104  |  19  ----------------------------<  159<H>  3.3<L>   |  26  |  0.64    Ca    8.8      12 Jan 2024 09:20    TPro  6.5  /  Alb  2.3<L>  /  TBili  0.3  /  DBili  x   /  AST  16  /  ALT  10<L>  /  AlkPhos  99  01-12      Creatinine: 0.64 mg/dL (01-12-24 @ 09:20)  Creatinine: 0.77 mg/dL (01-11-24 @ 21:20)      MICROBIOLOGY:      RADIOLOGY & ADDITIONAL STUDIES:    -- [facility] of INFECTIOUS DISEASE  Joe Byers MD PhD, Ayla Basilio MD, Brandie Marte MD, Cedric Yusuf MD, Umer Waite MD  and providing coverage with Aneudy Martin MD  Providing Infectious Disease Consultations at [facility], [facility], [facility], [facility]'s    Office# [phone] to schedule follow up appointments  Answering Service for urgent calls or New Consults [phone]  Cell# to text for urgent issues Joe Byers [phone]     HPI:  84 yo F with PMH of hyperlipidemia, breast CA s/p mastectomies with mets to bone, neuropathy presents to ED c/o generalized weakness, confusion since Wednesday. Pt's daughter (Leyla). Since Wednesday afternoon 1/10, pt has been more lethargic, confused and weaker than usual. Then pt had trouble coming down the stairs for dinner and required assistance from her family. Then pt had trouble ambulating and was stumbling. She was brought to PCP where she tested negative for Flu. She had trouble eating dinner earlier this evening due to confusion. EMS was called and pt was found to be febrile Tmax 101F.  In ED  Vitals: T 101.8F, HR 85, BP 96/43, RR 18, SpO2 95% on RA  UA: cloudy, trace ketone, 30 protein, positive nitrite, large LE moderate blood, bacteria TNTC, epithelial cells present  CXR: no acute chest disease      PAST MEDICAL & SURGICAL HISTORY:  Bilateral malignant neoplasm of breast in female, unspecified site of breast  b/l      History of macular degeneration  bilateral      Gastroesophageal reflux disease without esophagitis      Osteoarthritis of both knees, unspecified osteoarthritis type      Spinal stenosis of lumbar region      Basal cell carcinoma in situ of skin  removed from neck      Malignant neoplasm of left female breast, unspecified site of breast  with Lymph node involvement      Insomnia, unspecified type      Urinary frequency      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      History of lumpectomy of left breast  1990      H/O mastectomy, right  1993 with immediate reconstruction with fat grafting      H/O colonoscopy  multiple      History of esophagogastroduodenoscopy (EGD)      H/O left mastectomy  2016      H/O bilateral breast biopsy          Antimicrobials  cefTRIAXone   IVPB 1000 milliGRAM(s) IV Intermittent every 24 hours      Immunological      Other  acetaminophen     Tablet .. 650 milliGRAM(s) Oral every 6 hours PRN  atorvastatin 20 milliGRAM(s) Oral at bedtime  DULoxetine 60 milliGRAM(s) Oral daily  enoxaparin Injectable 40 milliGRAM(s) SubCutaneous every 24 hours  oxybutynin 5 milliGRAM(s) Oral daily  polyethylene glycol 3350 17 Gram(s) Oral daily  senna 2 Tablet(s) Oral at bedtime  traMADol 25 milliGRAM(s) Oral daily      Allergies    No Known Allergies    Intolerances        SOCIAL HISTORY:  Tobacco: smoked 2 ppd for ~20 years, quit smoking 40 years ago  EtOH: denies  Recreational drug use: denies  Lives with: daughter at home  Ambulates: with cane or walker  ADLs: with assistance (11 Jan 2024 23:34)      FAMILY HISTORY:  Family history of temporal arteritis (Father)  father    Family history of stroke (Mother)  mother        ROS:  limited due to cognitive function    Vital Signs Last 24 Hrs  T(C): 36.3 (12 Jan 2024 05:10), Max: 38.8 (11 Jan 2024 20:58)  T(F): 97.3 (12 Jan 2024 05:10), Max: 101.8 (11 Jan 2024 20:58)  HR: 55 (12 Jan 2024 05:10) (55 - 85)  BP: 112/51 (12 Jan 2024 05:10) (95/65 - 112/51)  BP(mean): 66 (11 Jan 2024 23:54) (66 - 66)  RR: 17 (12 Jan 2024 05:10) (16 - 18)  SpO2: 95% (12 Jan 2024 05:10) (95% - 98%)    Parameters below as of 12 Jan 2024 05:10  Patient On (Oxygen Delivery Method): room air        PE:  In no distress  HEENT:  NC, PERRL, sclerae anicteric, conjunctivae clear, EOMI.  Sinuses nontender, no nasal exudate.  No buccal or pharyngeal lesions, erythema or exudate  Neck:  Supple, no adenopathy  Lungs:  No accessory muscle use, bilaterally clear to auscultation  Cor:  distant  Abd:  Symmetric, normoactive BS.  Soft, nontender, no masses, guarding or rebound.  Liver and spleen not enlarged  Extrem:  No cyanosis or edema  Skin:  No rashes.  Neuro: limited        LABS:                        11.7   8.71  )-----------( 339      ( 12 Jan 2024 09:20 )             34.8       WBC Count: 8.71 K/uL (01-12-24 @ 09:20)  WBC Count: 10.25 K/uL (01-11-24 @ 21:20)      01-12    136  |  104  |  19  ----------------------------<  159<H>  3.3<L>   |  26  |  0.64    Ca    8.8      12 Jan 2024 09:20    TPro  6.5  /  Alb  2.3<L>  /  TBili  0.3  /  DBili  x   /  AST  16  /  ALT  10<L>  /  AlkPhos  99  01-12      Creatinine: 0.64 mg/dL (01-12-24 @ 09:20)  Creatinine: 0.77 mg/dL (01-11-24 @ 21:20)      MICROBIOLOGY:      RADIOLOGY & ADDITIONAL STUDIES:    --

## 2024-01-12 NOTE — PROGRESS NOTE ADULT - SUBJECTIVE AND OBJECTIVE BOX
Culture - Blood (01.11.24 @ 21:20)    -  CTX-M Resistance Marker: Detec   -  ESBL: Detec   -  Escherichia coli: Detec   Gram Stain:   Growth in aerobic bottle: Gram Negative Rods   Specimen Source: .Blood Blood-Peripheral   Organism: Blood Culture PCR   Culture Results:   Growth in aerobic bottle: Gram Negative Rods  Direct identification is available within approximately 3-5  hours either by Blood Panel Multiplexed PCR or Direct  MALDI-TOF. Details: [email or web address]   Organism Identification: Blood Culture PCR   Method Type: PCR     Culture - Blood (01.11.24 @ 21:20)    -  CTX-M Resistance Marker: Detec   -  ESBL: Detec   -  Escherichia coli: Detec   Gram Stain:   Growth in aerobic bottle: Gram Negative Rods   Specimen Source: .Blood Blood-Peripheral   Organism: Blood Culture PCR   Culture Results:   Growth in aerobic bottle: Gram Negative Rods  Direct identification is available within approximately 3-5  hours either by Blood Panel Multiplexed PCR or Direct  MALDI-TOF. Details: [email or web address]   Organism Identification: Blood Culture PCR   Method Type: PCR

## 2024-01-12 NOTE — CARE COORDINATION ASSESSMENT. - NSCAREPROVIDERS_GEN_ALL_CORE_FT
CARE PROVIDERS:  Accepting Physician: Dipti Simon  Administration: Elizabeth Espana  Administration: Estuardo Bella  Admitting: Dipti Simon  Attending: Dipti Simon  Case Management: Pallavi Waite  Consultant: Kianna Diego  Consultant: Joe Byers  Covering Team: Kianna Diego  Covering Team: Allison Oseguera  ED Attending: Zachariah Rea  ED Nurse: Alma Delia Caicedo  Nurse: Zuhair Cabral  Nurse: Daija Vail  Nurse: Thalia Rehman  Nurse: Sid Arthur  Ordered: ADM, User  Ordered: ServiceAccount, SCMMLM  Override: Tulio Venegas  Override: Sid Arthur  Override: Thalia Rehman  PCA/Nursing Assistant: Elizabeth Ayala  PCA/Nursing Assistant: Lori Madrid  Primary Team: Dipti Simon  Primary Team: Lul Jernigan  Registered Dietitian: Leidy Gonzalez  Respiratory Therapy: Garland Salomon  Team: [facility], Team

## 2024-01-12 NOTE — PHARMACOTHERAPY INTERVENTION NOTE - COMMENTS
Patient is an 84 yo female currently ordered for standing tramadol 25 mg daily. Utilizing the age friendly 65+ report, recommended adding hold parameters for lethargy, sedation, or RR < 12. Discussed with attending Dr. Jernigan and recommendation was accepted, order was updated to reflect hold parameters. Patient is an 82 yo female currently ordered for standing tramadol 25 mg daily. Utilizing the age friendly 65+ report, recommended adding hold parameters for lethargy, sedation, or RR < 12. Discussed with attending Dr. Jernigan and recommendation was accepted, order was updated to reflect hold parameters.

## 2024-01-12 NOTE — PROGRESS NOTE ADULT - ASSESSMENT
82 yo F with PMH of hyperlipidemia, breast CA s/p mastectomies with mets to bone, neuropathy presents to ED c/o generalized weakness, confusion since Wednesday. Pt's daughter (Leyla).   In ED  Vitals: T 101.8F, HR 85, BP 96/43, RR 18, SpO2 95% on RA  UA: cloudy, trace ketone, 30 protein, positive nitrite, large LE moderate blood, bacteria TNTC, epithelial cells present  CXR: no acute chest disease    RECOMMENDATIONS  Story suggestive of pyelonephritis with AMS and noted leukocytosis, fever, pyuria and no other obv localization.   Growth in aerobic bottle: Culture - Blood (01.11.24 @ 21:20)    -  CTX-M Resistance Marker: Detec   -  ESBL: Detec   -  Escherichia coli: Detec  Repeat Blood cultures ordered for 1/12 afternoon  For now recommend  escalated to Ertapenem 1/12 w ESBL markers    Thank you for consulting us and involving us in the management of this most interesting and challenging case.  We will follow along in the care of this patient. Please call us at [phone] or text me directly on my cell# at [phone] with any concerns.    Starting tomorrow Dr Brandie Matre will be assuming care of this patient so please contact her with any questions, concerns or new micro data.         84 yo F with PMH of hyperlipidemia, breast CA s/p mastectomies with mets to bone, neuropathy presents to ED c/o generalized weakness, confusion since Wednesday. Pt's daughter (Leyla).   In ED  Vitals: T 101.8F, HR 85, BP 96/43, RR 18, SpO2 95% on RA  UA: cloudy, trace ketone, 30 protein, positive nitrite, large LE moderate blood, bacteria TNTC, epithelial cells present  CXR: no acute chest disease    RECOMMENDATIONS  Story suggestive of pyelonephritis with AMS and noted leukocytosis, fever, pyuria and no other obv localization.   Growth in aerobic bottle: Culture - Blood (01.11.24 @ 21:20)    -  CTX-M Resistance Marker: Detec   -  ESBL: Detec   -  Escherichia coli: Detec  Repeat Blood cultures ordered for 1/12 afternoon  For now recommend  escalated to Ertapenem 1/12 w ESBL markers    Thank you for consulting us and involving us in the management of this most interesting and challenging case.  We will follow along in the care of this patient. Please call us at [phone] or text me directly on my cell# at [phone] with any concerns.    Starting tomorrow Dr Brandie Marte will be assuming care of this patient so please contact her with any questions, concerns or new micro data.

## 2024-01-12 NOTE — PATIENT PROFILE ADULT - FALL HARM RISK - HARM RISK INTERVENTIONS
Assistance OOB with selected safe patient handling equipment/Communicate Risk of Fall with Harm to all staff/Discuss with provider need for PT consult/Monitor gait and stability/Provide patient with walking aids - walker, cane, crutches/Reinforce activity limits and safety measures with patient and family/Tailored Fall Risk Interventions/Visual Cue: Yellow wristband and red socks/Bed in lowest position, wheels locked, appropriate side rails in place/Call bell, personal items and telephone in reach/Instruct patient to call for assistance before getting out of bed or chair/Non-slip footwear when patient is out of bed/[place] to call system/Physically safe environment - no spills, clutter or unnecessary equipment/Purposeful Proactive Rounding/Room/bathroom lighting operational, light cord in reach Assistance OOB with selected safe patient handling equipment/Communicate Risk of Fall with Harm to all staff/Discuss with provider need for PT consult/Monitor gait and stability/Provide patient with walking aids - walker, cane, crutches/Reinforce activity limits and safety measures with patient and family/Tailored Fall Risk Interventions/Visual Cue: Yellow wristband and red socks/Bed in lowest position, wheels locked, appropriate side rails in place/Call bell, personal items and telephone in reach/Instruct patient to call for assistance before getting out of bed or chair/Non-slip footwear when patient is out of bed/[place] to call system/Physically safe environment - no spills, clutter or unnecessary equipment/Purposeful Proactive Rounding/Room/bathroom lighting operational, light cord in reach

## 2024-01-12 NOTE — CARE COORDINATION ASSESSMENT. - OTHER PERTINENT REFERRAL INFORMATION
Spoke with patients daughter via phone. Explained the role of case management/discharge planning. Left discharge folder at patients bedside, and contact number with daughter. Patient resides with her daughter in a pvt home . Patient ambulated with a cane inside her home and walker outside the home. Patient admitted with change in mental status and UTI. Patient has a PMH of metastatic breast CA. Discharge needs/plan pending continued hospital stay. Will remain available to patient and family throughout hospital stay

## 2024-01-13 DIAGNOSIS — R78.81 BACTEREMIA: ICD-10-CM

## 2024-01-13 LAB
ANION GAP SERPL CALC-SCNC: 1 MMOL/L — LOW (ref 5–17)
ANION GAP SERPL CALC-SCNC: 1 MMOL/L — LOW (ref 5–17)
BASOPHILS # BLD AUTO: 0 K/UL — SIGNIFICANT CHANGE UP (ref 0–0.2)
BASOPHILS # BLD AUTO: 0 K/UL — SIGNIFICANT CHANGE UP (ref 0–0.2)
BASOPHILS NFR BLD AUTO: 0 % — SIGNIFICANT CHANGE UP (ref 0–2)
BASOPHILS NFR BLD AUTO: 0 % — SIGNIFICANT CHANGE UP (ref 0–2)
BUN SERPL-MCNC: 12 MG/DL — SIGNIFICANT CHANGE UP (ref 7–23)
BUN SERPL-MCNC: 12 MG/DL — SIGNIFICANT CHANGE UP (ref 7–23)
CALCIUM SERPL-MCNC: 9.4 MG/DL — SIGNIFICANT CHANGE UP (ref 8.5–10.1)
CALCIUM SERPL-MCNC: 9.4 MG/DL — SIGNIFICANT CHANGE UP (ref 8.5–10.1)
CHLORIDE SERPL-SCNC: 106 MMOL/L — SIGNIFICANT CHANGE UP (ref 96–108)
CHLORIDE SERPL-SCNC: 106 MMOL/L — SIGNIFICANT CHANGE UP (ref 96–108)
CO2 SERPL-SCNC: 27 MMOL/L — SIGNIFICANT CHANGE UP (ref 22–31)
CO2 SERPL-SCNC: 27 MMOL/L — SIGNIFICANT CHANGE UP (ref 22–31)
CREAT SERPL-MCNC: 0.41 MG/DL — LOW (ref 0.5–1.3)
CREAT SERPL-MCNC: 0.41 MG/DL — LOW (ref 0.5–1.3)
EGFR: 98 ML/MIN/1.73M2 — SIGNIFICANT CHANGE UP
EGFR: 98 ML/MIN/1.73M2 — SIGNIFICANT CHANGE UP
EOSINOPHIL # BLD AUTO: 0.3 K/UL — SIGNIFICANT CHANGE UP (ref 0–0.5)
EOSINOPHIL # BLD AUTO: 0.3 K/UL — SIGNIFICANT CHANGE UP (ref 0–0.5)
EOSINOPHIL NFR BLD AUTO: 3 % — SIGNIFICANT CHANGE UP (ref 0–6)
EOSINOPHIL NFR BLD AUTO: 3 % — SIGNIFICANT CHANGE UP (ref 0–6)
GLUCOSE SERPL-MCNC: 97 MG/DL — SIGNIFICANT CHANGE UP (ref 70–99)
GLUCOSE SERPL-MCNC: 97 MG/DL — SIGNIFICANT CHANGE UP (ref 70–99)
HCT VFR BLD CALC: 33 % — LOW (ref 34.5–45)
HCT VFR BLD CALC: 33 % — LOW (ref 34.5–45)
HGB BLD-MCNC: 11.4 G/DL — LOW (ref 11.5–15.5)
HGB BLD-MCNC: 11.4 G/DL — LOW (ref 11.5–15.5)
LYMPHOCYTES # BLD AUTO: 1.11 K/UL — SIGNIFICANT CHANGE UP (ref 1–3.3)
LYMPHOCYTES # BLD AUTO: 1.11 K/UL — SIGNIFICANT CHANGE UP (ref 1–3.3)
LYMPHOCYTES # BLD AUTO: 11 % — LOW (ref 13–44)
LYMPHOCYTES # BLD AUTO: 11 % — LOW (ref 13–44)
MAGNESIUM SERPL-MCNC: 1.8 MG/DL — SIGNIFICANT CHANGE UP (ref 1.6–2.6)
MAGNESIUM SERPL-MCNC: 1.8 MG/DL — SIGNIFICANT CHANGE UP (ref 1.6–2.6)
MCHC RBC-ENTMCNC: 32.9 PG — SIGNIFICANT CHANGE UP (ref 27–34)
MCHC RBC-ENTMCNC: 32.9 PG — SIGNIFICANT CHANGE UP (ref 27–34)
MCHC RBC-ENTMCNC: 34.5 GM/DL — SIGNIFICANT CHANGE UP (ref 32–36)
MCHC RBC-ENTMCNC: 34.5 GM/DL — SIGNIFICANT CHANGE UP (ref 32–36)
MCV RBC AUTO: 95.1 FL — SIGNIFICANT CHANGE UP (ref 80–100)
MCV RBC AUTO: 95.1 FL — SIGNIFICANT CHANGE UP (ref 80–100)
MONOCYTES # BLD AUTO: 1.82 K/UL — HIGH (ref 0–0.9)
MONOCYTES # BLD AUTO: 1.82 K/UL — HIGH (ref 0–0.9)
MONOCYTES NFR BLD AUTO: 18 % — HIGH (ref 2–14)
MONOCYTES NFR BLD AUTO: 18 % — HIGH (ref 2–14)
NEUTROPHILS # BLD AUTO: 6.77 K/UL — SIGNIFICANT CHANGE UP (ref 1.8–7.4)
NEUTROPHILS # BLD AUTO: 6.77 K/UL — SIGNIFICANT CHANGE UP (ref 1.8–7.4)
NEUTROPHILS NFR BLD AUTO: 61 % — SIGNIFICANT CHANGE UP (ref 43–77)
NEUTROPHILS NFR BLD AUTO: 61 % — SIGNIFICANT CHANGE UP (ref 43–77)
NRBC # BLD: SIGNIFICANT CHANGE UP /100 WBCS (ref 0–0)
NRBC # BLD: SIGNIFICANT CHANGE UP /100 WBCS (ref 0–0)
PLATELET # BLD AUTO: 367 K/UL — SIGNIFICANT CHANGE UP (ref 150–400)
PLATELET # BLD AUTO: 367 K/UL — SIGNIFICANT CHANGE UP (ref 150–400)
POTASSIUM SERPL-MCNC: 3.9 MMOL/L — SIGNIFICANT CHANGE UP (ref 3.5–5.3)
POTASSIUM SERPL-MCNC: 3.9 MMOL/L — SIGNIFICANT CHANGE UP (ref 3.5–5.3)
POTASSIUM SERPL-SCNC: 3.9 MMOL/L — SIGNIFICANT CHANGE UP (ref 3.5–5.3)
POTASSIUM SERPL-SCNC: 3.9 MMOL/L — SIGNIFICANT CHANGE UP (ref 3.5–5.3)
RBC # BLD: 3.47 M/UL — LOW (ref 3.8–5.2)
RBC # BLD: 3.47 M/UL — LOW (ref 3.8–5.2)
RBC # FLD: 15.5 % — HIGH (ref 10.3–14.5)
RBC # FLD: 15.5 % — HIGH (ref 10.3–14.5)
SODIUM SERPL-SCNC: 134 MMOL/L — LOW (ref 135–145)
SODIUM SERPL-SCNC: 134 MMOL/L — LOW (ref 135–145)
WBC # BLD: 10.11 K/UL — SIGNIFICANT CHANGE UP (ref 3.8–10.5)
WBC # BLD: 10.11 K/UL — SIGNIFICANT CHANGE UP (ref 3.8–10.5)
WBC # FLD AUTO: 10.11 K/UL — SIGNIFICANT CHANGE UP (ref 3.8–10.5)
WBC # FLD AUTO: 10.11 K/UL — SIGNIFICANT CHANGE UP (ref 3.8–10.5)

## 2024-01-13 PROCEDURE — 99233 SBSQ HOSP IP/OBS HIGH 50: CPT

## 2024-01-13 RX ADMIN — ATORVASTATIN CALCIUM 20 MILLIGRAM(S): 80 TABLET, FILM COATED ORAL at 21:50

## 2024-01-13 RX ADMIN — Medication 5 MILLIGRAM(S): at 12:31

## 2024-01-13 RX ADMIN — POLYETHYLENE GLYCOL 3350 17 GRAM(S): 17 POWDER, FOR SOLUTION ORAL at 12:32

## 2024-01-13 RX ADMIN — ENOXAPARIN SODIUM 40 MILLIGRAM(S): 100 INJECTION SUBCUTANEOUS at 06:21

## 2024-01-13 RX ADMIN — DULOXETINE HYDROCHLORIDE 60 MILLIGRAM(S): 30 CAPSULE, DELAYED RELEASE ORAL at 12:31

## 2024-01-13 RX ADMIN — ERTAPENEM SODIUM 120 MILLIGRAM(S): 1 INJECTION, POWDER, LYOPHILIZED, FOR SOLUTION INTRAMUSCULAR; INTRAVENOUS at 17:31

## 2024-01-13 RX ADMIN — SENNA PLUS 2 TABLET(S): 8.6 TABLET ORAL at 21:49

## 2024-01-13 NOTE — PROGRESS NOTE ADULT - SUBJECTIVE AND OBJECTIVE BOX
Patient is a 83y old  Female who presents with a chief complaint of metabolic encephalopathy in the setting of UTI (13 Jan 2024 07:32)      SUBJECTIVE / OVERNIGHT EVENTS: Patient seen and examined at bedside. No acute events overnight. Still confused, thinks she is at home. Patient found to have ESBL E. Coli bacteremia.    MEDICATIONS  (STANDING):  atorvastatin 20 milliGRAM(s) Oral at bedtime  DULoxetine 60 milliGRAM(s) Oral daily  enoxaparin Injectable 40 milliGRAM(s) SubCutaneous every 24 hours  ertapenem  IVPB 1000 milliGRAM(s) IV Intermittent every 24 hours  influenza  Vaccine (HIGH DOSE) 0.7 milliLiter(s) IntraMuscular once  oxybutynin 5 milliGRAM(s) Oral daily  polyethylene glycol 3350 17 Gram(s) Oral daily  senna 2 Tablet(s) Oral at bedtime    MEDICATIONS  (PRN):  acetaminophen     Tablet .. 650 milliGRAM(s) Oral every 6 hours PRN Temp greater or equal to 38C (100.4F), Mild Pain (1 - 3)      CAPILLARY BLOOD GLUCOSE        I&O's Summary      PHYSICAL EXAM:  Vital Signs Last 24 Hrs  T(C): 36.8 (13 Jan 2024 05:17), Max: 36.8 (13 Jan 2024 05:17)  T(F): 98.3 (13 Jan 2024 05:17), Max: 98.3 (13 Jan 2024 05:17)  HR: 78 (13 Jan 2024 05:17) (75 - 87)  BP: 136/81 (13 Jan 2024 05:17) (122/61 - 141/61)  BP(mean): --  RR: 18 (13 Jan 2024 05:17) (17 - 18)  SpO2: 92% (13 Jan 2024 05:17) (92% - 94%)    Parameters below as of 13 Jan 2024 05:17  Patient On (Oxygen Delivery Method): room air        GEN: female in NAD, appears comfortable, no diaphoresis  EYES: No scleral injection, EOMI  ENTM: neck supple & symmetric without tracheal deviation, moist membranes, no gross hearing impairment, thyroid gland not enlarged  CV: +S1/S2, no m/r/g, no abdominal bruit, no LE edema  RESP: breathing comfortably, no respiratory accessory muscle use, CTAB, no w/r/r  GI: normoactive BS, soft, NTND, no rebounding/guarding, no palpable masses    LABS:                        11.4   10.11 )-----------( 367      ( 13 Jan 2024 07:55 )             33.0     01-13    134<L>  |  106  |  12  ----------------------------<  97  3.9   |  27  |  0.41<L>    Ca    9.4      13 Jan 2024 07:55  Mg     1.8     01-13    TPro  6.5  /  Alb  2.3<L>  /  TBili  0.3  /  DBili  x   /  AST  16  /  ALT  10<L>  /  AlkPhos  99  01-12    PT/INR - ( 11 Jan 2024 21:20 )   PT: 12.1 sec;   INR: 1.04 ratio         PTT - ( 11 Jan 2024 21:20 )  PTT:28.4 sec  CARDIAC MARKERS ( 11 Jan 2024 21:20 )  x     / x     / 51 U/L / x     / x          Urinalysis Basic - ( 13 Jan 2024 07:55 )    Color: x / Appearance: x / SG: x / pH: x  Gluc: 97 mg/dL / Ketone: x  / Bili: x / Urobili: x   Blood: x / Protein: x / Nitrite: x   Leuk Esterase: x / RBC: x / WBC x   Sq Epi: x / Non Sq Epi: x / Bacteria: x        Culture - Blood (collected 11 Jan 2024 21:20)  Source: .Blood Blood-Peripheral  Gram Stain (12 Jan 2024 14:46):    Growth in aerobic bottle: Gram Negative Rods  Preliminary Report (12 Jan 2024 14:46):    Growth in aerobic bottle: Gram Negative Rods    Direct identification is available within approximately 3-5    hours either by Blood Panel Multiplexed PCR or Direct    MALDI-TOF. Details: [email or web address]  Organism: Blood Culture PCR (12 Jan 2024 16:24)  Organism: Blood Culture PCR (12 Jan 2024 16:24)    Culture - Blood (collected 11 Jan 2024 21:20)  Source: .Blood Blood-Peripheral  Preliminary Report (13 Jan 2024 02:03):    No growth at 24 hours        RADIOLOGY & ADDITIONAL TESTS:  Results Reviewed:   Imaging Personally Reviewed:  Electrocardiogram Personally Reviewed:    COORDINATION OF CARE:  Care Discussed with Consultants/Other Providers [Y/N]:  Prior or Outpatient Records Reviewed [Y/N]:   Patient is a 83y old  Female who presents with a chief complaint of metabolic encephalopathy in the setting of UTI (13 Jan 2024 07:32)      SUBJECTIVE / OVERNIGHT EVENTS: Patient seen and examined at bedside. No acute events overnight. Still confused, thinks she is at home. Patient found to have ESBL E. Coli bacteremia.    MEDICATIONS  (STANDING):  atorvastatin 20 milliGRAM(s) Oral at bedtime  DULoxetine 60 milliGRAM(s) Oral daily  enoxaparin Injectable 40 milliGRAM(s) SubCutaneous every 24 hours  ertapenem  IVPB 1000 milliGRAM(s) IV Intermittent every 24 hours  influenza  Vaccine (HIGH DOSE) 0.7 milliLiter(s) IntraMuscular once  oxybutynin 5 milliGRAM(s) Oral daily  polyethylene glycol 3350 17 Gram(s) Oral daily  senna 2 Tablet(s) Oral at bedtime    MEDICATIONS  (PRN):  acetaminophen     Tablet .. 650 milliGRAM(s) Oral every 6 hours PRN Temp greater or equal to 38C (100.4F), Mild Pain (1 - 3)      CAPILLARY BLOOD GLUCOSE        I&O's Summary      PHYSICAL EXAM:  Vital Signs Last 24 Hrs  T(C): 36.8 (13 Jan 2024 05:17), Max: 36.8 (13 Jan 2024 05:17)  T(F): 98.3 (13 Jan 2024 05:17), Max: 98.3 (13 Jan 2024 05:17)  HR: 78 (13 Jan 2024 05:17) (75 - 87)  BP: 136/81 (13 Jan 2024 05:17) (122/61 - 141/61)  BP(mean): --  RR: 18 (13 Jan 2024 05:17) (17 - 18)  SpO2: 92% (13 Jan 2024 05:17) (92% - 94%)    Parameters below as of 13 Jan 2024 05:17  Patient On (Oxygen Delivery Method): room air        GEN: female in NAD, appears comfortable, no diaphoresis  EYES: No scleral injection, EOMI  ENTM: neck supple & symmetric without tracheal deviation, moist membranes, no gross hearing impairment, thyroid gland not enlarged  CV: +S1/S2, no m/r/g, no abdominal bruit, no LE edema  RESP: breathing comfortably, no respiratory accessory muscle use, CTAB, no w/r/r  GI: normoactive BS, soft, NTND, no rebounding/guarding, no palpable masses    LABS:                        11.4   10.11 )-----------( 367      ( 13 Jan 2024 07:55 )             33.0     01-13    134<L>  |  106  |  12  ----------------------------<  97  3.9   |  27  |  0.41<L>    Ca    9.4      13 Jan 2024 07:55  Mg     1.8     01-13    TPro  6.5  /  Alb  2.3<L>  /  TBili  0.3  /  DBili  x   /  AST  16  /  ALT  10<L>  /  AlkPhos  99  01-12    PT/INR - ( 11 Jan 2024 21:20 )   PT: 12.1 sec;   INR: 1.04 ratio         PTT - ( 11 Jan 2024 21:20 )  PTT:28.4 sec  CARDIAC MARKERS ( 11 Jan 2024 21:20 )  x     / x     / 51 U/L / x     / x          Urinalysis Basic - ( 13 Jan 2024 07:55 )    Color: x / Appearance: x / SG: x / pH: x  Gluc: 97 mg/dL / Ketone: x  / Bili: x / Urobili: x   Blood: x / Protein: x / Nitrite: x   Leuk Esterase: x / RBC: x / WBC x   Sq Epi: x / Non Sq Epi: x / Bacteria: x        Culture - Blood (collected 11 Jan 2024 21:20)  Source: .Blood Blood-Peripheral  Gram Stain (12 Jan 2024 14:46):    Growth in aerobic bottle: Gram Negative Rods  Preliminary Report (12 Jan 2024 14:46):    Growth in aerobic bottle: Gram Negative Rods    Direct identification is available within approximately 3-5    hours either by Blood Panel Multiplexed PCR or Direct    MALDI-TOF. Details: [email or web address]  Organism: Blood Culture PCR (12 Jan 2024 16:24)  Organism: Blood Culture PCR (12 Jan 2024 16:24)    Culture - Blood (collected 11 Jan 2024 21:20)  Source: .Blood Blood-Peripheral  Preliminary Report (13 Jan 2024 02:03):    No growth at 24 hours        RADIOLOGY & ADDITIONAL TESTS:  Results Reviewed:   Imaging Personally Reviewed:  Electrocardiogram Personally Reviewed:    COORDINATION OF CARE:  Care Discussed with Consultants/Other Providers [Y/N]:  Prior or Outpatient Records Reviewed [Y/N]:

## 2024-01-13 NOTE — PROGRESS NOTE ADULT - ASSESSMENT
83F with PMHx of HLD, breast cancer (s/p mastectomies with mets to bone) and neuropathy presenting for weakness, confusion, and fever. Patient likely with ESBL E. Coli bacteremia 2/2 to UTI.

## 2024-01-14 DIAGNOSIS — I95.1 ORTHOSTATIC HYPOTENSION: ICD-10-CM

## 2024-01-14 LAB
-  AMPICILLIN/SULBACTAM: SIGNIFICANT CHANGE UP
-  AMPICILLIN/SULBACTAM: SIGNIFICANT CHANGE UP
-  AMPICILLIN: SIGNIFICANT CHANGE UP
-  AMPICILLIN: SIGNIFICANT CHANGE UP
-  AZTREONAM: SIGNIFICANT CHANGE UP
-  AZTREONAM: SIGNIFICANT CHANGE UP
-  CEFAZOLIN: SIGNIFICANT CHANGE UP
-  CEFAZOLIN: SIGNIFICANT CHANGE UP
-  CEFEPIME: SIGNIFICANT CHANGE UP
-  CEFEPIME: SIGNIFICANT CHANGE UP
-  CEFTRIAXONE: SIGNIFICANT CHANGE UP
-  CEFTRIAXONE: SIGNIFICANT CHANGE UP
-  CIPROFLOXACIN: SIGNIFICANT CHANGE UP
-  CIPROFLOXACIN: SIGNIFICANT CHANGE UP
-  ERTAPENEM: SIGNIFICANT CHANGE UP
-  ERTAPENEM: SIGNIFICANT CHANGE UP
-  GENTAMICIN: SIGNIFICANT CHANGE UP
-  GENTAMICIN: SIGNIFICANT CHANGE UP
-  IMIPENEM: SIGNIFICANT CHANGE UP
-  IMIPENEM: SIGNIFICANT CHANGE UP
-  LEVOFLOXACIN: SIGNIFICANT CHANGE UP
-  LEVOFLOXACIN: SIGNIFICANT CHANGE UP
-  MEROPENEM: SIGNIFICANT CHANGE UP
-  MEROPENEM: SIGNIFICANT CHANGE UP
-  PIPERACILLIN/TAZOBACTAM: SIGNIFICANT CHANGE UP
-  PIPERACILLIN/TAZOBACTAM: SIGNIFICANT CHANGE UP
-  TOBRAMYCIN: SIGNIFICANT CHANGE UP
-  TOBRAMYCIN: SIGNIFICANT CHANGE UP
-  TRIMETHOPRIM/SULFAMETHOXAZOLE: SIGNIFICANT CHANGE UP
-  TRIMETHOPRIM/SULFAMETHOXAZOLE: SIGNIFICANT CHANGE UP
ANION GAP SERPL CALC-SCNC: 6 MMOL/L — SIGNIFICANT CHANGE UP (ref 5–17)
ANION GAP SERPL CALC-SCNC: 6 MMOL/L — SIGNIFICANT CHANGE UP (ref 5–17)
BUN SERPL-MCNC: 12 MG/DL — SIGNIFICANT CHANGE UP (ref 7–23)
BUN SERPL-MCNC: 12 MG/DL — SIGNIFICANT CHANGE UP (ref 7–23)
CALCIUM SERPL-MCNC: 9.3 MG/DL — SIGNIFICANT CHANGE UP (ref 8.5–10.1)
CALCIUM SERPL-MCNC: 9.3 MG/DL — SIGNIFICANT CHANGE UP (ref 8.5–10.1)
CHLORIDE SERPL-SCNC: 107 MMOL/L — SIGNIFICANT CHANGE UP (ref 96–108)
CHLORIDE SERPL-SCNC: 107 MMOL/L — SIGNIFICANT CHANGE UP (ref 96–108)
CO2 SERPL-SCNC: 25 MMOL/L — SIGNIFICANT CHANGE UP (ref 22–31)
CO2 SERPL-SCNC: 25 MMOL/L — SIGNIFICANT CHANGE UP (ref 22–31)
CREAT SERPL-MCNC: 0.42 MG/DL — LOW (ref 0.5–1.3)
CREAT SERPL-MCNC: 0.42 MG/DL — LOW (ref 0.5–1.3)
CULTURE RESULTS: ABNORMAL
CULTURE RESULTS: ABNORMAL
EGFR: 97 ML/MIN/1.73M2 — SIGNIFICANT CHANGE UP
EGFR: 97 ML/MIN/1.73M2 — SIGNIFICANT CHANGE UP
GLUCOSE SERPL-MCNC: 85 MG/DL — SIGNIFICANT CHANGE UP (ref 70–99)
GLUCOSE SERPL-MCNC: 85 MG/DL — SIGNIFICANT CHANGE UP (ref 70–99)
HCT VFR BLD CALC: 33.7 % — LOW (ref 34.5–45)
HCT VFR BLD CALC: 33.7 % — LOW (ref 34.5–45)
HGB BLD-MCNC: 11.5 G/DL — SIGNIFICANT CHANGE UP (ref 11.5–15.5)
HGB BLD-MCNC: 11.5 G/DL — SIGNIFICANT CHANGE UP (ref 11.5–15.5)
MCHC RBC-ENTMCNC: 32.8 PG — SIGNIFICANT CHANGE UP (ref 27–34)
MCHC RBC-ENTMCNC: 32.8 PG — SIGNIFICANT CHANGE UP (ref 27–34)
MCHC RBC-ENTMCNC: 34.1 GM/DL — SIGNIFICANT CHANGE UP (ref 32–36)
MCHC RBC-ENTMCNC: 34.1 GM/DL — SIGNIFICANT CHANGE UP (ref 32–36)
MCV RBC AUTO: 96 FL — SIGNIFICANT CHANGE UP (ref 80–100)
MCV RBC AUTO: 96 FL — SIGNIFICANT CHANGE UP (ref 80–100)
METHOD TYPE: SIGNIFICANT CHANGE UP
METHOD TYPE: SIGNIFICANT CHANGE UP
NRBC # BLD: 0 /100 WBCS — SIGNIFICANT CHANGE UP (ref 0–0)
NRBC # BLD: 0 /100 WBCS — SIGNIFICANT CHANGE UP (ref 0–0)
ORGANISM # SPEC MICROSCOPIC CNT: ABNORMAL
ORGANISM # SPEC MICROSCOPIC CNT: SIGNIFICANT CHANGE UP
ORGANISM # SPEC MICROSCOPIC CNT: SIGNIFICANT CHANGE UP
PLATELET # BLD AUTO: 405 K/UL — HIGH (ref 150–400)
PLATELET # BLD AUTO: 405 K/UL — HIGH (ref 150–400)
POTASSIUM SERPL-MCNC: 3.3 MMOL/L — LOW (ref 3.5–5.3)
POTASSIUM SERPL-MCNC: 3.3 MMOL/L — LOW (ref 3.5–5.3)
POTASSIUM SERPL-SCNC: 3.3 MMOL/L — LOW (ref 3.5–5.3)
POTASSIUM SERPL-SCNC: 3.3 MMOL/L — LOW (ref 3.5–5.3)
RBC # BLD: 3.51 M/UL — LOW (ref 3.8–5.2)
RBC # BLD: 3.51 M/UL — LOW (ref 3.8–5.2)
RBC # FLD: 15.8 % — HIGH (ref 10.3–14.5)
RBC # FLD: 15.8 % — HIGH (ref 10.3–14.5)
SODIUM SERPL-SCNC: 138 MMOL/L — SIGNIFICANT CHANGE UP (ref 135–145)
SODIUM SERPL-SCNC: 138 MMOL/L — SIGNIFICANT CHANGE UP (ref 135–145)
SPECIMEN SOURCE: SIGNIFICANT CHANGE UP
SPECIMEN SOURCE: SIGNIFICANT CHANGE UP
WBC # BLD: 8.62 K/UL — SIGNIFICANT CHANGE UP (ref 3.8–10.5)
WBC # BLD: 8.62 K/UL — SIGNIFICANT CHANGE UP (ref 3.8–10.5)
WBC # FLD AUTO: 8.62 K/UL — SIGNIFICANT CHANGE UP (ref 3.8–10.5)
WBC # FLD AUTO: 8.62 K/UL — SIGNIFICANT CHANGE UP (ref 3.8–10.5)

## 2024-01-14 PROCEDURE — 99233 SBSQ HOSP IP/OBS HIGH 50: CPT

## 2024-01-14 RX ORDER — MULTIVIT-MIN/FERROUS GLUCONATE 9 MG/15 ML
1 LIQUID (ML) ORAL DAILY
Refills: 0 | Status: DISCONTINUED | OUTPATIENT
Start: 2024-01-14 | End: 2024-01-16

## 2024-01-14 RX ORDER — SODIUM CHLORIDE 9 MG/ML
1000 INJECTION INTRAMUSCULAR; INTRAVENOUS; SUBCUTANEOUS
Refills: 0 | Status: DISCONTINUED | OUTPATIENT
Start: 2024-01-14 | End: 2024-01-15

## 2024-01-14 RX ORDER — POTASSIUM CHLORIDE 20 MEQ
40 PACKET (EA) ORAL ONCE
Refills: 0 | Status: COMPLETED | OUTPATIENT
Start: 2024-01-14 | End: 2024-01-14

## 2024-01-14 RX ADMIN — DULOXETINE HYDROCHLORIDE 60 MILLIGRAM(S): 30 CAPSULE, DELAYED RELEASE ORAL at 11:12

## 2024-01-14 RX ADMIN — SODIUM CHLORIDE 75 MILLILITER(S): 9 INJECTION INTRAMUSCULAR; INTRAVENOUS; SUBCUTANEOUS at 09:58

## 2024-01-14 RX ADMIN — Medication 1 TABLET(S): at 11:12

## 2024-01-14 RX ADMIN — ATORVASTATIN CALCIUM 20 MILLIGRAM(S): 80 TABLET, FILM COATED ORAL at 21:55

## 2024-01-14 RX ADMIN — POLYETHYLENE GLYCOL 3350 17 GRAM(S): 17 POWDER, FOR SOLUTION ORAL at 11:12

## 2024-01-14 RX ADMIN — SENNA PLUS 2 TABLET(S): 8.6 TABLET ORAL at 21:55

## 2024-01-14 RX ADMIN — Medication 40 MILLIEQUIVALENT(S): at 11:12

## 2024-01-14 RX ADMIN — Medication 5 MILLIGRAM(S): at 11:12

## 2024-01-14 RX ADMIN — ENOXAPARIN SODIUM 40 MILLIGRAM(S): 100 INJECTION SUBCUTANEOUS at 04:08

## 2024-01-14 RX ADMIN — ERTAPENEM SODIUM 120 MILLIGRAM(S): 1 INJECTION, POWDER, LYOPHILIZED, FOR SOLUTION INTRAMUSCULAR; INTRAVENOUS at 17:41

## 2024-01-14 NOTE — DIETITIAN INITIAL EVALUATION ADULT - PERTINENT MEDS FT
MEDICATIONS  (STANDING):  atorvastatin 20 milliGRAM(s) Oral at bedtime  DULoxetine 60 milliGRAM(s) Oral daily  enoxaparin Injectable 40 milliGRAM(s) SubCutaneous every 24 hours  ertapenem  IVPB 1000 milliGRAM(s) IV Intermittent every 24 hours  influenza  Vaccine (HIGH DOSE) 0.7 milliLiter(s) IntraMuscular once  oxybutynin 5 milliGRAM(s) Oral daily  polyethylene glycol 3350 17 Gram(s) Oral daily  senna 2 Tablet(s) Oral at bedtime    MEDICATIONS  (PRN):  acetaminophen     Tablet .. 650 milliGRAM(s) Oral every 6 hours PRN Temp greater or equal to 38C (100.4F), Mild Pain (1 - 3)

## 2024-01-14 NOTE — PHYSICAL THERAPY INITIAL EVALUATION ADULT - ADDITIONAL COMMENTS
Pt lives on second floor of house, 3 ASTRID and 1 FOS to access bedroom. As per pt, family lives on first floor and daughter assists PRN. Pt has wheelchair and RW.

## 2024-01-14 NOTE — DIETITIAN INITIAL EVALUATION ADULT - NSFNSPHYEXAMSKINFT_GEN_A_CORE
Pressure Injury 1: Right:, heel, Stage I  Pressure Injury 2: Left:, heel, Stage I  Pressure Injury 3: Right:, ankle lateral, Unstageable  Pressure Injury 4: Left:, ankle, Unstageable  Pressure Injury 5: Left:, fifth toe, Stage I  Pressure Injury 6: Right:, second toe, Stage I  Pressure Injury 7: Right:, fifth toe, Stage I  Pressure Injury 8: Right:, Stage I  Pressure Injury 9: Left:, elbow, Stage I  Pressure Injury 10: none, none  Pressure Injury 11: none, none, Pressure Injury 1: Right:, heel, Stage I  Pressure Injury 2: Left:, heel, Stage I  Pressure Injury 3: Right:, ankle lateral, Unstageable  Pressure Injury 4: Left:, ankle, Unstageable  Pressure Injury 5: Left:, 5th toe, Stage I  Pressure Injury 6: Right:, second toe, Stage I  Pressure Injury 7: Right:, fith toe, Stage I  Pressure Injury 8: Right:, Stage I  Pressure Injury 9: Left:, elbow, Stage I  Pressure Injury 10: none, none  Pressure Injury 11: none, none Pressure Injury 1: Right:, heel, Stage I  Pressure Injury 2: Left:, heel, Stage I  Pressure Injury 3: Right:, ankle lateral, Unstageable  Pressure Injury 4: Left:, ankle, Unstageable  Pressure Injury 5: Left:, fifth toe, Stage I  Pressure Injury 6: Right:, second toe, Stage I  Pressure Injury 7: Right:, fifth toe, Stage I  Pressure Injury 8: Right:, Stage I  Pressure Injury 9: Left:, elbow, Stage I

## 2024-01-14 NOTE — PHYSICAL THERAPY INITIAL EVALUATION ADULT - GAIT TRAINING, PT EVAL
Pt will ambulate 100ft with least restrictive device with min A x1 for safe home mobility in 4 sessions.

## 2024-01-14 NOTE — PHYSICAL THERAPY INITIAL EVALUATION ADULT - PERTINENT HX OF CURRENT PROBLEM, REHAB EVAL
Pt is a 82 y/o F with PMHx of HLD, breast cancer (s/p mastectomies with mets to bone) and neuropathy presenting for weakness, confusion, and fever. +metabolic encephalopathy. Patient likely with ESBL E. Coli bacteremia 2/2 to UTI. Pt is a 84 y/o F with PMHx of HLD, breast cancer (s/p mastectomies with mets to bone) and neuropathy presenting for weakness, confusion, and fever. +metabolic encephalopathy. Patient likely with ESBL E. Coli bacteremia 2/2 to UTI.

## 2024-01-14 NOTE — DIETITIAN INITIAL EVALUATION ADULT - PROBLEM SELECTOR PLAN 1
Patient presents with generalized weakness and metabolic encephalopathy 2/2 UTI  - UA: cloudy, trace ketone, 30 protein, positive nitrite, large LE moderate blood, bacteria TNTC, epithelial cells present  - Given IV Zosyn x1 dose, IV NS 1L bolus x 2 in ED  - Start IV Rocephin  - Tylenol 650 mg PO q6h PRN fever or mild pain  - Trend WBC and monitor for fever  - F/u urine and blood cultures  - ID (Dr. Byers) consulted, f/u recs

## 2024-01-14 NOTE — DIETITIAN INITIAL EVALUATION ADULT - INCREASED NUTRIENT NEEDS
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  His son was conceived after approximately 10 months of unprotected intercourse.  They have been having unprotected intercourse for nearly 1-1/2 years at this point without any sexual dysfunction.  He does have some performance anxiety which is responded to sildenafil.  He has previously been seen by Dr. Chuck Francis.  He was found to have severe oligospermia on 2 occasions.  He was reportedly had normal endocrinologic studies.\par \par  A scrotal ultrasound examination performed by Dr. Francis was reported as being normal.\par Repeat ultrasound was reported as normal\par \par In light of semen analysis (normal volume) and severe oligospermia proceeded  with \par karyotype - normal\par Microdeletions - normal\par \par semen analysis   {0.1]\par endrocrine studies reviewed:\par testosterone 605\par LH 4.7\par FSH 2.6\par Estratiol 23.15\par prolactin no available (patient will have forwarded)\par \par discussed repeat SA at end of January\par proceed with IVF if semen analysis stable\par \par 
protein, vitamins/minerals

## 2024-01-14 NOTE — DIETITIAN INITIAL EVALUATION ADULT - PERTINENT LABORATORY DATA
01-13    134<L>  |  106  |  12  ----------------------------<  97  3.9   |  27  |  0.41<L>    Ca    9.4      13 Jan 2024 07:55  Mg     1.8     01-13    TPro  6.5  /  Alb  2.3<L>  /  TBili  0.3  /  DBili  x   /  AST  16  /  ALT  10<L>  /  AlkPhos  99  01-12

## 2024-01-14 NOTE — DIETITIAN NUTRITION RISK NOTIFICATION - TREATMENT: THE FOLLOWING DIET HAS BEEN RECOMMENDED
Diet, Regular:   Free Water Flush Instructions:  OTHER; NUTR ASSISTANT : MAGIC CUP 4 oz PO BID ( lunch and dinner please) (01-14-24 @ 10:15) [Pending Verification By Attending]  Diet, DASH/TLC:   Sodium & Cholesterol Restricted (01-11-24 @ 23:47) [Active]

## 2024-01-14 NOTE — PROGRESS NOTE ADULT - SUBJECTIVE AND OBJECTIVE BOX
Patient is a 83y old  Female who presents with a chief complaint of Weakness     (14 Jan 2024 07:35)      SUBJECTIVE / OVERNIGHT EVENTS: Patient seen and examined at bedside. No acute events overnight. Feels well without complaints. Hypotensive when working with PT. Mentating better. Discussed with daughter Leyla yesterday.    MEDICATIONS  (STANDING):  atorvastatin 20 milliGRAM(s) Oral at bedtime  DULoxetine 60 milliGRAM(s) Oral daily  enoxaparin Injectable 40 milliGRAM(s) SubCutaneous every 24 hours  ertapenem  IVPB 1000 milliGRAM(s) IV Intermittent every 24 hours  influenza  Vaccine (HIGH DOSE) 0.7 milliLiter(s) IntraMuscular once  oxybutynin 5 milliGRAM(s) Oral daily  polyethylene glycol 3350 17 Gram(s) Oral daily  senna 2 Tablet(s) Oral at bedtime  sodium chloride 0.9%. 1000 milliLiter(s) (75 mL/Hr) IV Continuous <Continuous>    MEDICATIONS  (PRN):  acetaminophen     Tablet .. 650 milliGRAM(s) Oral every 6 hours PRN Temp greater or equal to 38C (100.4F), Mild Pain (1 - 3)      CAPILLARY BLOOD GLUCOSE        I&O's Summary    13 Jan 2024 07:01  -  14 Jan 2024 07:00  --------------------------------------------------------  IN: 50 mL / OUT: 0 mL / NET: 50 mL        PHYSICAL EXAM:  Vital Signs Last 24 Hrs  T(C): 36.5 (14 Jan 2024 09:27), Max: 36.7 (13 Jan 2024 11:57)  T(F): 97.7 (14 Jan 2024 09:27), Max: 98.1 (13 Jan 2024 11:57)  HR: 84 (14 Jan 2024 09:41) (74 - 86)  BP: 93/47 (14 Jan 2024 09:41) (90/40 - 132/57)  BP(mean): --  RR: 18 (14 Jan 2024 09:27) (18 - 18)  SpO2: 94% (14 Jan 2024 09:41) (91% - 94%)    Parameters below as of 14 Jan 2024 09:41  Patient On (Oxygen Delivery Method): room air        GEN: female in NAD, appears comfortable, no diaphoresis  EYES: No scleral injection, EOMI  ENTM: neck supple & symmetric without tracheal deviation, moist membranes, no gross hearing impairment, thyroid gland not enlarged  CV: +S1/S2, no m/r/g, no abdominal bruit, no LE edema  RESP: breathing comfortably, no respiratory accessory muscle use, CTAB, no w/r/r  GI: normoactive BS, soft, NTND, no rebounding/guarding, no palpable masses    LABS:                        11.5   8.62  )-----------( 405      ( 14 Jan 2024 07:50 )             33.7     01-13    134<L>  |  106  |  12  ----------------------------<  97  3.9   |  27  |  0.41<L>    Ca    9.4      13 Jan 2024 07:55  Mg     1.8     01-13            Urinalysis Basic - ( 13 Jan 2024 07:55 )    Color: x / Appearance: x / SG: x / pH: x  Gluc: 97 mg/dL / Ketone: x  / Bili: x / Urobili: x   Blood: x / Protein: x / Nitrite: x   Leuk Esterase: x / RBC: x / WBC x   Sq Epi: x / Non Sq Epi: x / Bacteria: x        Culture - Blood (collected 12 Jan 2024 15:40)  Source: .Blood Blood-Peripheral  Preliminary Report (13 Jan 2024 22:02):    No growth at 24 hours    Culture - Blood (collected 12 Jan 2024 15:35)  Source: .Blood Blood-Venous  Preliminary Report (13 Jan 2024 22:03):    No growth at 24 hours    Culture - Urine (collected 11 Jan 2024 23:02)  Source: Clean Catch Clean Catch (Midstream)  Preliminary Report (13 Jan 2024 15:25):    >100,000 CFU/ml Escherichia coli    Culture - Blood (collected 11 Jan 2024 21:20)  Source: .Blood Blood-Peripheral  Gram Stain (12 Jan 2024 14:46):    Growth in aerobic bottle: Gram Negative Rods  Final Report (14 Jan 2024 07:30):    Growth in aerobic bottle: Escherichia coli ESBL    Direct identification is available within approximately 3-5    hours either by Blood Panel Multiplexed PCR or Direct    MALDI-TOF. Details: [email or web address]  Organism: Blood Culture PCR  Escherichia coli ESBL (14 Jan 2024 07:30)  Organism: Escherichia coli ESBL (14 Jan 2024 07:30)  Organism: Blood Culture PCR (14 Jan 2024 07:30)    Culture - Blood (collected 11 Jan 2024 21:20)  Source: .Blood Blood-Peripheral  Preliminary Report (14 Jan 2024 02:03):    No growth at 48 Hours        RADIOLOGY & ADDITIONAL TESTS:  Results Reviewed:   Imaging Personally Reviewed:  Electrocardiogram Personally Reviewed:    COORDINATION OF CARE:  Care Discussed with Consultants/Other Providers [Y/N]:  Prior or Outpatient Records Reviewed [Y/N]:   Patient is a 83y old  Female who presents with a chief complaint of Weakness     (14 Jan 2024 07:35)      SUBJECTIVE / OVERNIGHT EVENTS: Patient seen and examined at bedside. No acute events overnight. Feels well without complaints. Hypotensive when working with PT. Mentating better. Discussed with daughter Leyla yesterday.    MEDICATIONS  (STANDING):  atorvastatin 20 milliGRAM(s) Oral at bedtime  DULoxetine 60 milliGRAM(s) Oral daily  enoxaparin Injectable 40 milliGRAM(s) SubCutaneous every 24 hours  ertapenem  IVPB 1000 milliGRAM(s) IV Intermittent every 24 hours  influenza  Vaccine (HIGH DOSE) 0.7 milliLiter(s) IntraMuscular once  oxybutynin 5 milliGRAM(s) Oral daily  polyethylene glycol 3350 17 Gram(s) Oral daily  senna 2 Tablet(s) Oral at bedtime  sodium chloride 0.9%. 1000 milliLiter(s) (75 mL/Hr) IV Continuous <Continuous>    MEDICATIONS  (PRN):  acetaminophen     Tablet .. 650 milliGRAM(s) Oral every 6 hours PRN Temp greater or equal to 38C (100.4F), Mild Pain (1 - 3)      CAPILLARY BLOOD GLUCOSE        I&O's Summary    13 Jan 2024 07:01  -  14 Jan 2024 07:00  --------------------------------------------------------  IN: 50 mL / OUT: 0 mL / NET: 50 mL        PHYSICAL EXAM:  Vital Signs Last 24 Hrs  T(C): 36.5 (14 Jan 2024 09:27), Max: 36.7 (13 Jan 2024 11:57)  T(F): 97.7 (14 Jan 2024 09:27), Max: 98.1 (13 Jan 2024 11:57)  HR: 84 (14 Jan 2024 09:41) (74 - 86)  BP: 93/47 (14 Jan 2024 09:41) (90/40 - 132/57)  BP(mean): --  RR: 18 (14 Jan 2024 09:27) (18 - 18)  SpO2: 94% (14 Jan 2024 09:41) (91% - 94%)    Parameters below as of 14 Jan 2024 09:41  Patient On (Oxygen Delivery Method): room air        GEN: female in NAD, appears comfortable, no diaphoresis  EYES: No scleral injection, EOMI  ENTM: neck supple & symmetric without tracheal deviation, moist membranes, no gross hearing impairment, thyroid gland not enlarged  CV: +S1/S2, no m/r/g, no abdominal bruit, no LE edema  RESP: breathing comfortably, no respiratory accessory muscle use, CTAB, no w/r/r  GI: normoactive BS, soft, NTND, no rebounding/guarding, no palpable masses    LABS:                        11.5   8.62  )-----------( 405      ( 14 Jan 2024 07:50 )             33.7     01-13    134<L>  |  106  |  12  ----------------------------<  97  3.9   |  27  |  0.41<L>    Ca    9.4      13 Jan 2024 07:55  Mg     1.8     01-13            Urinalysis Basic - ( 13 Jan 2024 07:55 )    Color: x / Appearance: x / SG: x / pH: x  Gluc: 97 mg/dL / Ketone: x  / Bili: x / Urobili: x   Blood: x / Protein: x / Nitrite: x   Leuk Esterase: x / RBC: x / WBC x   Sq Epi: x / Non Sq Epi: x / Bacteria: x        Culture - Blood (collected 12 Jan 2024 15:40)  Source: .Blood Blood-Peripheral  Preliminary Report (13 Jan 2024 22:02):    No growth at 24 hours    Culture - Blood (collected 12 Jan 2024 15:35)  Source: .Blood Blood-Venous  Preliminary Report (13 Jan 2024 22:03):    No growth at 24 hours    Culture - Urine (collected 11 Jan 2024 23:02)  Source: Clean Catch Clean Catch (Midstream)  Preliminary Report (13 Jan 2024 15:25):    >100,000 CFU/ml Escherichia coli    Culture - Blood (collected 11 Jan 2024 21:20)  Source: .Blood Blood-Peripheral  Gram Stain (12 Jan 2024 14:46):    Growth in aerobic bottle: Gram Negative Rods  Final Report (14 Jan 2024 07:30):    Growth in aerobic bottle: Escherichia coli ESBL    Direct identification is available within approximately 3-5    hours either by Blood Panel Multiplexed PCR or Direct    MALDI-TOF. Details: [email or web address]  Organism: Blood Culture PCR  Escherichia coli ESBL (14 Jan 2024 07:30)  Organism: Escherichia coli ESBL (14 Jan 2024 07:30)  Organism: Blood Culture PCR (14 Jan 2024 07:30)    Culture - Blood (collected 11 Jan 2024 21:20)  Source: .Blood Blood-Peripheral  Preliminary Report (14 Jan 2024 02:03):    No growth at 48 Hours        RADIOLOGY & ADDITIONAL TESTS:  Results Reviewed:   Imaging Personally Reviewed:  Electrocardiogram Personally Reviewed:    COORDINATION OF CARE:  Care Discussed with Consultants/Other Providers [Y/N]:  Prior or Outpatient Records Reviewed [Y/N]:

## 2024-01-14 NOTE — DIETITIAN INITIAL EVALUATION ADULT - OTHER INFO
84 yo F with PMH of hyperlipidemia, breast CA s/p mastectomies with mets to bone, neuropathy presents to ED c/o generalized weakness, confusion since Wednesday, admitted for metabolic encephalopathy in the setting of UTI.       Problem/Plan - 1:  ·  Problem: UTI (urinary tract infection).   ·  Plan: Patient presents with generalized weakness and metabolic encephalopathy 2/2 UTI  - UA: cloudy, trace ketone, 30 protein, positive nitrite, large LE moderate blood, bacteria TNTC, epithelial cells present  - Given IV Zosyn x1 dose, IV NS 1L bolus x 2 in ED  - Continue IV Rocephin  - Tylenol 650 mg PO q6h PRN fever or mild pain  - Trend WBC and monitor for fever  - F/u urine and blood cultures  - ID (Dr. Byers) consulted, f/u recs.  - Neurology consult     Problem/Plan - 2:  ·  Problem: Primary breast cancer with metastasis to other site.   ·  Plan: - Hx of breat CA s/p mastectomies with mets to bone  - On Capecitabine every other week, to be started on 1/13/24- nonformulary, pt's daughter instructed to bring med from home  - Follows with Heme/Onc Dr. Crystal Joyce.     Problem/Plan - 3:  ·  Problem: Neuropathy.   ·  Plan: - Continue home Duloxetine.     Problem/Plan - 4:  ·  Problem: Hyperlipidemia.   ·  Plan: - Continue home Atorvastatin.     Problem/Plan - 5:  ·  Problem: Need for prophylactic measure.   ·  Plan: DVT ppx: Lovenox. 82 yo F with PMH of hyperlipidemia, breast CA s/p mastectomies with mets to bone, neuropathy presents to ED c/o generalized weakness, confusion since Wednesday, admitted for metabolic encephalopathy in the setting of UTI.    At time of RD visit to pts bedside, pt states she lives with daughter, son and 2 granddaughters, eatrs well at home, is 5'5" tall # does not know of any recent wt change, feels constipated.  84 yo F with PMH of hyperlipidemia, breast CA s/p mastectomies with mets to bone, neuropathy presents to ED c/o generalized weakness, confusion since Wednesday, admitted for metabolic encephalopathy in the setting of UTI.    At time of RD visit to pts bedside, pt states she lives with daughter, son and 2 granddaughters, eatrs well at home, is 5'5" tall # does not know of any recent wt change, feels constipated.

## 2024-01-15 LAB
-  AMOXICILLIN/CLAVULANIC ACID: SIGNIFICANT CHANGE UP
-  AMOXICILLIN/CLAVULANIC ACID: SIGNIFICANT CHANGE UP
-  AMPICILLIN/SULBACTAM: SIGNIFICANT CHANGE UP
-  AMPICILLIN/SULBACTAM: SIGNIFICANT CHANGE UP
-  AMPICILLIN: SIGNIFICANT CHANGE UP
-  AMPICILLIN: SIGNIFICANT CHANGE UP
-  AZTREONAM: SIGNIFICANT CHANGE UP
-  AZTREONAM: SIGNIFICANT CHANGE UP
-  CEFAZOLIN: SIGNIFICANT CHANGE UP
-  CEFAZOLIN: SIGNIFICANT CHANGE UP
-  CEFEPIME: SIGNIFICANT CHANGE UP
-  CEFEPIME: SIGNIFICANT CHANGE UP
-  CEFOXITIN: SIGNIFICANT CHANGE UP
-  CEFOXITIN: SIGNIFICANT CHANGE UP
-  CEFTRIAXONE: SIGNIFICANT CHANGE UP
-  CEFTRIAXONE: SIGNIFICANT CHANGE UP
-  CEFUROXIME: SIGNIFICANT CHANGE UP
-  CEFUROXIME: SIGNIFICANT CHANGE UP
-  CIPROFLOXACIN: SIGNIFICANT CHANGE UP
-  CIPROFLOXACIN: SIGNIFICANT CHANGE UP
-  ERTAPENEM: SIGNIFICANT CHANGE UP
-  ERTAPENEM: SIGNIFICANT CHANGE UP
-  GENTAMICIN: SIGNIFICANT CHANGE UP
-  GENTAMICIN: SIGNIFICANT CHANGE UP
-  IMIPENEM: SIGNIFICANT CHANGE UP
-  IMIPENEM: SIGNIFICANT CHANGE UP
-  LEVOFLOXACIN: SIGNIFICANT CHANGE UP
-  LEVOFLOXACIN: SIGNIFICANT CHANGE UP
-  MEROPENEM: SIGNIFICANT CHANGE UP
-  MEROPENEM: SIGNIFICANT CHANGE UP
-  NITROFURANTOIN: SIGNIFICANT CHANGE UP
-  NITROFURANTOIN: SIGNIFICANT CHANGE UP
-  PIPERACILLIN/TAZOBACTAM: SIGNIFICANT CHANGE UP
-  PIPERACILLIN/TAZOBACTAM: SIGNIFICANT CHANGE UP
-  TOBRAMYCIN: SIGNIFICANT CHANGE UP
-  TOBRAMYCIN: SIGNIFICANT CHANGE UP
-  TRIMETHOPRIM/SULFAMETHOXAZOLE: SIGNIFICANT CHANGE UP
-  TRIMETHOPRIM/SULFAMETHOXAZOLE: SIGNIFICANT CHANGE UP
ANION GAP SERPL CALC-SCNC: 6 MMOL/L — SIGNIFICANT CHANGE UP (ref 5–17)
ANION GAP SERPL CALC-SCNC: 6 MMOL/L — SIGNIFICANT CHANGE UP (ref 5–17)
BUN SERPL-MCNC: 15 MG/DL — SIGNIFICANT CHANGE UP (ref 7–23)
BUN SERPL-MCNC: 15 MG/DL — SIGNIFICANT CHANGE UP (ref 7–23)
CALCIUM SERPL-MCNC: 9.1 MG/DL — SIGNIFICANT CHANGE UP (ref 8.5–10.1)
CALCIUM SERPL-MCNC: 9.1 MG/DL — SIGNIFICANT CHANGE UP (ref 8.5–10.1)
CHLORIDE SERPL-SCNC: 107 MMOL/L — SIGNIFICANT CHANGE UP (ref 96–108)
CHLORIDE SERPL-SCNC: 107 MMOL/L — SIGNIFICANT CHANGE UP (ref 96–108)
CO2 SERPL-SCNC: 26 MMOL/L — SIGNIFICANT CHANGE UP (ref 22–31)
CO2 SERPL-SCNC: 26 MMOL/L — SIGNIFICANT CHANGE UP (ref 22–31)
CREAT SERPL-MCNC: 0.49 MG/DL — LOW (ref 0.5–1.3)
CREAT SERPL-MCNC: 0.49 MG/DL — LOW (ref 0.5–1.3)
CULTURE RESULTS: ABNORMAL
CULTURE RESULTS: ABNORMAL
EGFR: 93 ML/MIN/1.73M2 — SIGNIFICANT CHANGE UP
EGFR: 93 ML/MIN/1.73M2 — SIGNIFICANT CHANGE UP
GLUCOSE SERPL-MCNC: 103 MG/DL — HIGH (ref 70–99)
GLUCOSE SERPL-MCNC: 103 MG/DL — HIGH (ref 70–99)
MAGNESIUM SERPL-MCNC: 2 MG/DL — SIGNIFICANT CHANGE UP (ref 1.6–2.6)
MAGNESIUM SERPL-MCNC: 2 MG/DL — SIGNIFICANT CHANGE UP (ref 1.6–2.6)
METHOD TYPE: SIGNIFICANT CHANGE UP
METHOD TYPE: SIGNIFICANT CHANGE UP
ORGANISM # SPEC MICROSCOPIC CNT: ABNORMAL
ORGANISM # SPEC MICROSCOPIC CNT: ABNORMAL
ORGANISM # SPEC MICROSCOPIC CNT: SIGNIFICANT CHANGE UP
ORGANISM # SPEC MICROSCOPIC CNT: SIGNIFICANT CHANGE UP
PHOSPHATE SERPL-MCNC: 3.1 MG/DL — SIGNIFICANT CHANGE UP (ref 2.5–4.5)
PHOSPHATE SERPL-MCNC: 3.1 MG/DL — SIGNIFICANT CHANGE UP (ref 2.5–4.5)
POTASSIUM SERPL-MCNC: 4 MMOL/L — SIGNIFICANT CHANGE UP (ref 3.5–5.3)
POTASSIUM SERPL-MCNC: 4 MMOL/L — SIGNIFICANT CHANGE UP (ref 3.5–5.3)
POTASSIUM SERPL-SCNC: 4 MMOL/L — SIGNIFICANT CHANGE UP (ref 3.5–5.3)
POTASSIUM SERPL-SCNC: 4 MMOL/L — SIGNIFICANT CHANGE UP (ref 3.5–5.3)
SODIUM SERPL-SCNC: 139 MMOL/L — SIGNIFICANT CHANGE UP (ref 135–145)
SODIUM SERPL-SCNC: 139 MMOL/L — SIGNIFICANT CHANGE UP (ref 135–145)
SPECIMEN SOURCE: SIGNIFICANT CHANGE UP
SPECIMEN SOURCE: SIGNIFICANT CHANGE UP

## 2024-01-15 PROCEDURE — 99232 SBSQ HOSP IP/OBS MODERATE 35: CPT

## 2024-01-15 RX ADMIN — DULOXETINE HYDROCHLORIDE 60 MILLIGRAM(S): 30 CAPSULE, DELAYED RELEASE ORAL at 11:26

## 2024-01-15 RX ADMIN — Medication 5 MILLIGRAM(S): at 11:26

## 2024-01-15 RX ADMIN — Medication 1 TABLET(S): at 11:30

## 2024-01-15 RX ADMIN — POLYETHYLENE GLYCOL 3350 17 GRAM(S): 17 POWDER, FOR SOLUTION ORAL at 11:27

## 2024-01-15 RX ADMIN — ATORVASTATIN CALCIUM 20 MILLIGRAM(S): 80 TABLET, FILM COATED ORAL at 22:54

## 2024-01-15 RX ADMIN — SENNA PLUS 2 TABLET(S): 8.6 TABLET ORAL at 22:54

## 2024-01-15 RX ADMIN — ERTAPENEM SODIUM 120 MILLIGRAM(S): 1 INJECTION, POWDER, LYOPHILIZED, FOR SOLUTION INTRAMUSCULAR; INTRAVENOUS at 17:43

## 2024-01-15 RX ADMIN — ENOXAPARIN SODIUM 40 MILLIGRAM(S): 100 INJECTION SUBCUTANEOUS at 04:17

## 2024-01-15 NOTE — PROGRESS NOTE ADULT - SUBJECTIVE AND OBJECTIVE BOX
Neurology Follow up note    TOBIN [id number]    HPI:  82 yo F with PMH of hyperlipidemia, breast CA s/p mastectomies with mets to bone, neuropathy presents to ED c/o generalized weakness, confusion since Wednesday. History is obtained from pt's daughter (Leyla) as pt is a poor historian. Since Wednesday afternoon, pt has been more lethargic, confused and weak than usual. Yesterday evening, pt had trouble coming down the stairs for dinner and required assistance from her family. Today, pt had trouble ambulating and was stumbling. She was brought to PCP where she tested negative for Flu. She had trouble eating dinner earlier this evening due to confusion. EMS was called and pt was found to be febrile Tmax 101F. Denies any sick contacts or recent travel.   ED course:  Vitals: T 101.8F, HR 85, BP 96/43, RR 18, SpO2 95% on RA  Labs: RBC 3.54, Na 132, pro-  UA: cloudy, trace ketone, 30 protein, positive nitrite, large LE moderate blood, bacteria TNTC, epithelial cells present  CXR: no acute chest disease per wet read  CT head: negative  Given: IV Ofirmev x1, IV Zosyn x1, 1L IV NS bolus x2 (11 Jan 2024 23:34)      Interval History -calmer today    Patient is seen, chart was reviewed and case was discussed with the treatment team.  Pt is not in any distress.   Lying on bed comfortably.   No events reported overnight.       Vital Signs Last 24 Hrs  T(C): 36.7 (15 Mono 2024 12:58), Max: 36.8 (15 Mono 2024 04:22)  T(F): 98.1 (15 Mono 2024 12:58), Max: 98.2 (15 Mono 2024 04:22)  HR: 82 (15 Mono 2024 10:07) (79 - 84)  BP: 118/58 (15 Mono 2024 10:07) (118/58 - 148/70)  BP(mean): --  RR: 20 (15 Mono 2024 10:07) (17 - 20)  SpO2: 91% (15 Mono 2024 10:07) (91% - 95%)    Parameters below as of 15 Mono 2024 10:07  Patient On (Oxygen Delivery Method): room air            REVIEW OF SYSTEMS:    Constitutional: No fever, weight loss or fatigue  Eyes: No eye pain, visual disturbances, or discharge  ENT:  No difficulty hearing, tinnitus, vertigo; No sinus or throat pain  Neck: No pain or stiffness  Respiratory: No c wheezing, chills or hemoptysis  Cardiovascular: No chest pain, palpitations, shortness of breath, dizziness or leg swelling  Gastrointestinal: No abdominal or epigastric pain. No nausea, vomiting or hematemesis; No diarrhea or constipation. No melena or hematochezia.  Genitourinary: No dysuria, , hematuria   Neurological: No headaches,  loss of strength, numbness or tremors  Psychiatric: No depression, anxiety, mood swings or difficulty sleeping  Musculoskeletal: No joint pain or swelling; No muscle, back or extremity pain  Skin: No itching, burning, rashes or lesions   Lymph Nodes: No enlarged glands  Endocrine: No heat or cold intolerance;   Allergy and Immunologic: No hives or eczema    On Neurological Examination:    Mental Status - Pt is alert, awake, oriented X2   Follows commands well and able to answer questions appropriately.Mood and affect  normal    Speech -  Normal.     Cranial Nerves - Pupils 3 mm equal and reactive to light, extraocular eye movements intact. Pt has no visual field deficit.  Pt has no facial asymmetry. Facial sensation is intact.Tongue - is in midline.    Muscle tone - is nor    Motor Exam - 4+/5 of UE  LE 4/5  , No drift. No shaking or tremors.    Sensory Exam - . Pt withdraws all extremities equally on stimulation. No asymmetry seen. No complaints of tingling, numbness.        coordination:    Finger to nose: normal      Deep tendon Reflexes - 2 plus all over.          Neck Supple -  Yes.     MEDICATIONS    acetaminophen     Tablet .. 650 milliGRAM(s) Oral every 6 hours PRN  atorvastatin 20 milliGRAM(s) Oral at bedtime  DULoxetine 60 milliGRAM(s) Oral daily  enoxaparin Injectable 40 milliGRAM(s) SubCutaneous every 24 hours  ertapenem  IVPB 1000 milliGRAM(s) IV Intermittent every 24 hours  influenza  Vaccine (HIGH DOSE) 0.7 milliLiter(s) IntraMuscular once  multivitamin/minerals 1 Tablet(s) Oral daily  oxybutynin 5 milliGRAM(s) Oral daily  polyethylene glycol 3350 17 Gram(s) Oral daily  senna 2 Tablet(s) Oral at bedtime      Allergies    No Known Allergies    Intolerances        LABS:  CBC Full  -  ( 14 Jan 2024 07:50 )  WBC Count : 8.62 K/uL  RBC Count : 3.51 M/uL  Hemoglobin : 11.5 g/dL  Hematocrit : 33.7 %  Platelet Count - Automated : 405 K/uL  Mean Cell Volume : 96.0 fl  Mean Cell Hemoglobin : 32.8 pg  Mean Cell Hemoglobin Concentration : 34.1 gm/dL  Auto Neutrophil # : x  Auto Lymphocyte # : x   x    Urinalysis Basic - ( 15 Mono 2024 07:23 )    Color: x / Appearance: x / SG: x / pH: x  Gluc: 103 mg/dL / Ketone: x  / Bili: x / Urobili: x   Blood: x / Protein: x / Nitrite: x   Leuk Esterase: x / RBC: x / WBC x   Sq Epi: x / Non Sq Epi: x / Bacteria: x      01-15    139  |  107  |  15  ----------------------------<  103<H>  4.0   |  26  |  0.49<L>    Ca    9.1      15 Mono 2024 07:23  Phos  3.1     01-15  Mg     2.0     01-15      Hemoglobin A1C:     Vitamin B12     RADIOLOGY    ASSESSMENT AND PLAN:      SEEN FOR AMS  LIKELY METABOLIC ENCEPHALOPATHY  UTI    Antibiotic as per ID  Physical therapy evaluation.  OOB to chair/ambulation with assistance only.  Pain is accessed and addressed.  Would continue to follow.               Neurology Follow up note    TOBIN [id number]    HPI:  82 yo F with PMH of hyperlipidemia, breast CA s/p mastectomies with mets to bone, neuropathy presents to ED c/o generalized weakness, confusion since Wednesday. History is obtained from pt's daughter (Leyla) as pt is a poor historian. Since Wednesday afternoon, pt has been more lethargic, confused and weak than usual. Yesterday evening, pt had trouble coming down the stairs for dinner and required assistance from her family. Today, pt had trouble ambulating and was stumbling. She was brought to PCP where she tested negative for Flu. She had trouble eating dinner earlier this evening due to confusion. EMS was called and pt was found to be febrile Tmax 101F. Denies any sick contacts or recent travel.   ED course:  Vitals: T 101.8F, HR 85, BP 96/43, RR 18, SpO2 95% on RA  Labs: RBC 3.54, Na 132, pro-  UA: cloudy, trace ketone, 30 protein, positive nitrite, large LE moderate blood, bacteria TNTC, epithelial cells present  CXR: no acute chest disease per wet read  CT head: negative  Given: IV Ofirmev x1, IV Zosyn x1, 1L IV NS bolus x2 (11 Jan 2024 23:34)      Interval History -calmer today    Patient is seen, chart was reviewed and case was discussed with the treatment team.  Pt is not in any distress.   Lying on bed comfortably.   No events reported overnight.       Vital Signs Last 24 Hrs  T(C): 36.7 (15 Mono 2024 12:58), Max: 36.8 (15 Mono 2024 04:22)  T(F): 98.1 (15 Mono 2024 12:58), Max: 98.2 (15 Mono 2024 04:22)  HR: 82 (15 Mono 2024 10:07) (79 - 84)  BP: 118/58 (15 Mono 2024 10:07) (118/58 - 148/70)  BP(mean): --  RR: 20 (15 Mono 2024 10:07) (17 - 20)  SpO2: 91% (15 Mono 2024 10:07) (91% - 95%)    Parameters below as of 15 Mono 2024 10:07  Patient On (Oxygen Delivery Method): room air            REVIEW OF SYSTEMS:    Constitutional: No fever, weight loss or fatigue  Eyes: No eye pain, visual disturbances, or discharge  ENT:  No difficulty hearing, tinnitus, vertigo; No sinus or throat pain  Neck: No pain or stiffness  Respiratory: No c wheezing, chills or hemoptysis  Cardiovascular: No chest pain, palpitations, shortness of breath, dizziness or leg swelling  Gastrointestinal: No abdominal or epigastric pain. No nausea, vomiting or hematemesis; No diarrhea or constipation. No melena or hematochezia.  Genitourinary: No dysuria, , hematuria   Neurological: No headaches,  loss of strength, numbness or tremors  Psychiatric: No depression, anxiety, mood swings or difficulty sleeping  Musculoskeletal: No joint pain or swelling; No muscle, back or extremity pain  Skin: No itching, burning, rashes or lesions   Lymph Nodes: No enlarged glands  Endocrine: No heat or cold intolerance;   Allergy and Immunologic: No hives or eczema    On Neurological Examination:    Mental Status - Pt is alert, awake, oriented X2   Follows commands well and able to answer questions appropriately.Mood and affect  normal    Speech -  Normal.     Cranial Nerves - Pupils 3 mm equal and reactive to light, extraocular eye movements intact. Pt has no visual field deficit.  Pt has no facial asymmetry. Facial sensation is intact.Tongue - is in midline.    Muscle tone - is nor    Motor Exam - 4+/5 of UE  LE 4/5  , No drift. No shaking or tremors.    Sensory Exam - . Pt withdraws all extremities equally on stimulation. No asymmetry seen. No complaints of tingling, numbness.        coordination:    Finger to nose: normal      Deep tendon Reflexes - 2 plus all over.          Neck Supple -  Yes.     MEDICATIONS    acetaminophen     Tablet .. 650 milliGRAM(s) Oral every 6 hours PRN  atorvastatin 20 milliGRAM(s) Oral at bedtime  DULoxetine 60 milliGRAM(s) Oral daily  enoxaparin Injectable 40 milliGRAM(s) SubCutaneous every 24 hours  ertapenem  IVPB 1000 milliGRAM(s) IV Intermittent every 24 hours  influenza  Vaccine (HIGH DOSE) 0.7 milliLiter(s) IntraMuscular once  multivitamin/minerals 1 Tablet(s) Oral daily  oxybutynin 5 milliGRAM(s) Oral daily  polyethylene glycol 3350 17 Gram(s) Oral daily  senna 2 Tablet(s) Oral at bedtime      Allergies    No Known Allergies    Intolerances        LABS:  CBC Full  -  ( 14 Jan 2024 07:50 )  WBC Count : 8.62 K/uL  RBC Count : 3.51 M/uL  Hemoglobin : 11.5 g/dL  Hematocrit : 33.7 %  Platelet Count - Automated : 405 K/uL  Mean Cell Volume : 96.0 fl  Mean Cell Hemoglobin : 32.8 pg  Mean Cell Hemoglobin Concentration : 34.1 gm/dL  Auto Neutrophil # : x  Auto Lymphocyte # : x   x    Urinalysis Basic - ( 15 Mono 2024 07:23 )    Color: x / Appearance: x / SG: x / pH: x  Gluc: 103 mg/dL / Ketone: x  / Bili: x / Urobili: x   Blood: x / Protein: x / Nitrite: x   Leuk Esterase: x / RBC: x / WBC x   Sq Epi: x / Non Sq Epi: x / Bacteria: x      01-15    139  |  107  |  15  ----------------------------<  103<H>  4.0   |  26  |  0.49<L>    Ca    9.1      15 Mono 2024 07:23  Phos  3.1     01-15  Mg     2.0     01-15      Hemoglobin A1C:     Vitamin B12     RADIOLOGY    ASSESSMENT AND PLAN:      SEEN FOR AMS  LIKELY METABOLIC ENCEPHALOPATHY  UTI    Antibiotic as per ID  Physical therapy evaluation.  OOB to chair/ambulation with assistance only.  Pain is accessed and addressed.  Would continue to follow.               Neurology Follow up note    TOBIN [id number]    HPI:  84 yo F with PMH of hyperlipidemia, breast CA s/p mastectomies with mets to bone, neuropathy presents to ED c/o generalized weakness, confusion since Wednesday. History is obtained from pt's daughter (Leyla) as pt is a poor historian. Since Wednesday afternoon, pt has been more lethargic, confused and weak than usual. Yesterday evening, pt had trouble coming down the stairs for dinner and required assistance from her family. Today, pt had trouble ambulating and was stumbling. She was brought to PCP where she tested negative for Flu. She had trouble eating dinner earlier this evening due to confusion. EMS was called and pt was found to be febrile Tmax 101F. Denies any sick contacts or recent travel.   ED course:  Vitals: T 101.8F, HR 85, BP 96/43, RR 18, SpO2 95% on RA  Labs: RBC 3.54, Na 132, pro-  UA: cloudy, trace ketone, 30 protein, positive nitrite, large LE moderate blood, bacteria TNTC, epithelial cells present  CXR: no acute chest disease per wet read  CT head: negative  Given: IV Ofirmev x1, IV Zosyn x1, 1L IV NS bolus x2 (11 Jan 2024 23:34)      Interval History -calmer today    Patient is seen, chart was reviewed and case was discussed with the treatment team.  Pt is not in any distress.   Lying on bed comfortably.   No events reported overnight.       Vital Signs Last 24 Hrs  T(C): 36.7 (15 Mono 2024 12:58), Max: 36.8 (15 Mono 2024 04:22)  T(F): 98.1 (15 Mono 2024 12:58), Max: 98.2 (15 Mono 2024 04:22)  HR: 82 (15 Mono 2024 10:07) (79 - 84)  BP: 118/58 (15 Mono 2024 10:07) (118/58 - 148/70)  BP(mean): --  RR: 20 (15 Mono 2024 10:07) (17 - 20)  SpO2: 91% (15 Mono 2024 10:07) (91% - 95%)    Parameters below as of 15 Mono 2024 10:07  Patient On (Oxygen Delivery Method): room air            REVIEW OF SYSTEMS:    Constitutional: No fever, weight loss or fatigue  Eyes: No eye pain, visual disturbances, or discharge  ENT:  No difficulty hearing, tinnitus, vertigo; No sinus or throat pain  Neck: No pain or stiffness  Respiratory: No c wheezing, chills or hemoptysis  Cardiovascular: No chest pain, palpitations, shortness of breath, dizziness or leg swelling  Gastrointestinal: No abdominal or epigastric pain. No nausea, vomiting or hematemesis; No diarrhea or constipation. No melena or hematochezia.  Genitourinary: No dysuria, , hematuria   Neurological: No headaches,  loss of strength, numbness or tremors  Psychiatric: No depression, anxiety, mood swings or difficulty sleeping  Musculoskeletal: No joint pain or swelling; No muscle, back or extremity pain  Skin: No itching, burning, rashes or lesions   Lymph Nodes: No enlarged glands  Endocrine: No heat or cold intolerance;   Allergy and Immunologic: No hives or eczema    On Neurological Examination:    Mental Status - Pt is alert, awake, oriented X2   Follows commands well and able to answer questions appropriately.Mood and affect  normal    Speech -  Normal.     Cranial Nerves - Pupils 3 mm equal and reactive to light, extraocular eye movements intact. Pt has no visual field deficit.  Pt has no facial asymmetry. Facial sensation is intact.Tongue - is in midline.    Muscle tone - is nor    Motor Exam - 4+/5 of UE  LE 4/5  , No drift. No shaking or tremors.    Sensory Exam - . Pt withdraws all extremities equally on stimulation. No asymmetry seen. No complaints of tingling, numbness.        coordination:    Finger to nose: normal      Deep tendon Reflexes - 2 plus all over.          Neck Supple -  Yes.     MEDICATIONS    acetaminophen     Tablet .. 650 milliGRAM(s) Oral every 6 hours PRN  atorvastatin 20 milliGRAM(s) Oral at bedtime  DULoxetine 60 milliGRAM(s) Oral daily  enoxaparin Injectable 40 milliGRAM(s) SubCutaneous every 24 hours  ertapenem  IVPB 1000 milliGRAM(s) IV Intermittent every 24 hours  influenza  Vaccine (HIGH DOSE) 0.7 milliLiter(s) IntraMuscular once  multivitamin/minerals 1 Tablet(s) Oral daily  oxybutynin 5 milliGRAM(s) Oral daily  polyethylene glycol 3350 17 Gram(s) Oral daily  senna 2 Tablet(s) Oral at bedtime      Allergies    No Known Allergies    Intolerances        LABS:  CBC Full  -  ( 14 Jan 2024 07:50 )  WBC Count : 8.62 K/uL  RBC Count : 3.51 M/uL  Hemoglobin : 11.5 g/dL  Hematocrit : 33.7 %  Platelet Count - Automated : 405 K/uL  Mean Cell Volume : 96.0 fl  Mean Cell Hemoglobin : 32.8 pg  Mean Cell Hemoglobin Concentration : 34.1 gm/dL  Auto Neutrophil # : x  Auto Lymphocyte # : x   x    Urinalysis Basic - ( 15 Mono 2024 07:23 )    Color: x / Appearance: x / SG: x / pH: x  Gluc: 103 mg/dL / Ketone: x  / Bili: x / Urobili: x   Blood: x / Protein: x / Nitrite: x   Leuk Esterase: x / RBC: x / WBC x   Sq Epi: x / Non Sq Epi: x / Bacteria: x      01-15    139  |  107  |  15  ----------------------------<  103<H>  4.0   |  26  |  0.49<L>    Ca    9.1      15 Mono 2024 07:23  Phos  3.1     01-15  Mg     2.0     01-15      Hemoglobin A1C:     Vitamin B12     RADIOLOGY    ASSESSMENT AND PLAN:      SEEN FOR AMS  LIKELY METABOLIC ENCEPHALOPATHY  UTI    Antibiotic as per ID  Physical therapy evaluation.  OOB to chair/ambulation with assistance only.  Pain is accessed and addressed.  Would continue to follow.

## 2024-01-15 NOTE — PATIENT CHOICE NOTE. - NSPTCHOICESTATE_GEN_ALL_CORE
I have met with the patient and/or caregiver to discuss discharge goals and treatment plan. Patient and/or caregiver also provided with instructions on accessing the CMS Compare websites for additional information related to Post Acute Provider quality and resource use measures to assist them in evaluation of the providers and in selecting their post-acute provider of choice. Patient and caregiver were informed of the facilities that are owned and/or operated by [facility]. I have discussed with the patient the availability of in-network facilities and providers. Patient and caregiver provided with a list of post-acute providers whose services are appropriate to the discharge plans and patient needs.     For patient requiring durable medical equipment, patient and/or caregiver were informed that they have the right to request who provides the required equipment. I have met with the patient and/or caregiver to discuss discharge goals and treatment plan. Patient and/or caregiver also provided with instructions on accessing the CMS Compare websites for additional information related to Post Acute Provider quality and resource use measures to assist them in evaluation of the providers and in selecting their post-acute provider of choice. Patient and caregiver were informed of the facilities that are owned and/or operated by [facility]. I have discussed with the patient the availability of in-network facilities and providers. Patient and caregiver provided with a list of post-acute providers whose services are appropriate to the discharge plans and patient needs.     For patient requiring durable medical equipment, patient and/or caregiver were informed that they have the right to request who provides the required equipment. I have met with the patient and/or caregiver to discuss discharge goals and treatment plan. Patient and/or caregiver also provided with instructions on accessing the CMS Compare websites for additional information related to Post Acute Provider quality and resource use measures to assist them in evaluation of the providers and in selecting their post-acute provider of choice. Patient and caregiver were informed of the facilities that are owned and/or operated by [facility]. I have discussed with the patient the availability of in-network facilities and providers. Patient and caregiver provided with a list of post-acute providers whose services are appropriate to the discharge plans and patient needs.     For patient requiring durable medical equipment, patient and/or caregiver were informed that they have the right to request who provides the required equipment.

## 2024-01-15 NOTE — PROGRESS NOTE ADULT - SUBJECTIVE AND OBJECTIVE BOX
"Subjective:       Patient ID: Karmen Berg is a 87 y.o. male.    Vitals:  height is 5' 11" (1.803 m) and weight is 85.7 kg (189 lb). His oral temperature is 97.6 °F (36.4 °C). His blood pressure is 150/87 (abnormal) and his pulse is 58 (abnormal). His respiration is 16 and oxygen saturation is 98%.     Chief Complaint: Mouth Lesions    Pt states lip sores for 4-5days stating looking better but roof of mouth he is concerned. Denies fever or exposure to COVID. OTC meds not tried    Mouth Lesions   The current episode started 5 to 7 days ago. The onset was gradual. The problem occurs occasionally. The problem has been rapidly improving. The problem is mild. Nothing relieves the symptoms. Nothing aggravates the symptoms. Associated symptoms include mouth sores. Pertinent negatives include no fever, no double vision, no diarrhea, no nausea, no vomiting, no congestion, no headaches, no sore throat, no cough and no rash.       Constitution: Negative for chills, fatigue and fever.   HENT: Positive for mouth sores. Negative for congestion and sore throat.    Neck: Negative for painful lymph nodes.   Cardiovascular: Negative for chest pain and leg swelling.   Eyes: Negative for double vision and blurred vision.   Respiratory: Negative for cough and shortness of breath.    Gastrointestinal: Negative for nausea, vomiting and diarrhea.   Genitourinary: Negative for dysuria, frequency and urgency.   Musculoskeletal: Negative for joint pain, joint swelling, muscle cramps and muscle ache.   Skin: Negative for color change, pale and rash.   Allergic/Immunologic: Negative for seasonal allergies.   Neurological: Negative for dizziness, history of vertigo, light-headedness, passing out and headaches.   Hematologic/Lymphatic: Negative for swollen lymph nodes, easy bruising/bleeding and history of blood clots. Does not bruise/bleed easily.   Psychiatric/Behavioral: Negative for nervous/anxious, sleep disturbance and depression. The " patient is not nervous/anxious.        Objective:      Physical Exam   Constitutional: He is oriented to person, place, and time. He appears well-developed. He is cooperative.  Non-toxic appearance. He does not appear ill. No distress.   HENT:   Head: Normocephalic and atraumatic.   Ears:   Right Ear: Hearing, tympanic membrane, external ear and ear canal normal.   Left Ear: Hearing, tympanic membrane, external ear and ear canal normal.   Nose: Nose normal. No mucosal edema, rhinorrhea or nasal deformity. No epistaxis. Right sinus exhibits no maxillary sinus tenderness and no frontal sinus tenderness. Left sinus exhibits no maxillary sinus tenderness and no frontal sinus tenderness.   Mouth/Throat: Uvula is midline and oropharynx is clear and moist. Mucous membranes are dry. Oral lesions present. No trismus in the jaw. Normal dentition. No uvula swelling. No oropharyngeal exudate, posterior oropharyngeal edema or posterior oropharyngeal erythema.       Eyes: Conjunctivae and lids are normal. No scleral icterus.   Neck: Trachea normal, full passive range of motion without pain and phonation normal. Neck supple. No neck rigidity. No edema and no erythema present.   Cardiovascular: Normal rate, regular rhythm, normal heart sounds and normal pulses. Exam reveals no gallop and no friction rub.   No murmur heard.  Pulmonary/Chest: Effort normal and breath sounds normal. No stridor. No respiratory distress. He has no decreased breath sounds. He has no wheezes. He has no rhonchi. He has no rales.   Abdominal: Normal appearance.   Musculoskeletal: Normal range of motion.         General: No deformity.   Neurological: He is alert and oriented to person, place, and time. He exhibits normal muscle tone. Coordination normal.   Skin: Skin is warm, dry, intact, not diaphoretic and not pale. Psychiatric: His speech is normal and behavior is normal. Judgment and thought content normal.   Nursing note and vitals reviewed.         Assessment:       1. Aphthous stomatitis        Plan:         Aphthous stomatitis  -     (Magic mouthwash) 1:1:1 Benadryl 12.5mg/5ml liq, aluminum & magnesium hydroxide-simehticone (Maalox), lidocaine viscous 2%; Swish and spit 10 mLs every 4 (four) hours as needed. for mouth sores  Dispense: 360 mL; Refill: 0      Patient Instructions     Canker Sore    A canker sore (also called an aphthous ulcer) is a painful sore on the lining of the mouth. It is most painful during the first few days, and it lasts about 7 to 14 days before going away.  Causes  Canker sores are not cold sores or fever blisters. They are not contagious, so they are not spread by contact. The exact cause of canker sores is not clear, but there are a number of things that can trigger them in different people.  · Mild injury, such as biting the inside of the mouth, lip, or cheek, or dental procedures  · Stress  · Poor diet, or lack of certain nutrients, including B vitamins and iron  · Foods that can irritate the mouth, including tomatoes, citrus fruits, and some nuts (foods that are acidic or contain bitter substances called tannins)  · Irritating chemicals, such as those in some toothpastes and mouthwashes  · Certain chronic illnesses  Symptoms  Canker sores are found on the lining of the mouth. They can be inside the cheeks or lips, on the roof of the mouth, at the base of the gums, on the tongue, or in the back of the throat. Canker sores typically have these characteristics:  · Small, flat (not raised) sores  · Can be white or yellowish bumps that are red around the edges or have a red halo  · Usually small in size, roundish, and in groups  · Accompanied by pain or burning  Canker sores do not leave a scar. But they usually come back.  Home care  The goals of canker sore treatment are to decrease the pain, speed healing, and prevent recurrence. No single treatment works for everyone. Try a number of techniques to see what works best.  General  care  · You may find that soft, easy-to-chew foods cause less pain. Use a straw to direct liquids away from the sore.  · Use a soft-bristle toothbrush, and brush your teeth gently.  · Avoid acidic, salty, or spicy foods.  · Avoid injuring the inside of your mouth, or scraping your existing canker sores, by avoiding crusty and crunchy foods like french bread and chips.  Medicines  You can try over-the-counter medicines that cover the sores and numb them. This protects the sores while they heal and helps reduce pain.  Homemade rinses and solutions  You can use these solutions as mouth rinses. Spit them out after using them. You can also dab them on the sores. You can repeat these treatments as often as needed.  · Rinse your mouth with saltwater.  · Mix equal amounts of hydrogen peroxide and water. You can use it as a mouthwash or dab it on spots with a cotton swab. You can also add sodium bicarbonate to this to make a paste, and then dab it on spots.  Follow-up care  Follow up with your healthcare provider, or as advised.  · If a culture was done, you will be notified if the treatment needs to be changed. You can call as directed for the results.  Call 911  Contact emergency services if any of these occur:  · Trouble breathing  · Inability to swallow  · Extreme drowsiness or trouble awakening  · Fainting or loss of consciousness  · Rapid heart rate  · Seizure  · Stiff neck  When to seek medical advice  Call your healthcare provider right away if any of these occur:  · You have a fever of 100.4°F (38°C) or higher.  · You are pregnant.  · You just had surgery or another medical procedure, or you were just discharged from the hospital.  · You are unable to eat or swallow due to pain.  Date Last Reviewed: 7/30/2015  © 7647-3769 [email or web address]. [address] [place] [address], [place] [address]. All rights reserved. This information is not intended as a substitute for professional medical care. Always follow your  healthcare professional's instructions.      Please follow up with your Primary care provider within 2-5 days if your signs and symptoms have not resolved or worsen.     If your condition worsens or fails to improve we recommend that you receive another evaluation at the emergency room immediately or contact your primary medical clinic to discuss your concerns.   You must understand that you have received an Urgent Care treatment only and that you may be released before all of your medical problems are known or treated. You, the patient, will arrange for follow up care as instructed.     RED FLAGS/WARNING SYMPTOMS DISCUSSED WITH PATIENT THAT WOULD WARRANT EMERGENT MEDICAL ATTENTION. PATIENT VERBALIZED UNDERSTANDING.                   Patient is a 83y old  Female who presents with a chief complaint of metabolic encephalopathy in the setting of UTI (15 Mono 2024 04:12)      SUBJECTIVE / OVERNIGHT EVENTS: Patient seen and examined at bedside. No acute events overnight. Feels well without abdominal pain, dysuria, or polyuria. She was lightheaded yesterday when working with PT.    MEDICATIONS  (STANDING):  atorvastatin 20 milliGRAM(s) Oral at bedtime  DULoxetine 60 milliGRAM(s) Oral daily  enoxaparin Injectable 40 milliGRAM(s) SubCutaneous every 24 hours  ertapenem  IVPB 1000 milliGRAM(s) IV Intermittent every 24 hours  influenza  Vaccine (HIGH DOSE) 0.7 milliLiter(s) IntraMuscular once  multivitamin/minerals 1 Tablet(s) Oral daily  oxybutynin 5 milliGRAM(s) Oral daily  polyethylene glycol 3350 17 Gram(s) Oral daily  senna 2 Tablet(s) Oral at bedtime    MEDICATIONS  (PRN):  acetaminophen     Tablet .. 650 milliGRAM(s) Oral every 6 hours PRN Temp greater or equal to 38C (100.4F), Mild Pain (1 - 3)      CAPILLARY BLOOD GLUCOSE        I&O's Summary    14 Jan 2024 07:01  -  15 Mono 2024 07:00  --------------------------------------------------------  IN: 975 mL / OUT: 0 mL / NET: 975 mL        PHYSICAL EXAM:  Vital Signs Last 24 Hrs  T(C): 36.8 (15 Mono 2024 04:22), Max: 36.8 (15 Mono 2024 04:22)  T(F): 98.2 (15 Mono 2024 04:22), Max: 98.2 (15 Mono 2024 04:22)  HR: 82 (15 Mono 2024 10:07) (76 - 84)  BP: 118/58 (15 Mono 2024 10:07) (98/56 - 148/70)  BP(mean): --  RR: 20 (15 Mono 2024 10:07) (17 - 20)  SpO2: 91% (15 Mono 2024 10:07) (91% - 95%)    Parameters below as of 15 Mono 2024 10:07  Patient On (Oxygen Delivery Method): room air        GEN: female in NAD, appears comfortable, no diaphoresis  EYES: No scleral injection, EOMI  ENTM: neck supple & symmetric without tracheal deviation, moist membranes, no gross hearing impairment, thyroid gland not enlarged  CV: +S1/S2, no m/r/g, no abdominal bruit, no LE edema  RESP: breathing comfortably, no respiratory accessory muscle use, CTAB, no w/r/r  GI: normoactive BS, soft, NTND, no rebounding/guarding, no palpable masses    LABS:                        11.5   8.62  )-----------( 405      ( 14 Jan 2024 07:50 )             33.7     01-15    139  |  107  |  15  ----------------------------<  103<H>  4.0   |  26  |  0.49<L>    Ca    9.1      15 Mono 2024 07:23  Phos  3.1     01-15  Mg     2.0     01-15            Urinalysis Basic - ( 15 Mono 2024 07:23 )    Color: x / Appearance: x / SG: x / pH: x  Gluc: 103 mg/dL / Ketone: x  / Bili: x / Urobili: x   Blood: x / Protein: x / Nitrite: x   Leuk Esterase: x / RBC: x / WBC x   Sq Epi: x / Non Sq Epi: x / Bacteria: x        Culture - Blood (collected 12 Jan 2024 15:40)  Source: .Blood Blood-Peripheral  Preliminary Report (14 Jan 2024 22:01):    No growth at 48 Hours    Culture - Blood (collected 12 Jan 2024 15:35)  Source: .Blood Blood-Venous  Preliminary Report (14 Jan 2024 22:02):    No growth at 48 Hours        RADIOLOGY & ADDITIONAL TESTS:  Results Reviewed:   Imaging Personally Reviewed:  Electrocardiogram Personally Reviewed:    COORDINATION OF CARE:  Care Discussed with Consultants/Other Providers [Y/N]:  Prior or Outpatient Records Reviewed [Y/N]:

## 2024-01-15 NOTE — PROGRESS NOTE ADULT - SUBJECTIVE AND OBJECTIVE BOX
[facility], Division of Infectious Diseases  SURYA Reaves Y. Patel, S. Shah, G. [facility]  [phone]    Name: TOBIN LEON  Age: 83y  Gender: Female  MRN: [id number]    Interval History:  Patient seen and examined at bedside  No acute overnight events.   Notes reviewed    Antibiotics:  ertapenem  IVPB 1000 milliGRAM(s) IV Intermittent every 24 hours      Medications:  acetaminophen     Tablet .. 650 milliGRAM(s) Oral every 6 hours PRN  atorvastatin 20 milliGRAM(s) Oral at bedtime  DULoxetine 60 milliGRAM(s) Oral daily  enoxaparin Injectable 40 milliGRAM(s) SubCutaneous every 24 hours  ertapenem  IVPB 1000 milliGRAM(s) IV Intermittent every 24 hours  influenza  Vaccine (HIGH DOSE) 0.7 milliLiter(s) IntraMuscular once  multivitamin/minerals 1 Tablet(s) Oral daily  oxybutynin 5 milliGRAM(s) Oral daily  polyethylene glycol 3350 17 Gram(s) Oral daily  senna 2 Tablet(s) Oral at bedtime      Review of Systems:  Review of systems otherwise negative except as previously noted.    Allergies: No Known Allergies    For details regarding the patient's past medical history, social history, family history, and other miscellaneous elements, please refer the initial infectious diseases consultation and/or the admitting history and physical examination for this admission.    Objective:  Vitals:   T(C): 36.7 (01-15-24 @ 12:58), Max: 36.8 (01-15-24 @ 04:22)  HR: 82 (01-15-24 @ 10:07) (79 - 84)  BP: 118/58 (01-15-24 @ 10:07) (118/58 - 148/70)  RR: 20 (01-15-24 @ 10:07) (17 - 20)  SpO2: 91% (01-15-24 @ 10:07) (91% - 95%)    Physical Examination:  General: no acute distress  HEENT: NC/AT, EOMI,   Cardio: S1, S2 heard, RRR, no murmurs  Resp: decreased breath sounds  Abd: soft, NT, ND  Ext: no edema or cyanosis  Skin: warm, dry, no visible rash      Laboratory Studies:  CBC:                       11.5   8.62  )-----------( 405      ( 14 Jan 2024 07:50 )             33.7     CMP: 01-15    139  |  107  |  15  ----------------------------<  103<H>  4.0   |  26  |  0.49<L>    Ca    9.1      15 Mono 2024 07:23  Phos  3.1     01-15  Mg     2.0     01-15        Urinalysis Basic - ( 15 Mono 2024 07:23 )    Color: x / Appearance: x / SG: x / pH: x  Gluc: 103 mg/dL / Ketone: x  / Bili: x / Urobili: x   Blood: x / Protein: x / Nitrite: x   Leuk Esterase: x / RBC: x / WBC x   Sq Epi: x / Non Sq Epi: x / Bacteria: x        Microbiology: reviewed    Culture - Blood (collected 01-12-24 @ 15:40)  Source: .Blood Blood-Peripheral  Preliminary Report (01-14-24 @ 22:01):    No growth at 48 Hours    Culture - Blood (collected 01-12-24 @ 15:35)  Source: .Blood Blood-Venous  Preliminary Report (01-14-24 @ 22:02):    No growth at 48 Hours    Culture - Urine (collected 01-11-24 @ 23:02)  Source: Clean Catch Clean Catch (Midstream)  Final Report (01-15-24 @ 08:29):    >100,000 CFU/ml Escherichia coli  Organism: Escherichia coli (01-15-24 @ 08:29)  Organism: Escherichia coli (01-15-24 @ 08:29)      Method Type: DAVIS      -  Amoxicillin/Clavulanic Acid: S <=8/4      -  Ampicillin: S <=8 These ampicillin results predict results for amoxicillin      -  Ampicillin/Sulbactam: S <=4/2      -  Aztreonam: S <=4      -  Cefazolin: S 8 For uncomplicated UTI with K. pneumoniae, E. coli, or P. mirablis: DAVIS <=16 is sensitive and DAVIS >=32 is resistant. This also predicts results for oral agents cefaclor, cefdinir, cefpodoxime, cefprozil, cefuroxime axetil, cephalexin and locarbef for uncomplicated UTI. Note that some isolates may be susceptible to these agents while testing resistant to cefazolin.      -  Cefepime: S <=2      -  Cefoxitin: R >16      -  Ceftriaxone: S <=1      -  Cefuroxime: S 8      -  Ciprofloxacin: R >2      -  Ertapenem: S <=0.5      -  Gentamicin: S <=2      -  Imipenem: S <=1      -  Levofloxacin: R >4      -  Meropenem: S <=1      -  Nitrofurantoin: S <=32 Should not be used to treat pyelonephritis      -  Piperacillin/Tazobactam: S <=8      -  Tobramycin: S <=2      -  Trimethoprim/Sulfamethoxazole: S 2/38    Culture - Blood (collected 01-11-24 @ 21:20)  Source: .Blood Blood-Peripheral  Gram Stain (01-12-24 @ 14:46):    Growth in aerobic bottle: Gram Negative Rods  Final Report (01-14-24 @ 07:30):    Growth in aerobic bottle: Escherichia coli ESBL    Direct identification is available within approximately 3-5    hours either by Blood Panel Multiplexed PCR or Direct    MALDI-TOF. Details: [email or web address]  Organism: Blood Culture PCR  Escherichia coli ESBL (01-14-24 @ 07:30)  Organism: Escherichia coli ESBL (01-14-24 @ 07:30)      Method Type: DAVIS      -  Ampicillin: R >16 These ampicillin results predict results for amoxicillin      -  Ampicillin/Sulbactam: R 16/8      -  Aztreonam: R >16      -  Cefazolin: R >16      -  Cefepime: R >16      -  Ceftriaxone: R >32      -  Ciprofloxacin: R >2      -  Ertapenem: S <=0.5      -  Gentamicin: S <=2      -  Imipenem: S <=1      -  Levofloxacin: R >4      -  Meropenem: S <=1      -  Piperacillin/Tazobactam: R <=8      -  Tobramycin: S <=2      -  Trimethoprim/Sulfamethoxazole: R >2/38  Organism: Blood Culture PCR (01-14-24 @ 07:30)      Method Type: PCR      -  Escherichia coli: Detec      -  ESBL: Detec      -  CTX-M Resistance Marker: Detec    Culture - Blood (collected 01-11-24 @ 21:20)  Source: .Blood Blood-Peripheral  Preliminary Report (01-15-24 @ 02:02):    No growth at 72 Hours          Radiology: reviewed       [facility], Division of Infectious Diseases  SURYA Reaves Y. Patel, S. Shah, G. [facility]  [phone]    Name: TOBIN LEON  Age: 83y  Gender: Female  MRN: [id number]    Interval History:  Patient seen and examined at bedside  No acute overnight events.   Notes reviewed    Antibiotics:  ertapenem  IVPB 1000 milliGRAM(s) IV Intermittent every 24 hours      Medications:  acetaminophen     Tablet .. 650 milliGRAM(s) Oral every 6 hours PRN  atorvastatin 20 milliGRAM(s) Oral at bedtime  DULoxetine 60 milliGRAM(s) Oral daily  enoxaparin Injectable 40 milliGRAM(s) SubCutaneous every 24 hours  ertapenem  IVPB 1000 milliGRAM(s) IV Intermittent every 24 hours  influenza  Vaccine (HIGH DOSE) 0.7 milliLiter(s) IntraMuscular once  multivitamin/minerals 1 Tablet(s) Oral daily  oxybutynin 5 milliGRAM(s) Oral daily  polyethylene glycol 3350 17 Gram(s) Oral daily  senna 2 Tablet(s) Oral at bedtime      Review of Systems:  Review of systems otherwise negative except as previously noted.    Allergies: No Known Allergies    For details regarding the patient's past medical history, social history, family history, and other miscellaneous elements, please refer the initial infectious diseases consultation and/or the admitting history and physical examination for this admission.    Objective:  Vitals:   T(C): 36.7 (01-15-24 @ 12:58), Max: 36.8 (01-15-24 @ 04:22)  HR: 82 (01-15-24 @ 10:07) (79 - 84)  BP: 118/58 (01-15-24 @ 10:07) (118/58 - 148/70)  RR: 20 (01-15-24 @ 10:07) (17 - 20)  SpO2: 91% (01-15-24 @ 10:07) (91% - 95%)    Physical Examination:  General: no acute distress  HEENT: NC/AT, EOMI,   Cardio: S1, S2 heard, RRR, no murmurs  Resp: decreased breath sounds  Abd: soft, NT, ND  Ext: no edema or cyanosis  Skin: warm, dry, no visible rash      Laboratory Studies:  CBC:                       11.5   8.62  )-----------( 405      ( 14 Jan 2024 07:50 )             33.7     CMP: 01-15    139  |  107  |  15  ----------------------------<  103<H>  4.0   |  26  |  0.49<L>    Ca    9.1      15 Mono 2024 07:23  Phos  3.1     01-15  Mg     2.0     01-15        Urinalysis Basic - ( 15 Mono 2024 07:23 )    Color: x / Appearance: x / SG: x / pH: x  Gluc: 103 mg/dL / Ketone: x  / Bili: x / Urobili: x   Blood: x / Protein: x / Nitrite: x   Leuk Esterase: x / RBC: x / WBC x   Sq Epi: x / Non Sq Epi: x / Bacteria: x        Microbiology: reviewed    Culture - Blood (collected 01-12-24 @ 15:40)  Source: .Blood Blood-Peripheral  Preliminary Report (01-14-24 @ 22:01):    No growth at 48 Hours    Culture - Blood (collected 01-12-24 @ 15:35)  Source: .Blood Blood-Venous  Preliminary Report (01-14-24 @ 22:02):    No growth at 48 Hours    Culture - Urine (collected 01-11-24 @ 23:02)  Source: Clean Catch Clean Catch (Midstream)  Final Report (01-15-24 @ 08:29):    >100,000 CFU/ml Escherichia coli  Organism: Escherichia coli (01-15-24 @ 08:29)  Organism: Escherichia coli (01-15-24 @ 08:29)      Method Type: DAVIS      -  Amoxicillin/Clavulanic Acid: S <=8/4      -  Ampicillin: S <=8 These ampicillin results predict results for amoxicillin      -  Ampicillin/Sulbactam: S <=4/2      -  Aztreonam: S <=4      -  Cefazolin: S 8 For uncomplicated UTI with K. pneumoniae, E. coli, or P. mirablis: DAVIS <=16 is sensitive and DAVIS >=32 is resistant. This also predicts results for oral agents cefaclor, cefdinir, cefpodoxime, cefprozil, cefuroxime axetil, cephalexin and locarbef for uncomplicated UTI. Note that some isolates may be susceptible to these agents while testing resistant to cefazolin.      -  Cefepime: S <=2      -  Cefoxitin: R >16      -  Ceftriaxone: S <=1      -  Cefuroxime: S 8      -  Ciprofloxacin: R >2      -  Ertapenem: S <=0.5      -  Gentamicin: S <=2      -  Imipenem: S <=1      -  Levofloxacin: R >4      -  Meropenem: S <=1      -  Nitrofurantoin: S <=32 Should not be used to treat pyelonephritis      -  Piperacillin/Tazobactam: S <=8      -  Tobramycin: S <=2      -  Trimethoprim/Sulfamethoxazole: S 2/38    Culture - Blood (collected 01-11-24 @ 21:20)  Source: .Blood Blood-Peripheral  Gram Stain (01-12-24 @ 14:46):    Growth in aerobic bottle: Gram Negative Rods  Final Report (01-14-24 @ 07:30):    Growth in aerobic bottle: Escherichia coli ESBL    Direct identification is available within approximately 3-5    hours either by Blood Panel Multiplexed PCR or Direct    MALDI-TOF. Details: [email or web address]  Organism: Blood Culture PCR  Escherichia coli ESBL (01-14-24 @ 07:30)  Organism: Escherichia coli ESBL (01-14-24 @ 07:30)      Method Type: DAVIS      -  Ampicillin: R >16 These ampicillin results predict results for amoxicillin      -  Ampicillin/Sulbactam: R 16/8      -  Aztreonam: R >16      -  Cefazolin: R >16      -  Cefepime: R >16      -  Ceftriaxone: R >32      -  Ciprofloxacin: R >2      -  Ertapenem: S <=0.5      -  Gentamicin: S <=2      -  Imipenem: S <=1      -  Levofloxacin: R >4      -  Meropenem: S <=1      -  Piperacillin/Tazobactam: R <=8      -  Tobramycin: S <=2      -  Trimethoprim/Sulfamethoxazole: R >2/38  Organism: Blood Culture PCR (01-14-24 @ 07:30)      Method Type: PCR      -  Escherichia coli: Detec      -  ESBL: Detec      -  CTX-M Resistance Marker: Detec    Culture - Blood (collected 01-11-24 @ 21:20)  Source: .Blood Blood-Peripheral  Preliminary Report (01-15-24 @ 02:02):    No growth at 72 Hours          Radiology: reviewed       [facility], Division of Infectious Diseases  SURYA Reaves Y. Patel, S. Shah, G. [facility]  [phone]    Name: TOBIN LEON  Age: 83y  Gender: Female  MRN: [id number]    Interval History:  Patient seen and examined at bedside  No acute overnight events.   Notes reviewed    Antibiotics:  ertapenem  IVPB 1000 milliGRAM(s) IV Intermittent every 24 hours      Medications:  acetaminophen     Tablet .. 650 milliGRAM(s) Oral every 6 hours PRN  atorvastatin 20 milliGRAM(s) Oral at bedtime  DULoxetine 60 milliGRAM(s) Oral daily  enoxaparin Injectable 40 milliGRAM(s) SubCutaneous every 24 hours  ertapenem  IVPB 1000 milliGRAM(s) IV Intermittent every 24 hours  influenza  Vaccine (HIGH DOSE) 0.7 milliLiter(s) IntraMuscular once  multivitamin/minerals 1 Tablet(s) Oral daily  oxybutynin 5 milliGRAM(s) Oral daily  polyethylene glycol 3350 17 Gram(s) Oral daily  senna 2 Tablet(s) Oral at bedtime      Review of Systems:  Review of systems otherwise negative except as previously noted.    Allergies: No Known Allergies    For details regarding the patient's past medical history, social history, family history, and other miscellaneous elements, please refer the initial infectious diseases consultation and/or the admitting history and physical examination for this admission.    Objective:  Vitals:   T(C): 36.7 (01-15-24 @ 12:58), Max: 36.8 (01-15-24 @ 04:22)  HR: 82 (01-15-24 @ 10:07) (79 - 84)  BP: 118/58 (01-15-24 @ 10:07) (118/58 - 148/70)  RR: 20 (01-15-24 @ 10:07) (17 - 20)  SpO2: 91% (01-15-24 @ 10:07) (91% - 95%)    Physical Examination:  General: no acute distress  HEENT: NC/AT, EOMI,   Cardio: S1, S2 heard, RRR, no murmurs  Resp: decreased breath sounds  Abd: soft, NT, ND  Ext: no edema or cyanosis  Skin: warm, dry, no visible rash      Laboratory Studies:  CBC:                       11.5   8.62  )-----------( 405      ( 14 Jan 2024 07:50 )             33.7     CMP: 01-15    139  |  107  |  15  ----------------------------<  103<H>  4.0   |  26  |  0.49<L>    Ca    9.1      15 Mono 2024 07:23  Phos  3.1     01-15  Mg     2.0     01-15        Urinalysis Basic - ( 15 Mono 2024 07:23 )    Color: x / Appearance: x / SG: x / pH: x  Gluc: 103 mg/dL / Ketone: x  / Bili: x / Urobili: x   Blood: x / Protein: x / Nitrite: x   Leuk Esterase: x / RBC: x / WBC x   Sq Epi: x / Non Sq Epi: x / Bacteria: x        Microbiology: reviewed    Culture - Blood (collected 01-12-24 @ 15:40)  Source: .Blood Blood-Peripheral  Preliminary Report (01-14-24 @ 22:01):    No growth at 48 Hours    Culture - Blood (collected 01-12-24 @ 15:35)  Source: .Blood Blood-Venous  Preliminary Report (01-14-24 @ 22:02):    No growth at 48 Hours    Culture - Urine (collected 01-11-24 @ 23:02)  Source: Clean Catch Clean Catch (Midstream)  Final Report (01-15-24 @ 08:29):    >100,000 CFU/ml Escherichia coli  Organism: Escherichia coli (01-15-24 @ 08:29)  Organism: Escherichia coli (01-15-24 @ 08:29)      Method Type: DAVIS      -  Amoxicillin/Clavulanic Acid: S <=8/4      -  Ampicillin: S <=8 These ampicillin results predict results for amoxicillin      -  Ampicillin/Sulbactam: S <=4/2      -  Aztreonam: S <=4      -  Cefazolin: S 8 For uncomplicated UTI with K. pneumoniae, E. coli, or P. mirablis: DAVIS <=16 is sensitive and DAVIS >=32 is resistant. This also predicts results for oral agents cefaclor, cefdinir, cefpodoxime, cefprozil, cefuroxime axetil, cephalexin and locarbef for uncomplicated UTI. Note that some isolates may be susceptible to these agents while testing resistant to cefazolin.      -  Cefepime: S <=2      -  Cefoxitin: R >16      -  Ceftriaxone: S <=1      -  Cefuroxime: S 8      -  Ciprofloxacin: R >2      -  Ertapenem: S <=0.5      -  Gentamicin: S <=2      -  Imipenem: S <=1      -  Levofloxacin: R >4      -  Meropenem: S <=1      -  Nitrofurantoin: S <=32 Should not be used to treat pyelonephritis      -  Piperacillin/Tazobactam: S <=8      -  Tobramycin: S <=2      -  Trimethoprim/Sulfamethoxazole: S 2/38    Culture - Blood (collected 01-11-24 @ 21:20)  Source: .Blood Blood-Peripheral  Gram Stain (01-12-24 @ 14:46):    Growth in aerobic bottle: Gram Negative Rods  Final Report (01-14-24 @ 07:30):    Growth in aerobic bottle: Escherichia coli ESBL    Direct identification is available within approximately 3-5    hours either by Blood Panel Multiplexed PCR or Direct    MALDI-TOF. Details: [email or web address]  Organism: Blood Culture PCR  Escherichia coli ESBL (01-14-24 @ 07:30)  Organism: Escherichia coli ESBL (01-14-24 @ 07:30)      Method Type: DAVIS      -  Ampicillin: R >16 These ampicillin results predict results for amoxicillin      -  Ampicillin/Sulbactam: R 16/8      -  Aztreonam: R >16      -  Cefazolin: R >16      -  Cefepime: R >16      -  Ceftriaxone: R >32      -  Ciprofloxacin: R >2      -  Ertapenem: S <=0.5      -  Gentamicin: S <=2      -  Imipenem: S <=1      -  Levofloxacin: R >4      -  Meropenem: S <=1      -  Piperacillin/Tazobactam: R <=8      -  Tobramycin: S <=2      -  Trimethoprim/Sulfamethoxazole: R >2/38  Organism: Blood Culture PCR (01-14-24 @ 07:30)      Method Type: PCR      -  Escherichia coli: Detec      -  ESBL: Detec      -  CTX-M Resistance Marker: Detec    Culture - Blood (collected 01-11-24 @ 21:20)  Source: .Blood Blood-Peripheral  Preliminary Report (01-15-24 @ 02:02):    No growth at 72 Hours          Radiology: reviewed

## 2024-01-15 NOTE — SOCIAL WORK PROGRESS NOTE - NSSWPROGRESSNOTE_GEN_ALL_CORE
SW spoke with pt dtr Leyla to review [facility]ATE choices. Pt dtr wishes for referrals to be sent to the followin)[facility] 2)[facility] 3)[place] 4) [facility]. SW sent referrals to selected facilities and will continue to follow.  SW spoke with pt dtr Leyla to review C[facility] choices. Pt dtr wishes for referrals to be sent to the followin)[facility] 2)[facility] 3)[place] 4) [facility]. SW sent referrals to selected facilities and will continue to follow.  SW spoke with pt dtr Leyla to review CATE choices. Pt dtr wishes for referrals to be sent to the followin)[facility] 2)[facility] 3)[place] 4) WO. SW sent referrals to selected facilities and will continue to follow.

## 2024-01-15 NOTE — PROGRESS NOTE ADULT - ASSESSMENT
84 yo F with PMH of hyperlipidemia, breast CA s/p mastectomies with mets to bone, neuropathy presents to ED c/o generalized weakness, confusion since Wednesday. Pt's daughter (Leyla).   In ED  Vitals: T 101.8F, HR 85, BP 96/43, RR 18, SpO2 95% on RA  UA: cloudy, trace ketone, 30 protein, positive nitrite, large LE moderate blood, bacteria TNTC, epithelial cells present  CXR: no acute chest disease    RECOMMENDATIONS  Story suggestive of pyelonephritis with AMS and noted leukocytosis, fever, pyuria and no other obv localization.   Growth in aerobic bottle: Culture - Blood (01.11.24 @ 21:20)    -  CTX-M Resistance Marker: Detec   -  ESBL: Detec   -  Escherichia coli: Detec  Repeat 1/12 BCx NGTD  Continue Ertapenem until 1/22/23  Will need midline if pending d/c    D/w Dr. Jacobo  Infectious Diseases will continue to follow. Please call with any questions.   Brandie Marte M.D.  [facility] Division of Infectious Diseases [phone]  For after 5 P.M. and weekends, please call [phone]       82 yo F with PMH of hyperlipidemia, breast CA s/p mastectomies with mets to bone, neuropathy presents to ED c/o generalized weakness, confusion since Wednesday. Pt's daughter (Leyla).   In ED  Vitals: T 101.8F, HR 85, BP 96/43, RR 18, SpO2 95% on RA  UA: cloudy, trace ketone, 30 protein, positive nitrite, large LE moderate blood, bacteria TNTC, epithelial cells present  CXR: no acute chest disease    RECOMMENDATIONS  Story suggestive of pyelonephritis with AMS and noted leukocytosis, fever, pyuria and no other obv localization.   Growth in aerobic bottle: Culture - Blood (01.11.24 @ 21:20)    -  CTX-M Resistance Marker: Detec   -  ESBL: Detec   -  Escherichia coli: Detec  Repeat 1/12 BCx NGTD  Continue Ertapenem until 1/22/23  Will need midline if pending d/c    D/w Dr. Jacobo  Infectious Diseases will continue to follow. Please call with any questions.   Brandie Marte M.D.  [facility] Division of Infectious Diseases [phone]  For after 5 P.M. and weekends, please call [phone]       82 yo F with PMH of hyperlipidemia, breast CA s/p mastectomies with mets to bone, neuropathy presents to ED c/o generalized weakness, confusion since Wednesday. Pt's daughter (Leyla).   In ED  Vitals: T 101.8F, HR 85, BP 96/43, RR 18, SpO2 95% on RA  UA: cloudy, trace ketone, 30 protein, positive nitrite, large LE moderate blood, bacteria TNTC, epithelial cells present  CXR: no acute chest disease    RECOMMENDATIONS  Story suggestive of pyelonephritis with AMS and noted leukocytosis, fever, pyuria and no other obv localization.   Growth in aerobic bottle: Culture - Blood (01.11.24 @ 21:20)    -  CTX-M Resistance Marker: Detec   -  ESBL: Detec   -  Escherichia coli: Detec  Repeat 1/12 BCx NGTD  Continue Ertapenem until 1/22/23  Will need midline if pending d/c    D/w Dr. Jacobo  Infectious Diseases will continue to follow. Please call with any questions.   Brandie Marte M.D.  [facility] Division of Infectious Diseases [phone]  For after 5 P.M. and weekends, please call [phone]

## 2024-01-16 ENCOUNTER — TRANSCRIPTION ENCOUNTER (OUTPATIENT)
Age: 84
End: 2024-01-16

## 2024-01-16 VITALS
HEART RATE: 79 BPM | OXYGEN SATURATION: 93 % | TEMPERATURE: 98 F | DIASTOLIC BLOOD PRESSURE: 53 MMHG | RESPIRATION RATE: 20 BRPM | SYSTOLIC BLOOD PRESSURE: 110 MMHG

## 2024-01-16 PROCEDURE — 99285 EMERGENCY DEPT VISIT HI MDM: CPT | Mod: 25

## 2024-01-16 PROCEDURE — 87637 SARSCOV2&INF A&B&RSV AMP PRB: CPT

## 2024-01-16 PROCEDURE — 36573 INSJ PICC RS&I 5 YR+: CPT

## 2024-01-16 PROCEDURE — 83690 ASSAY OF LIPASE: CPT

## 2024-01-16 PROCEDURE — 82550 ASSAY OF CK (CPK): CPT

## 2024-01-16 PROCEDURE — 87040 BLOOD CULTURE FOR BACTERIA: CPT

## 2024-01-16 PROCEDURE — 96365 THER/PROPH/DIAG IV INF INIT: CPT

## 2024-01-16 PROCEDURE — 87086 URINE CULTURE/COLONY COUNT: CPT

## 2024-01-16 PROCEDURE — 71045 X-RAY EXAM CHEST 1 VIEW: CPT

## 2024-01-16 PROCEDURE — 84100 ASSAY OF PHOSPHORUS: CPT

## 2024-01-16 PROCEDURE — 85027 COMPLETE CBC AUTOMATED: CPT

## 2024-01-16 PROCEDURE — 36415 COLL VENOUS BLD VENIPUNCTURE: CPT

## 2024-01-16 PROCEDURE — 87150 DNA/RNA AMPLIFIED PROBE: CPT

## 2024-01-16 PROCEDURE — 76937 US GUIDE VASCULAR ACCESS: CPT | Mod: 26

## 2024-01-16 PROCEDURE — 85025 COMPLETE CBC W/AUTO DIFF WBC: CPT

## 2024-01-16 PROCEDURE — 83880 ASSAY OF NATRIURETIC PEPTIDE: CPT

## 2024-01-16 PROCEDURE — 85610 PROTHROMBIN TIME: CPT

## 2024-01-16 PROCEDURE — 85730 THROMBOPLASTIN TIME PARTIAL: CPT

## 2024-01-16 PROCEDURE — 99232 SBSQ HOSP IP/OBS MODERATE 35: CPT

## 2024-01-16 PROCEDURE — 76937 US GUIDE VASCULAR ACCESS: CPT

## 2024-01-16 PROCEDURE — 0225U NFCT DS DNA&RNA 21 SARSCOV2: CPT

## 2024-01-16 PROCEDURE — 83605 ASSAY OF LACTIC ACID: CPT

## 2024-01-16 PROCEDURE — 70450 CT HEAD/BRAIN W/O DYE: CPT | Mod: MA

## 2024-01-16 PROCEDURE — 36410 VNPNXR 3YR/> PHY/QHP DX/THER: CPT

## 2024-01-16 PROCEDURE — 93005 ELECTROCARDIOGRAM TRACING: CPT

## 2024-01-16 PROCEDURE — 80053 COMPREHEN METABOLIC PANEL: CPT

## 2024-01-16 PROCEDURE — 83735 ASSAY OF MAGNESIUM: CPT

## 2024-01-16 PROCEDURE — 87186 SC STD MICRODIL/AGAR DIL: CPT

## 2024-01-16 PROCEDURE — 80048 BASIC METABOLIC PNL TOTAL CA: CPT

## 2024-01-16 PROCEDURE — 97162 PT EVAL MOD COMPLEX 30 MIN: CPT

## 2024-01-16 PROCEDURE — 87077 CULTURE AEROBIC IDENTIFY: CPT

## 2024-01-16 PROCEDURE — 81001 URINALYSIS AUTO W/SCOPE: CPT

## 2024-01-16 PROCEDURE — 84484 ASSAY OF TROPONIN QUANT: CPT

## 2024-01-16 PROCEDURE — 97116 GAIT TRAINING THERAPY: CPT

## 2024-01-16 RX ORDER — TRAMADOL HYDROCHLORIDE 50 MG/1
0.5 TABLET ORAL
Refills: 0 | DISCHARGE

## 2024-01-16 RX ORDER — CAPECITABINE 500 MG/1
2 TABLET ORAL
Refills: 0 | DISCHARGE

## 2024-01-16 RX ORDER — ERTAPENEM SODIUM 1 G/1
1 INJECTION, POWDER, LYOPHILIZED, FOR SOLUTION INTRAMUSCULAR; INTRAVENOUS
Qty: 0 | Refills: 0 | DISCHARGE
Start: 2024-01-16 | End: 2024-01-22

## 2024-01-16 RX ADMIN — Medication 5 MILLIGRAM(S): at 11:33

## 2024-01-16 RX ADMIN — Medication 1 TABLET(S): at 11:33

## 2024-01-16 RX ADMIN — ENOXAPARIN SODIUM 40 MILLIGRAM(S): 100 INJECTION SUBCUTANEOUS at 04:49

## 2024-01-16 RX ADMIN — POLYETHYLENE GLYCOL 3350 17 GRAM(S): 17 POWDER, FOR SOLUTION ORAL at 11:33

## 2024-01-16 RX ADMIN — DULOXETINE HYDROCHLORIDE 60 MILLIGRAM(S): 30 CAPSULE, DELAYED RELEASE ORAL at 11:33

## 2024-01-16 NOTE — DISCHARGE NOTE PROVIDER - NSDCCPCAREPLAN_GEN_ALL_CORE_FT
PRINCIPAL DISCHARGE DIAGNOSIS  Diagnosis: Bacteremia  Assessment and Plan of Treatment: Will finish Ertapenem 1 g daily (last dose 1/22/24). Please remove midline after last dose      SECONDARY DISCHARGE DIAGNOSES  Diagnosis: Acute UTI  Assessment and Plan of Treatment: Finish antibiotics as prescribed.    Diagnosis: Primary breast cancer with metastasis to other site  Assessment and Plan of Treatment: Follow up outpatient with your oncologist after rehab to determine when to begin treatment    Diagnosis: Altered mental status  Assessment and Plan of Treatment: Resolved after treatment of bacteremia

## 2024-01-16 NOTE — DISCHARGE NOTE PROVIDER - DETAILS OF MALNUTRITION DIAGNOSIS/DIAGNOSES
This patient has been assessed with a concern for Malnutrition and was treated during this hospitalization for the following Nutrition diagnosis/diagnoses:     -  01/14/2024: Moderate protein-calorie malnutrition

## 2024-01-16 NOTE — PROGRESS NOTE ADULT - REASON FOR ADMISSION
metabolic encephalopathy in the setting of UTI

## 2024-01-16 NOTE — DISCHARGE NOTE PROVIDER - CARE PROVIDER_API CALL
Crystal Joyce  Medical Oncology  [address], [address]  [place] [address]  Phone: [phone]  Fax: [phone]  Established Patient  Follow Up Time: Routine    Brandie Marte  Infectious Disease  [address], [address]  [place] [address]  Phone: [phone]  Fax: [phone]  Established Patient  Follow Up Time: Routine    Eren Reinoso  Internal Medicine  [address] [place] [address]  Phone: [phone]  Fax: [phone]  Established Patient  Follow Up Time: Routine   Crystal Joyce  Medical Oncology  [address]  [place] [address]  Phone: [phone]  Fax: [phone]  Established Patient  Follow Up Time: Routine    Brandie Marte  Infectious Disease  [address], [address]  [place] [address]  Phone: [phone]  Fax: [phone]  Established Patient  Follow Up Time: Routine    Eren Reinoso  Internal Medicine  [address] [place] [address]  Phone: [phone]  Fax: [phone]  Established Patient  Follow Up Time: Routine

## 2024-01-16 NOTE — DISCHARGE NOTE NURSING/CASE MANAGEMENT/SOCIAL WORK - SOCIAL WORKER'S NAME
Angela Terry, [facility] [phone] Angela Terry, [facility] [phone]

## 2024-01-16 NOTE — DISCHARGE NOTE NURSING/CASE MANAGEMENT/SOCIAL WORK - NSSCCARECORD_GEN_ALL_CORE
[facility]
pt ambulatory to walk in triage c/o 10/10 pain after falling today landing on left shoulder/ arm, abrasion ot left hand noted, deformity noted to left arm, pt guarding arm. pmh: htn, hlpd, dm fs= 212

## 2024-01-16 NOTE — DISCHARGE NOTE NURSING/CASE MANAGEMENT/SOCIAL WORK - PATIENT PORTAL LINK FT
You can access the FollowMyHealth Patient Portal offered by [facility] by registering at the following website: [email or web address]. By joining [email or web address]’s FollowMyHealth portal, you will also be able to view your health information using other applications (apps) compatible with our system. You can access the FollowMyHealth Patient Portal offered by [facility] by registering at the following website: [email or web address]. By joining [employer]’s FollowMyHealth portal, you will also be able to view your health information using other applications (apps) compatible with our system.

## 2024-01-16 NOTE — PROGRESS NOTE ADULT - SUBJECTIVE AND OBJECTIVE BOX
Patient is a 83y old  Female who presents with a chief complaint of metabolic encephalopathy in the setting of UTI (16 Jan 2024 02:46)      SUBJECTIVE / OVERNIGHT EVENTS: Patient seen and examined at bedside. No acute events overnight. Feels okay. No lightheadedness.    MEDICATIONS  (STANDING):  atorvastatin 20 milliGRAM(s) Oral at bedtime  DULoxetine 60 milliGRAM(s) Oral daily  enoxaparin Injectable 40 milliGRAM(s) SubCutaneous every 24 hours  ertapenem  IVPB 1000 milliGRAM(s) IV Intermittent every 24 hours  influenza  Vaccine (HIGH DOSE) 0.7 milliLiter(s) IntraMuscular once  multivitamin/minerals 1 Tablet(s) Oral daily  oxybutynin 5 milliGRAM(s) Oral daily  polyethylene glycol 3350 17 Gram(s) Oral daily  senna 2 Tablet(s) Oral at bedtime    MEDICATIONS  (PRN):  acetaminophen     Tablet .. 650 milliGRAM(s) Oral every 6 hours PRN Temp greater or equal to 38C (100.4F), Mild Pain (1 - 3)      CAPILLARY BLOOD GLUCOSE        I&O's Summary      PHYSICAL EXAM:  Vital Signs Last 24 Hrs  T(C): 36.6 (16 Jan 2024 05:23), Max: 36.7 (15 Mono 2024 12:58)  T(F): 97.8 (16 Jan 2024 05:23), Max: 98.1 (15 Mono 2024 12:58)  HR: 75 (16 Jan 2024 05:23) (75 - 82)  BP: 136/61 (16 Jan 2024 05:23) (118/58 - 144/67)  BP(mean): --  RR: 18 (16 Jan 2024 05:23) (18 - 20)  SpO2: 94% (16 Jan 2024 05:23) (91% - 94%)    Parameters below as of 16 Jan 2024 05:23  Patient On (Oxygen Delivery Method): room air        GEN: female in NAD, appears comfortable, no diaphoresis  EYES: No scleral injection, EOMI  ENTM: neck supple & symmetric without tracheal deviation, moist membranes, no gross hearing impairment, thyroid gland not enlarged  CV: +S1/S2, no m/r/g, no abdominal bruit, no LE edema  RESP: breathing comfortably, no respiratory accessory muscle use, CTAB, no w/r/r  GI: normoactive BS, soft, NTND, no rebounding/guarding, no palpable masses    LABS:    01-15    139  |  107  |  15  ----------------------------<  103<H>  4.0   |  26  |  0.49<L>    Ca    9.1      15 Mono 2024 07:23  Phos  3.1     01-15  Mg     2.0     01-15            Urinalysis Basic - ( 15 Mono 2024 07:23 )    Color: x / Appearance: x / SG: x / pH: x  Gluc: 103 mg/dL / Ketone: x  / Bili: x / Urobili: x   Blood: x / Protein: x / Nitrite: x   Leuk Esterase: x / RBC: x / WBC x   Sq Epi: x / Non Sq Epi: x / Bacteria: x          RADIOLOGY & ADDITIONAL TESTS:  Results Reviewed:   Imaging Personally Reviewed:  Electrocardiogram Personally Reviewed:    COORDINATION OF CARE:  Care Discussed with Consultants/Other Providers [Y/N]:  Prior or Outpatient Records Reviewed [Y/N]:

## 2024-01-16 NOTE — PROGRESS NOTE ADULT - SUBJECTIVE AND OBJECTIVE BOX
Neurology Follow up note    TOBIN [id number]    HPI:  84 yo F with PMH of hyperlipidemia, breast CA s/p mastectomies with mets to bone, neuropathy presents to ED c/o generalized weakness, confusion since Wednesday. History is obtained from pt's daughter (Leyla) as pt is a poor historian. Since Wednesday afternoon, pt has been more lethargic, confused and weak than usual. Yesterday evening, pt had trouble coming down the stairs for dinner and required assistance from her family. Today, pt had trouble ambulating and was stumbling. She was brought to PCP where she tested negative for Flu. She had trouble eating dinner earlier this evening due to confusion. EMS was called and pt was found to be febrile Tmax 101F. Denies any sick contacts or recent travel.   ED course:  Vitals: T 101.8F, HR 85, BP 96/43, RR 18, SpO2 95% on RA  Labs: RBC 3.54, Na 132, pro-  UA: cloudy, trace ketone, 30 protein, positive nitrite, large LE moderate blood, bacteria TNTC, epithelial cells present  CXR: no acute chest disease per wet read  CT head: negative  Given: IV Ofirmev x1, IV Zosyn x1, 1L IV NS bolus x2 (11 Jan 2024 23:34)      Interval History -no new events    Patient is seen, chart was reviewed and case was discussed with the treatment team.  Pt is not in any distress.   Lying on bed comfortably.           Vital Signs Last 24 Hrs  T(C): 36.9 (16 Jan 2024 13:43), Max: 36.9 (16 Jan 2024 13:43)  T(F): 98.5 (16 Jan 2024 13:43), Max: 98.5 (16 Jan 2024 13:43)  HR: 79 (16 Jan 2024 13:43) (75 - 81)  BP: 110/53 (16 Jan 2024 13:43) (110/53 - 144/67)  BP(mean): --  RR: 20 (16 Jan 2024 13:43) (18 - 20)  SpO2: 93% (16 Jan 2024 13:43) (92% - 94%)    Parameters below as of 16 Jan 2024 13:43  Patient On (Oxygen Delivery Method): room air                REVIEW OF SYSTEMS:    Constitutional: No fever, weight loss or fatigue  Eyes: No eye pain, visual disturbances, or discharge  ENT:  No difficulty hearing, tinnitus, vertigo; No sinus or throat pain  Neck: No pain or stiffness  Respiratory: No c wheezing, chills or hemoptysis  Cardiovascular: No chest pain, palpitations, shortness of breath, dizziness or leg swelling  Gastrointestinal: No abdominal or epigastric pain. No nausea, vomiting or hematemesis; No diarrhea or constipation. No melena or hematochezia.  Genitourinary: No dysuria, , hematuria   Neurological: No headaches,  loss of strength, numbness or tremors  Psychiatric: No depression, anxiety, mood swings or difficulty sleeping  Musculoskeletal: No joint pain or swelling; No muscle, back or extremity pain  Skin: No itching, burning, rashes or lesions   Lymph Nodes: No enlarged glands  Endocrine: No heat or cold intolerance;   Allergy and Immunologic: No hives or eczema    On Neurological Examination:    Mental Status - Pt is alert, awake, oriented X2   Follows commands well and able to answer questions appropriately.Mood and affect  normal    Speech -  Normal.     Cranial Nerves - Pupils 3 mm equal and reactive to light, extraocular eye movements intact. Pt has no visual field deficit.  Pt has no facial asymmetry. Facial sensation is intact.Tongue - is in midline.    Muscle tone - is nor    Motor Exam - 4+/5 of UE  LE 4/5  , No drift. No shaking or tremors.    Sensory Exam - . Pt withdraws all extremities equally on stimulation. No asymmetry seen. No complaints of tingling, numbness.        coordination:    Finger to nose: normal      Deep tendon Reflexes - 2 plus all over.          Neck Supple -  Yes.     MEDICATIONS  (STANDING):  atorvastatin 20 milliGRAM(s) Oral at bedtime  DULoxetine 60 milliGRAM(s) Oral daily  enoxaparin Injectable 40 milliGRAM(s) SubCutaneous every 24 hours  ertapenem  IVPB 1000 milliGRAM(s) IV Intermittent every 24 hours  influenza  Vaccine (HIGH DOSE) 0.7 milliLiter(s) IntraMuscular once  multivitamin/minerals 1 Tablet(s) Oral daily  oxybutynin 5 milliGRAM(s) Oral daily  polyethylene glycol 3350 17 Gram(s) Oral daily  senna 2 Tablet(s) Oral at bedtime    MEDICATIONS  (PRN):  acetaminophen     Tablet .. 650 milliGRAM(s) Oral every 6 hours PRN Temp greater or equal to 38C (100.4F), Mild Pain (1 - 3)      Allergies    No Known Allergies    Intolerances              Hemoglobin A1C:     Vitamin B12     RADIOLOGY    ASSESSMENT AND PLAN:      SEEN FOR AMS  LIKELY METABOLIC ENCEPHALOPATHY  UTI    for CATE  Antibiotic as per ID  Physical therapy evaluation.  OOB to chair/ambulation with assistance only.  Pain is accessed and addressed.  Would continue to follow.

## 2024-01-16 NOTE — PROGRESS NOTE ADULT - PROBLEM SELECTOR PLAN 1
- UA consistent with infection  - BCx 1/2 ESBL E. Coli (sensitivities reviewed)  - Follow up repeat blood cultures (NGTD)  - Continue with Ertapenem  - ID consult appreciated  - Follow up urine culture (100K CFU E. Coli)  - Likely needs IV Ertapenem until 1/21 (discussed with ID)
- UA consistent with infection  - BCx 1/2 ESBL E. Coli (sensitivities reviewed)  - Follow up repeat blood cultures (NGTD)  - Continue with Ertapenem  - ID consult appreciated  - Follow up urine culture (100K CFU E. Coli)  - Needs IV Ertapenem until 1/22 (discussed with ID)  - PICC/MIDLINE placement
- UA consistent with infection  - BCx 1/2 ESBL E. Coli   - Follow up repeat blood cultures  - Continue with Ertapenem  - ID consult appreciated  - Follow up urine culture  - If does not improve will need to consider CT A&P to look for infectious focus
- UA consistent with infection  - BCx 1/2 ESBL E. Coli (sensitivities reviewed)  - Follow up repeat blood cultures (NGTD)  - Continue with Ertapenem  - ID consult appreciated  - Follow up urine culture (100K CFU E. Coli)  - If does not improve will need to consider CT A&P to look for infectious focus

## 2024-01-16 NOTE — PROCEDURE NOTE - NSTIMEOUT_GEN_A_CORE
Normal Patient's first and last name, , procedure, and correct site confirmed prior to the start of procedure.

## 2024-01-16 NOTE — CAREGIVER ENGAGEMENT NOTE - CAREGIVER OUTREACH NOTES - FREE TEXT
SW spoke with pt dtr regarding plan for dc today. Pt is stable per MD. [facility] bed avail for today, they are accepting bed. SW to set up lette transport vifor 330p. today. SW to follow and remain available for any needs. SW spoke with pt dtr regarding plan for dc today. Pt is stable per MD. \[facility]\"" has bed avail for today, they are accepting bed. SW to set up belinda transport vifor 330p. today. SW to follow and remain available for any needs. SW spoke with pt dtr regarding plan for dc today. Pt is stable per MD. [facility] has bed avail for today, they are accepting bed. SW to set up belinda transport vifor 330p. today. SW to follow and remain available for any needs.

## 2024-01-16 NOTE — DISCHARGE NOTE NURSING/CASE MANAGEMENT/SOCIAL WORK - NSDCPEFALRISK_GEN_ALL_CORE
For information on Fall & Injury Prevention, visit: [email or web address] OR  [email or web address] OR  https://www.cdc.gov/steadi/patient.html For information on Fall & Injury Prevention, visit: [email or web address] OR  [email or web address] OR  https://www.cdc.gov/steadi/patient.html

## 2024-01-16 NOTE — DISCHARGE NOTE PROVIDER - CARE PROVIDERS DIRECT ADDRESSES
[email or web address]

## 2024-01-16 NOTE — PROGRESS NOTE ADULT - ASSESSMENT
84 yo F with PMH of hyperlipidemia, breast CA s/p mastectomies with mets to bone, neuropathy presents to ED c/o generalized weakness, confusion since Wednesday. Pt's daughter (Leyla).   In ED  Vitals: T 101.8F, HR 85, BP 96/43, RR 18, SpO2 95% on RA  UA: cloudy, trace ketone, 30 protein, positive nitrite, large LE moderate blood, bacteria TNTC, epithelial cells present  CXR: no acute chest disease    RECOMMENDATIONS  Story suggestive of pyelonephritis with AMS and noted leukocytosis, fever, pyuria and no other obv localization.   Growth in aerobic bottle: Culture - Blood (01.11.24 @ 21:20)    -  CTX-M Resistance Marker: Detec   -  ESBL: Detec   -  Escherichia coli: Detec  Repeat 1/12 BCx NGTD  Continue Ertapenem until 1/22/23  Will need midline if pending d/c (ordered)  Additional care per primary team    D/w Dr. Jacobo  Infectious Diseases will continue to follow. Please call with any questions.   Brandie Marte M.D.  [facility] Division of Infectious Diseases [phone]  For after 5 P.M. and weekends, please call [phone]       82 yo F with PMH of hyperlipidemia, breast CA s/p mastectomies with mets to bone, neuropathy presents to ED c/o generalized weakness, confusion since Wednesday. Pt's daughter (Leyla).   In ED  Vitals: T 101.8F, HR 85, BP 96/43, RR 18, SpO2 95% on RA  UA: cloudy, trace ketone, 30 protein, positive nitrite, large LE moderate blood, bacteria TNTC, epithelial cells present  CXR: no acute chest disease    RECOMMENDATIONS  Story suggestive of pyelonephritis with AMS and noted leukocytosis, fever, pyuria and no other obv localization.   Growth in aerobic bottle: Culture - Blood (01.11.24 @ 21:20)    -  CTX-M Resistance Marker: Detec   -  ESBL: Detec   -  Escherichia coli: Detec  Repeat 1/12 BCx NGTD  Continue Ertapenem until 1/22/23  Will need midline if pending d/c (ordered)  Additional care per primary team    D/w Dr. Jacobo  Infectious Diseases will continue to follow. Please call with any questions.   Brandie Marte M.D.  [facility] of Infectious Diseases [phone]  For after 5 P.M. and weekends, please call [phone]       84 yo F with PMH of hyperlipidemia, breast CA s/p mastectomies with mets to bone, neuropathy presents to ED c/o generalized weakness, confusion since Wednesday. Pt's daughter (Leyla).   In ED  Vitals: T 101.8F, HR 85, BP 96/43, RR 18, SpO2 95% on RA  UA: cloudy, trace ketone, 30 protein, positive nitrite, large LE moderate blood, bacteria TNTC, epithelial cells present  CXR: no acute chest disease    RECOMMENDATIONS  Story suggestive of pyelonephritis with AMS and noted leukocytosis, fever, pyuria and no other obv localization.   Growth in aerobic bottle: Culture - Blood (01.11.24 @ 21:20)    -  CTX-M Resistance Marker: Detec   -  ESBL: Detec   -  Escherichia coli: Detec  Repeat 1/12 BCx NGTD  S/p midline placement today  Continue Ertapenem until 1/22/23  Additional care per primary team    Stable from ID standpoint  D/c planning per primary team    D/w Dr. Jacobo  Infectious Diseases will continue to follow. Please call with any questions.   Brandie Marte M.D.  [facility] Division of Infectious Diseases [phone]  For after 5 P.M. and weekends, please call [phone]       84 yo F with PMH of hyperlipidemia, breast CA s/p mastectomies with mets to bone, neuropathy presents to ED c/o generalized weakness, confusion since Wednesday. Pt's daughter (Leyla).   In ED  Vitals: T 101.8F, HR 85, BP 96/43, RR 18, SpO2 95% on RA  UA: cloudy, trace ketone, 30 protein, positive nitrite, large LE moderate blood, bacteria TNTC, epithelial cells present  CXR: no acute chest disease    RECOMMENDATIONS  Story suggestive of pyelonephritis with AMS and noted leukocytosis, fever, pyuria and no other obv localization.   Growth in aerobic bottle: Culture - Blood (01.11.24 @ 21:20)    -  CTX-M Resistance Marker: Detec   -  ESBL: Detec   -  Escherichia coli: Detec  Repeat 1/12 BCx NGTD  S/p midline placement today  Continue Ertapenem until 1/22/23  Additional care per primary team    Stable from ID standpoint  D/c planning per primary team    D/w Dr. Jacobo  Infectious Diseases will continue to follow. Please call with any questions.   Brandie Marte M.D.  [facility] Division of Infectious Diseases [phone]  For after 5 P.M. and weekends, please call [phone]

## 2024-01-16 NOTE — DISCHARGE NOTE PROVIDER - NSDCMRMEDTOKEN_GEN_ALL_CORE_FT
atorvastatin 20 mg oral tablet: 1 tab(s) orally once a day  capecitabine 500 mg oral tablet: 2 tab(s) orally 4 times a day 1 week on, 1 week off. Start on 1/13/24  docusate sodium 100 mg oral tablet: 1 tab(s) orally once a day  DULoxetine 60 mg oral delayed release capsule: 1 cap(s) orally once a day  oxyBUTYnin 5 mg oral tablet: 1 tab(s) orally once a day  traMADol 50 mg oral tablet: 0.5 tab(s) orally once a day

## 2024-01-16 NOTE — PROGRESS NOTE ADULT - PROVIDER SPECIALTY LIST ADULT
Infectious Disease
Neurology
Internal Medicine
Infectious Disease
Hospitalist

## 2024-01-16 NOTE — DISCHARGE NOTE NURSING/CASE MANAGEMENT/SOCIAL WORK - NSDCVIVACCINE_GEN_ALL_CORE_FT
Tdap; 22-Nov-2019 20:23; Mayra Alicea (RN); [employer]; [id number] (Exp. Date: 22-Nov-2021); IntraMuscular; Deltoid Right.; 0.5 milliLiter(s); VIS (VIS Published: 09-May-2013, VIS Presented: 22-Nov-2019);   Tdap; 25-Dec-2020 00:55; Kristen Johnson (RN); [employer]; [id number] (Exp. Date: 31-Jul-2022); IntraMuscular; Deltoid Right.; 0.5 milliLiter(s); VIS (VIS Published: 09-May-2013, VIS Presented: 25-Dec-2020);   Tdap; 05-Aug-2021 22:35; Stellwag, Emily (RN); [employer]; [id number] (Exp. Date: 18-Nov-2022); IntraMuscular; Deltoid Right.; 0.5 milliLiter(s); VIS (VIS Published: 09-May-2013, VIS Presented: 05-Aug-2021);   Tdap; 29-Nov-2021 17:14; Jasmin Stewart (RN); [employer]; [id number] (Exp. Date: 09-Sep-2023); IntraMuscular; Deltoid Left.; 0.5 milliLiter(s); VIS (VIS Published: 09-May-2013, VIS Presented: 29-Nov-2021);   Tdap; 19-Feb-2023 22:34; Minor Burgos (RN); [employer]; [id number] (Exp. Date: 08-Dec-2024); IntraMuscular; Deltoid Right.; 0.5 milliLiter(s); VIS (VIS Published: 09-May-2013, VIS Presented: 19-Feb-2023);    Tdap; 22-Nov-2019 20:23; Mayra Alicea (RN); [employer]; [id number] (Exp. Date: 22-Nov-2021); IntraMuscular; Deltoid Right.; 0.5 milliLiter(s); VIS (VIS Published: 09-May-2013, VIS Presented: 22-Nov-2019);   Tdap; 25-Dec-2020 00:55; Kristen Johnson (RN); [employer]; [id number] (Exp. Date: 31-Jul-2022); IntraMuscular; Deltoid Right.; 0.5 milliLiter(s); VIS (VIS Published: 09-May-2013, VIS Presented: 25-Dec-2020);   Tdap; 05-Aug-2021 22:35; Stellwag, Emily (RN); [employer]; [id number] (Exp. Date: 18-Nov-2022); IntraMuscular; Deltoid Right.; 0.5 milliLiter(s); VIS (VIS Published: 09-May-2013, VIS Presented: 05-Aug-2021);   Tdap; 29-Nov-2021 17:14; Jasmin Stewart (RN); [employer]; [id number] (Exp. Date: 09-Sep-2023); IntraMuscular; Deltoid Left.; 0.5 milliLiter(s); VIS (VIS Published: 09-May-2013, VIS Presented: 29-Nov-2021);   Tdap; 19-Feb-2023 22:34; Minor Burgos (RN); [employer]; [id number] (Exp. Date: 08-Dec-2024); IntraMuscular; Deltoid Right.; 0.5 milliLiter(s); VIS (VIS Published: 09-May-2013, VIS Presented: 19-Feb-2023);

## 2024-01-16 NOTE — PROGRESS NOTE ADULT - PROBLEM SELECTOR PLAN 8
DVT ppx: Lovenox    PT rec CATE, medically optimized
DVT ppx: Lovenox    PT rec CATE
DVT ppx: Lovenox    PT rec CATE

## 2024-01-16 NOTE — PROGRESS NOTE ADULT - PROBLEM SELECTOR PROBLEM 5
Primary breast cancer with metastasis to other site
Primary breast cancer with metastasis to other site
Neuropathy
Primary breast cancer with metastasis to other site

## 2024-01-16 NOTE — PROGRESS NOTE ADULT - PROBLEM SELECTOR PLAN 5
- Hx of breat CA s/p mastectomies with mets to bone  - On Capecitabine every other week, to be started on 1/13/24- non-formulary; given infection will have to defer for now  - Follows with Heme/Onc Dr. Crystal Joyce
- Continue home Duloxetine
- Hx of breat CA s/p mastectomies with mets to bone  - On Capecitabine every other week, to be started on 1/13/24- non-formulary; given infection will have to defer for now  - Follows with Heme/Onc Dr. Crystal Joyce
- Hx of breat CA s/p mastectomies with mets to bone  - On Capecitabine every other week, to be started on 1/13/24- non-formulary; given infection will have to defer for now  - Follows with Heme/Onc Dr. Crystal Joyce  - OP follow up post-rehab

## 2024-01-16 NOTE — PROGRESS NOTE ADULT - PROBLEM SELECTOR PLAN 7
- Continue home Atorvastatin
DVT ppx: Lovenox
- Continue home Atorvastatin
- Continue home Atorvastatin

## 2024-01-16 NOTE — PROGRESS NOTE ADULT - PROBLEM SELECTOR PLAN 4
- Hx of breat CA s/p mastectomies with mets to bone  - On Capecitabine every other week, to be started on 1/13/24- non-formulary; given infection will have to defer for now  - Follows with Heme/Onc Dr. Crystal Joyce
Noted to be hypotensive with PT on 1/14  Likely orthostatic but couldn't be checked  Check orthostatics  IVF trial
Noted to be hypotensive with PT on 1/14  Likely orthostatic but couldn't be checked  Check orthostatics  IVF trial
Noted to be hypotensive with PT on 1/14  Likely orthostatic but couldn't be checked  IVF trial  Overall improved

## 2024-01-16 NOTE — PROGRESS NOTE ADULT - SUBJECTIVE AND OBJECTIVE BOX
[facility], Division of Infectious Diseases  SURYA Reaves Y. Patel, S. Shah, G. [facility]  [phone]    Name: TOBIN LEON  Age: 83y  Gender: Female  MRN: [id number]    Interval History:  Patient seen and examined at bedside  No acute overnight events.   Notes reviewed    Antibiotics:  ertapenem  IVPB 1000 milliGRAM(s) IV Intermittent every 24 hours      Medications:  acetaminophen     Tablet .. 650 milliGRAM(s) Oral every 6 hours PRN  atorvastatin 20 milliGRAM(s) Oral at bedtime  DULoxetine 60 milliGRAM(s) Oral daily  enoxaparin Injectable 40 milliGRAM(s) SubCutaneous every 24 hours  ertapenem  IVPB 1000 milliGRAM(s) IV Intermittent every 24 hours  influenza  Vaccine (HIGH DOSE) 0.7 milliLiter(s) IntraMuscular once  multivitamin/minerals 1 Tablet(s) Oral daily  oxybutynin 5 milliGRAM(s) Oral daily  polyethylene glycol 3350 17 Gram(s) Oral daily  senna 2 Tablet(s) Oral at bedtime      Review of Systems:  Review of systems otherwise negative except as previously noted.    Allergies: No Known Allergies    For details regarding the patient's past medical history, social history, family history, and other miscellaneous elements, please refer the initial infectious diseases consultation and/or the admitting history and physical examination for this admission.    Objective:  Vitals:   T(C): 36.6 (01-16-24 @ 05:23), Max: 36.7 (01-15-24 @ 12:58)  HR: 75 (01-16-24 @ 05:23) (75 - 81)  BP: 136/61 (01-16-24 @ 05:23) (136/61 - 144/67)  RR: 18 (01-16-24 @ 05:23) (18 - 18)  SpO2: 94% (01-16-24 @ 05:23) (92% - 94%)    Physical Examination:  General: no acute distress  HEENT: NC/AT, EOMI,   Cardio: S1, S2 heard, RRR, no murmurs  Resp: breath sounds heard bilaterally, no rales, wheezes or rhonchi  Abd: soft, NT, ND,  Ext: no edema or cyanosis  Skin: warm, dry, no visible rash      Laboratory Studies:  CBC:   CMP: 01-15    139  |  107  |  15  ----------------------------<  103<H>  4.0   |  26  |  0.49<L>    Ca    9.1      15 Mono 2024 07:23  Phos  3.1     01-15  Mg     2.0     01-15        Urinalysis Basic - ( 15 Mono 2024 07:23 )    Color: x / Appearance: x / SG: x / pH: x  Gluc: 103 mg/dL / Ketone: x  / Bili: x / Urobili: x   Blood: x / Protein: x / Nitrite: x   Leuk Esterase: x / RBC: x / WBC x   Sq Epi: x / Non Sq Epi: x / Bacteria: x        Microbiology: reviewed    Culture - Blood (collected 01-12-24 @ 15:40)  Source: .Blood Blood-Peripheral  Preliminary Report (01-15-24 @ 22:02):    No growth at 72 Hours    Culture - Blood (collected 01-12-24 @ 15:35)  Source: .Blood Blood-Venous  Preliminary Report (01-15-24 @ 22:02):    No growth at 72 Hours    Culture - Urine (collected 01-11-24 @ 23:02)  Source: Clean Catch Clean Catch (Midstream)  Final Report (01-15-24 @ 08:29):    >100,000 CFU/ml Escherichia coli  Organism: Escherichia coli (01-15-24 @ 08:29)  Organism: Escherichia coli (01-15-24 @ 08:29)      Method Type: DAVIS      -  Amoxicillin/Clavulanic Acid: S <=8/4      -  Ampicillin: S <=8 These ampicillin results predict results for amoxicillin      -  Ampicillin/Sulbactam: S <=4/2      -  Aztreonam: S <=4      -  Cefazolin: S 8 For uncomplicated UTI with K. pneumoniae, E. coli, or P. mirablis: DAVIS <=16 is sensitive and DAVIS >=32 is resistant. This also predicts results for oral agents cefaclor, cefdinir, cefpodoxime, cefprozil, cefuroxime axetil, cephalexin and locarbef for uncomplicated UTI. Note that some isolates may be susceptible to these agents while testing resistant to cefazolin.      -  Cefepime: S <=2      -  Cefoxitin: R >16      -  Ceftriaxone: S <=1      -  Cefuroxime: S 8      -  Ciprofloxacin: R >2      -  Ertapenem: S <=0.5      -  Gentamicin: S <=2      -  Imipenem: S <=1      -  Levofloxacin: R >4      -  Meropenem: S <=1      -  Nitrofurantoin: S <=32 Should not be used to treat pyelonephritis      -  Piperacillin/Tazobactam: S <=8      -  Tobramycin: S <=2      -  Trimethoprim/Sulfamethoxazole: S 2/38    Culture - Blood (collected 01-11-24 @ 21:20)  Source: .Blood Blood-Peripheral  Gram Stain (01-12-24 @ 14:46):    Growth in aerobic bottle: Gram Negative Rods  Final Report (01-14-24 @ 07:30):    Growth in aerobic bottle: Escherichia coli ESBL    Direct identification is available within approximately 3-5    hours either by Blood Panel Multiplexed PCR or Direct    MALDI-TOF. Details: [email or web address]  Organism: Blood Culture PCR  Escherichia coli ESBL (01-14-24 @ 07:30)  Organism: Escherichia coli ESBL (01-14-24 @ 07:30)      Method Type: DAVIS      -  Ampicillin: R >16 These ampicillin results predict results for amoxicillin      -  Ampicillin/Sulbactam: R 16/8      -  Aztreonam: R >16      -  Cefazolin: R >16      -  Cefepime: R >16      -  Ceftriaxone: R >32      -  Ciprofloxacin: R >2      -  Ertapenem: S <=0.5      -  Gentamicin: S <=2      -  Imipenem: S <=1      -  Levofloxacin: R >4      -  Meropenem: S <=1      -  Piperacillin/Tazobactam: R <=8      -  Tobramycin: S <=2      -  Trimethoprim/Sulfamethoxazole: R >2/38  Organism: Blood Culture PCR (01-14-24 @ 07:30)      Method Type: PCR      -  Escherichia coli: Detec      -  ESBL: Detec      -  CTX-M Resistance Marker: Detec    Culture - Blood (collected 01-11-24 @ 21:20)  Source: .Blood Blood-Peripheral  Preliminary Report (01-16-24 @ 02:02):    No growth at 4 days          Radiology: reviewed       [facility], Division of Infectious Diseases  SURYA Reaves Y. Patel, S. Shah, G. [facility]  [phone]    Name: TOBIN LEON  Age: 83y  Gender: Female  MRN: [id number]    Interval History:  Patient seen and examined at bedside  No acute overnight events.   Notes reviewed    Antibiotics:  ertapenem  IVPB 1000 milliGRAM(s) IV Intermittent every 24 hours      Medications:  acetaminophen     Tablet .. 650 milliGRAM(s) Oral every 6 hours PRN  atorvastatin 20 milliGRAM(s) Oral at bedtime  DULoxetine 60 milliGRAM(s) Oral daily  enoxaparin Injectable 40 milliGRAM(s) SubCutaneous every 24 hours  ertapenem  IVPB 1000 milliGRAM(s) IV Intermittent every 24 hours  influenza  Vaccine (HIGH DOSE) 0.7 milliLiter(s) IntraMuscular once  multivitamin/minerals 1 Tablet(s) Oral daily  oxybutynin 5 milliGRAM(s) Oral daily  polyethylene glycol 3350 17 Gram(s) Oral daily  senna 2 Tablet(s) Oral at bedtime      Review of Systems:  Review of systems otherwise negative except as previously noted.    Allergies: No Known Allergies    For details regarding the patient's past medical history, social history, family history, and other miscellaneous elements, please refer the initial infectious diseases consultation and/or the admitting history and physical examination for this admission.    Objective:  Vitals:   T(C): 36.6 (01-16-24 @ 05:23), Max: 36.7 (01-15-24 @ 12:58)  HR: 75 (01-16-24 @ 05:23) (75 - 81)  BP: 136/61 (01-16-24 @ 05:23) (136/61 - 144/67)  RR: 18 (01-16-24 @ 05:23) (18 - 18)  SpO2: 94% (01-16-24 @ 05:23) (92% - 94%)    Physical Examination:  General: no acute distress  HEENT: NC/AT, EOMI,   Cardio: S1, S2 heard, RRR, no murmurs  Resp: breath sounds heard bilaterally, no rales, wheezes or rhonchi  Abd: soft, NT, ND,  Ext: no edema or cyanosis  Skin: warm, dry, no visible rash      Laboratory Studies:  CBC:   CMP: 01-15    139  |  107  |  15  ----------------------------<  103<H>  4.0   |  26  |  0.49<L>    Ca    9.1      15 Mono 2024 07:23  Phos  3.1     01-15  Mg     2.0     01-15        Urinalysis Basic - ( 15 Mono 2024 07:23 )    Color: x / Appearance: x / SG: x / pH: x  Gluc: 103 mg/dL / Ketone: x  / Bili: x / Urobili: x   Blood: x / Protein: x / Nitrite: x   Leuk Esterase: x / RBC: x / WBC x   Sq Epi: x / Non Sq Epi: x / Bacteria: x        Microbiology: reviewed    Culture - Blood (collected 01-12-24 @ 15:40)  Source: .Blood Blood-Peripheral  Preliminary Report (01-15-24 @ 22:02):    No growth at 72 Hours    Culture - Blood (collected 01-12-24 @ 15:35)  Source: .Blood Blood-Venous  Preliminary Report (01-15-24 @ 22:02):    No growth at 72 Hours    Culture - Urine (collected 01-11-24 @ 23:02)  Source: Clean Catch Clean Catch (Midstream)  Final Report (01-15-24 @ 08:29):    >100,000 CFU/ml Escherichia coli  Organism: Escherichia coli (01-15-24 @ 08:29)  Organism: Escherichia coli (01-15-24 @ 08:29)      Method Type: DAVIS      -  Amoxicillin/Clavulanic Acid: S <=8/4      -  Ampicillin: S <=8 These ampicillin results predict results for amoxicillin      -  Ampicillin/Sulbactam: S <=4/2      -  Aztreonam: S <=4      -  Cefazolin: S 8 For uncomplicated UTI with K. pneumoniae, E. coli, or P. mirablis: DAVIS <=16 is sensitive and DAVIS >=32 is resistant. This also predicts results for oral agents cefaclor, cefdinir, cefpodoxime, cefprozil, cefuroxime axetil, cephalexin and locarbef for uncomplicated UTI. Note that some isolates may be susceptible to these agents while testing resistant to cefazolin.      -  Cefepime: S <=2      -  Cefoxitin: R >16      -  Ceftriaxone: S <=1      -  Cefuroxime: S 8      -  Ciprofloxacin: R >2      -  Ertapenem: S <=0.5      -  Gentamicin: S <=2      -  Imipenem: S <=1      -  Levofloxacin: R >4      -  Meropenem: S <=1      -  Nitrofurantoin: S <=32 Should not be used to treat pyelonephritis      -  Piperacillin/Tazobactam: S <=8      -  Tobramycin: S <=2      -  Trimethoprim/Sulfamethoxazole: S 2/38    Culture - Blood (collected 01-11-24 @ 21:20)  Source: .Blood Blood-Peripheral  Gram Stain (01-12-24 @ 14:46):    Growth in aerobic bottle: Gram Negative Rods  Final Report (01-14-24 @ 07:30):    Growth in aerobic bottle: Escherichia coli ESBL    Direct identification is available within approximately 3-5    hours either by Blood Panel Multiplexed PCR or Direct    MALDI-TOF. Details: [email or web address]  Organism: Blood Culture PCR  Escherichia coli ESBL (01-14-24 @ 07:30)  Organism: Escherichia coli ESBL (01-14-24 @ 07:30)      Method Type: DAVIS      -  Ampicillin: R >16 These ampicillin results predict results for amoxicillin      -  Ampicillin/Sulbactam: R 16/8      -  Aztreonam: R >16      -  Cefazolin: R >16      -  Cefepime: R >16      -  Ceftriaxone: R >32      -  Ciprofloxacin: R >2      -  Ertapenem: S <=0.5      -  Gentamicin: S <=2      -  Imipenem: S <=1      -  Levofloxacin: R >4      -  Meropenem: S <=1      -  Piperacillin/Tazobactam: R <=8      -  Tobramycin: S <=2      -  Trimethoprim/Sulfamethoxazole: R >2/38  Organism: Blood Culture PCR (01-14-24 @ 07:30)      Method Type: PCR      -  Escherichia coli: Detec      -  ESBL: Detec      -  CTX-M Resistance Marker: Detec    Culture - Blood (collected 01-11-24 @ 21:20)  Source: .Blood Blood-Peripheral  Preliminary Report (01-16-24 @ 02:02):    No growth at 4 days          Radiology: reviewed

## 2024-01-16 NOTE — DISCHARGE NOTE PROVIDER - HOSPITAL COURSE
HPI:  84 yo F with PMH of hyperlipidemia, breast CA s/p mastectomies with mets to bone, neuropathy presents to ED c/o generalized weakness, confusion since Wednesday. History is obtained from pt's daughter (Leyla) as pt is a poor historian. Since Wednesday afternoon, pt has been more lethargic, confused and weak than usual. Yesterday evening, pt had trouble coming down the stairs for dinner and required assistance from her family. Today, pt had trouble ambulating and was stumbling. She was brought to PCP where she tested negative for Flu. She had trouble eating dinner earlier this evening due to confusion. EMS was called and pt was found to be febrile Tmax 101F. Denies any sick contacts or recent travel.   ED course:  Vitals: T 101.8F, HR 85, BP 96/43, RR 18, SpO2 95% on RA  Labs: RBC 3.54, Na 132, pro-  UA: cloudy, trace ketone, 30 protein, positive nitrite, large LE moderate blood, bacteria TNTC, epithelial cells present  CXR: no acute chest disease per wet read  CT head: negative  Given: IV Ofirmev x1, IV Zosyn x1, 1L IV NS bolus x2 (11 Jan 2024 23:34)    Hospital Course:  Patient admitted and found to have ESBL Bacteremia (1/2 cultures, E. Coli). She was seen by ID and started on Ertapenem. Her confusion quickly resolved with treatment. ID recommending 10 days from first negative culture which will be until 1/22. UCx with E. Coli as well; thus, this is the likely source. PT saw and recommended for [facility]. patient to follow up outpatient with oncologist to determine when to resume treatment for breast cancer (after discharge from [facility]). A midline was placed and this should be removed after she finishes antibiotics on 1/22.          HPI:  84 yo F with PMH of hyperlipidemia, breast CA s/p mastectomies with mets to bone, neuropathy presents to ED c/o generalized weakness, confusion since Wednesday. History is obtained from pt's daughter (Leyla) as pt is a poor historian. Since Wednesday afternoon, pt has been more lethargic, confused and weak than usual. Yesterday evening, pt had trouble coming down the stairs for dinner and required assistance from her family. Today, pt had trouble ambulating and was stumbling. She was brought to PCP where she tested negative for Flu. She had trouble eating dinner earlier this evening due to confusion. EMS was called and pt was found to be febrile Tmax 101F. Denies any sick contacts or recent travel.   ED course:  Vitals: T 101.8F, HR 85, BP 96/43, RR 18, SpO2 95% on RA  Labs: RBC 3.54, Na 132, pro-  UA: cloudy, trace ketone, 30 protein, positive nitrite, large LE moderate blood, bacteria TNTC, epithelial cells present  CXR: no acute chest disease per wet read  CT head: negative  Given: IV Ofirmev x1, IV Zosyn x1, 1L IV NS bolus x2 (11 Jan 2024 23:34)    Hospital Course:  Patient admitted and found to have ESBL Bacteremia (1/2 cultures, E. Coli). She was seen by ID and started on Ertapenem. Her confusion quickly resolved with treatment. ID recommending 10 days from first negative culture which will be until 1/22. UCx with E. Coli as well; thus, this is the likely source. PT saw and recommended for [facility]. patient to follow up outpatient with oncologist to determine when to resume treatment for breast cancer (after discharge from [facility]). A midline was placed and this should be removed after she finishes antibiotics on 1/22.

## 2024-01-16 NOTE — PROGRESS NOTE ADULT - PROBLEM SELECTOR PROBLEM 4
Primary breast cancer with metastasis to other site
Postural hypotension

## 2024-01-16 NOTE — PROGRESS NOTE ADULT - PROBLEM SELECTOR PLAN 3
Likely infectious and as above  CT head on admission with no acute pathology  Per daughter has baseline cognitive impairment for which she is seeing a neurologist  Explained to daughter that goal is to get patient back to her baseline  RESOLVED
Likely infectious and as above  CT head on admission with no acute pathology  Per daughter has baseline cognitive impairment for which she is seeing a neurologist  Explained to daughter that goal is to get patient back to her baseline
Likely infectious and as above  CT head on admission with no acute pathology
Likely infectious and as above  CT head on admission with no acute pathology  Per daughter has baseline cognitive impairment for which she is seeing a neurologist  Explained to daughter that goal is to get patient back to her baseline  RESOLVED

## 2024-01-16 NOTE — DISCHARGE NOTE PROVIDER - PROVIDER TOKENS
PROVIDER:[TOKEN:[[id number]],FOLLOWUP:[Routine],ESTABLISHEDPATIENT:[T]],PROVIDER:[TOKEN:[[id number]],FOLLOWUP:[Routine],ESTABLISHEDPATIENT:[T]],PROVIDER:[TOKEN:[[id number]],FOLLOWUP:[Routine],ESTABLISHEDPATIENT:[T]] PROVIDER:[TOKEN:[[id number]],FOLLOWUP:[Routine],ESTABLISHEDPATIENT:[T]],PROVIDER:[TOKEN:[[id number]],FOLLOWUP:[Routine],ESTABLISHEDPATIENT:[T]],PROVIDER:[TOKEN:[[id number]],FOLLOWUP:[Routine],ESTABLISHEDPATIENT:[T]]

## 2024-01-16 NOTE — PROGRESS NOTE ADULT - PROBLEM SELECTOR PLAN 6
- Continue home Duloxetine
- Continue home Atorvastatin
- Continue home Duloxetine
- Continue home Duloxetine

## 2024-01-17 LAB
CULTURE RESULTS: SIGNIFICANT CHANGE UP
SPECIMEN SOURCE: SIGNIFICANT CHANGE UP

## 2024-01-17 NOTE — PATIENT PROFILE ADULT - HISTORY OF COVID-19 VACCINATION
CHOLECYSTECTOMY LAPAROSCOPIC INTRAOPERATIVE CHOLANGIOGRAM  Progress Note    Umu Queen  1/16/2024    Pre-op Diagnosis:    * Choledocholithiasis        Post-Op Diagnosis Codes:     * Choledocholithiasis     Procedure/CPT® Codes:        Procedure(s):  CHOLECYSTECTOMY LAPAROSCOPIC INTRAOPERATIVE CHOLANGIOGRAM              Surgeon(s):  Delgado Guevara MD    Anesthesia: General    Staff:   Circulator: Jacques Taylor RN  Radiology Technologist: Christopher Shen  Scrub Person: Andrew Hammer  Nursing Assistant: Amaya Segovia PCT         Estimated Blood Loss: minimal    Urine Voided: * No values recorded between 1/16/2024  7:22 PM and 1/16/2024  8:44 PM *    Specimens:                Specimens       ID Source Type Tests Collected By Collected At Frozen?    A Gallbladder Tissue TISSUE PATHOLOGY EXAM   Delgado Guevara MD 1/16/24 1947                   Drains: * No LDAs found *    Findings:   1. Gallbladder dilated but not inflamed    2. Intra-operative cholangiogram demonstrated filling of the cystic, common, right and left hepatic ducts but without significant flow of contrast into the duodenum without retained stone or filling defect, with dilation of cystic and common bile duct        Complications: None          Delgado Guevara MD     Date: 1/16/2024  Time: 20:54 EST         Yes

## 2024-01-25 NOTE — PATIENT PROFILE ADULT - NSPROPASSIVESMOKEEXPOSURE_GEN_A_NUR
Problem   Maternal Tobacco Use in Third Trimester    Pt states she smokes cigarettes occasionally and has not used medical Marijuana since October.     31 Weeks Gestation of Pregnancy    Blood Type: A positive. Antibody screen:  Pap done 1/25  GC/CT -  done 1/25  PN1 Labs- ordered  28 Week Labs- ordered    Flu vaccine - declined  Blue folder- given and reviewed  Yellow folder- given and reviewed    Genetic screening- not done  AFP- missed    Level 2- 9/11    TDAP - declined     Delivery consent- signed 1/25, pt plans to adopt the baby out  Breast pump - declined  Pediatrician - has    Perineal massage -  GBS swab -   IOL -             31 weeks gestation of pregnancy  Here for 1st OB appointment accompanied by no one. This is her first prenatal visit since her OB interview 9/11. Lives in [place]. Feels ok, getting over a cold. Pt states she smokes cigarettes occasionally and has not used medical Marijuana since October. Pap/GC/CT done today. Hx of GDM in previous pregnancy and measuring larger than dates today. She was advised to get her 1*GTT asap and call for an MFM appt. Denies LOF/CTX/VB. See overview for plan of care. All questions answered. Oriented to our practice. Blue and yellow folders given and reviewed. Pt declined to make further OB appts today but agrees to call MFM.      
No

## 2024-03-12 ENCOUNTER — APPOINTMENT (OUTPATIENT)
Dept: NEUROSURGERY | Facility: CLINIC | Age: 84
End: 2024-03-12
Payer: MEDICARE

## 2024-03-12 VITALS
DIASTOLIC BLOOD PRESSURE: 57 MMHG | SYSTOLIC BLOOD PRESSURE: 104 MMHG | HEART RATE: 83 BPM | BODY MASS INDEX: 20.55 KG/M2 | HEIGHT: 63 IN | WEIGHT: 116 LBS | OXYGEN SATURATION: 96 %

## 2024-03-12 DIAGNOSIS — M54.50 LOW BACK PAIN, UNSPECIFIED: ICD-10-CM

## 2024-03-12 DIAGNOSIS — G89.29 LOW BACK PAIN, UNSPECIFIED: ICD-10-CM

## 2024-03-12 PROCEDURE — 99215 OFFICE O/P EST HI 40 MIN: CPT

## 2024-04-15 NOTE — ED PROVIDER NOTE - CHPI ED SYMPTOMS POS
Emmanuel was seen and treated in our emergency department on 4/15/2024.  She may return to school on 04/18/2024.      If you have any questions or concerns, please don't hesitate to call.      Ria Núñez PA-C [facility]

## 2024-05-09 NOTE — ED PROCEDURE NOTE - CPROC ED POST PROC CARE GUIDE1
Dinora Clip repositioned Verbal/written post procedure instructions were given to patient/caregiver./Instructed patient/caregiver to follow-up with primary care physician./Instructed patient/caregiver regarding signs and symptoms of infection.

## 2024-06-06 NOTE — ED ADULT TRIAGE NOTE - BP NONINVASIVE SYSTOLIC (MM HG)
"Daily Note     Today's date: 2024  Patient name: Luis F Velazquez  : 1964  MRN: [id number]  Referring provider: Carla Price DPM  Dx:   Encounter Diagnosis     ICD-10-CM    1. Pain in both feet  M79.671     M79.672       2. DISH (diffuse idiopathic skeletal hyperostosis)  M48.10       3. Chronic bilateral low back pain with right-sided sciatica  M54.41     G89.29                      Subjective: Pt states that he isn't feeling too bad today.       Objective: See treatment diary below      Assessment: Pt willing to participate in new exercises this date; mild fatigue and short rest periods needed between sets. TA with OH lifts limited 2* shld pain. Continue with gradual progression of plan to address chronic pain.     Plan: Continue per plan of care.      Re-eval Date: 24     Precautions: chronic pain; gait/balance dysfunction        Manuals 6/3 6/6  5/22 5/30   Laser  Chronic  Lumbar radiculopathy    Trial IFC/MHP this date                             Neuro Re-Ed         PPT        PPT with marchjosselin         TA Seated   3\" x 10        TA + OH lifts 4# red med ball x 10 seated  Yellow med ball   X 5  Limited 2* shld pain  Back to RED  Seated x 10  4# red med ball  X 10   Seated    MTP/LTP Green x 20 ea  Green x 20 ea   Green x 20 ea  Green x 20 ea    Palloff press   Green   5\" x 10       Romberg         Tandem                 Ther Ex        NuStep  L1 10' L2 10'   L1 5'  L1 9'    Standing hip flex/abd/ext Flex/abd  X 15 ea Irving     Flex/Abd x 15 ea irving    Squats         Step-ups         LTR 10\"x 5     10\" x 5    SKTC        SLR        S/L SLR        Clamshells         Heel walk outs         Bridges         Physio ball stretch  Fwd  10\"x 5  Fwd   10\" x 5   Fwd   10\" x 5  Fwd   10\" x 5                    Ther Activity                        Gait Training                        Modalities         IFC/MHP  L/S x 15 min  IFC/MHP  L/S x 15 min  IFC/MHP to L/S  X 15 minutes  IFC/MHP L/S x 15 minutes          "
169

## 2024-10-23 NOTE — PHYSICAL THERAPY INITIAL EVALUATION ADULT - IMPAIRMENTS FOUND, PT EVAL
Problem: Occupational Therapy - Adult  Goal: By Discharge: Performs self-care activities at highest level of function for planned discharge setting.  See evaluation for individualized goals.  Description: FUNCTIONAL STATUS PRIOR TO ADMISSION:  Patient lives with family who assist as needed, though patient is typically independent with basic ADL. Patient has history of CVA and was receiving home health OT and PT   ADL Assistance: Needs assistance, Bath: Supervision, Dressing: Minimal assistance,  , Feeding: Stand by assistance, Toileting: Independent,  , Ambulation Assistance: Independent, Transfer Assistance: Independent, Active : No       Occupational Therapy Goals:  Initiated 10/23/2024  1.  Patient will perform grooming in stand with Stand by Assist within 7 day(s).  2.  Patient will perform bathing with Stand by Assist within 7 day(s).  3.  Patient will perform upper body dressing and lower body dressing with Stand by Assist within 7 day(s).  4.  Patient will perform toilet transfers with Stand by Assist  within 7 day(s).  5.  Patient will perform all aspects of toileting with Stand by Assist within 7 day(s).  Outcome: Progressing   OCCUPATIONAL THERAPY EVALUATION    Patient: Mag Montiel (85 y.o. female)  Date: 10/23/2024  Primary Diagnosis: SIRS (systemic inflammatory response syndrome) ([facility]) [R65.10]  Acute febrile illness [R50.9]  Sepsis due to pneumonia ([facility]) [J18.9, A41.9]  Sepsis without acute organ dysfunction, due to unspecified organism ([facility]) [A41.9]         Precautions: Fall Risk                  ASSESSMENT :  The patient is limited by decreased functional mobility, independence in ADLs, strength, activity tolerance, endurance, balance. Patient's daughter was present, who is  for this facility, and served as in-person  during today's session. Patient demonstrates improved standing balance during ADL and related mobility than with prior PT session. Intermittent assistance  Toileting, which includes using toilet, bedpan or urinal? [] 1 []  2 [x]  3 []  4   4.  Putting on and taking off regular upper body clothing? []  1 []  2 [x]  3 []  4   5.  Taking care of personal grooming such as brushing teeth? []  1 []  2 [x]  3 []  4   6.  Eating meals? []  1 []  2 [x]  3 []  4   © , Trustees of [facility], under license to [employer]. All rights reserved     Score: 18/24     Interpretation of Tool:  Represents clinically-significant functional categories (i.e. Activities of daily living).    Cutoff score 39.4 (19) correlates to a good likelihood of discharging home versus a facility  Vonda Avila, Rosemarie Mccord, Mehul Hudson, Candy Vieira, Eliu Machuca, Ta Avila, -PAC “6-Clicks” Functional Assessment Scores Predict Acute Care Hospital Discharge Destination, Physical Therapy, Volume 94, Issue 9, 2014, Pages 0697-3161, [email or web address]    Pain Ratin/10   Pain Intervention(s):       Activity Tolerance:   Good    After treatment:   Patient left in no apparent distress sitting up in chair, Call bell within reach, and Caregiver / family present    COMMUNICATION/EDUCATION:   The patient's plan of care was discussed with: physical therapist and registered nurse    Patient Education  Education Given To: Patient  Education Provided: Role of Therapy;ADL Adaptive Strategies;Transfer Training  Education Method: Demonstration;Verbal  Barriers to Learning: Cognition  Education Outcome: Continued education needed    Thank you for this referral.  Scarlet Hernandez OT  Minutes: 17    Occupational Therapy Evaluation Charge Determination   History Examination Decision-Making   LOW Complexity : Brief history review  LOW Complexity: 1-3 Performance deficits relating to physical, cognitive, or psychosocial skills that result in activity limitations and/or participation restrictions LOW Complexity: No comorbidities that affect functional and  no  gait, locomotion, and balance/posture

## 2024-11-08 NOTE — ED STATDOCS - NSFOLLOWUPINSTRUCTIONS_ED_ALL_ED_FT
Staple Care    WHAT YOU NEED TO KNOW:    Staples are often used to close a wound. Your staples may be placed for 3 to 14 days, depending on the location of your wound.    DISCHARGE INSTRUCTIONS:    Care for your wound:   •Clean: ?You may be able to shower in 24 hours. Do not soak your wound under water.      ?Gently wash your wound with soap and warm water daily. Lightly pat it dry. Do not cover your wound unless your healthcare provider tells you to.       ?You may also need to clean your wound with a mixture of hydrogen peroxide and water. Ask how to do this.      ?Do not apply ointment or cream to the wound unless your healthcare provider tells you to.      •Elevate: ?Rest any arm or leg that has a wound on pillows above the level of your heart. Do this as often as possible for 2 days. This will help decrease swelling and pain, and help you heal faster.           •Minimize scarring: ?Avoid sunshine on your wound to reduce scarring.             Follow up with your healthcare provider as directed: You may need to return for a wound checkup 3 days after your staples are placed. Ask when you should return to get your staples removed.    Staple removal:   •A medical staple remover will be used to take out your staples. Your healthcare provider will slide the tool under each staple, squeeze the handle, and gently pull the staple out.      •Medical tape will be placed on your wound once your staples are removed. This will help keep your wound closed. The medical tape will fall off on its own after several days.      Contact your healthcare provider if:   •You have redness, pain, swelling, or pus draining from your wound.      •Your pain medicine does not relieve your pain.      •You have a fever of 101°F (38.5°C) or higher.      •You have an odor coming from your wound.      •You have questions or concerns about your condition or care.      Seek care immediately if:   •Your wound reopens.      •You have red streaks on your skin that spread out from your wound.      •You have severe pain or vomiting.
Neck FROMOM

## 2024-11-11 NOTE — ED ADULT TRIAGE NOTE - CADM TRG TX PRIOR TO ARRIVAL
I did not schedule patient I told her to call back if they wanted that appointment **    David,CMA   
Left vm for patient spouse about sooner appointment on November 27th at 4:00.    HUB CAN RELAY    GENNY Hernandez   
Provider: KE ALANIZ    Caller: Alia Yeboah    Relationship to Patient: Emergency Contact      Reason for Call: PT  WIFE CALLED TO CHECK TO SEE IF PT COULD BE SEEN SOONER THAN 12/5/24.  PT WILL BE OUT OF TESTOSTERONE FOR 2MTHS.        When was the patient last seen: 9/10/24 LABS, 4/26/24 OFFICE VISIT    
none

## 2024-12-22 ENCOUNTER — EMERGENCY (EMERGENCY)
Facility: HOSPITAL | Age: 84
LOS: 0 days | Discharge: HOME | End: 2024-12-23
Attending: STUDENT IN AN ORGANIZED HEALTH CARE EDUCATION/TRAINING PROGRAM
Payer: MEDICARE

## 2024-12-22 VITALS
SYSTOLIC BLOOD PRESSURE: 155 MMHG | HEART RATE: 81 BPM | TEMPERATURE: 98 F | RESPIRATION RATE: 18 BRPM | DIASTOLIC BLOOD PRESSURE: 76 MMHG | OXYGEN SATURATION: 100 %

## 2024-12-22 DIAGNOSIS — S51.812A LACERATION WITHOUT FOREIGN BODY OF LEFT FOREARM, INITIAL ENCOUNTER: ICD-10-CM

## 2024-12-22 DIAGNOSIS — Z23 ENCOUNTER FOR IMMUNIZATION: ICD-10-CM
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
## 2024-12-22 DIAGNOSIS — Z90.12 ACQUIRED ABSENCE OF LEFT BREAST AND NIPPLE: Chronic | ICD-10-CM

## 2024-12-22 DIAGNOSIS — E78.5 HYPERLIPIDEMIA, UNSPECIFIED: ICD-10-CM

## 2024-12-22 DIAGNOSIS — K21.9 GASTRO-ESOPHAGEAL REFLUX DISEASE WITHOUT ESOPHAGITIS: ICD-10-CM

## 2024-12-22 DIAGNOSIS — Z98.890 OTHER SPECIFIED POSTPROCEDURAL STATES: Chronic | ICD-10-CM

## 2024-12-22 DIAGNOSIS — Z90.11 ACQUIRED ABSENCE OF RIGHT BREAST AND NIPPLE: Chronic | ICD-10-CM

## 2024-12-22 DIAGNOSIS — N39.0 URINARY TRACT INFECTION, SITE NOT SPECIFIED: ICD-10-CM

## 2024-12-22 DIAGNOSIS — M19.90 UNSPECIFIED OSTEOARTHRITIS, UNSPECIFIED SITE: ICD-10-CM

## 2024-12-22 DIAGNOSIS — Z85.3 PERSONAL HISTORY OF MALIGNANT NEOPLASM OF BREAST: ICD-10-CM

## 2024-12-22 DIAGNOSIS — D04.9 CARCINOMA IN SITU OF SKIN, UNSPECIFIED: Chronic | ICD-10-CM
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
## 2024-12-22 DIAGNOSIS — M48.061 SPINAL STENOSIS, LUMBAR REGION WITHOUT NEUROGENIC CLAUDICATION: ICD-10-CM

## 2024-12-22 PROCEDURE — 81001 URINALYSIS AUTO W/SCOPE: CPT

## 2024-12-22 PROCEDURE — 70450 CT HEAD/BRAIN W/O DYE: CPT | Mod: 26,MC

## 2024-12-22 PROCEDURE — 90471 IMMUNIZATION ADMIN: CPT

## 2024-12-22 PROCEDURE — 73090 X-RAY EXAM OF FOREARM: CPT | Mod: LT

## 2024-12-22 PROCEDURE — 90715 TDAP VACCINE 7 YRS/> IM: CPT

## 2024-12-22 PROCEDURE — 71045 X-RAY EXAM CHEST 1 VIEW: CPT

## 2024-12-22 PROCEDURE — 70450 CT HEAD/BRAIN W/O DYE: CPT | Mod: MC

## 2024-12-22 PROCEDURE — 12002 RPR S/N/AX/GEN/TRNK2.6-7.5CM: CPT

## 2024-12-22 PROCEDURE — 99285 EMERGENCY DEPT VISIT HI MDM: CPT | Mod: 25

## 2024-12-22 PROCEDURE — 71045 X-RAY EXAM CHEST 1 VIEW: CPT | Mod: 26

## 2024-12-22 PROCEDURE — 99284 EMERGENCY DEPT VISIT MOD MDM: CPT | Mod: 25

## 2024-12-22 PROCEDURE — 73090 X-RAY EXAM OF FOREARM: CPT | Mod: 26,LT

## 2024-12-22 PROCEDURE — 72125 CT NECK SPINE W/O DYE: CPT | Mod: MC

## 2024-12-22 PROCEDURE — 72125 CT NECK SPINE W/O DYE: CPT | Mod: 26,MC

## 2024-12-22 PROCEDURE — 72170 X-RAY EXAM OF PELVIS: CPT

## 2024-12-22 PROCEDURE — 72170 X-RAY EXAM OF PELVIS: CPT | Mod: 26

## 2024-12-22 RX ORDER — TETANUS TOXOID, REDUCED DIPHTHERIA TOXOID AND ACELLULAR PERTUSSIS VACCINE, ADSORBED 5; 2.5; 8; 8; 2.5 [IU]/.5ML; [IU]/.5ML; UG/.5ML; UG/.5ML; UG/.5ML
0.5 SUSPENSION INTRAMUSCULAR ONCE
Refills: 0 | Status: COMPLETED | OUTPATIENT
Start: 2024-12-22 | End: 2024-12-22

## 2024-12-22 RX ADMIN — TETANUS TOXOID, REDUCED DIPHTHERIA TOXOID AND ACELLULAR PERTUSSIS VACCINE, ADSORBED 0.5 MILLILITER(S): 5; 2.5; 8; 8; 2.5 SUSPENSION INTRAMUSCULAR at 23:03

## 2024-12-22 NOTE — ED ADULT NURSE NOTE - OBJECTIVE STATEMENT
pt presents to ED s/p unwitnessed fall at home. pt was walking down the stairs with her cane and states "she lost her balance". (+) headstrike, (-) LOC, (-) bloodthinners. pt is alert & oriented x4, family is concerned for early stage UTI. reports foul smelling urine. laceration to left forearm, and abrasions/ecchymosis to left cheekbone.  bleeding controlled. no other deformities, lacerations or injuries noted to body. no acute distress at this time. NA called brought straight to CT from triage.

## 2024-12-22 NOTE — ED PROVIDER NOTE - PHYSICAL EXAMINATION
GEN: Patient awake alert NAD.   HEENT: normocephalic, atraumatic, EOMI, no scleral icterus, moist MM  CARDIAC: RRR, S1, S2, no murmur.   PULM: CTA B/L no wheeze, rhonchi, rales.   ABD: soft NT, ND, no rebound no guarding, no CVA tenderness.   MSK: Moving all extremities, no edema. 5/5 strength and full ROM in all extremities.     NEURO: A&Ox3, gait normal, no focal neurological deficits, CN 2-12 grossly intact  SKIN: Left forearm laceration. GEN: Patient awake alert NAD.   HEENT: normocephalic, atraumatic, EOMI, no scleral icterus, moist MM  CARDIAC: RRR, S1, S2, no murmur.   PULM: CTA B/L no wheeze, rhonchi, rales.   ABD: soft NT, ND, no rebound no guarding, no CVA tenderness.   MSK: Moving all extremities, no edema. 5/5 strength and full ROM in all extremities.     NEURO: A&Ox3, gait normal, no focal neurological deficits, CN 2-12 grossly intact  SKIN: Proximal aspect of left forearm laceration approximately 7 cm in length with devitalized tissue. GEN: Patient awake alert NAD.   HEENT: normocephalic, atraumatic, EOMI, no scleral icterus, moist MM  CARDIAC: RRR, S1, S2, no murmur.   PULM: CTA B/L no wheeze, rhonchi, rales.   ABD: soft NT, ND, no rebound no guarding, no CVA tenderness.   MSK: Moving all extremities, no edema.   NEURO: A&Ox2-3, gait normal, no focal neurological deficits, no CTL midline spinal ttp   SKIN: Proximal aspect of left forearm laceration approximately 7 cm in length with devitalized tissue.

## 2024-12-22 NOTE — ED PROVIDER NOTE - NSFOLLOWUPINSTRUCTIONS_ED_ALL_ED_FT
Laceration  Sutures to be removed in 10-14 days.     A laceration is a cut that goes through all of the layers of the skin and into the tissue that is right under the skin. Some lacerations heal on their own. Others need to be closed with skin adhesive strips, skin glue, stitches (sutures), or staples. Proper laceration care minimizes the risk of infection and helps the laceration to heal better.  If non-absorbable stitches or staples have been placed, they must be taken out within the time frame instructed by your healthcare provider.    SEEK IMMEDIATE MEDICAL CARE IF YOU HAVE ANY OF THE FOLLOWING SYMPTOMS: swelling around the wound, worsening pain, drainage from the wound, red streaking going away from your wound, inability to move finger or toe near the laceration, or discoloration of skin near the laceration.    Urinary Tract Infection    A urinary tract infection (UTI) is an infection of any part of the urinary tract, which includes the kidneys, ureters, bladder, and urethra. Risk factors include ignoring your need to urinate, wiping back to front if female, being an uncircumcised male, and having diabetes or a weak immune system. Symptoms include frequent urination, pain or burning with urination, foul smelling urine, cloudy urine, pain in the lower abdomen, blood in the urine, and fever. If you were prescribed an antibiotic medicine, take it as told by your health care provider. Do not stop taking the antibiotic even if you start to feel better.    SEEK IMMEDIATE MEDICAL CARE IF YOU HAVE ANY OF THE FOLLOWING SYMPTOMS: severe back or abdominal pain, fever, inability to keep fluids or medicine down, dizziness/lightheadedness, or a change in mental status.

## 2024-12-22 NOTE — ED PROVIDER NOTE - OBJECTIVE STATEMENT
85 y/o female with PMHx of breast CA s/p mastectomies in 2016 with mets to bone, neuropathy, BCC in situ s/p resection from side of neck, GERD, HLD, osteoarthritis, lumbar spinal stenosis presents to the ED s/p witnessed fall down 4 steps, c/o left forearm laceration. Denies headstrike, LOC.

## 2024-12-22 NOTE — ED PROVIDER NOTE - PROGRESS NOTE DETAILS
Nabor Lanza DO (Attending): Proximal aspect of the laceration not repaired due to devascularized tissue distal aspect repaired with sutures.  Spoke with family who is concerned patient has UTI.  Will add on urine.  Pending final CT reads.

## 2024-12-22 NOTE — ED ADULT TRIAGE NOTE - CHIEF COMPLAINT QUOTE
Pt BIBEMS s/p mechanical fall. As per EMS pt fell down 4 steps + head strike - LOC - AC. Lac noted to left forearm. Bleeding controlled in triage. P A+O 2. no other complaints at this time. MD Lanza at bedside N/A called. Pt taken directly to CT scan.

## 2024-12-22 NOTE — ED PROVIDER NOTE - CLINICAL SUMMARY MEDICAL DECISION MAKING FREE TEXT BOX
Nabor Lanza DO (Attending): 85 y/o female with PMHx of breast CA s/p mastectomies in 2016 with mets to bone, neuropathy, BCC in situ s/p resection from side of neck, GERD, HLD, osteoarthritis, lumbar spinal stenosis presents to the ED s/p witnessed fall down 4 steps, c/o left forearm laceration.   Patient with laceration to left forearm, no other signs of trauma hemodynamically stable.  Although patient denies head strike will rule out ICH with CT head, x-rays, laceration repair, update tetanus.  Reassess

## 2024-12-22 NOTE — ED PROCEDURE NOTE - PROCEDURE ADDITIONAL DETAILS
Proximal 2 cm of laceration with devitalized tissue, not closed by sutures the distal aspect closed by sick sutures.

## 2024-12-22 NOTE — ED ADULT NURSE NOTE - SUICIDE SCREENING QUESTION 1
Simvastatin 10mg  Last seen: 1/18/21  Follow up appointment: 7/6/21  Last filled: 11/17/20  Last labs: 6/11/20    Prescription e-prescribed to pharmacy per MD protocol    
No

## 2024-12-22 NOTE — ED PROVIDER NOTE - PATIENT PORTAL LINK FT
You can access the FollowMyHealth Patient Portal offered by [facility] by registering at the following website: [email or web address]. By joining [employer]’s FollowMyHealth portal, you will also be able to view your health information using other applications (apps) compatible with our system.

## 2024-12-23 VITALS
TEMPERATURE: 98 F | OXYGEN SATURATION: 99 % | DIASTOLIC BLOOD PRESSURE: 91 MMHG | RESPIRATION RATE: 16 BRPM | HEART RATE: 88 BPM | SYSTOLIC BLOOD PRESSURE: 179 MMHG

## 2024-12-23 LAB
APPEARANCE UR: ABNORMAL
BACTERIA # UR AUTO: ABNORMAL /HPF
BILIRUB UR-MCNC: NEGATIVE — SIGNIFICANT CHANGE UP
CAST: 1 /LPF — SIGNIFICANT CHANGE UP (ref 0–4)
COLOR SPEC: YELLOW — SIGNIFICANT CHANGE UP
DIFF PNL FLD: ABNORMAL
GLUCOSE UR QL: NEGATIVE MG/DL — SIGNIFICANT CHANGE UP
KETONES UR-MCNC: NEGATIVE MG/DL — SIGNIFICANT CHANGE UP
LEUKOCYTE ESTERASE UR-ACNC: ABNORMAL
NITRITE UR-MCNC: NEGATIVE — SIGNIFICANT CHANGE UP
PH UR: 7 — SIGNIFICANT CHANGE UP (ref 5–8)
PROT UR-MCNC: SIGNIFICANT CHANGE UP MG/DL
RBC CASTS # UR COMP ASSIST: 2 /HPF — SIGNIFICANT CHANGE UP (ref 0–4)
SP GR SPEC: 1.02 — SIGNIFICANT CHANGE UP (ref 1–1.03)
SQUAMOUS # UR AUTO: 1 /HPF — SIGNIFICANT CHANGE UP (ref 0–5)
UROBILINOGEN FLD QL: 1 MG/DL — SIGNIFICANT CHANGE UP (ref 0.2–1)
WBC UR QL: 5 /HPF — SIGNIFICANT CHANGE UP (ref 0–5)

## 2024-12-23 RX ORDER — CEFUROXIME AXETIL 250 MG
1 TABLET ORAL
Qty: 20 | Refills: 0 | DISCHARGE
Start: 2024-12-23 | End: 2025-01-01

## 2024-12-23 RX ADMIN — Medication 500 MILLIGRAM(S): at 01:29

## 2025-01-02 ENCOUNTER — INPATIENT (INPATIENT)
Facility: HOSPITAL | Age: 85
LOS: 6 days | Discharge: NURSING, REHAB OR LONG-TERM CARE | DRG: 884 | End: 2025-01-09
Attending: HOSPITALIST | Admitting: FAMILY MEDICINE
Payer: MEDICARE

## 2025-01-02 VITALS — WEIGHT: 132.06 LBS | HEIGHT: 64 IN

## 2025-01-02 DIAGNOSIS — R41.0 DISORIENTATION, UNSPECIFIED: ICD-10-CM

## 2025-01-02 DIAGNOSIS — Z90.12 ACQUIRED ABSENCE OF LEFT BREAST AND NIPPLE: Chronic | ICD-10-CM

## 2025-01-02 DIAGNOSIS — Z90.11 ACQUIRED ABSENCE OF RIGHT BREAST AND NIPPLE: Chronic | ICD-10-CM

## 2025-01-02 DIAGNOSIS — Z98.890 OTHER SPECIFIED POSTPROCEDURAL STATES: Chronic | ICD-10-CM

## 2025-01-02 DIAGNOSIS — D04.9 CARCINOMA IN SITU OF SKIN, UNSPECIFIED: Chronic | ICD-10-CM

## 2025-01-02 LAB
ADD ON TEST-SPECIMEN IN LAB: SIGNIFICANT CHANGE UP
ALBUMIN SERPL ELPH-MCNC: 2.9 G/DL — LOW (ref 3.3–5)
ALP SERPL-CCNC: 108 U/L — SIGNIFICANT CHANGE UP (ref 40–120)
ALT FLD-CCNC: 9 U/L — LOW (ref 12–78)
ANION GAP SERPL CALC-SCNC: 5 MMOL/L — SIGNIFICANT CHANGE UP (ref 5–17)
APPEARANCE UR: CLEAR — SIGNIFICANT CHANGE UP
APTT BLD: 32.6 SEC — SIGNIFICANT CHANGE UP (ref 24.5–35.6)
AST SERPL-CCNC: 18 U/L — SIGNIFICANT CHANGE UP (ref 15–37)
BACTERIA # UR AUTO: NEGATIVE /HPF — SIGNIFICANT CHANGE UP
BASOPHILS # BLD AUTO: 0.06 K/UL — SIGNIFICANT CHANGE UP (ref 0–0.2)
BASOPHILS NFR BLD AUTO: 0.7 % — SIGNIFICANT CHANGE UP (ref 0–2)
BILIRUB SERPL-MCNC: 0.6 MG/DL — SIGNIFICANT CHANGE UP (ref 0.2–1.2)
BILIRUB UR-MCNC: NEGATIVE — SIGNIFICANT CHANGE UP
BUN SERPL-MCNC: 18 MG/DL — SIGNIFICANT CHANGE UP (ref 7–23)
CALCIUM SERPL-MCNC: 9.2 MG/DL — SIGNIFICANT CHANGE UP (ref 8.5–10.1)
CAST: 1 /LPF — SIGNIFICANT CHANGE UP (ref 0–4)
CHLORIDE SERPL-SCNC: 105 MMOL/L — SIGNIFICANT CHANGE UP (ref 96–108)
CO2 SERPL-SCNC: 26 MMOL/L — SIGNIFICANT CHANGE UP (ref 22–31)
COLOR SPEC: SIGNIFICANT CHANGE UP
CREAT SERPL-MCNC: 0.81 MG/DL — SIGNIFICANT CHANGE UP (ref 0.5–1.3)
DIFF PNL FLD: NEGATIVE — SIGNIFICANT CHANGE UP
EGFR: 72 ML/MIN/1.73M2 — SIGNIFICANT CHANGE UP
EOSINOPHIL # BLD AUTO: 0.15 K/UL — SIGNIFICANT CHANGE UP (ref 0–0.5)
EOSINOPHIL NFR BLD AUTO: 1.8 % — SIGNIFICANT CHANGE UP (ref 0–6)
GLUCOSE SERPL-MCNC: 147 MG/DL — HIGH (ref 70–99)
GLUCOSE UR QL: NEGATIVE MG/DL — SIGNIFICANT CHANGE UP
HCT VFR BLD CALC: 37.6 % — SIGNIFICANT CHANGE UP (ref 34.5–45)
HGB BLD-MCNC: 12.4 G/DL — SIGNIFICANT CHANGE UP (ref 11.5–15.5)
IMM GRANULOCYTES NFR BLD AUTO: 0.4 % — SIGNIFICANT CHANGE UP (ref 0–0.9)
INR BLD: 1.05 RATIO — SIGNIFICANT CHANGE UP (ref 0.85–1.16)
KETONES UR-MCNC: ABNORMAL MG/DL
LACTATE SERPL-SCNC: 1.9 MMOL/L — SIGNIFICANT CHANGE UP (ref 0.7–2)
LEUKOCYTE ESTERASE UR-ACNC: ABNORMAL
LYMPHOCYTES # BLD AUTO: 1.63 K/UL — SIGNIFICANT CHANGE UP (ref 1–3.3)
LYMPHOCYTES # BLD AUTO: 20.1 % — SIGNIFICANT CHANGE UP (ref 13–44)
MAGNESIUM SERPL-MCNC: 2.2 MG/DL — SIGNIFICANT CHANGE UP (ref 1.6–2.6)
MCHC RBC-ENTMCNC: 33 G/DL — SIGNIFICANT CHANGE UP (ref 32–36)
MCHC RBC-ENTMCNC: 33.2 PG — SIGNIFICANT CHANGE UP (ref 27–34)
MCV RBC AUTO: 100.8 FL — HIGH (ref 80–100)
MONOCYTES # BLD AUTO: 0.91 K/UL — HIGH (ref 0–0.9)
MONOCYTES NFR BLD AUTO: 11.2 % — SIGNIFICANT CHANGE UP (ref 2–14)
NEUTROPHILS # BLD AUTO: 5.33 K/UL — SIGNIFICANT CHANGE UP (ref 1.8–7.4)
NEUTROPHILS NFR BLD AUTO: 65.8 % — SIGNIFICANT CHANGE UP (ref 43–77)
NITRITE UR-MCNC: NEGATIVE — SIGNIFICANT CHANGE UP
PH UR: 5.5 — SIGNIFICANT CHANGE UP (ref 5–8)
PLATELET # BLD AUTO: 405 K/UL — HIGH (ref 150–400)
POTASSIUM SERPL-MCNC: 3.3 MMOL/L — LOW (ref 3.5–5.3)
POTASSIUM SERPL-SCNC: 3.3 MMOL/L — LOW (ref 3.5–5.3)
PROT SERPL-MCNC: 6.8 GM/DL — SIGNIFICANT CHANGE UP (ref 6–8.3)
PROT UR-MCNC: SIGNIFICANT CHANGE UP MG/DL
PROTHROM AB SERPL-ACNC: 12.1 SEC — SIGNIFICANT CHANGE UP (ref 9.9–13.4)
RBC # BLD: 3.73 M/UL — LOW (ref 3.8–5.2)
RBC # FLD: 15.2 % — HIGH (ref 10.3–14.5)
RBC CASTS # UR COMP ASSIST: 3 /HPF — SIGNIFICANT CHANGE UP (ref 0–4)
SODIUM SERPL-SCNC: 136 MMOL/L — SIGNIFICANT CHANGE UP (ref 135–145)
SP GR SPEC: 1.03 — SIGNIFICANT CHANGE UP (ref 1–1.03)
SQUAMOUS # UR AUTO: 2 /HPF — SIGNIFICANT CHANGE UP (ref 0–5)
UROBILINOGEN FLD QL: 1 MG/DL — SIGNIFICANT CHANGE UP (ref 0.2–1)
WBC # BLD: 8.11 K/UL — SIGNIFICANT CHANGE UP (ref 3.8–10.5)
WBC # FLD AUTO: 8.11 K/UL — SIGNIFICANT CHANGE UP (ref 3.8–10.5)
WBC UR QL: 2 /HPF — SIGNIFICANT CHANGE UP (ref 0–5)

## 2025-01-02 PROCEDURE — 82607 VITAMIN B-12: CPT

## 2025-01-02 PROCEDURE — 70543 MRI ORBT/FAC/NCK W/O &W/DYE: CPT | Mod: MC

## 2025-01-02 PROCEDURE — 97162 PT EVAL MOD COMPLEX 30 MIN: CPT | Mod: GP

## 2025-01-02 PROCEDURE — 85027 COMPLETE CBC AUTOMATED: CPT

## 2025-01-02 PROCEDURE — 93010 ELECTROCARDIOGRAM REPORT: CPT

## 2025-01-02 PROCEDURE — 97116 GAIT TRAINING THERAPY: CPT | Mod: GP

## 2025-01-02 PROCEDURE — 99285 EMERGENCY DEPT VISIT HI MDM: CPT

## 2025-01-02 PROCEDURE — 97530 THERAPEUTIC ACTIVITIES: CPT | Mod: GP

## 2025-01-02 PROCEDURE — 71260 CT THORAX DX C+: CPT | Mod: MC

## 2025-01-02 PROCEDURE — 71045 X-RAY EXAM CHEST 1 VIEW: CPT

## 2025-01-02 PROCEDURE — 36415 COLL VENOUS BLD VENIPUNCTURE: CPT

## 2025-01-02 PROCEDURE — 0241U: CPT

## 2025-01-02 PROCEDURE — 74177 CT ABD & PELVIS W/CONTRAST: CPT | Mod: MC

## 2025-01-02 PROCEDURE — 80048 BASIC METABOLIC PNL TOTAL CA: CPT

## 2025-01-02 PROCEDURE — 99232 SBSQ HOSP IP/OBS MODERATE 35: CPT

## 2025-01-02 PROCEDURE — 70450 CT HEAD/BRAIN W/O DYE: CPT | Mod: 26,MC

## 2025-01-02 PROCEDURE — 84443 ASSAY THYROID STIM HORMONE: CPT

## 2025-01-02 PROCEDURE — A9579: CPT

## 2025-01-02 RX ORDER — POTASSIUM CHLORIDE 600 MG/1
20 TABLET, FILM COATED, EXTENDED RELEASE ORAL DAILY
Refills: 0 | Status: COMPLETED | OUTPATIENT
Start: 2025-01-03 | End: 2025-01-04

## 2025-01-02 RX ORDER — ATORVASTATIN CALCIUM 40 MG/1
20 TABLET, FILM COATED ORAL AT BEDTIME
Refills: 0 | Status: DISCONTINUED | OUTPATIENT
Start: 2025-01-02 | End: 2025-01-09

## 2025-01-02 RX ORDER — POTASSIUM CHLORIDE 600 MG/1
40 TABLET, FILM COATED, EXTENDED RELEASE ORAL ONCE
Refills: 0 | Status: COMPLETED | OUTPATIENT
Start: 2025-01-02 | End: 2025-01-02

## 2025-01-02 RX ORDER — ACETAMINOPHEN 80 MG/.8ML
650 SOLUTION/ DROPS ORAL EVERY 6 HOURS
Refills: 0 | Status: DISCONTINUED | OUTPATIENT
Start: 2025-01-02 | End: 2025-01-09

## 2025-01-02 RX ORDER — GINKGO BILOBA 40 MG
3 CAPSULE ORAL AT BEDTIME
Refills: 0 | Status: DISCONTINUED | OUTPATIENT
Start: 2025-01-02 | End: 2025-01-09

## 2025-01-02 RX ORDER — SODIUM CHLORIDE 9 MG/ML
500 INJECTION, SOLUTION INTRAMUSCULAR; INTRAVENOUS; SUBCUTANEOUS ONCE
Refills: 0 | Status: COMPLETED | OUTPATIENT
Start: 2025-01-02 | End: 2025-01-02

## 2025-01-02 RX ORDER — DULOXETINE HYDROCHLORIDE 30 MG/1
60 CAPSULE, DELAYED RELEASE ORAL DAILY
Refills: 0 | Status: DISCONTINUED | OUTPATIENT
Start: 2025-01-02 | End: 2025-01-09

## 2025-01-02 RX ORDER — CAPECITABINE 150 MG/1
1000 TABLET ORAL
Refills: 0 | Status: DISCONTINUED | OUTPATIENT
Start: 2025-01-02 | End: 2025-01-04

## 2025-01-02 RX ORDER — ONDANSETRON 4 MG/1
4 TABLET ORAL EVERY 8 HOURS
Refills: 0 | Status: DISCONTINUED | OUTPATIENT
Start: 2025-01-02 | End: 2025-01-09

## 2025-01-02 RX ORDER — MAG HYDROX/ALUMINUM HYD/SIMETH 200-200-20
30 SUSPENSION, ORAL (FINAL DOSE FORM) ORAL EVERY 4 HOURS
Refills: 0 | Status: DISCONTINUED | OUTPATIENT
Start: 2025-01-02 | End: 2025-01-09

## 2025-01-02 RX ORDER — INFLUENZA A VIRUS A/WISCONSIN/588/2019 (H1N1) RECOMBINANT HEMAGGLUTININ ANTIGEN, INFLUENZA A VIRUS A/DARWIN/6/2021 (H3N2) RECOMBINANT HEMAGGLUTININ ANTIGEN, INFLUENZA B VIRUS B/AUSTRIA/1359417/2021 RECOMBINANT HEMAGGLUTININ ANTIGEN, AND INFLUENZA B VIRUS B/PHUKET/3073/2013 RECOMBINANT HEMAGGLUTININ ANTIGEN 45; 45; 45; 45 UG/.5ML; UG/.5ML; UG/.5ML; UG/.5ML
0.5 INJECTION INTRAMUSCULAR ONCE
Refills: 0 | Status: DISCONTINUED | OUTPATIENT
Start: 2025-01-02 | End: 2025-01-09

## 2025-01-02 RX ORDER — OXYBUTYNIN CHLORIDE 5 MG/1
5 TABLET ORAL DAILY
Refills: 0 | Status: DISCONTINUED | OUTPATIENT
Start: 2025-01-02 | End: 2025-01-02

## 2025-01-02 RX ADMIN — Medication 3 MILLIGRAM(S): at 22:28

## 2025-01-02 RX ADMIN — POTASSIUM CHLORIDE 40 MILLIEQUIVALENT(S): 600 TABLET, FILM COATED, EXTENDED RELEASE ORAL at 15:46

## 2025-01-02 RX ADMIN — SODIUM CHLORIDE 500 MILLILITER(S): 9 INJECTION, SOLUTION INTRAMUSCULAR; INTRAVENOUS; SUBCUTANEOUS at 10:56

## 2025-01-02 RX ADMIN — ATORVASTATIN CALCIUM 20 MILLIGRAM(S): 40 TABLET, FILM COATED ORAL at 22:26

## 2025-01-02 NOTE — ED ADULT NURSE NOTE - OBJECTIVE STATEMENT
bib daughter for increased confusion. fall 2 weeks ago with head strike has stitches in left forearm need to be out. pt treated for uti finished oral abx. pmh: metastatic breast cancer, hld, dementia

## 2025-01-02 NOTE — PATIENT PROFILE ADULT - FALL HARM RISK - HARM RISK INTERVENTIONS
Assistance OOB with selected safe patient handling equipment/Communicate Risk of Fall with Harm to all staff/Discuss with provider need for PT consult/Monitor for mental status changes/Monitor gait and stability/Move patient closer to nurses' station/Provide patient with walking aids - walker, cane, crutches/Reinforce activity limits and safety measures with patient and family/Reorient to person, place and time as needed/Tailored Fall Risk Interventions/Toileting schedule using arm’s reach rule for commode and bathroom/Use of alarms - bed, chair and/or voice tab/Visual Cue: Yellow wristband and red socks/Bed in lowest position, wheels locked, appropriate side rails in place/Call bell, personal items and telephone in reach/Instruct patient to call for assistance before getting out of bed or chair/Non-slip footwear when patient is out of bed/[place] to call system/Physically safe environment - no spills, clutter or unnecessary equipment/Purposeful Proactive Rounding/Room/bathroom lighting operational, light cord in reach

## 2025-01-02 NOTE — ED ADULT NURSE NOTE - CHIEF COMPLAINT QUOTE
patient presents with daughter c/o confusion.  patient recently seen in ED s/p fall, dx with UTI.  daughter reports patient completed course of antibiotics.  towards end of antibiotics daughter started noticing increased confusion, more than patients baseline.  also needs sutures removed from left forearm

## 2025-01-02 NOTE — ED PROVIDER NOTE - OBJECTIVE STATEMENT
85 y/o female with past medical history of obesity, breast ca s/p mastectomies in 2016 with mets to bone, spinal stenosis of lumbar region, BCC, HLD, OA, GERD, and macular degeneration presents BIB daughter from home for increased confusion. Pt was in [facility] on Dec 22nd for a fall, diagnosed with a UTI and subsequently finished oral antibiotics. Daughter also requesting sutures from the fall be taken out since patient is due, and is also concerned for another UTI. According to daughter, patient has been talking to her  parents. Daughter also reports that gait is more unsteady than baseline, has kept her downstairs. This morning, daughter found patient in closet packing for an upcoming trip that is nonexistent, and also reports that patient is more confused a night. NKDA. No other complaints at this time. 83 y/o female with past medical history of obesity, early dementia, breast cancer  s/p mastectomies in 2016 with mets to bone, spinal stenosis of lumbar region, BCC, HLD, OA, GERD, and macular degeneration presents BIB daughter from home for increased confusion x2d. Pt was in [facility] on Dec 22nd for a fall, diagnosed with a UTI and subsequently finished oral antibiotics. Daughter also requesting sutures from the fall be taken out since patient is due, and is also concerned for another UTI. According to daughter, patient has been talking to her  parents. Daughter also reports that gait is more unsteady than baseline, has kept her downstairs. This morning, daughter found patient in closet packing for an upcoming trip that is nonexistent, and also reports that patient is more confused a night. NKDA. No other complaints at this time. 85 y/o female with past medical history of obesity, early dementia, breast cancer  s/p mastectomies in 2016 with mets to bone, spinal stenosis of lumbar region, BCC, HLD, OA, GERD, and macular degeneration presents BIB daughter from home for increased confusion x2d. Pt was in [facility] on Dec 22nd for a fall, diagnosed with a UTI and subsequently finished oral antibiotics. Daughter also requesting sutures from the fall be taken out since patient is due, and is also concerned for another UTI. According to daughter, patient has been talking to her  parents. Daughter also reports that gait is more unsteady than baseline, has kept her downstairs. This morning, daughter found patient in closet packing for an upcoming trip that is nonexistent, and also reports that patient is more confused at night. NKDA. No other complaints at this time. 85 y/o F, PMH: obesity, early dementia, breast CA  s/p mastectomies in 2016 with mets to bone, spinal stenosis of lumbar region, BCC, HLD, OA, GERD; BIB daughter from home for increased confusion x 2d.  ED eval Dec 22nd for a fall: L FA lac sutured & diagnosed with a UTI: tx'ed w/ po Abx which has finished. Daughter believes sutures due for removal, and is also concerned for another UTI. According to daughter, patient has been talking to her  parents. Daughter also reports that gait is more unsteady than baseline, has kept her downstairs. This morning, daughter found patient in closet packing for an upcoming trip that is nonexistent, and also reports that patient is more confused at night. NKDA. No other complaints at this time.

## 2025-01-02 NOTE — ED ADULT TRIAGE NOTE - CHIEF COMPLAINT QUOTE
patient presents with daughter c/o confusion.  patient recently seen in ED s/p fall, dx with UTI.  daughter reports patient completed course of antibiotics.  towards end of antibiotics daughter started noticing increased confusion than patients baseline patient presents with daughter c/o confusion.  patient recently seen in ED s/p fall, dx with UTI.  daughter reports patient completed course of antibiotics.  towards end of antibiotics daughter started noticing increased confusion, more than patients baseline.  also needs sutures removed from left forearm

## 2025-01-02 NOTE — ED PROVIDER NOTE - PHYSICAL EXAMINATION
Gen: elderly white female, no respiratory distress, nontoxic  Head: NC/AT  Eyes: PERRL, EOMI  ENT: oropharynx clear, mucous membranes mildly dry   Card: regular rate and rhythm, normal radial pulse  Resp: Breath sounds clear bilaterally, respirations normal  Abd: soft, non-tender + bowel sounds, no rebound, guarding or mass   Msk: MAEx4 without focal deformities, tenderness or swelling  Skin: no tactile warmth, no rash,   Neuro: Alert and oriented, no focal deficits, no motor or sensory deficits Gen: elderly white female, no respiratory distress, nontoxic  Head: NC/AT  Eyes: PERRL, EOMI  ENT: oropharynx clear, mucous membranes mildly dry   Card: regular rate and rhythm, normal radial pulse  Resp: Breath sounds clear bilaterally, respirations normal  Abd: soft, non-tender + bowel sounds normal pitch, no rebound, guarding or mass   Msk: MAEx4 without focal deformities, tenderness or swelling, neck nontender, supple without pain, back/chest wall/pelvis nontender and stable   Skin: no tactile warmth, no rash, no evidence of new trauma, L forearm laceration with sutures in place, trace erythema at wound edges, no bleeding, no discharge, nontender, no dehiscence   Neuro: AAOx3, no motor or sensory deficits  : deferred, no CVA tenderness Gen: elderly white female, no respiratory distress, nontoxic  Head: NC/AT  Eyes: PERRL, EOMI  ENT: oropharynx clear, mucous membranes mildly dry   Card: regular rate and rhythm, normal radial pulse  Resp: Breath sounds clear bilaterally, respirations normal  Abd: soft, non-tender + bowel sounds normal pitch, no rebound, guarding or mass   Msk: MAEx4 without focal deformities, tenderness nor swelling. Neck: nontender, supple without pain. Back/chest wall/pelvis nontender and stable.   Skin: no tactile warmth, no rash, no evidence of new trauma, L forearm laceration with sutures in place, trace erythema at wound edges, no bleeding, no discharge, nontender, no dehiscence   Neuro: AAOx3, no motor or sensory deficits, CNs intact, normal speech  : deferred, no CVA tenderness

## 2025-01-02 NOTE — H&P ADULT - ASSESSMENT
83 y/o female with past medical history of obesity, early dementia, breast cancer  s/p mastectomies in 2016 with mets to bone, spinal stenosis of lumbar region, BCC, HLD, OA, GERD, and macular degeneration presents BIB daughter from home for increased confusion x2d.    # Increasing Confusion and worsening of Dementia  - Admit to med surg    #Unsteady gait  - PT  - Longterm placement in [facility] vs. LTC    # Abnormal heterogeneous left retrobulbar soft tissue  - MR with and without IV contrast orbits b/l    # Hypokalemia  - replete    #HLD  - Atorvastatin    #Health maintenance  -High dose flu vaccine    #DVT Prophylaxis  - Lovenox 30 mg sq daily    Disposition: DC to [facility] vs. LTC    Total time spent: 55 minutes

## 2025-01-02 NOTE — ED PROVIDER NOTE - CARE PLAN
Principal Discharge DX:	Mental confusion  Secondary Diagnosis:	Dementia  Secondary Diagnosis:	Unsteady gait   1

## 2025-01-02 NOTE — ED PROCEDURE NOTE - ATTENDING APP SHARED VISIT CONTRIBUTION OF CARE
TANMAY Booth MD, ED Attending physician:    I reviewed and verified the documentation of ED PA's suture removal procedure, no complications.

## 2025-01-02 NOTE — ED PROVIDER NOTE - CLINICAL SUMMARY MEDICAL DECISION MAKING FREE TEXT BOX
83 y/o female with past medical history of obesity, early dementia, breast cancer s/p mastectomies in 2016 with mets to bone, spinal stenosis of lumbar region, BCC, HLD, OA, GERD, and macular degeneration presents BIB daughter from home for increased confusion past couple of days after completed PO Cefuroxime status post 12/22 fall, diagnosed with UTI then. Pt has early dementia. Plan suture removal L forearm by PA, fall precautions, IV fluids, CT head non contrast, EKG, labs, including urinalysis via straight catheter, monitor, observe, reassess. 83 y/o female with past medical history of obesity, early dementia, breast cancer s/p mastectomies in 2016 with mets to bone, spinal stenosis of lumbar region, BCC, HLD, OA, GERD, and macular degeneration presents BIB daughter from home for increased confusion past couple of days after completed PO Cefuroxime status post 12/22 fall, diagnosed with UTI then. Pt has early dementia.   Plan suture removal L forearm by PA, fall precautions, IV fluids, CT head non contrast, EKG, labs, including urinalysis via straight catheter, monitor, observe, reassess.    13:35, FRITZ Booth MD:  CT head report: No IC acute pathology.  Labs unremarkable including U/A negative.  Sutures removed by ED PA without complication, Steri-Strips applied.  Informed by ED RN that patient was too unsteady during ambulation trial even with walker assistance.  Patient not a safe candidate for DC home, outpatient management due to lives with daughter and daughter has to start work to restart work tomorrow leaving patient alone at home with access to stairs.  Case discussed with Dr. Tejada and med admit accepted, patient likely will require placement. 85 y/o F, PMH: obesity, early dementia, breast CA s/p mastectomies in 2016 with mets to bone, spinal stenosis of lumbar region,  HLD, OA, GERD; BIB daughter from home for increased confusion past couple of days after completed PO Cefuroxime s/p 12/22/24 fall, diagnosed with UTI then.     Plan suture removal L forearm by PA, fall precautions, IV fluids, CT head non-contrast, EKG, labs, including urinalysis via straight catheter, monitor, observe, reassess.    13:35, FRITZ Booth MD:  CT head report: No IC acute pathology.  Labs results unremarkable including U/A negative.  Sutures removed by ED PA without complication, Steri-Strips applied.  Informed by ED RN that patient was too unsteady during ambulation trial even with walker assistance.  Patient not a safe candidate for DC home/outpatient management due to lives with daughter and daughter has to restart work tomorrow leaving patient alone at home with access to stairs.  Case discussed with Dr. Tejada and Med admit accepted, patient likely will require placement.

## 2025-01-02 NOTE — H&P ADULT - HISTORY OF PRESENT ILLNESS
85 y/o female with past medical history of obesity, early dementia, breast cancer  s/p mastectomies in 2016 with mets to bone, spinal stenosis of lumbar region, BCC, HLD, OA, GERD, and macular degeneration presents BIB daughter from home for increased confusion x2d. Pt was in [facility] on Dec 22nd for a fall, diagnosed with a UTI and subsequently finished oral antibiotics. Daughter also requesting sutures from the fall be taken out since patient is due, and is also concerned for another UTI. According to daughter, patient has been talking to her  parents. Daughter also reports that gait is more unsteady than baseline, has kept her downstairs. This morning, daughter found patient in closet packing for an upcoming trip that is nonexistent, and also reports that patient is more confused at night. NKDA. No other complaints at this time.

## 2025-01-02 NOTE — ED ADULT NURSE REASSESSMENT NOTE - NS ED NURSE REASSESS COMMENT FT1
Pt ambulated with a walker with one assist in the pop, pt unsteady, reports "I feel like my left knee will give out and I feel I will fall". MD Contino aware

## 2025-01-02 NOTE — ED ADULT NURSE REASSESSMENT NOTE - NS ED NURSE REASSESS COMMENT FT1
Received pt from JOURDAN Morelos, pt seen bedside, take for CT scan of head, pt pending BC, lactate and urine sample.

## 2025-01-02 NOTE — ED ADULT NURSE NOTE - CAS EDP DISCH TYPE
Mental Health Follow up    PATIENT:  Johana Reyes    MEDICAL RECORD NUMBER:  [id number]  YOB: 1977  AGE: 47 year old    DATE:  1/2/2025   TIME:  10:58 AM     This visit was performed via live two-way video with patient's verbal consent.   Clinician Location: Home.  Patient Location: Home.    Johana is in [place] and identity has been established.     She was informed that consent to treat includes permission to submit charges to the applicable insurance on file. Johana was advised regarding the potential risk inherent in video visits, as the assessment may be limited due to what can be seen on the screen which potentially results in an incomplete assessment; as well as either of us may discontinue the video visit if it is.    CHIEF COMPLAINT:  Follow up anxiety and depression   Video Visit     HISTORY OF PRESENTING ILLNESS:  Johana has had a history of anxiety and depression. Patient presents today alone. Last visit we decreased the Hydroxyzine to make sure this was not causing her any falls. Reports that she went back up on her hydroxyzine because she felt she had more anxiety with lower dose. Neurology is okay with the dose at this time. She tells me that she has not fallen since our last visit. Last time she fell was Nov 29th, 2024. She has follow up with neurology today also.     Current symptoms have been a little better.   Current mood: \"ok\"   Appetite: \"Good\"  Patient had no weight changes.  There is no height or weight on file to calculate BMI.  Energy: \"Depends on the day.\" Related to MS.  She tells me she fell a few times, she got very dizzy and fell.   Concentration: \"pretty good.\"   Motivation: \"Fairly good.\"   Complains of some anhedonia.      Obtains 8 hours per night, no problems falling asleep or staying asleep. \"My cat is crazy.\"     Denies suicidal ideation, plan, or intent.    Denies homicidal ideation, plan, or intent.    On a scale 0-10, 10 being the worst of  symptoms and 0 being minimal, Johana rates depression as 5/10  Last rate 7/10.     Recent PHQ 2/9 Score    PHQ 2:  PHQ 2 Score Adult PHQ 2 Score Adult PHQ 2 Interpretation Little interest or pleasure in activity?   11/4/2024   2:16 PM 2 No further screening needed 1       PHQ 9:  PHQ 9 Score Adult PHQ 9 Score Adult PHQ 9 Interpretation   2/9/2023  10:40 AM 6 Mild Depression      Insomnia Symptoms: Patient admits to dissatisfaction with sleep quantity or quality, occurring at least 3 nights per week, for at least 3 months, causing distress or impairment, associated with at least one: difficulty initiating sleep.    Depressive Symptoms: depressed mood, anhedonia, insomnia and fatigue.  Daily    Generalized Anxiety Symptoms: On a scale of 1-10 (10 being the worst) patient rated average anxiety at 7/10 in the past month restlessness, feeling keyed up or on edge, easily fatigued, difficulty concentrating, irritability and sleep disturbance.  Lots of recent stressors with her health.   Last rate 7/10    Most Recent ABHI 7 Score     ABHI 7 Score ABHI 7 Score   2/13/2023   9:00 AM 16      Panic Symptoms: patient has had recurrent triggered panic attacks with at least 4 symptoms including: palpitations/increased heart rate and shortness of breath Triggers: Not sure what the trigger is. Frequency: 1x/month Patient denies any avoidant behavior to prevent panic symptoms.      REVIEW OF SYSTEMS:  Constitutional: Denies fever.            Integumentary: Denies rash or pruritus.  Ears, Nose, Throat: Denies rhinorrhea, ear pain, hearing loss, sore throat, or corrective lenses.  Respiratory: Denies wheezing or cough.  Gastrointestinal: Denies nausea, diarrhea or constipation.         Urological:  Denies dysuria, frequency or incontinence.  Cardiovascular:  Denies chest pain, palpitations or shortness of breath.  Musculoskeletal: Has MS, muscle spasms, gets botox in her neck. Uses a walker for ambulation.    Neurological: Remarkable for  weakness and imbalance        Hematological: Denies gum bleeding or easy bruising.  Pertinent items are noted in the HPI    ALLERGIES:  is allergic to bactrim [sulfamethoxazole w/trimethoprim], gluten meal   (food or med), misc natural products, mold   (environmental), and nicotinex.    MEDICATIONS:  Current Outpatient Medications   Medication Sig    Ferrous Sulfate (Iron) 325 (65 Fe) MG Tab Take 1 tablet by mouth 3 days a week.    Ofatumumab (Kesimpta) 20 MG/0.4ML Solution Auto-injector Inject 20 mg into the skin every 28 days.    hydrOXYzine (ATARAX) 25 MG tablet Take 1 tablet by mouth every 4 hours as needed for Anxiety. Take with 10 mg for total of 35 mg as needed.    hydrOXYzine (ATARAX) 10 MG tablet Take 1 tablet by mouth every 4 hours as needed for Anxiety (Sleep). Take with 25 mg to equal 35 mg PRN.    QUEtiapine (SEROquel XR) 400 MG 24 hr tablet Take 1 tablet by mouth nightly.    mirtazapine (REMERON) 30 MG tablet Take 1 tablet by mouth nightly.    busPIRone (BUSPAR) 10 MG tablet Take 2 tablets by mouth in the morning and 2 tablets at noon and 2 tablets in the evening.    Multiple Vitamins-Minerals (vitamin - therapeutic multivitamins w/minerals) tablet     Ascorbic Acid (vitamin C) 500 MG tablet Take 500 mg by mouth daily.    Alemtuzumab (LEMTRADA IV)  (Patient not taking: Reported on 8/8/2024)    VITAMIN D, CHOLECALCIFEROL, PO Take 5,000 Units by mouth.     No current facility-administered medications for this visit.     VITAL SIGNS:  Visit Vitals  LMP 07/08/2024 (Approximate)       LABS/IMAGING:  TSH (mIU/L)   Date Value   11/26/2024 2.92     T4, FREE (ng/dL)   Date Value   09/07/2016 1.0     No results found for this or any previous visit (from the past 4464 hour(s)).     MEDICAL HISTORY:  Past Medical History:   Diagnosis Date    COVID 2021    Raynaud's disease     Seizure  (CMD)       History of head injury: Denies  Seizure history: Denies    PRIMARY CARE PROVIDER:  Sheyla Robin  MD    PSYCHIATRIC HISTORY:  Previous Diagnosis: anxiety and depression  Therapist: No current therapist.  Previous Psychiatrist:  Dr. AALIYAH Borges.  Previous Psychiatric Hospitalizations: denies.   Previous Suicide Attempts: denies.  Self-Injurious Behavior:  Denies.  AODA Treatment: Denies.  Past Psychiatric Medication Trials:   Clonazepam (falls from MS and weakness)   Buspar   Seroquel   Remeron     SOCIAL HISTORY:  Born/raised:  Born and raised in [place]. Lived in [place] for sometime.   Childhood:  Overall rates childhood as \"Fair.\" Parents  when the patient was 15 years old.  Siblings:  1 sister, lives , get along great.   Level of Education:  Bachelors degree in Science in Business, psychology minor. Denies any learning disabilities or behavioral issues in school.   Work:  On disability.  Hobbies/Interests:  Watching Movies, reading, SciFi   Marital:  Single   Children:  None, Cat Parker  Living Situation: Live with mother in .   Support:  Mother  Trauma: Emotional abuse: Denies. Physical abuse: Denies. Sexual abuse:  Denies.   Exposure to domestic violence: Denies. Other traumatic experiences include: Denies.  Role of Spirituality: not reported  : Denies  Legal: Denies    HABITS:  Caffeine:  2-3 cups of coffee per day  Alcohol: Denies any current or history of abuse or dependence.   Drugs: Denies any current or history of abuse or dependence.   Smoking:   Tobacco Use: Quit          Packs/Day:       Years:         Gambling: Denies.   Exercising: Denies.    FAMILY PSYCHIATRIC HISTORY:  Anxiety: yes  Depression: yes  Bipolar: Father  Schizophrenia: denies.  AODA: Father  Suicide: denies.  ADHD: denies.    SUICIDE RISK ASSESSMENT:  Risk Factors: anhedonia, insomnia, chronic pain or other physical illness, no children under 18 in the home  Protective Factors/Strengths: future-oriented and goal planning, able to make needs known, has access to psychiatric care, motivated for treatment, no history of suicide  attempts, no plan or intent, good therapeutic relationships with providers  Risk Level: low.    ACCESS TO FIREARMS:  Denies.    MENTAL STATUS EXAM:  Appearance:  Well-groomed, good eye contact, appears stated age.   Behavior:  Calmed, pleasant, interactive.   Cooperativity:  Cooperative, forthcoming, appears reliable.    Speech:  Normal rate, tone and volume.   Language:  No abnormality noted.    Mood:  Noted in History of Present Illness.  Affect:  Mood-congruent, stable, normal range.  Thought Process:  Linear, logical, goal-directed.    Thought Content:  No overt delusions or abnormality noted.    Perception:  No hallucinations, not responding to internal stimuli.   Consciousness:  Awake and alert.   Orientation:  Oriented to person, place and time.   Musculoskeletal: No abnormal or involuntary muscle movements observed.  Memory:  Good, able to demonstrate accurate historical recall for recent and more remote events and discussions.  Attention:  Good, no fluctuation or obvious deficit noted and able to follow the conversation.   Fund of Knowledge:  Consistent with education and experiences as evidenced by vocabulary.   Insight:  Good, based on appropriate recognition of impact of psychiatric symptoms and need for treatment.   Judgment:  Good, based on recent decisions.  Safety:  Noted in History of Present Illness.  Motivation to pursue treatment:  Good.    DIAGNOSIS:  Generalized Anxiety Disorder (F41.1)   Major Depressive Disorder, Recurrent, Moderate    MEDICAL HISTORY:  Past Medical History:   Diagnosis Date    COVID 2021    Raynaud's disease     Seizure  (CMD)       IMPRESSION AND PLAN:  Johana presents today for follow up. She is feeling better with higher dose of Hydroxyzine and is not looking to make changes. No recent falls. She reports Neurology is okay with her current dose of hydroxzyine. She has follow up with neurology today. Follow up in 2 months, sooner if needed. Patient verbalized understanding  and agrees with plan of care.     Has MS with history of falling, using medication for anxiety has to be done with caution.    Medication Recommendations:  Hydroxyzine 25 mg every 4 hours PRN.   Hydroxyzine 10 mg every 4 hours as needed with 25 mg for total of 35 mg   Remeron 30 mg nightly.   Seroquel  mg nightly.   Buspar 10 mg take 20 mg TID.     Discussed recommended medications, treatment alternative options, risks, benefits and side effects.    Labs, Procedures, Consults/Referrals Ordered:  No orders of the defined types were placed in this encounter.     Patient will follow up with writer in 2-3 month(s) Encouraged to call the office with any questions, comments, concerns or to reschedule an earlier appointment with writer.    PREP-TIME, APPOINTMENT DURATION, AND POST-APPOINTMENT DOCUMENTATION TIME:  25 minutes.    PATIENT EDUCATION:  Ready to learn, no apparent learning barriers were identified. Explained diagnosis and treatment plan; patient expressed understanding of the content. Patient was sent the treatment consent, medication consent and treatment plan via the patient portal for patient to acknowledge and provide electronic signature.    LE Chowdhury         Home

## 2025-01-03 LAB
ANION GAP SERPL CALC-SCNC: 3 MMOL/L — LOW (ref 5–17)
BUN SERPL-MCNC: 14 MG/DL — SIGNIFICANT CHANGE UP (ref 7–23)
CALCIUM SERPL-MCNC: 8.7 MG/DL — SIGNIFICANT CHANGE UP (ref 8.5–10.1)
CHLORIDE SERPL-SCNC: 109 MMOL/L — HIGH (ref 96–108)
CO2 SERPL-SCNC: 24 MMOL/L — SIGNIFICANT CHANGE UP (ref 22–31)
CREAT SERPL-MCNC: 0.48 MG/DL — LOW (ref 0.5–1.3)
EGFR: 93 ML/MIN/1.73M2 — SIGNIFICANT CHANGE UP
FLUAV AG NPH QL: SIGNIFICANT CHANGE UP
FLUBV AG NPH QL: SIGNIFICANT CHANGE UP
GLUCOSE SERPL-MCNC: 85 MG/DL — SIGNIFICANT CHANGE UP (ref 70–99)
HCT VFR BLD CALC: 35.4 % — SIGNIFICANT CHANGE UP (ref 34.5–45)
HGB BLD-MCNC: 11.7 G/DL — SIGNIFICANT CHANGE UP (ref 11.5–15.5)
MCHC RBC-ENTMCNC: 32.9 PG — SIGNIFICANT CHANGE UP (ref 27–34)
MCHC RBC-ENTMCNC: 33.1 G/DL — SIGNIFICANT CHANGE UP (ref 32–36)
MCV RBC AUTO: 99.4 FL — SIGNIFICANT CHANGE UP (ref 80–100)
PLATELET # BLD AUTO: 388 K/UL — SIGNIFICANT CHANGE UP (ref 150–400)
POTASSIUM SERPL-MCNC: 3.7 MMOL/L — SIGNIFICANT CHANGE UP (ref 3.5–5.3)
POTASSIUM SERPL-SCNC: 3.7 MMOL/L — SIGNIFICANT CHANGE UP (ref 3.5–5.3)
RBC # BLD: 3.56 M/UL — LOW (ref 3.8–5.2)
RBC # FLD: 14.7 % — HIGH (ref 10.3–14.5)
RSV RNA NPH QL NAA+NON-PROBE: SIGNIFICANT CHANGE UP
SARS-COV-2 RNA SPEC QL NAA+PROBE: SIGNIFICANT CHANGE UP
SODIUM SERPL-SCNC: 136 MMOL/L — SIGNIFICANT CHANGE UP (ref 135–145)
WBC # BLD: 7.68 K/UL — SIGNIFICANT CHANGE UP (ref 3.8–10.5)
WBC # FLD AUTO: 7.68 K/UL — SIGNIFICANT CHANGE UP (ref 3.8–10.5)

## 2025-01-03 PROCEDURE — 99232 SBSQ HOSP IP/OBS MODERATE 35: CPT

## 2025-01-03 PROCEDURE — 70543 MRI ORBT/FAC/NCK W/O &W/DYE: CPT | Mod: 26

## 2025-01-03 PROCEDURE — 71045 X-RAY EXAM CHEST 1 VIEW: CPT | Mod: 26

## 2025-01-03 RX ADMIN — ATORVASTATIN CALCIUM 20 MILLIGRAM(S): 40 TABLET, FILM COATED ORAL at 21:25

## 2025-01-03 RX ADMIN — DULOXETINE HYDROCHLORIDE 60 MILLIGRAM(S): 30 CAPSULE, DELAYED RELEASE ORAL at 12:45

## 2025-01-03 RX ADMIN — POTASSIUM CHLORIDE 20 MILLIEQUIVALENT(S): 600 TABLET, FILM COATED, EXTENDED RELEASE ORAL at 12:45

## 2025-01-03 RX ADMIN — Medication 3 MILLIGRAM(S): at 21:25

## 2025-01-03 NOTE — CONSULT NOTE ADULT - SUBJECTIVE AND OBJECTIVE BOX
Reason for consult:    HPI:  85 y/o female with past medical history of obesity, early dementia, breast cancer  s/p mastectomies in 2016 with mets to bone, spinal stenosis of lumbar region, BCC, HLD, OA, GERD, and macular degeneration presents BIB daughter from home for increased confusion x2d. Pt was in [facility] on Dec 22nd for a fall, diagnosed with a UTI and subsequently finished oral antibiotics. I. According to daughter, patient has been talking to her  parents. Daughter also reports that gait is more unsteady than baseline, has kept her downstairs. This morning, daughter found patient in closet packing for an upcoming trip that is nonexistent, and also reports that patient is more confused at night. NKDA. No other complaints at this time.      PAST MEDICAL & SURGICAL HISTORY:  Bilateral malignant neoplasm of breast in female, unspecified site of breast  b/l      History of macular degeneration  bilateral      Gastroesophageal reflux disease without esophagitis      Osteoarthritis of both knees, unspecified osteoarthritis type      Spinal stenosis of lumbar region      Basal cell carcinoma in situ of skin  removed from neck      Malignant neoplasm of left female breast, unspecified site of breast  with Lymph node involvement      Insomnia, unspecified type      Urinary frequency      Neoplasm by body site  left anterior chest wall      Obesity      Cataract  b/l      Macular Hole  repair b/l eyes      S/P left knee arthroscopy        Early stage skin cancer  basal cell carcinoma removed from left side of neck.      History of lumpectomy of left breast        H/O mastectomy, right   with immediate reconstruction with fat grafting      H/O colonoscopy  multiple      History of esophagogastroduodenoscopy (EGD)      H/O left mastectomy  2016      H/O bilateral breast biopsy          FAMILY HISTORY:  Family history of temporal arteritis (Father)  father    Family history of stroke (Mother)  mother        Alochol: Denied  Smoking: Nonsmoker  Drug Use: Denied  Marital Status:         Allergies    No Known Allergies    Intolerances        MEDICATIONS  (STANDING):  atorvastatin 20 milliGRAM(s) Oral at bedtime  capecitabine 1000 milliGRAM(s) Oral two times a day  DULoxetine 60 milliGRAM(s) Oral daily  influenza  Vaccine (HIGH DOSE) 0.5 milliLiter(s) IntraMuscular once  potassium chloride   Powder 20 milliEquivalent(s) Oral daily    MEDICATIONS  (PRN):  acetaminophen     Tablet .. 650 milliGRAM(s) Oral every 6 hours PRN Temp greater or equal to 38C (100.4F), Mild Pain (1 - 3)  aluminum hydroxide/magnesium hydroxide/simethicone Suspension 30 milliLiter(s) Oral every 4 hours PRN Dyspepsia  melatonin 3 milliGRAM(s) Oral at bedtime PRN Insomnia  ondansetron Injectable 4 milliGRAM(s) IV Push every 8 hours PRN Nausea and/or Vomiting      ROS  No fever, sweats, chills  No epistaxis, HA, sore throat  No CP, SOB, cough, sputum  No n/v/d, abd pain, melena, hematochezia  No edema  No rash  No anxiety  No back pain, joint pain  No bleeding, bruising  No dysuria, hematuria    T(C): 36.8 (25 @ 16:34), Max: 36.8 (25 @ 16:34)  HR: 73 (25 @ 16:34) (73 - 82)  BP: 160/67 (25 @ 16:34) (132/64 - 160/67)  RR: 18 (25 @ 16:34) (18 - 18)  SpO2: 95% (25 @ 16:34) (95% - 97%)  Wt(kg): --    PE  NAD  Awake, alert  Anicteric, MMM  RRR  CTAB  Abd soft, NT, ND  No c/c/e  No rash grossly  FROM                          11.7   7.68  )-----------( 388      ( 2025 05:59 )             35.4           136  |  109[H]  |  14  ----------------------------<  85  3.7   |  24  |  0.48[L]    Ca    8.7      2025 05:59  Mg     2.2         TPro  6.8  /  Alb  2.9[L]  /  TBili  0.6  /  DBili  x   /  AST  18  /  ALT  9[L]  /  AlkPhos  108     Reason for consult:    HPI:  85 y/o female with past medical history of obesity, early dementia, breast cancer  s/p mastectomies in 2016 with mets to bone, spinal stenosis of lumbar region, BCC, HLD, OA, GERD, and macular degeneration presents BIB daughter from home for increased confusion x2d. Pt was in [facility] on Dec 22nd for a fall, diagnosed with a UTI and subsequently finished oral antibiotics. I. According to daughter, patient has been talking to her  parents. Daughter also reports that gait is more unsteady than baseline, has kept her downstairs. This morning, daughter found patient in closet packing for an upcoming trip that is nonexistent, and also reports that patient is more confused at night. NKDA. No other complaints at this time.      PAST MEDICAL & SURGICAL HISTORY:  Bilateral malignant neoplasm of breast in female, unspecified site of breast  b/l      History of macular degeneration  bilateral      Gastroesophageal reflux disease without esophagitis      Osteoarthritis of both knees, unspecified osteoarthritis type      Spinal stenosis of lumbar region      Basal cell carcinoma in situ of skin  removed from neck      Malignant neoplasm of left female breast, unspecified site of breast  with Lymph node involvement      Insomnia, unspecified type      Urinary frequency      Neoplasm by body site  left anterior chest wall      Obesity      Cataract  b/l      Macular Hole  repair b/l eyes      S/P left knee arthroscopy        Early stage skin cancer  basal cell carcinoma removed from left side of neck.      History of lumpectomy of left breast        H/O mastectomy, right   with immediate reconstruction with fat grafting      H/O colonoscopy  multiple      History of esophagogastroduodenoscopy (EGD)      H/O left mastectomy  2016      H/O bilateral breast biopsy          FAMILY HISTORY:  Family history of temporal arteritis (Father)  father    Family history of stroke (Mother)  mother        Alochol: Denied  Smoking: Nonsmoker  Drug Use: Denied  Marital Status:         Allergies    No Known Allergies    Intolerances        MEDICATIONS  (STANDING):  atorvastatin 20 milliGRAM(s) Oral at bedtime  capecitabine 1000 milliGRAM(s) Oral two times a day  DULoxetine 60 milliGRAM(s) Oral daily  influenza  Vaccine (HIGH DOSE) 0.5 milliLiter(s) IntraMuscular once  potassium chloride   Powder 20 milliEquivalent(s) Oral daily    MEDICATIONS  (PRN):  acetaminophen     Tablet .. 650 milliGRAM(s) Oral every 6 hours PRN Temp greater or equal to 38C (100.4F), Mild Pain (1 - 3)  aluminum hydroxide/magnesium hydroxide/simethicone Suspension 30 milliLiter(s) Oral every 4 hours PRN Dyspepsia  melatonin 3 milliGRAM(s) Oral at bedtime PRN Insomnia  ondansetron Injectable 4 milliGRAM(s) IV Push every 8 hours PRN Nausea and/or Vomiting      ROS  No fever, sweats, chills    T(C): 36.8 (25 @ 16:34), Max: 36.8 (25 @ 16:34)  HR: 73 (25 @ 16:34) (73 - 82)  BP: 160/67 (25 @ 16:34) (132/64 - 160/67)  RR: 18 (25 @ 16:34) (18 - 18)  SpO2: 95% (25 @ 16:34) (95% - 97%)  Wt(kg): --    PE    Awake, confused  No rash grossly  FROM                          11.7   7.68  )-----------( 388      ( 2025 05:59 )             35.4           136  |  109[H]  |  14  ----------------------------<  85  3.7   |  24  |  0.48[L]    Ca    8.7      2025 05:59  Mg     2.2         TPro  6.8  /  Alb  2.9[L]  /  TBili  0.6  /  DBili  x   /  AST  18  /  ALT  9[L]  /  AlkPhos  108

## 2025-01-03 NOTE — PROGRESS NOTE ADULT - ASSESSMENT
85 y/o female with past medical history of obesity, early dementia, breast cancer  s/p mastectomies in 2016 with mets to bone, spinal stenosis of lumbar region, BCC, HLD, OA, GERD, and macular degeneration presents BIB daughter from home for increased confusion x2d.    # Increasing Confusion and worsening of Dementia  Daughter reports worsening of dementia at home   - infectious work up so far unrevealing   - CTH notable only for left retrobulbar soft tissue   - f/u covid flu rsv swab and cxr to complete infectious work up   - Admit to med surg    #Unsteady gait  - PT  - [facility] vs. 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

## 2025-01-03 NOTE — PHYSICAL THERAPY INITIAL EVALUATION ADULT - MODALITIES TREATMENT COMMENTS
Patient returned to bed by request, call bell in reach and bed alarm active. CM RN informed of session/status.

## 2025-01-03 NOTE — PHYSICAL THERAPY INITIAL EVALUATION ADULT - MANUAL MUSCLE TESTING RESULTS, REHAB EVAL
mentataion.  Moving all extremities well antigravity. Patient c/o R knee stiffness./grossly assessed due to mentation.  Moving all extremities well antigravity. Patient c/o R knee stiffness./grossly assessed due to

## 2025-01-03 NOTE — PROGRESS NOTE ADULT - SUBJECTIVE AND OBJECTIVE BOX
HOSPITALIST ATTENDING PROGRESS NOTE    Chart and meds reviewed.  Patient seen and examined.    Subjective: NAEO. Daughter updated discussed results of mri pending onc evaluation may need transfer if in need of optho     All other systems reviewed and found to be negative with the exception of what has been described above.    MEDICATIONS  (STANDING):  atorvastatin 20 milliGRAM(s) Oral at bedtime  capecitabine 1000 milliGRAM(s) Oral two times a day  DULoxetine 60 milliGRAM(s) Oral daily  influenza  Vaccine (HIGH DOSE) 0.5 milliLiter(s) IntraMuscular once  potassium chloride   Powder 20 milliEquivalent(s) Oral daily    MEDICATIONS  (PRN):  acetaminophen     Tablet .. 650 milliGRAM(s) Oral every 6 hours PRN Temp greater or equal to 38C (100.4F), Mild Pain (1 - 3)  aluminum hydroxide/magnesium hydroxide/simethicone Suspension 30 milliLiter(s) Oral every 4 hours PRN Dyspepsia  melatonin 3 milliGRAM(s) Oral at bedtime PRN Insomnia  ondansetron Injectable 4 milliGRAM(s) IV Push every 8 hours PRN Nausea and/or Vomiting      VITALS:  T(F): 98.3 (01-03-25 @ 16:34), Max: 98.3 (01-03-25 @ 16:34)  HR: 73 (01-03-25 @ 16:34) (73 - 85)  BP: 160/67 (01-03-25 @ 16:34) (132/64 - 160/67)  RR: 18 (01-03-25 @ 16:34) (18 - 18)  SpO2: 95% (01-03-25 @ 16:34) (95% - 97%)  Wt(kg): --    I&O's Summary      CAPILLARY BLOOD GLUCOSE          PHYSICAL EXAM:  Gen: NAD  HEENT:  pupils equal and reactive, EOMI, no oropharyngeal lesions, erythema, exudates, oral thrush  NECK:   supple, no carotid bruits, no palpable lymph nodes, no thyromegaly  CV:  +S1, +S2, regular, no murmurs or rubs  RESP:   lungs clear to auscultation bilaterally, no wheezing, rales, rhonchi, good air entry bilaterally  BREAST:  not examined  GI:  abdomen soft  EXT:  no clubbing  NEURO:  AAOX1    LABS:                            11.7   7.68  )-----------( 388      ( 03 Jan 2025 05:59 )             35.4     01-03    136  |  109[H]  |  14  ----------------------------<  85  3.7   |  24  |  0.48[L]    Ca    8.7      03 Jan 2025 05:59  Mg     2.2     01-02    TPro  6.8  /  Alb  2.9[L]  /  TBili  0.6  /  DBili  x   /  AST  18  /  ALT  9[L]  /  AlkPhos  108  01-02        LIVER FUNCTIONS - ( 02 Jan 2025 09:42 )  Alb: 2.9 g/dL / Pro: 6.8 gm/dL / ALK PHOS: 108 U/L / ALT: 9 U/L / AST: 18 U/L / GGT: x           PT/INR - ( 02 Jan 2025 09:42 )   PT: 12.1 sec;   INR: 1.05 ratio         PTT - ( 02 Jan 2025 09:42 )  PTT:32.6 sec  Urinalysis Basic - ( 03 Jan 2025 05:59 )    Color: x / Appearance: x / SG: x / pH: x  Gluc: 85 mg/dL / Ketone: x  / Bili: x / Urobili: x   Blood: x / Protein: x / Nitrite: x   Leuk Esterase: x / RBC: x / WBC x   Sq Epi: x / Non Sq Epi: x / Bacteria: x              CULTURES:      Additional results/Imaging, I have personally reviewed:    Telemetry, personally reviewed:

## 2025-01-03 NOTE — PHYSICAL THERAPY INITIAL EVALUATION ADULT - ADDITIONAL COMMENTS
Patient reported she was independent with her RW, daughter reported in EMR, patient was unsteady, +falls at home.

## 2025-01-03 NOTE — PHARMACOTHERAPY INTERVENTION NOTE - INTERVENTION CATEGORIES
GASTROENTEROLOGY CONSULT  PROBLEM LIST:  1.  Diarrhea.   2.  Resolved \"maroon\" stools.     HPI: Christine Lopez is a 80 year old female, with PMH listed below, who is being seen at the request of Dr. Nicolasa Paul, NP for consultation due to the above problem list.      Patient notes she developed diarrhea one month ago. About 2 weeks later developed \"dark maroon\" stools.   No nausea, reflux, early satiety, emesis, abdominal pain. No fever, chills or weight loss. No travel, ill contacts or recent antibiotics.   Was seen in the OB ER (11/16). CT c/w soft tissue versus fecal material within the proximal rectum, recommended to consider endoscopy. Mesenteric panniculitis. Pulmonary nodule.  Cdiff and pathogen stool studies were negative.   CBC and CMP noted H&H 12/35, glucose 230, alk phos 133, otherwise overall unremarkable.   No antibiotics were provided.     Currently, continues to have type 6 stool on BS chart. Stools were up to 8 times a day, now 4 times/day. Intermittent fecal incontinence and nocturnal symptoms, which have resolved.   No current hematochezia, melena or maroon stools. Now brown. (Prior to last month stools were type 4.)   No current asa or nsaid. Infrequent tylenol. No smoker. On diuretic.   No previous egd.  She notes she's only had a colonoscopy done through [facility], \"years ago.\" when reviewing Epic, has only had Flexible Sigmoidoscopy (2003 and 2004), reportedly she had a polyp. She states she tried drinking prep, but couldn't tolerate this so stopped.   She does have a history of hemorrhoids.   Denies a family history of ulcer, IBD, celiac, esophageal, gastric, pancreatic and colon cancer.    Past Medical History:   Diagnosis Date   • Arthropathy (NOS)    • COPD (chronic obstructive pulmonary disease) (CMS/HCC)    • Coronary artery disease    • Essential (primary) hypertension    • Glaucoma (increased eye pressure)     suspect   • Hypertension    • RAD (reactive airway disease)    •  Med Reconciliation Senile cataract, unspecified 8/15/2013   • Thyroid condition        Past Surgical History:   Procedure Laterality Date   • DEXA BONE DENSITY AXIAL SKELETON  08/23/2010   • FLEXIBLE SIGMOIDOSCOPY BX  02/26/2003    Flex Sig with Bx   • FLEXIBLE SIGMOIDOSCOPY BX  09/15/2004    Flex Sig with Bx   • FRACTURE SURGERY      ORIF fibula 2001   • JOINT REPLACEMENT     • MAMMO SCREENING BILATERAL  08/10/2012   • PAST SURGICAL HISTORY      broken bone leg   • PAST SURGICAL HISTORY  6-3-11    right temporal art bx - Dr. Wilson   • TOTAL HIP REPLACEMENT  9/9/2006    Hip Replacement, Total-right    • TOTAL HIP REPLACEMENT  4/28/2011    [facility] Left Hip replacement       Current Outpatient Prescriptions   Medication   • levothyroxine (SYNTHROID, LEVOTHROID) 100 MCG tablet   • metoPROLOL (TOPROL-XL) 25 MG 24 hr tablet   • hydrochlorothiazide (HYDRODIURIL) 25 MG tablet   • allopurinol (ZYLOPRIM) 300 MG tablet   • lisinopril (ZESTRIL) 20 MG tablet   • fluticasone (FLONASE) 50 MCG/ACT nasal spray   • acetaminophen (TYLENOL) 500 MG tablet     No current facility-administered medications for this visit.      Facility-Administered Medications Ordered in Other Visits   Medication   • ceFAZolin (ANCEF) injection       ALLERGIES:   Allergen Reactions   • Vicodin [Hydrocodone-Acetaminophen] HEADACHES   • Codeine HEADACHES       Family History   Problem Relation Age of Onset   • Arthritis Mother    • High blood pressure Mother    • Cataracts Mother    • Hypertension Mother    • Heart Father    • Diabetes Father    • Hypertension Father    • Heart Brother    • Stroke Maternal Grandmother    • Cancer Sister      skin   • Cancer Sister        Social History     Social History   • Marital status:      Spouse name: N/A   • Number of children: 4   • Years of education: 12     Occupational History   • retired4      Social History Main Topics   • Smoking status: Never Smoker   • Smokeless tobacco: Never Used   • Alcohol use Yes      Comment:  very rarely   • Drug use: No   • Sexual activity: No     Other Topics Concern   • Seat Belt Yes     Social History Narrative   • No narrative on file       ROS:  GEN: Patient denies fevers, chills, night sweats, anorexia, fatigue and weight loss.  EYES:  Patient denies significant visual disturbances or diplopia.  ENT/MOUTH:  Denies problems with hearing, sinus drainage, or sore throat.  ENDOCRINE: Denies diabetes, hot flashes, or night sweats. + thyroid disease.  HEM/LYMP: Denies tender or palpable lymph nodes, or easy bruising.  RESP:  Denies SOB, dyspnea on exertion, chest pain, cough or hemoptysis.    CARDIAC: Denies anginal chest pain, palpitations, orthopnea, peripheral edema.  GI:  See above.    PHYSICAL EXAM:   Blood pressure 154/52, height 5' 2\" (1.575 m), weight 94.8 kg.   GEN:  Well developed, well nourished, appears in no acute distress. Orientated to place, person and time.  EYES:  Conjunctivae free of irritation, lids free of deformity.  Sclerae nonicteric, pupils equal, round, reactive to light and accommodation bilaterally.  SKIN: No rashes or lesions. No induration, nodules or tightening of the skin.  RESP: Lungs clear throughout with no wheezes, rales or rhonchi.  No respiratory distress or use of accessory muscles.  CARDIAC:  Regular rate and rhythm with no murmurs, rubs or gallops.  No pedal edema.  GI:  Abdomen soft, round, nondistended, nontender, with active bowel sounds.  Free of any palpable mass, lesion, scars, hepatomegaly or splenomegaly.      LABS:  CBC and CMP noted H&H 12/35, glucose 230, alk phos 133, otherwise overall unremarkable.       ASSESSMENT/PLAN:  80 year old female, with PMH of thyroid ds, HTN, and HLP, who presents with resolved \"maroon\" stools and continued diarrhea.     CT c/w soft tissue versus fecal material within the proximal rectum, recommended to consider endoscopy. Mesenteric panniculitis. Pulmonary nodule.  Cdiff and pathogen stool studies were negative.   CBC and  CMP noted H&H 12/35, glucose 230, alk phos 133, otherwise overall unremarkable.    1. Patient denies any further bleeding, will hold off on additional labs at this time.   2. Cannot rule out hemorroids, colitis, vs malignancy. Does not appear she has had a colonoscopy, although reported h/o polyps on previous flex sigs (2004). We did discuss a possible flex sig >vs colonoscopy. Patient is active, very overwhelmed regarding health work up & conditions at this time. Will do trial of imodium for 2 weeks. Our office will call her for an update in 2 weeks to see how she is feeling. Would obtain updated H&H at that time as well.   3. Further recommendations to be based on the above.     Will discuss with Dr. Srini Miller.  Patient verbalizes and agrees with above plan.  She has no questions or concerns at this time.     Iona Pollock, ANP-BC

## 2025-01-03 NOTE — PHARMACOTHERAPY INTERVENTION NOTE - COMMENTS
Medication reconciliation complete.  Home medications reviewed with patient's daughter, Leyla, over the phone and confirmed against Dr. [facility].  Per daughter, patient receives Xgeva injections once a month (unsure of dose) and is currently on capecitabine 2 tablets by mouth twice daily for one week on and one week off alternating ***Week "on" started 1/2 AM; Patient's daughter unsure of the dose of the tablets***.  Patient has no known medication allergies.    Home Medications:  atorvastatin 20 mg oral tablet: 1 tab(s) orally once a day (03 Jan 2025 09:31)  Calcium + Vitamin D: Take 1 tablet by mouth once daily.  ***PATIENTS DAUGHTER UNSURE OF DOSE*** (03 Jan 2025 09:33)  Capecitabine: 2 tab(s) orally 2 times a day x 1 week on and then one week off.  Week &quot;on&quot; started 1/2 AM.  ***PATIENTS DAUGHTER UNSURE OF DOSE*** (03 Jan 2025 09:33)  cefuroxime 500 mg oral tablet: 1 tab(s) orally 2 times a day ***COURSE COMPLETED*** (03 Jan 2025 09:24)  DULoxetine 60 mg oral delayed release capsule: 1 cap(s) orally once a day (03 Jan 2025 09:31)  Xgeva: 1 dose injected once a month ***PATIENTS DAUGHTER UNSURE OF DOSE*** (03 Jan 2025 09:33)

## 2025-01-03 NOTE — PHYSICAL THERAPY INITIAL EVALUATION ADULT - PERTINENT HX OF CURRENT PROBLEM, REHAB EVAL
83 y/o female with past medical history of obesity, early dementia, breast cancer  s/p mastectomies in 2016 with mets to bone, spinal stenosis of lumbar region, BCC, HLD, OA, GERD, and macular degeneration presents BIB daughter from home for increased confusion x2d. Pt was in [facility] on Dec 22nd for a fall, diagnosed with a UTI and subsequently finished oral antibiotics. Daughter also requesting sutures from the fall be taken out since patient is due, and is also concerned for another UTI. According to daughter, patient has been talking to her  parents. Daughter also reports that gait is more unsteady than baseline, has kept her downstairs. CT head: No acute intracranial hemorrhage, mass effect, or midline shift.  Abnormal heterogeneous left retrobulbar soft tissue. Recommend MRI orbits, results pending.

## 2025-01-03 NOTE — CONSULT NOTE ADULT - ASSESSMENT
83 y/o female with past medical history of obesity, early dementia, breast cancer  s/p mastectomies in 2016 with mets to bone, spinal stenosis of lumbar region, BCC, HLD, OA, GERD, and macular degeneration presents BIB daughter from home for increased confusion x2d.    1) Metastatic hormone positive breast cancer  s/p multiple anti-estrogen therapies with progression  most recently on xeloda  PET scan was planned for this month as well as foundation liquid testing  s/p MRI with following findings: 1.  ORBITS:   Indistinct infiltration and enhancement involves the left medial rectus muscle, the left inferior rectus muscle and adjacent intraconal fat inferior nasal quadrant of the left orbit retrobulbar region. Soft tissue metastasis is suspected. Optic nerve atrophy  -would continue xeloda while in hospital but will likely need to switch to other therapies, foundation liquid testing should be sent after dc  -would also consult radiation oncology to see if there is any possible treatment for orbital findings  -s/p xgeva recently in office for bone mets    -will discuss further with dr Joyce    Thank you for the courtesy of this consultation and we will continue to follow.    Manuelito Morrissey MD  [facility]  Cell: [phone]

## 2025-01-04 LAB
-  AMPICILLIN/SULBACTAM: SIGNIFICANT CHANGE UP
-  AMPICILLIN: SIGNIFICANT CHANGE UP
-  AMPICILLIN: SIGNIFICANT CHANGE UP
-  AZTREONAM: SIGNIFICANT CHANGE UP
-  CEFAZOLIN: SIGNIFICANT CHANGE UP
-  CEFEPIME: SIGNIFICANT CHANGE UP
-  CEFTRIAXONE: SIGNIFICANT CHANGE UP
-  CEFUROXIME: SIGNIFICANT CHANGE UP
-  CIPROFLOXACIN: SIGNIFICANT CHANGE UP
-  CIPROFLOXACIN: SIGNIFICANT CHANGE UP
-  ERTAPENEM: SIGNIFICANT CHANGE UP
-  GENTAMICIN: SIGNIFICANT CHANGE UP
-  IMIPENEM: SIGNIFICANT CHANGE UP
-  LEVOFLOXACIN: SIGNIFICANT CHANGE UP
-  LEVOFLOXACIN: SIGNIFICANT CHANGE UP
-  MEROPENEM: SIGNIFICANT CHANGE UP
-  NITROFURANTOIN: SIGNIFICANT CHANGE UP
-  NITROFURANTOIN: SIGNIFICANT CHANGE UP
-  PIPERACILLIN/TAZOBACTAM: SIGNIFICANT CHANGE UP
-  TETRACYCLINE: SIGNIFICANT CHANGE UP
-  TOBRAMYCIN: SIGNIFICANT CHANGE UP
-  TRIMETHOPRIM/SULFAMETHOXAZOLE: SIGNIFICANT CHANGE UP
-  VANCOMYCIN: SIGNIFICANT CHANGE UP
ANION GAP SERPL CALC-SCNC: 6 MMOL/L — SIGNIFICANT CHANGE UP (ref 5–17)
BUN SERPL-MCNC: 16 MG/DL — SIGNIFICANT CHANGE UP (ref 7–23)
CALCIUM SERPL-MCNC: 9.3 MG/DL — SIGNIFICANT CHANGE UP (ref 8.5–10.1)
CHLORIDE SERPL-SCNC: 108 MMOL/L — SIGNIFICANT CHANGE UP (ref 96–108)
CO2 SERPL-SCNC: 24 MMOL/L — SIGNIFICANT CHANGE UP (ref 22–31)
CREAT SERPL-MCNC: 0.5 MG/DL — SIGNIFICANT CHANGE UP (ref 0.5–1.3)
CULTURE RESULTS: ABNORMAL
EGFR: 92 ML/MIN/1.73M2 — SIGNIFICANT CHANGE UP
GLUCOSE SERPL-MCNC: 95 MG/DL — SIGNIFICANT CHANGE UP (ref 70–99)
HCT VFR BLD CALC: 35.6 % — SIGNIFICANT CHANGE UP (ref 34.5–45)
HGB BLD-MCNC: 11.7 G/DL — SIGNIFICANT CHANGE UP (ref 11.5–15.5)
MCHC RBC-ENTMCNC: 32.8 PG — SIGNIFICANT CHANGE UP (ref 27–34)
MCHC RBC-ENTMCNC: 32.9 G/DL — SIGNIFICANT CHANGE UP (ref 32–36)
MCV RBC AUTO: 99.7 FL — SIGNIFICANT CHANGE UP (ref 80–100)
METHOD TYPE: SIGNIFICANT CHANGE UP
METHOD TYPE: SIGNIFICANT CHANGE UP
ORGANISM # SPEC MICROSCOPIC CNT: ABNORMAL
ORGANISM # SPEC MICROSCOPIC CNT: ABNORMAL
ORGANISM # SPEC MICROSCOPIC CNT: SIGNIFICANT CHANGE UP
PLATELET # BLD AUTO: 410 K/UL — HIGH (ref 150–400)
POTASSIUM SERPL-MCNC: 4 MMOL/L — SIGNIFICANT CHANGE UP (ref 3.5–5.3)
POTASSIUM SERPL-SCNC: 4 MMOL/L — SIGNIFICANT CHANGE UP (ref 3.5–5.3)
RBC # BLD: 3.57 M/UL — LOW (ref 3.8–5.2)
RBC # FLD: 14.8 % — HIGH (ref 10.3–14.5)
SODIUM SERPL-SCNC: 138 MMOL/L — SIGNIFICANT CHANGE UP (ref 135–145)
SPECIMEN SOURCE: SIGNIFICANT CHANGE UP
WBC # BLD: 11.86 K/UL — HIGH (ref 3.8–10.5)
WBC # FLD AUTO: 11.86 K/UL — HIGH (ref 3.8–10.5)

## 2025-01-04 PROCEDURE — 71045 X-RAY EXAM CHEST 1 VIEW: CPT | Mod: 26

## 2025-01-04 PROCEDURE — 99233 SBSQ HOSP IP/OBS HIGH 50: CPT

## 2025-01-04 PROCEDURE — 74177 CT ABD & PELVIS W/CONTRAST: CPT | Mod: 26

## 2025-01-04 PROCEDURE — 71260 CT THORAX DX C+: CPT | Mod: 26

## 2025-01-04 RX ORDER — CEFTRIAXONE SODIUM 1 G/1
1000 INJECTION, POWDER, FOR SOLUTION INTRAMUSCULAR; INTRAVENOUS EVERY 24 HOURS
Refills: 0 | Status: DISCONTINUED | OUTPATIENT
Start: 2025-01-04 | End: 2025-01-04

## 2025-01-04 RX ORDER — MEROPENEM 1 G/20ML
1000 INJECTION, POWDER, FOR SOLUTION INTRAVENOUS EVERY 8 HOURS
Refills: 0 | Status: DISCONTINUED | OUTPATIENT
Start: 2025-01-04 | End: 2025-01-04

## 2025-01-04 RX ORDER — MEROPENEM 1 G/20ML
1000 INJECTION, POWDER, FOR SOLUTION INTRAVENOUS EVERY 8 HOURS
Refills: 0 | Status: COMPLETED | OUTPATIENT
Start: 2025-01-04 | End: 2025-01-07

## 2025-01-04 RX ADMIN — MEROPENEM 1000 MILLIGRAM(S): 1 INJECTION, POWDER, FOR SOLUTION INTRAVENOUS at 22:27

## 2025-01-04 RX ADMIN — CEFTRIAXONE SODIUM 1000 MILLIGRAM(S): 1 INJECTION, POWDER, FOR SOLUTION INTRAMUSCULAR; INTRAVENOUS at 11:49

## 2025-01-04 RX ADMIN — DULOXETINE HYDROCHLORIDE 60 MILLIGRAM(S): 30 CAPSULE, DELAYED RELEASE ORAL at 11:50

## 2025-01-04 RX ADMIN — ATORVASTATIN CALCIUM 20 MILLIGRAM(S): 40 TABLET, FILM COATED ORAL at 22:27

## 2025-01-04 RX ADMIN — POTASSIUM CHLORIDE 20 MILLIEQUIVALENT(S): 600 TABLET, FILM COATED, EXTENDED RELEASE ORAL at 11:46

## 2025-01-04 NOTE — PROGRESS NOTE ADULT - ASSESSMENT
85 y/o female with past medical history of obesity, early dementia, breast cancer  s/p mastectomies in 2016 with mets to bone, spinal stenosis of lumbar region, BCC, HLD, OA, GERD, and macular degeneration presents BIB daughter from home for increased confusion x2d.    # Increasing Confusion and worsening of Dementia  Daughter reports worsening of dementia at home   - infectious work up so far unrevealing   - CTH notable only for left retrobulbar soft tissue   - f/u covid flu rsv swab and cxr to complete infectious work up   - Admit to med surg    #Unsteady gait  - PT  - [facility]term placement in [facility] vs. 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    # Increasing Confusion and worsening of Dementia  Daughter reports worsening of dementia at home   - UC growing ESBL -> started on meropenem today   - id consulted for approval   - CTH notable only for left retrobulbar soft tissue   - covid flu rsv negative   - f/u cxr   - Admit to med surg    #Unsteady gait  - PT  - [facility]term placement in [facility] vs. 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

## 2025-01-04 NOTE — PROGRESS NOTE ADULT - ASSESSMENT
85 y/o female with past medical history of obesity, early dementia, breast cancer  s/p mastectomies in 2016 with mets to bone, spinal stenosis of lumbar region, BCC, HLD, OA, GERD, and macular degeneration presents BIB daughter from home for increased confusion x2d.    1) Metastatic hormone positive breast cancer  s/p multiple anti-estrogen therapies with progression  most recently on xeloda  PET scan was planned for this month as well as foundation liquid testing  s/p MRI with following findings: 1.  ORBITS:   Indistinct infiltration and enhancement involves the left medial rectus muscle, the left inferior rectus muscle and adjacent intraconal fat inferior nasal quadrant of the left orbit retrobulbar region. Soft tissue metastasis is suspected. Optic nerve atrophy  -consult radiation onc to see if radiation possible  -will need another line of therapy or palliative care may be appropriate  send for CT C A P to restage her disease as she has not had f/u PET  -s/p xgeva recently in office for bone mets  -outpt liquid foundation testing will be sent  -can hold xeloda while in hosp as she is progressing on this therapy    d/w hospitalist    Thank you for the courtesy of this consultation and we will continue to follow.    Manuelito Morrissey MD  [facility]  Cell: [phone]

## 2025-01-04 NOTE — PROGRESS NOTE ADULT - SUBJECTIVE AND OBJECTIVE BOX
Pt seen, still with confusion, reports no visual difficulty    MEDICATIONS  (STANDING):  atorvastatin 20 milliGRAM(s) Oral at bedtime  DULoxetine 60 milliGRAM(s) Oral daily  influenza  Vaccine (HIGH DOSE) 0.5 milliLiter(s) IntraMuscular once  meropenem Injectable 1000 milliGRAM(s) IV Push every 8 hours    MEDICATIONS  (PRN):  acetaminophen     Tablet .. 650 milliGRAM(s) Oral every 6 hours PRN Temp greater or equal to 38C (100.4F), Mild Pain (1 - 3)  aluminum hydroxide/magnesium hydroxide/simethicone Suspension 30 milliLiter(s) Oral every 4 hours PRN Dyspepsia  melatonin 3 milliGRAM(s) Oral at bedtime PRN Insomnia  ondansetron Injectable 4 milliGRAM(s) IV Push every 8 hours PRN Nausea and/or Vomiting      ROS  no fever    Vital Signs Last 24 Hrs  T(C): 37 (04 Jan 2025 08:21), Max: 37 (04 Jan 2025 08:21)  T(F): 98.6 (04 Jan 2025 08:21), Max: 98.6 (04 Jan 2025 08:21)  HR: 60 (04 Jan 2025 08:21) (60 - 83)  BP: 130/70 (04 Jan 2025 08:21) (117/54 - 160/67)  BP(mean): --  RR: 18 (04 Jan 2025 08:21) (18 - 18)  SpO2: 95% (04 Jan 2025 08:21) (92% - 95%)    Parameters below as of 04 Jan 2025 08:21  Patient On (Oxygen Delivery Method): room air        PE  NAD  Awake    No c/c/e  No rash grossly  FROM                          11.7   11.86 )-----------( 410      ( 04 Jan 2025 07:30 )             35.6       01-04    138  |  108  |  16  ----------------------------<  95  4.0   |  24  |  0.50    Ca    9.3      04 Jan 2025 07:30

## 2025-01-04 NOTE — PROGRESS NOTE ADULT - SUBJECTIVE AND OBJECTIVE BOX
HOSPITALIST ATTENDING PROGRESS NOTE    Chart and meds reviewed.  Patient seen and examined.    CC:    Subjective:    All other systems reviewed and found to be negative with the exception of what has been described above.    MEDICATIONS  (STANDING):  atorvastatin 20 milliGRAM(s) Oral at bedtime  cefTRIAXone Injectable. 1000 milliGRAM(s) IV Push every 24 hours  DULoxetine 60 milliGRAM(s) Oral daily  influenza  Vaccine (HIGH DOSE) 0.5 milliLiter(s) IntraMuscular once    MEDICATIONS  (PRN):  acetaminophen     Tablet .. 650 milliGRAM(s) Oral every 6 hours PRN Temp greater or equal to 38C (100.4F), Mild Pain (1 - 3)  aluminum hydroxide/magnesium hydroxide/simethicone Suspension 30 milliLiter(s) Oral every 4 hours PRN Dyspepsia  melatonin 3 milliGRAM(s) Oral at bedtime PRN Insomnia  ondansetron Injectable 4 milliGRAM(s) IV Push every 8 hours PRN Nausea and/or Vomiting      VITALS:  T(F): 98.6 (01-04-25 @ 08:21), Max: 98.6 (01-04-25 @ 08:21)  HR: 60 (01-04-25 @ 08:21) (60 - 83)  BP: 130/70 (01-04-25 @ 08:21) (117/54 - 160/67)  RR: 18 (01-04-25 @ 08:21) (18 - 18)  SpO2: 95% (01-04-25 @ 08:21) (92% - 95%)  Wt(kg): --    I&O's Summary      CAPILLARY BLOOD GLUCOSE          PHYSICAL EXAM:  Gen: NAD  HEENT:  pupils equal and reactive, EOMI, no oropharyngeal lesions, erythema, exudates, oral thrush  NECK:   supple, no carotid bruits, no palpable lymph nodes, no thyromegaly  CV:  +S1, +S2, regular, no murmurs or rubs  RESP:   lungs clear to auscultation bilaterally, no wheezing, rales, rhonchi, good air entry bilaterally  BREAST:  not examined  GI:  abdomen soft, non-tender, non-distended, normal BS, no bruits, no abdominal masses, no palpable masses  RECTAL:  not examined  :  not examined  MSK:   normal muscle tone, no atrophy, no rigidity, no contractions  EXT:  no clubbing, no cyanosis, no edema, no calf pain, swelling or erythema  VASCULAR:  pulses equal and symmetric in the upper and lower extremities  NEURO:  AAOX3, no focal neurological deficits, follows all commands, able to move extremities spontaneously  SKIN:  no ulcers, lesions or rashes    LABS:                            11.7   11.86 )-----------( 410      ( 04 Jan 2025 07:30 )             35.6     01-04    138  |  108  |  16  ----------------------------<  95  4.0   |  24  |  0.50    Ca    9.3      04 Jan 2025 07:30              Urinalysis Basic - ( 04 Jan 2025 07:30 )    Color: x / Appearance: x / SG: x / pH: x  Gluc: 95 mg/dL / Ketone: x  / Bili: x / Urobili: x   Blood: x / Protein: x / Nitrite: x   Leuk Esterase: x / RBC: x / WBC x   Sq Epi: x / Non Sq Epi: x / Bacteria: x              CULTURES:      Additional results/Imaging, I have personally reviewed:    Telemetry, personally reviewed: HOSPITALIST ATTENDING PROGRESS NOTE    Chart and meds reviewed.  Patient seen and examined.    CC: confusion     Subjective: Pt AOx2 this am which is baseline per daughter. Discussed plan for ct cap for staging.     All other systems reviewed and found to be negative with the exception of what has been described above.    MEDICATIONS  (STANDING):  atorvastatin 20 milliGRAM(s) Oral at bedtime  cefTRIAXone Injectable. 1000 milliGRAM(s) IV Push every 24 hours  DULoxetine 60 milliGRAM(s) Oral daily  influenza  Vaccine (HIGH DOSE) 0.5 milliLiter(s) IntraMuscular once    MEDICATIONS  (PRN):  acetaminophen     Tablet .. 650 milliGRAM(s) Oral every 6 hours PRN Temp greater or equal to 38C (100.4F), Mild Pain (1 - 3)  aluminum hydroxide/magnesium hydroxide/simethicone Suspension 30 milliLiter(s) Oral every 4 hours PRN Dyspepsia  melatonin 3 milliGRAM(s) Oral at bedtime PRN Insomnia  ondansetron Injectable 4 milliGRAM(s) IV Push every 8 hours PRN Nausea and/or Vomiting      VITALS:  T(F): 98.6 (01-04-25 @ 08:21), Max: 98.6 (01-04-25 @ 08:21)  HR: 60 (01-04-25 @ 08:21) (60 - 83)  BP: 130/70 (01-04-25 @ 08:21) (117/54 - 160/67)  RR: 18 (01-04-25 @ 08:21) (18 - 18)  SpO2: 95% (01-04-25 @ 08:21) (92% - 95%)  Wt(kg): --    I&O's Summary      CAPILLARY BLOOD GLUCOSE          PHYSICAL EXAM:  Gen: NAD  HEENT:  pupils equal and reactive, EOMI, no oropharyngeal lesions, erythema, exudates, oral thrush  NECK:   supple, no carotid bruits, no palpable lymph nodes, no thyromegaly  CV:  +S1, +S2, regular, no murmurs or rubs  RESP:   lungs clear to auscultation bilaterally, no wheezing, rales, rhonchi, good air entry bilaterally  BREAST:  not examined  GI:  abdomen soft  EXT:  no clubbing  NEURO:  AAOX2    LABS:                            11.7   11.86 )-----------( 410      ( 04 Jan 2025 07:30 )             35.6     01-04    138  |  108  |  16  ----------------------------<  95  4.0   |  24  |  0.50    Ca    9.3      04 Jan 2025 07:30              Urinalysis Basic - ( 04 Jan 2025 07:30 )    Color: x / Appearance: x / SG: x / pH: x  Gluc: 95 mg/dL / Ketone: x  / Bili: x / Urobili: x   Blood: x / Protein: x / Nitrite: x   Leuk Esterase: x / RBC: x / WBC x   Sq Epi: x / Non Sq Epi: x / Bacteria: x              CULTURES:      Additional results/Imaging, I have personally reviewed:    Telemetry, personally reviewed:

## 2025-01-05 LAB
ANION GAP SERPL CALC-SCNC: 5 MMOL/L — SIGNIFICANT CHANGE UP (ref 5–17)
BUN SERPL-MCNC: 17 MG/DL — SIGNIFICANT CHANGE UP (ref 7–23)
CALCIUM SERPL-MCNC: 8.9 MG/DL — SIGNIFICANT CHANGE UP (ref 8.5–10.1)
CHLORIDE SERPL-SCNC: 106 MMOL/L — SIGNIFICANT CHANGE UP (ref 96–108)
CO2 SERPL-SCNC: 24 MMOL/L — SIGNIFICANT CHANGE UP (ref 22–31)
CREAT SERPL-MCNC: 0.55 MG/DL — SIGNIFICANT CHANGE UP (ref 0.5–1.3)
EGFR: 90 ML/MIN/1.73M2 — SIGNIFICANT CHANGE UP
GLUCOSE SERPL-MCNC: 92 MG/DL — SIGNIFICANT CHANGE UP (ref 70–99)
HCT VFR BLD CALC: 36.1 % — SIGNIFICANT CHANGE UP (ref 34.5–45)
HGB BLD-MCNC: 11.9 G/DL — SIGNIFICANT CHANGE UP (ref 11.5–15.5)
MCHC RBC-ENTMCNC: 33 G/DL — SIGNIFICANT CHANGE UP (ref 32–36)
MCHC RBC-ENTMCNC: 33 PG — SIGNIFICANT CHANGE UP (ref 27–34)
MCV RBC AUTO: 100 FL — SIGNIFICANT CHANGE UP (ref 80–100)
PLATELET # BLD AUTO: 391 K/UL — SIGNIFICANT CHANGE UP (ref 150–400)
POTASSIUM SERPL-MCNC: 4.1 MMOL/L — SIGNIFICANT CHANGE UP (ref 3.5–5.3)
POTASSIUM SERPL-SCNC: 4.1 MMOL/L — SIGNIFICANT CHANGE UP (ref 3.5–5.3)
RBC # BLD: 3.61 M/UL — LOW (ref 3.8–5.2)
RBC # FLD: 15.1 % — HIGH (ref 10.3–14.5)
SODIUM SERPL-SCNC: 135 MMOL/L — SIGNIFICANT CHANGE UP (ref 135–145)
WBC # BLD: 8.91 K/UL — SIGNIFICANT CHANGE UP (ref 3.8–10.5)
WBC # FLD AUTO: 8.91 K/UL — SIGNIFICANT CHANGE UP (ref 3.8–10.5)

## 2025-01-05 PROCEDURE — 99233 SBSQ HOSP IP/OBS HIGH 50: CPT

## 2025-01-05 RX ORDER — HEPARIN SODIUM 1000 [USP'U]/ML
5000 INJECTION, SOLUTION INTRAVENOUS; SUBCUTANEOUS EVERY 12 HOURS
Refills: 0 | Status: DISCONTINUED | OUTPATIENT
Start: 2025-01-05 | End: 2025-01-09

## 2025-01-05 RX ORDER — SENNOSIDES 8.6 MG/1
2 TABLET, FILM COATED ORAL AT BEDTIME
Refills: 0 | Status: DISCONTINUED | OUTPATIENT
Start: 2025-01-05 | End: 2025-01-09

## 2025-01-05 RX ORDER — POLYETHYLENE GLYCOL 3350 17 G/DOSE
17 POWDER (GRAM) ORAL
Refills: 0 | Status: DISCONTINUED | OUTPATIENT
Start: 2025-01-05 | End: 2025-01-09

## 2025-01-05 RX ADMIN — Medication 17 GRAM(S): at 09:30

## 2025-01-05 RX ADMIN — SENNOSIDES 2 TABLET(S): 8.6 TABLET, FILM COATED ORAL at 22:15

## 2025-01-05 RX ADMIN — ATORVASTATIN CALCIUM 20 MILLIGRAM(S): 40 TABLET, FILM COATED ORAL at 22:15

## 2025-01-05 RX ADMIN — MEROPENEM 1000 MILLIGRAM(S): 1 INJECTION, POWDER, FOR SOLUTION INTRAVENOUS at 22:15

## 2025-01-05 RX ADMIN — HEPARIN SODIUM 5000 UNIT(S): 1000 INJECTION, SOLUTION INTRAVENOUS; SUBCUTANEOUS at 22:15

## 2025-01-05 RX ADMIN — MEROPENEM 1000 MILLIGRAM(S): 1 INJECTION, POWDER, FOR SOLUTION INTRAVENOUS at 05:25

## 2025-01-05 RX ADMIN — DULOXETINE HYDROCHLORIDE 60 MILLIGRAM(S): 30 CAPSULE, DELAYED RELEASE ORAL at 09:29

## 2025-01-05 RX ADMIN — Medication 17 GRAM(S): at 22:15

## 2025-01-05 RX ADMIN — MEROPENEM 1000 MILLIGRAM(S): 1 INJECTION, POWDER, FOR SOLUTION INTRAVENOUS at 13:44

## 2025-01-05 NOTE — PHYSICAL THERAPY INITIAL EVALUATION ADULT - LONG TERM MEMORY, REHAB EVAL
MD Tati Mortensen reviewed discharge instructions with the patient. The patient verbalized understanding. Patient discharged home with stable vitals. Patient ambulatory out of ED with discharge instructions in hand. Opportunity for questions and clarification provided. No further complaints noted at this time. Patient's daughter transporting her home. Patient currently waiting for her transportation in waiting room.
intact
Unknown

## 2025-01-05 NOTE — PROGRESS NOTE ADULT - SUBJECTIVE AND OBJECTIVE BOX
HOSPITALIST ATTENDING PROGRESS NOTE    Chart and meds reviewed.  Patient seen and examined.    CC: confusion     Subjective: NAEO pt got ct which shows no metastatic disease. Discussed with daughter possibility of transfer for optho eval. Daughter would like to discuss with pts oncologist Dr. Joyce who will see the pt tomorrow prior to deciding    All other systems reviewed and found to be negative with the exception of what has been described above.    MEDICATIONS  (STANDING):  atorvastatin 20 milliGRAM(s) Oral at bedtime  DULoxetine 60 milliGRAM(s) Oral daily  influenza  Vaccine (HIGH DOSE) 0.5 milliLiter(s) IntraMuscular once  meropenem Injectable 1000 milliGRAM(s) IV Push every 8 hours  polyethylene glycol 3350 17 Gram(s) Oral two times a day  senna 2 Tablet(s) Oral at bedtime    MEDICATIONS  (PRN):  acetaminophen     Tablet .. 650 milliGRAM(s) Oral every 6 hours PRN Temp greater or equal to 38C (100.4F), Mild Pain (1 - 3)  aluminum hydroxide/magnesium hydroxide/simethicone Suspension 30 milliLiter(s) Oral every 4 hours PRN Dyspepsia  melatonin 3 milliGRAM(s) Oral at bedtime PRN Insomnia  ondansetron Injectable 4 milliGRAM(s) IV Push every 8 hours PRN Nausea and/or Vomiting      VITALS:  T(F): 97.6 (01-05-25 @ 07:26), Max: 98.2 (01-04-25 @ 16:26)  HR: 77 (01-05-25 @ 07:26) (67 - 77)  BP: 109/50 (01-05-25 @ 07:26) (107/48 - 114/47)  RR: 18 (01-05-25 @ 07:26) (18 - 18)  SpO2: 95% (01-05-25 @ 07:26) (93% - 97%)  Wt(kg): --    I&O's Summary      CAPILLARY BLOOD GLUCOSE          PHYSICAL EXAM:  Gen: NAD  HEENT:  pupils equal and reactive, EOMI, no oropharyngeal lesions, erythema, exudates, oral thrush  NECK:   supple, no carotid bruits, no palpable lymph nodes, no thyromegaly  CV:  +S1, +S2, regular, no murmurs or rubs  RESP:   lungs clear to auscultation bilaterally, no wheezing, rales, rhonchi, good air entry bilaterally  BREAST:  not examined  GI:  abdomen soft  EXT:  no clubbing  NEURO:  AOX2     LABS:                            11.9   8.91  )-----------( 391      ( 05 Jan 2025 07:13 )             36.1     01-05    135  |  106  |  17  ----------------------------<  92  4.1   |  24  |  0.55    Ca    8.9      05 Jan 2025 07:13              Urinalysis Basic - ( 05 Jan 2025 07:13 )    Color: x / Appearance: x / SG: x / pH: x  Gluc: 92 mg/dL / Ketone: x  / Bili: x / Urobili: x   Blood: x / Protein: x / Nitrite: x   Leuk Esterase: x / RBC: x / WBC x   Sq Epi: x / Non Sq Epi: x / Bacteria: x              CULTURES:      Additional results/Imaging, I have personally reviewed:    Telemetry, personally reviewed:

## 2025-01-05 NOTE — CONSULT NOTE ADULT - SUBJECTIVE AND OBJECTIVE BOX
Patient is a 84y old  Female who presents with a chief complaint of Increased confusion and difficulty ambulating     HPI:  83 y/o female with h/o obesity, mild dementia, breast cancer s/p mastectomies in 2016 with mets to bone, spinal stenosis of lumbar region, BCC, HLD, OA, GERD, and macular degeneration was admitted on  from home for increased confusion x 2d. Pt was in [facility] on Dec 22nd for a fall, diagnosed with a UTI and subsequently finished oral antibiotics. Daughter also requesting sutures from the fall be taken out since patient is due, and is also concerned for another UTI. According to daughter, patient has been talking to her  parents. Daughter also reports that gait is more unsteady than baseline, has kept her downstairs. This morning, daughter found patient in closet packing for an upcoming trip that is nonexistent, and also reports that patient is more confused at night.   She was reported with multiresistant organisms in urine.     PMH: as above  PSH: as above  Meds: per reconciliation sheet, noted below  MEDICATIONS  (STANDING):  atorvastatin 20 milliGRAM(s) Oral at bedtime  DULoxetine 60 milliGRAM(s) Oral daily  influenza  Vaccine (HIGH DOSE) 0.5 milliLiter(s) IntraMuscular once  meropenem Injectable 1000 milliGRAM(s) IV Push every 8 hours  polyethylene glycol 3350 17 Gram(s) Oral two times a day  senna 2 Tablet(s) Oral at bedtime    MEDICATIONS  (PRN):  acetaminophen     Tablet .. 650 milliGRAM(s) Oral every 6 hours PRN Temp greater or equal to 38C (100.4F), Mild Pain (1 - 3)  aluminum hydroxide/magnesium hydroxide/simethicone Suspension 30 milliLiter(s) Oral every 4 hours PRN Dyspepsia  melatonin 3 milliGRAM(s) Oral at bedtime PRN Insomnia  ondansetron Injectable 4 milliGRAM(s) IV Push every 8 hours PRN Nausea and/or Vomiting    Allergies    No Known Allergies    Intolerances      Social: no smoking, no alcohol, no illegal drugs; no recent travel, no exposure to TB  FAMILY HISTORY:  Family history of temporal arteritis (Father)  father    Family history of stroke (Mother)  mother      no history of premature cardiovascular disease in first degree relatives    ROS: the patient is confused, limited, denies fever, no chills, no HA, no CP, no palpitations, no SOB, no cough, no abdominal pain, no diarrhea, no N/V, has dysuria, no leg pain, no claudication, no rash, no joint aches, no rectal pain or bleeding, no night sweats  All other systems reviewed and are negative    Vital Signs Last 24 Hrs  T(C): 36.4 (2025 07:26), Max: 36.8 (2025 16:26)  T(F): 97.6 (2025 07:), Max: 98.2 (2025 16:26)  HR: 77 (:) (67 - 77)  BP: 109/50 (:) (107/48 - 114/47)  BP(mean): 62 (2025 16:) (62 - 62)  RR: 18 (:) (18 - 18)  SpO2: 95% (:) (93% - 97%)    Parameters below as of 2025 07:26  Patient On (Oxygen Delivery Method): room air    PE:    Constitutional:  No acute distress  HEENT: NC/AT, EOMI, PERRLA, conjunctivae clear; ears and nose atraumatic; pharynx benign  Neck: supple; thyroid not palpable  Back: no tenderness  Respiratory: respiratory effort normal; clear to auscultation  Cardiovascular: S1S2 regular, no murmurs  Abdomen: soft, not tender, not distended, positive BS; no liver or spleen organomegaly  Genitourinary: no suprapubic tenderness  Lymphatic: no LN palpable  Musculoskeletal: no muscle tenderness, no joint swelling or tenderness  Extremities: no pedal edema  Neurological/ Psychiatric: Alert, judgement and insight impaired; moving all extremities  Skin: no rashes; no palpable lesions    Labs: all available labs reviewed                        11.9   8.91  )-----------( 391      ( 2025 07:13 )             36.1     01-    135  |  106  |  17  ----------------------------<  92  4.1   |  24  |  0.55    Ca    8.9      2025 07:13    Culture - Blood (collected 2025 11:04)  Source: .Blood BLOOD  Preliminary Report (2025 17:01):    No growth at 48 Hours    Culture - Blood (collected 2025 11:03)  Source: .Blood BLOOD  Preliminary Report (2025 17:01):    No growth at 48 Hours    Urinalysis with Rflx Culture (collected 2025 10:40)    Culture - Urine (collected 2025 10:40)  Source: .Urine None  Final Report (2025 14:55):    10,000 - 49,000 CFU/mL Escherichia coli ESBL    10,000 - 49,000 CFU/mL Enterococcus faecalis  Organism: Escherichia coli ESBL  Enterococcus faecalis (2025 14:55)  Organism: Enterococcus faecalis (2025 14:55)      Method Type: DAVIS      -  Ampicillin: S <=2 Predicts results to ampicillin/sulbactam, amoxacillin-clavulanate and  piperacillin-tazobactam.      -  Ciprofloxacin: S <=1      -  Levofloxacin: S <=1      -  Nitrofurantoin: S <=32 Should not be used to treat pyelonephritis.      -  Tetracycline: R >8      -  Vancomycin: S 1  Organism: Escherichia coli ESBL (2025 14:55)      Method Type: DAVIS      -  Ampicillin: R >16 These ampicillin results predict results for amoxicillin      -  Ampicillin/Sulbactam: I 16/8      -  Aztreonam: R >16      -  Cefazolin: R >16 For uncomplicated UTI with K. pneumoniae, E. coli, or P. mirablis: DAVIS <=16 is sensitive and DAVIS >=32 is resistant. This also predicts results for oral agents cefaclor, cefdinir, cefpodoxime, cefprozil, cefuroxime axetil, cephalexin and locarbef for uncomplicated UTI. Note that some isolates may be susceptible to these agents while testing resistant to cefazolin.      -  Cefepime: R >16      -  Ceftriaxone: R >32      -  Cefuroxime: R >16      -  Ciprofloxacin: R >2      -  Ertapenem: S <=0.5      -  Gentamicin: S <=2      -  Imipenem: S <=1      -  Levofloxacin: R >4      -  Meropenem: S <=1      -  Nitrofurantoin: S <=32 Should not be used to treat pyelonephritis      -  Piperacillin/Tazobactam: S <=8      -  Tobramycin: S <=2      -  Trimethoprim/Sulfamethoxazole: R >    Radiology: all available radiological tests reviewed    < from: CT Abdomen and Pelvis w/ IV Cont (25 @ 11:34) >  *  Lytic expansile lesion in the right posterolateral ninth rib. Possible lytic lesion in T12 vertebral body.  *  No other masses or evidence of metastatic disease elsewhere.  *  Moderate fecal retention in the rectum with evidence of stercoral   < end of copied text >      Advanced directives addressed: full resuscitation

## 2025-01-05 NOTE — CONSULT NOTE ADULT - ASSESSMENT
85 y/o female with h/o obesity, mild dementia, breast cancer s/p mastectomies in 2016 with mets to bone, spinal stenosis of lumbar region, BCC, HLD, OA, GERD, and macular degeneration was admitted on  from home for increased confusion x 2d. Pt was in [facility] on Dec 22nd for a fall, diagnosed with a UTI and subsequently finished oral antibiotics. Daughter also requesting sutures from the fall be taken out since patient is due, and is also concerned for another UTI. According to daughter, patient has been talking to her  parents. Daughter also reports that gait is more unsteady than baseline, has kept her downstairs. This morning, daughter found patient in closet packing for an upcoming trip that is nonexistent, and also reports that patient is more confused at night.     #UTI with ESBL E.coli and ENFA  #Metabolic encephalopathy  -cultures reviewed  -reported with multiresistant organism in urine. Prior cultures reviewed. An epidemiologic assessment was performed. The risk of the microorganism spread to family members, healthcare staff is low. Standard isolation in place at present time. Will reconsider isolation measures according to infection control policy, further culture results and other new information. The patient will be monitored closely, cultures collected as appropriate. Broad spectrum abx therapy was started.  -start meropenem 1 gm IV q8h  -reason for abx use and side effects reviewed with patient; monitor BMP   -old chart reviewed to assess prior cultures  -monitor temps  -f/u CBC  -supportive care  2. Other issues:   -care per medicine    There is no good oral abx choice. Will need to complete abx therapy with IV formulation       85 y/o female with h/o obesity, mild dementia, breast cancer s/p mastectomies in 2016 with mets to bone, spinal stenosis of lumbar region, BCC, HLD, OA, GERD, and macular degeneration was admitted on  from home for increased confusion x 2d. Pt was in [facility] on Dec 22nd for a fall, diagnosed with a UTI and subsequently finished oral antibiotics. Daughter also requesting sutures from the fall be taken out since patient is due, and is also concerned for another UTI. According to daughter, patient has been talking to her  parents. Daughter also reports that gait is more unsteady than baseline, has kept her downstairs. This morning, daughter found patient in closet packing for an upcoming trip that is nonexistent, and also reports that patient is more confused at night.     #UTI with ESBL E.coli and ENFA  #Metabolic encephalopathy  #Breast Ca  -cultures reviewed  -reported with multiresistant organism in urine. Prior cultures reviewed. An epidemiologic assessment was performed. The risk of the microorganism spread to family members, healthcare staff is low. Standard isolation in place at present time. Will reconsider isolation measures according to infection control policy, further culture results and other new information. The patient will be monitored closely, cultures collected as appropriate. Broad spectrum abx therapy was started.  -start meropenem 1 gm IV q8h  -reason for abx use and side effects reviewed with patient; monitor BMP   -old chart reviewed to assess prior cultures  -monitor temps  -f/u CBC  -supportive care  2. Other issues:   -care per medicine    d/w Dr. Delgado     There is no good oral abx choice. Will need to complete abx therapy with IV formulation

## 2025-01-05 NOTE — PROGRESS NOTE ADULT - ASSESSMENT
85 y/o female with past medical history of obesity, early dementia, breast cancer  s/p mastectomies in 2016 with mets to bone, spinal stenosis of lumbar region, BCC, HLD, OA, GERD, and macular degeneration presents BIB daughter from home for increased confusion x2d.    1) Metastatic hormone positive breast cancer  s/p multiple anti-estrogen therapies with progression  most recently on xelod  PET scan was planned for this month as well as foundation liquid testing  s/p MRI with following findings: 1.  ORBITS:   Indistinct infiltration and enhancement involves the left medial rectus muscle, the left inferior rectus muscle and adjacent intraconal fat inferior nasal quadrant of the left orbit retrobulbar region. Soft tissue metastasis is suspected. Optic nerve atrophy  -consult radiation onc to see if radiation possible  -Ct scan performed c/w bone mets  -s/p xgeva recently in office for bone mets  -outpt liquid foundation testing will be sent  -Dr Joyce to f/u tomorrow  -would also consult palliative care      d/w hospitalist    Thank you for the courtesy of this consultation and we will continue to follow.    Manuelito Morrissey MD  [facility]  Cell: [phone]

## 2025-01-05 NOTE — PROGRESS NOTE ADULT - ASSESSMENT
85 y/o female with past medical history of obesity, early dementia, breast cancer  s/p mastectomies in 2016 with mets to bone, spinal stenosis of lumbar region, BCC, HLD, OA, GERD, and macular degeneration presents BIB daughter from home for increased confusion x2d.    # Increasing Confusion and worsening of Dementia  Daughter reports worsening of dementia at home   - UC growing ESBL -> started on meropenem today   - id consulted for approval   - CTH notable only for left retrobulbar soft tissue   - covid flu rsv negative   - Admit to med surg    #Unsteady gait  - PT  - Longterm placement in [facility] vs. 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

## 2025-01-05 NOTE — PROGRESS NOTE ADULT - SUBJECTIVE AND OBJECTIVE BOX
Pt seen, resting    MEDICATIONS  (STANDING):  atorvastatin 20 milliGRAM(s) Oral at bedtime  DULoxetine 60 milliGRAM(s) Oral daily  influenza  Vaccine (HIGH DOSE) 0.5 milliLiter(s) IntraMuscular once  meropenem Injectable 1000 milliGRAM(s) IV Push every 8 hours  polyethylene glycol 3350 17 Gram(s) Oral two times a day  senna 2 Tablet(s) Oral at bedtime    MEDICATIONS  (PRN):  acetaminophen     Tablet .. 650 milliGRAM(s) Oral every 6 hours PRN Temp greater or equal to 38C (100.4F), Mild Pain (1 - 3)  aluminum hydroxide/magnesium hydroxide/simethicone Suspension 30 milliLiter(s) Oral every 4 hours PRN Dyspepsia  melatonin 3 milliGRAM(s) Oral at bedtime PRN Insomnia  ondansetron Injectable 4 milliGRAM(s) IV Push every 8 hours PRN Nausea and/or Vomiting      ROS  No fever, sweats, chills      Vital Signs Last 24 Hrs  T(C): 36.4 (05 Jan 2025 07:26), Max: 36.8 (04 Jan 2025 16:26)  T(F): 97.6 (05 Jan 2025 07:26), Max: 98.2 (04 Jan 2025 16:26)  HR: 77 (05 Jan 2025 07:26) (67 - 77)  BP: 109/50 (05 Jan 2025 07:26) (107/48 - 114/47)  BP(mean): 62 (04 Jan 2025 16:26) (62 - 62)  RR: 18 (05 Jan 2025 07:26) (18 - 18)  SpO2: 95% (05 Jan 2025 07:26) (93% - 97%)    Parameters below as of 05 Jan 2025 07:26  Patient On (Oxygen Delivery Method): room air        PE  lethargic, resting  No rash grossly  FROM                          11.9   8.91  )-----------( 391      ( 05 Jan 2025 07:13 )             36.1       01-05    135  |  106  |  17  ----------------------------<  92  4.1   |  24  |  0.55    Ca    8.9      05 Jan 2025 07:13

## 2025-01-06 DIAGNOSIS — R41.82 ALTERED MENTAL STATUS, UNSPECIFIED: ICD-10-CM

## 2025-01-06 LAB
ANION GAP SERPL CALC-SCNC: 4 MMOL/L — LOW (ref 5–17)
BUN SERPL-MCNC: 18 MG/DL — SIGNIFICANT CHANGE UP (ref 7–23)
CALCIUM SERPL-MCNC: 10 MG/DL — SIGNIFICANT CHANGE UP (ref 8.5–10.1)
CHLORIDE SERPL-SCNC: 108 MMOL/L — SIGNIFICANT CHANGE UP (ref 96–108)
CO2 SERPL-SCNC: 26 MMOL/L — SIGNIFICANT CHANGE UP (ref 22–31)
CREAT SERPL-MCNC: 0.54 MG/DL — SIGNIFICANT CHANGE UP (ref 0.5–1.3)
EGFR: 91 ML/MIN/1.73M2 — SIGNIFICANT CHANGE UP
GLUCOSE SERPL-MCNC: 100 MG/DL — HIGH (ref 70–99)
HCT VFR BLD CALC: 37.3 % — SIGNIFICANT CHANGE UP (ref 34.5–45)
HGB BLD-MCNC: 12.4 G/DL — SIGNIFICANT CHANGE UP (ref 11.5–15.5)
MCHC RBC-ENTMCNC: 33.2 G/DL — SIGNIFICANT CHANGE UP (ref 32–36)
MCHC RBC-ENTMCNC: 33.3 PG — SIGNIFICANT CHANGE UP (ref 27–34)
MCV RBC AUTO: 100.3 FL — HIGH (ref 80–100)
PLATELET # BLD AUTO: 390 K/UL — SIGNIFICANT CHANGE UP (ref 150–400)
POTASSIUM SERPL-MCNC: 4.2 MMOL/L — SIGNIFICANT CHANGE UP (ref 3.5–5.3)
POTASSIUM SERPL-SCNC: 4.2 MMOL/L — SIGNIFICANT CHANGE UP (ref 3.5–5.3)
RBC # BLD: 3.72 M/UL — LOW (ref 3.8–5.2)
RBC # FLD: 15.1 % — HIGH (ref 10.3–14.5)
SODIUM SERPL-SCNC: 138 MMOL/L — SIGNIFICANT CHANGE UP (ref 135–145)
WBC # BLD: 7.82 K/UL — SIGNIFICANT CHANGE UP (ref 3.8–10.5)
WBC # FLD AUTO: 7.82 K/UL — SIGNIFICANT CHANGE UP (ref 3.8–10.5)

## 2025-01-06 PROCEDURE — 99223 1ST HOSP IP/OBS HIGH 75: CPT

## 2025-01-06 PROCEDURE — 99233 SBSQ HOSP IP/OBS HIGH 50: CPT

## 2025-01-06 RX ADMIN — Medication 17 GRAM(S): at 21:59

## 2025-01-06 RX ADMIN — MEROPENEM 1000 MILLIGRAM(S): 1 INJECTION, POWDER, FOR SOLUTION INTRAVENOUS at 21:58

## 2025-01-06 RX ADMIN — ATORVASTATIN CALCIUM 20 MILLIGRAM(S): 40 TABLET, FILM COATED ORAL at 21:58

## 2025-01-06 RX ADMIN — Medication 17 GRAM(S): at 10:03

## 2025-01-06 RX ADMIN — Medication 3 MILLIGRAM(S): at 21:58

## 2025-01-06 RX ADMIN — DULOXETINE HYDROCHLORIDE 60 MILLIGRAM(S): 30 CAPSULE, DELAYED RELEASE ORAL at 10:03

## 2025-01-06 RX ADMIN — HEPARIN SODIUM 5000 UNIT(S): 1000 INJECTION, SOLUTION INTRAVENOUS; SUBCUTANEOUS at 10:03

## 2025-01-06 RX ADMIN — HEPARIN SODIUM 5000 UNIT(S): 1000 INJECTION, SOLUTION INTRAVENOUS; SUBCUTANEOUS at 21:58

## 2025-01-06 RX ADMIN — MEROPENEM 1000 MILLIGRAM(S): 1 INJECTION, POWDER, FOR SOLUTION INTRAVENOUS at 15:18

## 2025-01-06 RX ADMIN — MEROPENEM 1000 MILLIGRAM(S): 1 INJECTION, POWDER, FOR SOLUTION INTRAVENOUS at 05:46

## 2025-01-06 RX ADMIN — SENNOSIDES 2 TABLET(S): 8.6 TABLET, FILM COATED ORAL at 21:59

## 2025-01-06 NOTE — PROGRESS NOTE ADULT - ASSESSMENT
85 y/o female with past medical history of obesity, early dementia, breast cancer  s/p mastectomies in 2016 with mets to bone, spinal stenosis of lumbar region, BCC, HLD, OA, GERD, and macular degeneration presents BIB daughter from home for increased confusion x2d.    # Increasing Confusion and worsening of Dementia  Daughter reports worsening of dementia at home   - UC growing ESBL -> started on meropenem today   - id consulted for approval   - CTH notable only for left retrobulbar soft tissue   - covid flu rsv negative   - Admit to med surg  - neurology consulted, appreciate recs     #Unsteady gait  - PT  - Longterm placement in [facility] vs. 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

## 2025-01-06 NOTE — PROGRESS NOTE ADULT - ASSESSMENT
Breast cancer ER+ / Bone metastases ; slow growth kinetics. 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 ( Ataxia/ confusion/ incontinence) / MRI imaging / did not follow through with treatment   Calvarial mets; Small/ bone: asymptomatic and not the cause  of present neurological symptoms    Plan    We can hold Xeloda for now   CT PET as out patient  Liquid biopsy : she may benefit from oral agents , say if she has an ESR 1 mutation  Her present symptoms, however, are causing significant morbidity and unlikely to be from breast cancer: Will  defer to Neurology and Internal Medicine to discuss options with family   If the L orbital findings are neoplastic , palliative ( and effective) RT can be considered

## 2025-01-06 NOTE — CONSULT NOTE ADULT - NS ATTEND AMEND GEN_ALL_CORE FT
Patient seen and examined.  History reviewed fully with patient daughter    Agree with above assessment and plan
case reviewed and discussed w/ above NP  pt w/ confusion   GOC held and awaiting input from oncologist as family would like to touch base prior to any further limitations  pt currently DNR DNI

## 2025-01-06 NOTE — PROGRESS NOTE ADULT - SUBJECTIVE AND OBJECTIVE BOX
HOSPITALIST ATTENDING PROGRESS NOTE    Chart and meds reviewed.  Patient seen and examined.    Subjective: NAEO. Unclear if daughter would like pt to go home vs jennifer     All other systems reviewed and found to be negative with the exception of what has been described above.    MEDICATIONS  (STANDING):  atorvastatin 20 milliGRAM(s) Oral at bedtime  DULoxetine 60 milliGRAM(s) Oral daily  heparin   Injectable 5000 Unit(s) SubCutaneous every 12 hours  influenza  Vaccine (HIGH DOSE) 0.5 milliLiter(s) IntraMuscular once  meropenem Injectable 1000 milliGRAM(s) IV Push every 8 hours  polyethylene glycol 3350 17 Gram(s) Oral two times a day  senna 2 Tablet(s) Oral at bedtime    MEDICATIONS  (PRN):  acetaminophen     Tablet .. 650 milliGRAM(s) Oral every 6 hours PRN Temp greater or equal to 38C (100.4F), Mild Pain (1 - 3)  aluminum hydroxide/magnesium hydroxide/simethicone Suspension 30 milliLiter(s) Oral every 4 hours PRN Dyspepsia  melatonin 3 milliGRAM(s) Oral at bedtime PRN Insomnia  ondansetron Injectable 4 milliGRAM(s) IV Push every 8 hours PRN Nausea and/or Vomiting      VITALS:  T(F): 97.4 (01-06-25 @ 15:36), Max: 97.8 (01-06-25 @ 07:36)  HR: 77 (01-06-25 @ 15:36) (75 - 79)  BP: 113/64 (01-06-25 @ 15:36) (113/64 - 139/50)  RR: 18 (01-06-25 @ 15:36) (17 - 18)  SpO2: 96% (01-06-25 @ 15:36) (96% - 100%)  Wt(kg): --    I&O's Summary      CAPILLARY BLOOD GLUCOSE          PHYSICAL EXAM:  Gen: NAD  HEENT:  pupils equal and reactive, EOMI, no oropharyngeal lesions, erythema, exudates, oral thrush  NECK:   supple, no carotid bruits, no palpable lymph nodes, no thyromegaly  CV:  +S1, +S2, regular, no murmurs or rubs  RESP:   lungs clear to auscultation bilaterally, no wheezing, rales, rhonchi, good air entry bilaterally  BREAST:  not examined  GI:  abdomen soft  EXT:  no clubbing  NEURO:  AOX2       LABS:                            12.4   7.82  )-----------( 390      ( 06 Jan 2025 06:58 )             37.3     01-06    138  |  108  |  18  ----------------------------<  100[H]  4.2   |  26  |  0.54    Ca    10.0      06 Jan 2025 06:58              Urinalysis Basic - ( 06 Jan 2025 06:58 )    Color: x / Appearance: x / SG: x / pH: x  Gluc: 100 mg/dL / Ketone: x  / Bili: x / Urobili: x   Blood: x / Protein: x / Nitrite: x   Leuk Esterase: x / RBC: x / WBC x   Sq Epi: x / Non Sq Epi: x / Bacteria: x              CULTURES:      Additional results/Imaging, I have personally reviewed:    Telemetry, personally reviewed:

## 2025-01-06 NOTE — CONSULT NOTE ADULT - REASON FOR ADMISSION
Increased confusion and difficulty ambulating

## 2025-01-06 NOTE — PROGRESS NOTE ADULT - SUBJECTIVE AND OBJECTIVE BOX
Date of service: 01-06-25 @ 08:55    Lying in bed in NAD  Weak looking  Alert and confused    ROS: no fever or chills; limited     MEDICATIONS  (STANDING):  atorvastatin 20 milliGRAM(s) Oral at bedtime  DULoxetine 60 milliGRAM(s) Oral daily  heparin   Injectable 5000 Unit(s) SubCutaneous every 12 hours  influenza  Vaccine (HIGH DOSE) 0.5 milliLiter(s) IntraMuscular once  meropenem Injectable 1000 milliGRAM(s) IV Push every 8 hours  polyethylene glycol 3350 17 Gram(s) Oral two times a day  senna 2 Tablet(s) Oral at bedtime    Vital Signs Last 24 Hrs  T(C): 36.6 (06 Jan 2025 07:36), Max: 36.8 (05 Jan 2025 15:24)  T(F): 97.8 (06 Jan 2025 07:36), Max: 98.2 (05 Jan 2025 15:24)  HR: 79 (06 Jan 2025 07:36) (75 - 79)  BP: 139/50 (06 Jan 2025 07:36) (99/52 - 139/50)  BP(mean): --  RR: 17 (06 Jan 2025 07:36) (17 - 18)  SpO2: 98% (06 Jan 2025 07:36) (98% - 100%)    Parameters below as of 06 Jan 2025 07:36  Patient On (Oxygen Delivery Method): room air     Physical exam:    Constitutional:  No acute distress  HEENT: NC/AT, EOMI, PERRLA, conjunctivae clear; ears and nose atraumatic; pharynx benign  Neck: supple; thyroid not palpable  Back: no tenderness  Respiratory: respiratory effort normal; clear to auscultation  Cardiovascular: S1S2 regular, no murmurs  Abdomen: soft, not tender, not distended, positive BS; no liver or spleen organomegaly  Genitourinary: no suprapubic tenderness  Lymphatic: no LN palpable  Musculoskeletal: no muscle tenderness, no joint swelling or tenderness  Extremities: no pedal edema  Neurological/ Psychiatric: Alert, judgement and insight impaired; moving all extremities  Skin: no rashes; no palpable lesions    Labs: all available labs reviewed                        11.9   8.91  )-----------( 391      ( 05 Jan 2025 07:13 )             36.1     01-05    135  |  106  |  17  ----------------------------<  92  4.1   |  24  |  0.55    Ca    8.9      05 Jan 2025 07:13    Culture - Blood (collected 02 Jan 2025 11:04)  Source: .Blood BLOOD  Preliminary Report (04 Jan 2025 17:01):    No growth at 48 Hours    Culture - Blood (collected 02 Jan 2025 11:03)  Source: .Blood BLOOD  Preliminary Report (04 Jan 2025 17:01):    No growth at 48 Hours    Urinalysis with Rflx Culture (collected 02 Jan 2025 10:40)    Culture - Urine (collected 02 Jan 2025 10:40)  Source: .Urine None  Final Report (04 Jan 2025 14:55):    10,000 - 49,000 CFU/mL Escherichia coli ESBL    10,000 - 49,000 CFU/mL Enterococcus faecalis  Organism: Escherichia coli ESBL  Enterococcus faecalis (04 Jan 2025 14:55)  Organism: Enterococcus faecalis (04 Jan 2025 14:55)      Method Type: DAVIS      -  Ampicillin: S <=2 Predicts results to ampicillin/sulbactam, amoxacillin-clavulanate and  piperacillin-tazobactam.      -  Ciprofloxacin: S <=1      -  Levofloxacin: S <=1      -  Nitrofurantoin: S <=32 Should not be used to treat pyelonephritis.      -  Tetracycline: R >8      -  Vancomycin: S 1  Organism: Escherichia coli ESBL (04 Jan 2025 14:55)      Method Type: DAVIS      -  Ampicillin: R >16 These ampicillin results predict results for amoxicillin      -  Ampicillin/Sulbactam: I 16/8      -  Aztreonam: R >16      -  Cefazolin: R >16 For uncomplicated UTI with K. pneumoniae, E. coli, or P. mirablis: DAVIS <=16 is sensitive and DAVIS >=32 is resistant. This also predicts results for oral agents cefaclor, cefdinir, cefpodoxime, cefprozil, cefuroxime axetil, cephalexin and locarbef for uncomplicated UTI. Note that some isolates may be susceptible to these agents while testing resistant to cefazolin.      -  Cefepime: R >16      -  Ceftriaxone: R >32      -  Cefuroxime: R >16      -  Ciprofloxacin: R >2      -  Ertapenem: S <=0.5      -  Gentamicin: S <=2      -  Imipenem: S <=1      -  Levofloxacin: R >4      -  Meropenem: S <=1      -  Nitrofurantoin: S <=32 Should not be used to treat pyelonephritis      -  Piperacillin/Tazobactam: S <=8      -  Tobramycin: S <=2      -  Trimethoprim/Sulfamethoxazole: R >2/38    Radiology: all available radiological tests reviewed    < from: CT Abdomen and Pelvis w/ IV Cont (01.04.25 @ 11:34) >  *  Lytic expansile lesion in the right posterolateral ninth rib. Possible lytic lesion in T12 vertebral body.  *  No other masses or evidence of metastatic disease elsewhere.  *  Moderate fecal retention in the rectum with evidence of stercoral   < end of copied text >      Advanced directives addressed: full resuscitation

## 2025-01-06 NOTE — CONSULT NOTE ADULT - CONVERSATION DETAILS
The role of Palliative Medicine was reviewed with the pt's daughter Leyla. She has many unanswered questions regarding her current  medical state and her increased confusion. She is hopeful to speak with Dr Joyce tomorrow prior to any decisions. I did review the MOLST and she will likely place a DNR/DNI order after discussion with her brother. Will follow tomorrow. 1/7/25 230p

## 2025-01-06 NOTE — CONSULT NOTE ADULT - ASSESSMENT
HPI: Pt is a 84y old Female with a past medical history of obesity, early dementia, breast cancer  s/p mastectomies in 2016 with mets to bone, spinal stenosis of lumbar region, BCC, HLD, OA, GERD, and macular degeneration presents BIB daughter from home for increased confusion x2d. Pt was in [facility] on Dec 22nd for a fall, diagnosed with a UTI and subsequently finished oral antibiotics. Daughter also reports that gait is more unsteady than baseline, has kept her downstairs. Palliative Medicine Consult to further establish GOC  1/6/25 Seen and examined at bedside. OOB/chair confused and disoriented to time and current situation. Denies pain or dyspnea    Assessment and Plan:    1) Increasing Confusion   -Daughter reports worsening of dementia at home   -UC growing ESBL    -CTH notable only for left retrobulbar soft tissue   -Baseline dementia    2) Metastatic breast cancer  -Metastatic hormone positive breast cancer  -s/p multiple anti-estrogen therapies with progression  -most recently on xelod  -PET scan was planned for this month as well as foundation liquid testing  -s/p MRI with following findings: 1.  ORBITS:   Indistinct infiltration and enhancement involves the left medial rectus muscle, the left inferior rectus muscle and adjacent intraconal fat inferior nasal quadrant of the left orbit retrobulbar region. Soft tissue metastasis is suspected. Optic nerve atrophy  -consult radiation onc to see if radiation possible  -Ct scan performed c/w bone mets  -s/p xgeva recently in office for bone mets  -outpt liquid foundation testing will be sent  -Dr Joyce to f/u tomorrow    3) Debility  -Increased confusion  -Metastatic cancer  -PT eval  -Poor PO intake  -Mod protein magui malnutrition    4) Advanced Directives  -Pt without capacity  -Daughter Leyla named HCP  -Will discuss GOC    Time Spent: 75 minutes including the care, coordination and counseling of this patient, 50% of which was spent coordinating and counseling.          HPI: Pt is a 84y old Female with a past medical history of obesity, early dementia, breast cancer  s/p mastectomies in 2016 with mets to bone, spinal stenosis of lumbar region, BCC, HLD, OA, GERD, and macular degeneration presents BIB daughter from home for increased confusion x2d. Pt was in [facility] on Dec 22nd for a fall, diagnosed with a UTI and subsequently finished oral antibiotics. Daughter also reports that gait is more unsteady than baseline, has kept her downstairs. Palliative Medicine Consult to further establish GOC  1/6/25 Seen and examined at bedside. OOB/chair confused and disoriented to time and current situation. Denies pain or dyspnea    Assessment and Plan:    1) Increasing Confusion   -Daughter reports worsening of dementia at home   -UC growing ESBL    -CTH notable only for left retrobulbar soft tissue   -Baseline dementia    2) Metastatic breast cancer  -Metastatic hormone positive breast cancer  -s/p multiple anti-estrogen therapies with progression  -most recently on xelod  -PET scan was planned for this month as well as foundation liquid testing  -s/p MRI with following findings: 1.  ORBITS:   Indistinct infiltration and enhancement involves the left medial rectus muscle, the left inferior rectus muscle and adjacent intraconal fat inferior nasal quadrant of the left orbit retrobulbar region. Soft tissue metastasis is suspected. Optic nerve atrophy  -consult radiation onc to see if radiation possible  -Ct scan performed c/w bone mets  -s/p xgeva recently in office for bone mets  -outpt liquid foundation testing will be sent  -Dr Joyce to f/u tomorrow    3) Debility  -Increased confusion  -Metastatic cancer  -PT eval  -Poor PO intake  -Mod protein magui malnutrition    4) Advanced Directives  -Pt without capacity  -Daughter Leyla named HCP  -Will discuss GOC further tomorrow 1/7    Time Spent: 75 minutes including the care, coordination and counseling of this patient, 50% of which was spent coordinating and counseling.

## 2025-01-06 NOTE — CONSULT NOTE ADULT - SUBJECTIVE AND OBJECTIVE BOX
85 y/o female with past medical history of obesity, early dementia, breast cancer  s/p mastectomies in 2016 with mets to bone, spinal stenosis of lumbar region, BCC, HLD, OA, GERD, and macular degeneration presents BIB daughter from home for increased confusion x2d. Pt was in [facility] on Dec 22nd for a fall, diagnosed with a UTI and subsequently finished oral antibiotics. According to daughter, patient has been talking to her  parents. Daughter also reports that gait is more unsteady than baseline, has kept her downstairs. This morning, daughter found patient in closet packing for an upcoming trip that is nonexistent, and also reports that patient is more confused at night. No other complaints at this time.  Found to have UTI and metabolic encephalopathy.  Being treated with antibiotics.    Oncology history-    Metastatic hormone positive breast cancer, s/p multiple anti-estrogen therapies with progression, most recently on xeloda, -s/p xgeva recently in office for bone mets  PET scan was planned for this month as well as foundation liquid testing  s/p MRI with following findings: 1.  ORBITS:   Indistinct infiltration and enhancement involves the left medial rectus muscle, the left inferior rectus muscle and adjacent intraconal fat inferior nasal quadrant of the left orbit retrobulbar region. Soft tissue metastasis is suspected.  Optic nerve atrophy    PAST MEDICAL & SURGICAL HISTORY:  Bilateral malignant neoplasm of breast in female (History of lumpectomy of left breast   H/O mastectomy, right  with immediate reconstruction with fat grafting, H/O left mastectomy )  History of macular degeneration  Gastroesophageal reflux disease without esophagitis  Osteoarthritis of both knees, unspecified osteoarthritis type  Spinal stenosis of lumbar region  Basal cell carcinoma in situ of skin, removed from neck  Insomnia  Urinary frequency  Obesity  Cataract  S/P left knee [id number]  H/O colonoscopy  History of esophagogastroduodenoscopy (EGD)    FAMILY HISTORY:  Family history of temporal arteritis (Father)  Family history of stroke (Mother)    Alochol: Denied  Smoking: Nonsmoker  Drug Use: Denied  Marital Status:     Allergies-  NKDA    MEDICATIONS  (STANDING):  atorvastatin 20 milliGRAM(s) Oral at bedtime  capecitabine 1000 milliGRAM(s) Oral two times a day  DULoxetine 60 milliGRAM(s) Oral daily  influenza  Vaccine (HIGH DOSE) 0.5 milliLiter(s) IntraMuscular once  potassium chloride   Powder 20 milliEquivalent(s) Oral daily    MEDICATIONS  (PRN):  acetaminophen     Tablet .. 650 milliGRAM(s) Oral every 6 hours PRN Temp greater or equal to 38C (100.4F), Mild Pain (1 - 3)  aluminum hydroxide/magnesium hydroxide/simethicone Suspension 30 milliLiter(s) Oral every 4 hours PRN Dyspepsia  melatonin 3 milliGRAM(s) Oral at bedtime PRN Insomnia  ondansetron Injectable 4 milliGRAM(s) IV Push every 8 hours PRN Nausea and/or Vomiting    ROS  No fever, sweats, chills    Vital Signs Last 24 Hrs  T(C): 36.6 (2025 07:36), Max: 36.8 (2025 15:24)  T(F): 97.8 (2025 07:36), Max: 98.2 (2025 15:24)  HR: 79 (2025 07:36) (75 - 79)  BP: 139/50 (2025 07:36) (99/52 - 139/50)  BP(mean): --  RR: 17 (2025 07:36) (17 - 18)  SpO2: 98% (2025 07:36) (98% - 100%)    EXAM:  Constitutional:  No acute distress  HEENT: NC/AT, EOMI, PERRLA, conjunctivae clear; ears and nose atraumatic; pharynx benign  Neck: supple; thyroid not palpable  Back: no tenderness  Respiratory: respiratory effort normal; clear to auscultation  Cardiovascular: S1S2 regular, no murmurs  Abdomen: soft, not tender, not distended, positive BS; no liver or spleen organomegaly  Genitourinary: no suprapubic tenderness  Lymphatic: no LN palpable  Musculoskeletal: no muscle tenderness, no joint swelling or tenderness  Extremities: no pedal edema  Neurological/ Psychiatric: Alert, judgement and insight impaired; moving all extremities  Skin: no rashes; no palpable lesions      LABS:                11.9   8.91  )-----------( 391      ( 2025 07:13 )             36.1     135  |  106  |  17  ----------------------------<  92  4.1   |  24  |  0.55    Ca    8.9      2025 07:13  TPro  6.8  /  Alb  2.9[L]  /  TBili  0.6  /  DBili  x   /  AST  18  /  ALT  9[L]  /  AlkPhos  108        RADIOLOGY:  ACC: [id number] EXAM:  MR ORBITS ONLY WAWI   ORDERED BY: RANULFO IZQUIERDO   PROCEDURE DATE:  2025    INTERPRETATION:  Two examinations were performed on this patient:  1. MR of the orbits with and without gadolinium contrast  2. MR of the brain with and without gadolinium contrast  CLINICAL INFORMATION:   Left retrobublar mass  JMR  TECHNIQUE:     MR orbits:   Coronal and axial T1-weighted and coronal and axial    fat-saturated T2-weighted images of the orbits were obtained.  Following   gadolinium administration 5 cc administered axial and coronal   fat-saturated T1-weighted images were obtained.     MR brain:   Sagittal T1-weighted images, axial FLAIR images, axial   T2-weighted images and axial diffusion weighted images of the brain were   obtained.  Following gadolinium administration axial volumetric gradient   echo T1-weighted images were obtained.  This data set was reconstructed   in the sagittal and coronal planes.  CONTRAST:    Gadavist:     5 cc administered  ;  2.5 cc discarded  COMPARISON:   None  FINDINGS:  ORBITS  The optic nerves demonstrate moderate atrophy, somewhat more pronounced   on the left than the right, without abnormal signal intensity,   enhancement or focal lesion.   Optic nerve sheath fluid is   correspondingly increased, near symmetric and within physiologic limits..     The orbital apices are intact.  The cisternal segments of the optic   nerves, the optic chiasm and the optic tracts appear intact.  The orbits are significant for asymmetric thickening and heterogeneous   enhancement involving the left medial rectus and inferior rectus muscles.   This is associated with indistinct infiltration within the adjacent   retrobulbar fat. Well defined margins of the lesion are not seen.  No   preseptal abnormality is found.   The globes maintain intact contours.   Lens surgery was performed. On the right intraconal and retrobulbar fat   are preserved.  Extraconal fat is preserved.   The right-sided   extraocular muscles appear intact.  The superior ophthalmic veins are   symmetric in caliber.  The sella, cavernous sinuses and para cavernous regions appear intact.      The cavernous segments of the internal carotid arteries demonstrate   expected flow-void indicating patency.   The central skull base,   calvarium and visualized upper cervical spine demonstrate patchy marrow   replacement. These lesions enhance with gadolinium.  The paranasal sinuses are clear.   The nasal cavity appears intact.  The   deep facial spaces are intact.  BRAIN:   The brain demonstrates no abnormal signal intensity.  No   abnormal enhancement occurs within the brain.  No diffusion restriction   is found in the brain.  No acute cerebral cortical infarct is found.   No   intracranial hemorrhage is recognized.  No mass effect is found in the   brain.  CSF SPACES:   The ventricles, sulci and basal cisterns appear moderately   dilated reflecting diffuse brain volume loss.  VESSELS:   The vertebral and internal carotid arteries demonstrate   expected flow voids indicating their patency.  The principal dural   sinuses enhance consistent with their patency. In the left transverse   sinus proximal aspect there is an ovoid well-circumscribed T2 bright   defect typical for arachnoid granulation.    IMPRESSION:  1.  ORBITS:   Indistinct infiltration and enhancement involves the left   medial rectus muscle, the left inferior rectus muscle and adjacent   intraconal fat inferior nasal quadrant of the left orbit retrobulbar   region. Soft tissue metastasis is suspected. Optic nerve atrophy  2.  BRAIN:  No brain metastasis. Numerous calvarial and skull base   metastases    --- End of Report ---  NAYELY DUTTA MD; Attending Radiologist  This document has been electronically signed. [place]  3 2025  1:38PM

## 2025-01-06 NOTE — CONSULT NOTE ADULT - ASSESSMENT
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   Pt was in [facility] on Dec 22nd for a fall, diagnosed with a UTI and subsequently finished oral antibiotics.  Daughter reports increased confusion, unsteady gait.  Patient is incontinent at baseline.  Currently the patient has ESBL UTI and is on IV Meropenem.  Patient has also been found to have L orbital mass suspicious for mets and numerous calvarial and skull base mets followed by [facility].   Neurology was consulted for evaluation of NPH.  In March of 2-25 patient was seen by neurosurgery Dr. Mitchell, and at that time LP was recommended.  CT head this admission is showing retrobulbar ST mass, and calvarium lucent lesions.        #h/o NPH-In light of findings of orbital mass, calvarium lesions, and ESBL UTI,  would not persue treatment for NPH at this time.      Recommendations  -Would not w/u NPH at this time-can follow up outpatient with Dr. Mitchell   -[facility]/[place] w/u pending and in progress  -UTI treatment in progress  -palliative care and GOC discussion w/u in progress  -PT eval  -will sign off at this time-please page or call for any questions    D/W Dr. Mattson, Dr. Delgado, patient and her daughter at bedside.   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  The morning of admission on 25 the patient's daughter found patient in the closet packing for an imaginary trip - to visit  parents, has has been diagnosed with ESBL UTI and is on IV Meropenem. Pt was recently in  ED on Dec 22nd for a fall, was diagnosed with UTI and treated with oral antibiotics, daughter has since reported increased confusion, unsteady gait, at baseline she is incontinent of urine.  CT scan and MRI orbits - reveals L orbital mass suspicious for mets and numerous calvarial and skull base mets      #  History of NPH, patient does have triad of symptoms, patient has been evaluated by Dr. Mitchell in 3/2024, he recommended lumbar drain and possible intervention, however patient did not follow through    # History of breast CA with bone mets, current imaging suggests orbital mass and calvarial/skull base mets.    #Metabolic encephalopathy likely UTI related improved      – Had a detailed discussion with the patient and her daughter who is up-to-date with patient's condition, management of NPH can be considered, however, the pressing issue before that is the orbital mass and calvarial/skull base mets.  This should be addressed and treated before NPH, as it is an invasive procedure.  – At this time, from neurological perspective no further workup is deemed necessary  – May consult neurosurgery - Dr. Mitchell's team if management of NPH has to be addressed  – May obtain B12 and TSH levels as workup for mild cognitive impairment  - Follow-up with heme/onc Dr. Joyce - Dr. Morrissey.    – UTI treatment in progress  – PT eval for balance and gait training       Above plan D/W pts daughter and Dr. Fortune

## 2025-01-06 NOTE — CONSULT NOTE ADULT - SUBJECTIVE AND OBJECTIVE BOX
HPI: Pt is a 84y old Female with a past medical history of obesity, early dementia, breast cancer  s/p mastectomies in 2016 with mets to bone, spinal stenosis of lumbar region, BCC, HLD, OA, GERD, and macular degeneration presents BIB daughter from home for increased confusion x2d. Pt was in [facility] on Dec 22nd for a fall, diagnosed with a UTI and subsequently finished oral antibiotics. Daughter also reports that gait is more unsteady than baseline, has kept her downstairs. Palliative Medicine Consult to further establish GOC  1/6/25 Seen and examined at bedside. OOB/chair confused and disoriented to time and current situation. Denies pain or dyspnea    PAIN: ( )Yes   (X )No  DYSPNEA: ( ) Yes  ( X) No      PAST MEDICAL & SURGICAL HISTORY:  Bilateral malignant neoplasm of breast in female, unspecified site of breast  b/l  History of macular degeneration  bilateral  Gastroesophageal reflux disease without esophagitis  Osteoarthritis of both knees, unspecified osteoarthritis type  Spinal stenosis of lumbar region  Basal cell carcinoma in situ of skin  removed from neck  Malignant neoplasm of left female breast, unspecified site of breast  with Lymph node involvement  Insomnia, unspecified type  Urinary frequency  Neoplasm by body site  left anterior chest wall  Obesity  Cataract  b/l  Macular Hole  repair b/l eyes  S/P left knee arthroscopy  2009  Early stage skin cancer  basal cell carcinoma removed from left side of neck.  History of lumpectomy of left breast  1990  H/O mastectomy, right  1993 with immediate reconstruction with fat grafting  H/O colonoscopy  multiple  History of esophagogastroduodenoscopy (EGD)  H/O left mastectomy  2016  H/O bilateral breast biopsy    SOCIAL HX:  Lives with daughter  Hx opiate tolerance ( )YES  (X )NO    Baseline ADLs  (Prior to Admission)  ( ) Independent   (X )Dependent    FAMILY HISTORY:  Family history of temporal arteritis (Father)  father    Family history of stroke (Mother)  mother        Review of Systems:    All other systems reviewed and negative  Unable to obtain/Limited due to: AMS      PHYSICAL EXAM:    Vital Signs Last 24 Hrs  T(C): 36.6 (06 Jan 2025 07:36), Max: 36.8 (05 Jan 2025 15:24)  T(F): 97.8 (06 Jan 2025 07:36), Max: 98.2 (05 Jan 2025 15:24)  HR: 79 (06 Jan 2025 07:36) (75 - 79)  BP: 139/50 (06 Jan 2025 07:36) (99/52 - 139/50)  BP(mean): --  RR: 17 (06 Jan 2025 07:36) (17 - 18)  SpO2: 98% (06 Jan 2025 07:36) (98% - 100%)    Parameters below as of 06 Jan 2025 07:36  Patient On (Oxygen Delivery Method): room air      PPSV2: 40 %  FAST: 7    General:  Mental Status:  HEENT:  Lungs:  Cardiac:  GI:  :  Ext:  Neuro:      LABS:                        12.4   7.82  )-----------( 390      ( 06 Jan 2025 06:58 )             37.3     01-06    138  |  108  |  18  ----------------------------<  100[H]  4.2   |  26  |  0.54    Ca    10.0      06 Jan 2025 06:58        Albumin: Albumin: 2.9 g/dL (01-02 @ 09:42)      Allergies    No Known Allergies    Intolerances      MEDICATIONS  (STANDING):  atorvastatin 20 milliGRAM(s) Oral at bedtime  DULoxetine 60 milliGRAM(s) Oral daily  heparin   Injectable 5000 Unit(s) SubCutaneous every 12 hours  influenza  Vaccine (HIGH DOSE) 0.5 milliLiter(s) IntraMuscular once  meropenem Injectable 1000 milliGRAM(s) IV Push every 8 hours  polyethylene glycol 3350 17 Gram(s) Oral two times a day  senna 2 Tablet(s) Oral at bedtime    MEDICATIONS  (PRN):  acetaminophen     Tablet .. 650 milliGRAM(s) Oral every 6 hours PRN Temp greater or equal to 38C (100.4F), Mild Pain (1 - 3)  aluminum hydroxide/magnesium hydroxide/simethicone Suspension 30 milliLiter(s) Oral every 4 hours PRN Dyspepsia  melatonin 3 milliGRAM(s) Oral at bedtime PRN Insomnia  ondansetron Injectable 4 milliGRAM(s) IV Push every 8 hours PRN Nausea and/or Vomiting      RADIOLOGY/ADDITIONAL STUDIES:   HPI: Pt is a 84y old Female with a past medical history of obesity, early dementia, breast cancer  s/p mastectomies in 2016 with mets to bone, spinal stenosis of lumbar region, BCC, HLD, OA, GERD, and macular degeneration presents BIB daughter from home for increased confusion x2d. Pt was in [facility] on Dec 22nd for a fall, diagnosed with a UTI and subsequently finished oral antibiotics. Daughter also reports that gait is more unsteady than baseline, has kept her downstairs. Palliative Medicine Consult to further establish GOC  1/6/25 Seen and examined at bedside. OOB/chair confused and disoriented to time and current situation. Denies pain or dyspnea    PAIN: ( )Yes   (X )No  DYSPNEA: ( ) Yes  ( X) No      PAST MEDICAL & SURGICAL HISTORY:  Bilateral malignant neoplasm of breast in female, unspecified site of breast  b/l  History of macular degeneration  bilateral  Gastroesophageal reflux disease without esophagitis  Osteoarthritis of both knees, unspecified osteoarthritis type  Spinal stenosis of lumbar region  Basal cell carcinoma in situ of skin  removed from neck  Malignant neoplasm of left female breast, unspecified site of breast  with Lymph node involvement  Insomnia, unspecified type  Urinary frequency  Neoplasm by body site  left anterior chest wall  Obesity  Cataract  b/l  Macular Hole  repair b/l eyes  S/P left knee arthroscopy  2009  Early stage skin cancer  basal cell carcinoma removed from left side of neck.  History of lumpectomy of left breast  1990  H/O mastectomy, right  1993 with immediate reconstruction with fat grafting  H/O colonoscopy  multiple  History of esophagogastroduodenoscopy (EGD)  H/O left mastectomy  2016  H/O bilateral breast biopsy    SOCIAL HX:  Lives with daughter  Hx opiate tolerance ( )YES  (X )NO    Baseline ADLs  (Prior to Admission)  ( ) Independent   (X )Dependent    FAMILY HISTORY:  Family history of temporal arteritis (Father)  father    Family history of stroke (Mother)  mother        Review of Systems:    All other systems reviewed and negative  Unable to obtain/Limited due to: AMS      PHYSICAL EXAM:    Vital Signs Last 24 Hrs  T(C): 36.6 (06 Jan 2025 07:36), Max: 36.8 (05 Jan 2025 15:24)  T(F): 97.8 (06 Jan 2025 07:36), Max: 98.2 (05 Jan 2025 15:24)  HR: 79 (06 Jan 2025 07:36) (75 - 79)  BP: 139/50 (06 Jan 2025 07:36) (99/52 - 139/50)  BP(mean): --  RR: 17 (06 Jan 2025 07:36) (17 - 18)  SpO2: 98% (06 Jan 2025 07:36) (98% - 100%)    Parameters below as of 06 Jan 2025 07:36  Patient On (Oxygen Delivery Method): room air      PPSV2: 40 %  FAST: 7    General: Elderly female in chair  Mental Status: A&O X2  HEENT: oral mucosa dry  Lungs: clear to auscultation salvatore  Cardiac: S1S2+  GI: abd soft NT ND + BS  : voids  Ext: BILLINGSLEY=strength  Neuro: confusion      LABS:                        12.4   7.82  )-----------( 390      ( 06 Jan 2025 06:58 )             37.3     01-06    138  |  108  |  18  ----------------------------<  100[H]  4.2   |  26  |  0.54    Ca    10.0      06 Jan 2025 06:58        Albumin: Albumin: 2.9 g/dL (01-02 @ 09:42)      Allergies    No Known Allergies    Intolerances      MEDICATIONS  (STANDING):  atorvastatin 20 milliGRAM(s) Oral at bedtime  DULoxetine 60 milliGRAM(s) Oral daily  heparin   Injectable 5000 Unit(s) SubCutaneous every 12 hours  influenza  Vaccine (HIGH DOSE) 0.5 milliLiter(s) IntraMuscular once  meropenem Injectable 1000 milliGRAM(s) IV Push every 8 hours  polyethylene glycol 3350 17 Gram(s) Oral two times a day  senna 2 Tablet(s) Oral at bedtime    MEDICATIONS  (PRN):  acetaminophen     Tablet .. 650 milliGRAM(s) Oral every 6 hours PRN Temp greater or equal to 38C (100.4F), Mild Pain (1 - 3)  aluminum hydroxide/magnesium hydroxide/simethicone Suspension 30 milliLiter(s) Oral every 4 hours PRN Dyspepsia  melatonin 3 milliGRAM(s) Oral at bedtime PRN Insomnia  ondansetron Injectable 4 milliGRAM(s) IV Push every 8 hours PRN Nausea and/or Vomiting      RADIOLOGY/ADDITIONAL STUDIES:    < from: CT Abdomen and Pelvis w/ IV Cont (01.04.25 @ 11:34) >    ACC: [id number] EXAM:  CT ABDOMEN AND PELVIS IC   ORDERED BY: KOFI BERNARDO     ACC: [id number] EXAM:  CT CHEST IC   ORDERED BY: KOFI BERNARDO     PROCEDURE DATE:  01/04/2025          INTERPRETATION:  CLINICAL INFORMATION: Malignancy workup    COMPARISON: None.    CONTRAST/COMPLICATIONS:  IV Contrast: Omnipaque 350 (accession [id number]), NONE (accession   [id number])  90 cc administered   0 cc discarded  Oral Contrast: NONE.    PROCEDURE:  CT of the Chest, Abdomen and Pelvis was performed.  Sagittal and coronal reformats were performed.    FINDINGS:  CHEST:  LUNGS AND LARGE AIRWAYS: The central airways are patent. The lungs are   clear. No suspicious mass.  PLEURA: No pleural abnormality.  VESSELS: Aortic atherosclerosis without aneurysm. Mainpulmonary arteries   are patent.  HEART: No cardiomegaly. No pericardial effusion. Coronary and mitral   annular calcification.  MEDIASTINUM AND JIM: No adenopathy.  CHEST WALL AND LOWER NECK: No masses. Status post left mastectomy.    ABDOMEN AND PELVIS:  LIVER: A few scattered tiny cysts.  BILE DUCTS: Nondilated.  GALLBLADDER: Normal.  SPLEEN: Normal.  PANCREAS: Diffuse atrophy.  ADRENALS: Normal.  KIDNEYS/URETERS: No hydronephrosis or urinary tract calculi. Bilateral   parapelvic cysts.    BLADDER: Normal.  REPRODUCTIVE ORGANS: Small calcified fibroids. No adnexal mass.    BOWEL: No bowel obstruction. Redundant cecum. Normal appendix. Moderate   fecal retention in the rectum with evidence of stercoral proctitis.  PERITONEUM: No ascites orimplants.  VESSELS: Aortoiliac atherosclerosis without aneurysm.  RETROPERITONEUM/LYMPH NODES: No adenopathy.  ABDOMINAL WALL: Rectus diastasis.  BONES: Lytic expansile lesion in the right posterolateral ninth rib.   Possible lytic lesion in T12 vertebral body.    IMPRESSION:  *  Lytic expansile lesion in the right posterolateral ninth rib. Possible   lytic lesion in T12 vertebral body.  *  No other masses or evidence of metastatic disease elsewhere.  *  Moderate fecal retention in the rectum with evidence of stercoral   proctitis.    < end of copied text >  < from: Xray Chest 1 View- PORTABLE-Routine (Xray Chest 1 View- PORTABLE-Routine .) (01.03.25 @ 18:49) >    ACC: [id number] EXAM:  XR CHEST PORTABLE ROUTINE 1V   ORDERED BY: KOFI BERNARDO     PROCEDURE DATE:  01/03/2025          INTERPRETATION:  Portable AP chest radiograph    COMPARISON: 12/22/2024 chest x-ray.    CLINICAL INFORMATION: Cough. Pneumonia?    FINDINGS:  CATHETERS AND TUBES: None    PULMONARY:  No airspace consolidations or radiographic evidence of pulmonary nodules..  No pleural effusion or pneumothorax.    HEART/VASCULAR: The heart size and mediastinum configuration are within   the limits of normal.    BONES: The visualized osseous thorax is intact.    IMPRESSION:   No radiographic evidence of active chest disease..    < end of copied text >  < from: MR Orbits w/wo IV Cont (01.03.25 @ 11:38) >    ACC: [id number] EXAM:  MR ORBITS ONLY WAWIC   ORDERED BY: RANULFO IZQUIERDO     PROCEDURE DATE:  01/03/2025          INTERPRETATION:  Two examinations were performed on this patient:  1. MR of the orbits with and without gadolinium contrast  2. MR of the brain with and without gadolinium contrast    CLINICAL INFORMATION:   Left retrobublar mass  JMR    TECHNIQUE:     MR orbits:   Coronal and axial T1-weighted and coronal and axial    fat-saturated T2-weighted images of the orbits were obtained.  Following   gadolinium administration 5 cc administered axial and coronal   fat-saturated T1-weighted images were obtained.     MR brain:   Sagittal T1-weighted images, axial FLAIR images, axial   T2-weighted images and axial diffusion weighted images of the brain were   obtained.  Following gadolinium administration axial volumetric gradient   echo T1-weighted images were obtained.  This data set was reconstructed   in the sagittal and coronal planes.  CONTRAST:    Gadavist:     5 cc administered  ;  2.5 cc discarded    COMPARISON:   None      FINDINGS:    ORBITS    The optic nerves demonstrate moderate atrophy, somewhat more pronounced   on the left than the right, without abnormal signal intensity,   enhancement or focal lesion.   Optic nervesheath fluid is   correspondingly increased, near symmetric and within physiologic limits..     The orbital apices are intact.  The cisternal segments of the optic   nerves, the optic chiasm and the optic tracts appear intact.    The orbits are significant for asymmetric thickening and heterogeneous   enhancement involving the left medial rectus and inferior rectus muscles.   This is associated with indistinct infiltration within the adjacent   retrobulbar fat. Well defined margins of the lesion are not seen.  No   preseptal abnormality is found.   The globes maintain intact contours.   Lens surgery was performed. On the right intraconal and retrobulbar fat   are preserved.  Extraconal fat is preserved.   The right-sided   extraocular musclesappear intact.  The superior ophthalmic veins are   symmetric in caliber.    The sella, cavernous sinuses and para cavernous regions appear intact.      The cavernous segments of the internal carotid arteries demonstrate   expected flow-void indicating patency.   The central skull base,   calvarium and visualized upper cervical spine demonstrate patchy marrow   replacement. These lesions enhance with gadolinium.    The paranasal sinuses are clear.   The nasal cavity appears intact.  The   deep facial spaces are intact.    BRAIN    BRAIN:   The brain demonstrates no abnormal signal intensity.  No   abnormal enhancement occurs within the brain.  No diffusion restriction   is found in the brain.  No acute cerebral cortical infarct is found.   No  intracranial hemorrhage is recognized.  No mass effect is found in the   brain.    CSF SPACES:   The ventricles, sulci and basal cisterns appear moderately   dilated reflecting diffuse brain volume loss.    VESSELS:   The vertebral and internal carotid arteries demonstrate   expected flow voids indicating their patency.  The principal dural   sinuses enhance consistent with their patency. In the left transverse   sinus proximal aspect there is an ovoid well-circumscribed T2 bright   defect typical for arachnoid granulation.      IMPRESSION:    1.  ORBITS:   Indistinct infiltration and enhancement involves the left   medial rectus muscle, the left inferior rectus muscle and adjacent   intraconal fat inferior nasal quadrant of the left orbit retrobulbar   region. Soft tissue metastasis is suspected. Optic nerve atrophy    2.  BRAIN:  No brain metastasis. Numerous calvarial and skull base   metastases    --- End of Report ---    < from: CT Head No Cont (01.02.25 @ 10:22) >    ACC: [id number] EXAM:  CT BRAIN   ORDERED BY: HOLLY JOSE     PROCEDURE DATE:  01/02/2025          INTERPRETATION:  CLINICAL INFORMATION: AMS    COMPARISON: CT head 12/22/2024.  MRI brain 8/6/2021.  CT head 12/24/2020    CONTRAST:  IV Contrast: NONE  .    TECHNIQUE:  Serial axial images were obtained from the skull base to the   vertex using multi-slice helical technique. Sagittal and coronal   reformats were obtained.    FINDINGS:    VENTRICLES AND SULCI: Normal in size and configuration.  INTRA-AXIAL: No mass effect, acute hemorrhage, or midline shift.  There   are periventricular and subcortical white matter hypodensities,   consistent with microvascular type changes.  EXTRA-AXIAL: No mass or fluid collection. Basal cisterns are normal in   appearance.    VISUALIZED SINUSES:  Clear.  TYMPANOMASTOID CAVITIES:  Clear.  VISUALIZED ORBITS: Bilateral lens replacement. Abnormal heterogeneous   left retrobulbar soft tissue.  CALVARIUM: Multiple nonspecific lucent lesions in the calvarium, some   increased in size since 2020. For example, there is a 1.5 cm lucent   lesion in the right parietal bone (3:25), previously 1.1 cm in 2020.    MISCELLANEOUS: None.      IMPRESSION:  1.  No acute intracranial hemorrhage, mass effect, or midline shift.  2.  Abnormal heterogeneous left retrobulbar soft tissue. Recommend MRI   orbits with and without contrast, if there is no contraindication.  3.  Multiple nonspecific lucent lesions in the calvarium, some increased   in size since 2020. Forexample, there is a 1.5 cm lucent lesion in the   right parietal bone (3:25), previously 1.1 cm in 2020.      < end of copied text >

## 2025-01-06 NOTE — CONSULT NOTE ADULT - SUBJECTIVE AND OBJECTIVE BOX
CC: dementia ?NPH      HPI:  85 y/o female with past medical history of obesity, early dementia, breast cancer with mets to bone, spinal stenosis of lumbar region, BCC, HLD, and macular degeneration presents BIB daughter from home for increased confusion x2d.  Pt was in [facility] on Dec 22nd for a fall, diagnosed with a UTI and subsequently finished oral antibiotics.  Per records according to daughter, patient has been talking to her  parents. Daughter also reported that gait is more unsteady than baseline. The morning of admission  per records daughter found patient in closet packing for an upcoming trip that was nonexistent, and also reports that patient is more confused at night.        PAST MEDICAL & SURGICAL HISTORY:  Bilateral malignant neoplasm of breast in female, unspecified site of breast  b/l      History of macular degeneration  bilateral      Gastroesophageal reflux disease without esophagitis      Osteoarthritis of both knees, unspecified osteoarthritis type      Spinal stenosis of lumbar region      Basal cell carcinoma in situ of skin  removed from neck      Malignant neoplasm of left female breast, unspecified site of breast  with Lymph node involvement      Insomnia, unspecified type      Urinary frequency      Neoplasm by body site  left anterior chest wall      Obesity      Cataract  b/l      Macular Hole  repair b/l eyes      S/P left knee arthroscopy        Early stage skin cancer  basal cell carcinoma removed from left side of neck.      History of lumpectomy of left breast        H/O mastectomy, right   with immediate reconstruction with fat grafting      H/O colonoscopy  multiple      History of esophagogastroduodenoscopy (EGD)      H/O left mastectomy  2016      H/O bilateral breast biopsy          FAMILY HISTORY:  Family history of temporal arteritis (Father)  father    Family history of stroke (Mother)  mother        Social Hx:  Nonsmoker, no drug or alcohol use    MEDICATIONS  (STANDING):  atorvastatin 20 milliGRAM(s) Oral at bedtime  DULoxetine 60 milliGRAM(s) Oral daily  heparin   Injectable 5000 Unit(s) SubCutaneous every 12 hours  influenza  Vaccine (HIGH DOSE) 0.5 milliLiter(s) IntraMuscular once  meropenem Injectable 1000 milliGRAM(s) IV Push every 8 hours  polyethylene glycol 3350 17 Gram(s) Oral two times a day  senna 2 Tablet(s) Oral at bedtime       Allergies  No Known Allergies        ROS: Pertinent positives in HPI, all other ROS were reviewed and are negative.      Vital Signs Last 24 Hrs  T(C): 36.6 (2025 07:36), Max: 36.8 (2025 15:24)  T(F): 97.8 (2025 07:36), Max: 98.2 (2025 15:24)  HR: 79 (2025 07:36) (75 - 79)  BP: 139/50 (2025 07:36) (99/52 - 139/50)  BP(mean): --  RR: 17 (2025 07:36) (17 - 18)  SpO2: 98% (2025 07:36) (98% - 100%)    Parameters below as of 2025 07:36  Patient On (Oxygen Delivery Method): room air      GEN:  Constitutional: awake and alert.  HEENT: PERRLA, EOMI,   Neck: Supple.  Extremities:  no edema  Musculoskeletal: no joint swelling/tenderness, no abnormal movements  Skin: No rashes    Neurological exam:  HF: A x O x 3. Appropriately interactive. Speech fluent, No Aphasia or paraphasic errors. Naming /repetition intact   CN: POLLY, EOMI, VFF, facial sensation normal, no NLFD, tongue midline, no dysarthria  Motor: No pronator drift, Strength 5/5 in all 4 ext, normal bulk and tone, no tremor, rigidity or bradykinesia.    Sens: Intact to light touch / PP/ VS/ JS    Reflexes: Symmetric and normal . BJ 2+, BR 2+, KJ 2+, AJ 2+, downgoing toes b/l  Coord:  No FNFA, dysmetria, JOVANNY intact   Gait/Balance: Normal/Cannot test    NIHSS:          Labs:       138  |  108  |  18  ----------------------------<  100[H]  4.2   |  26  |  0.54    Ca    10.0      2025 06:58                                12.4   7.82  )-----------( 390      ( 2025 06:58 )             37.3       Radiology:  < from: CT Head No Cont (25 @ 10:22) >  IMPRESSION:  1.  No acute intracranial hemorrhage, mass effect, or midline shift.  2.  Abnormal heterogeneous left retrobulbar soft tissue. Recommend MRI   orbits with and without contrast, if there is no contraindication.  3.  Multiple nonspecific lucent lesions in the calvarium, some increased   in size since 2020. Forexample, there is a 1.5 cm lucent lesion in the   right parietal bone (3:25), previously 1.1 cm in 2020.    < from: MR Orbits w/wo IV Cont (25 @ 11:38) >    IMPRESSION:    1.  ORBITS:   Indistinct infiltration and enhancement involves the left   medial rectus muscle, the left inferior rectus muscle and adjacent   intraconal fat inferior nasal quadrant of the left orbit retrobulbar   region. Soft tissue metastasis is suspected. Optic nerve atrophy    2.  BRAIN:  No brain metastasis. Numerous calvarial and skull base   metastases             CC: dementia ?NPH      HPI:  85 y/o female with past medical history of early dementia, breast cancer with mets to bone, spinal stenosis of lumbar region, BCC, HLD, and macular degeneration presents BIB daughter from home for increased confusion x2d.  Pt was in [facility] on Dec 22nd for a fall, diagnosed with a UTI and subsequently finished oral antibiotics.  Per records according to daughter, patient has been talking to her  parents. Daughter also reported that gait is more unsteady than baseline. The morning of admission / per records daughter found patient in closet packing for an upcoming trip that was nonexistent, and also reports that patient is more confused at night.        PAST MEDICAL & SURGICAL HISTORY:  Bilateral malignant neoplasm of breast in female, unspecified site of breast  b/l      History of macular degeneration  bilateral      Gastroesophageal reflux disease without esophagitis      Osteoarthritis of both knees, unspecified osteoarthritis type      Spinal stenosis of lumbar region      Basal cell carcinoma in situ of skin  removed from neck      Malignant neoplasm of left female breast, unspecified site of breast  with Lymph node involvement      Insomnia, unspecified type      Urinary frequency      Neoplasm by body site  left anterior chest wall      Obesity      Cataract  b/l      Macular Hole  repair b/l eyes      S/P left knee arthroscopy        Early stage skin cancer  basal cell carcinoma removed from left side of neck.      History of lumpectomy of left breast        H/O mastectomy, right   with immediate reconstruction with fat grafting      H/O colonoscopy  multiple      History of esophagogastroduodenoscopy (EGD)      H/O left mastectomy  2016      H/O bilateral breast biopsy          FAMILY HISTORY:  Family history of temporal arteritis (Father)  father    Family history of stroke (Mother)  mother        Social Hx:  Nonsmoker, no drug or alcohol use    MEDICATIONS  (STANDING):  atorvastatin 20 milliGRAM(s) Oral at bedtime  DULoxetine 60 milliGRAM(s) Oral daily  heparin   Injectable 5000 Unit(s) SubCutaneous every 12 hours  influenza  Vaccine (HIGH DOSE) 0.5 milliLiter(s) IntraMuscular once  meropenem Injectable 1000 milliGRAM(s) IV Push every 8 hours  polyethylene glycol 3350 17 Gram(s) Oral two times a day  senna 2 Tablet(s) Oral at bedtime       Allergies  No Known Allergies        ROS: Pertinent positives in HPI, all other ROS were reviewed and are negative.      Vital Signs Last 24 Hrs  T(C): 36.6 (2025 07:36), Max: 36.8 (2025 15:24)  T(F): 97.8 (2025 07:36), Max: 98.2 (2025 15:24)  HR: 79 (2025 07:36) (75 - 79)  BP: 139/50 (2025 07:36) (99/52 - 139/50)  BP(mean): --  RR: 17 (2025 07:36) (17 - 18)  SpO2: 98% (2025 07:36) (98% - 100%)    Parameters below as of 2025 07:36  Patient On (Oxygen Delivery Method): room air      GEN:  Constitutional: awake and alert.  HEAD: Normocephalic  Neck: Supple.  Extremities:  no edema  Musculoskeletal: no abnormal movements  Skin: No rashes    Neurological exam:  HF: A x Oriented to month only.  Appropriately interactive. Speech fluent, No Aphasia or paraphasic errors. Naming /repetition intact   CN: POLLY, EOMI, VFF, facial sensation normal, no NLFD, tongue midline, no dysarthria  Motor: No pronator drift, Strength 5/5 in all 4 ext, normal bulk and tone, no tremors, rigidity  Sens: Intact to light touch  Reflexes: Symmetric and normal,  downgoing toes b/l  Coord:  No FNFA, HTS intact b/l  Gait/Balance: Cannot test        Labs:       138  |  108  |  18  ----------------------------<  100[H]  4.2   |  26  |  0.54    Ca    10.0      2025 06:58                                12.4   7.82  )-----------( 390      ( 2025 06:58 )             37.3       Radiology:  < from: CT Head No Cont (25 @ 10:22) >  IMPRESSION:  1.  No acute intracranial hemorrhage, mass effect, or midline shift.  2.  Abnormal heterogeneous left retrobulbar soft tissue. Recommend MRI   orbits with and without contrast, if there is no contraindication.  3.  Multiple nonspecific lucent lesions in the calvarium, some increased   in size since . Forexample, there is a 1.5 cm lucent lesion in the   right parietal bone (3:25), previously 1.1 cm in .    < from: MR Orbits w/wo IV Cont (25 @ 11:38) >    IMPRESSION:    1.  ORBITS:   Indistinct infiltration and enhancement involves the left   medial rectus muscle, the left inferior rectus muscle and adjacent   intraconal fat inferior nasal quadrant of the left orbit retrobulbar   region. Soft tissue metastasis is suspected. Optic nerve atrophy    2.  BRAIN:  No brain metastasis. 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  The morning of admission on 25 the patient's daughter found patient in the closet packing for a trip that was non-existent to visit  parents.  Pt was in [facility] on Dec 22nd for a fall, diagnosed with a UTI and subsequently finished oral antibiotics.  Daughter reports increased confusion, unsteady gait.  Patient is incontinent at baseline.  Currently the patient has ESBL UTI and is on IV Meropenem.  Patient has also been found to have L orbital mass suspicious for mets and numerous calvarial and skull base mets followed by [facility].   Neurology was consulted for evaluation of NPH.  In  patient was seen by neurosurgery Dr. Mitchell, and at that time LP was recommended.        PAST MEDICAL & SURGICAL HISTORY:  Bilateral malignant neoplasm of breast in female, unspecified site of breast  b/l      History of macular degeneration  bilateral      Gastroesophageal reflux disease without esophagitis      Osteoarthritis of both knees, unspecified osteoarthritis type      Spinal stenosis of lumbar region      Basal cell carcinoma in situ of skin  removed from neck      Malignant neoplasm of left female breast, unspecified site of breast  with Lymph node involvement      Insomnia, unspecified type      Urinary frequency      Neoplasm by body site  left anterior chest wall      Obesity      Cataract  b/l      Macular Hole  repair b/l eyes      S/P left knee arthroscopy        Early stage skin cancer  basal cell carcinoma removed from left side of neck.      History of lumpectomy of left breast        H/O mastectomy, right   with immediate reconstruction with fat grafting      H/O colonoscopy  multiple      History of esophagogastroduodenoscopy (EGD)      H/O left mastectomy  2016      H/O bilateral breast biopsy          FAMILY HISTORY:  Family history of temporal arteritis (Father)  father    Family history of stroke (Mother)  mother        Social Hx:  Nonsmoker, no drug or alcohol use    MEDICATIONS  (STANDING):  atorvastatin 20 milliGRAM(s) Oral at bedtime  DULoxetine 60 milliGRAM(s) Oral daily  heparin   Injectable 5000 Unit(s) SubCutaneous every 12 hours  influenza  Vaccine (HIGH DOSE) 0.5 milliLiter(s) IntraMuscular once  meropenem Injectable 1000 milliGRAM(s) IV Push every 8 hours  polyethylene glycol 3350 17 Gram(s) Oral two times a day  senna 2 Tablet(s) Oral at bedtime       Allergies  No Known Allergies        ROS: Pertinent positives in HPI, all other ROS were reviewed and are negative.      Vital Signs Last 24 Hrs  T(C): 36.6 (2025 07:36), Max: 36.8 (2025 15:24)  T(F): 97.8 (2025 07:36), Max: 98.2 (2025 15:24)  HR: 79 (2025 07:36) (75 - 79)  BP: 139/50 (2025 07:36) (99/52 - 139/50)  BP(mean): --  RR: 17 (2025 07:36) (17 - 18)  SpO2: 98% (2025 07:36) (98% - 100%)    Parameters below as of 2025 07:36  Patient On (Oxygen Delivery Method): room air      GEN:  Constitutional: awake and alert.  HEAD: Normocephalic  Neck: Supple.  Extremities:  no edema  Musculoskeletal: no abnormal movements  Skin: No rashes    Neurological exam:  HF: A x Oriented to month only.  Appropriately interactive. Speech fluent, No Aphasia or paraphasic errors. Naming /repetition intact   CN: POLLY, EOMI, VFF, facial sensation normal, no NLFD, tongue midline, no dysarthria  Motor: No pronator drift, Strength 5/5 in all 4 ext, normal bulk and tone, no tremors, rigidity  Sens: Intact to light touch  Reflexes: Symmetric and normal,  downgoing toes b/l  Coord:  No FNFA, HTS intact b/l  Gait/Balance: Cannot test        Labs:       138  |  108  |  18  ----------------------------<  100[H]  4.2   |  26  |  0.54    Ca    10.0      2025 06:58                                12.4   7.82  )-----------( 390      ( 2025 06:58 )             37.3       Radiology:  < from: CT Head No Cont (25 @ 10:22) >  IMPRESSION:  1.  No acute intracranial hemorrhage, mass effect, or midline shift.  2.  Abnormal heterogeneous left retrobulbar soft tissue. Recommend MRI   orbits with and without contrast, if there is no contraindication.  3.  Multiple nonspecific lucent lesions in the calvarium, some increased   in size since 2020. Forexample, there is a 1.5 cm lucent lesion in the   right parietal bone (3:25), previously 1.1 cm in 2020.    < from: MR Orbits w/wo IV Cont (25 @ 11:38) >    IMPRESSION:    1.  ORBITS:   Indistinct infiltration and enhancement involves the left   medial rectus muscle, the left inferior rectus muscle and adjacent   intraconal fat inferior nasal quadrant of the left orbit retrobulbar   region. Soft tissue metastasis is suspected. Optic nerve atrophy    2.  BRAIN:  No brain metastasis. 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  The morning of admission on 25 the patient's daughter found patient in the closet packing for an imaginary trip - to visit  parents, has has been diagnosed with ESBL UTI and is on IV Meropenem. Pt was recently in  ED on Dec 22nd for a fall, was diagnosed with UTI and treated with oral antibiotics, daughter has since reported increased confusion, unsteady gait, at baseline she is incontinent of urine.     MRI brain was ordered - it reveals L orbital mass suspicious for mets and numerous calvarial and skull base mets, pt is followed by heme/onc Dr. Joyce - was seen by Dr. Morrissey.       Neurology was consulted for evaluation of NPH at patient's daughter's request, as previously neurosurgeon Dr. Mitchell had made recommendation of lumbar drain and possible intervention for NPH as patient had triad of symptoms, patient however did not follow through.         PAST MEDICAL & SURGICAL HISTORY:  Bilateral malignant neoplasm of breast in female, unspecified site of breast b/l  History of macular degeneration bilateral  Gastroesophageal reflux disease without esophagitis  Osteoarthritis of both knees, unspecified osteoarthritis type  Spinal stenosis of lumbar region  Basal cell carcinoma in situ of skin removed from neck  Insomnia, unspecified type  Urinary frequency  Obesity  S/P left knee arthroscopy   Early stage skin cancer basal cell carcinoma removed from left side of neck.  History of lumpectomy of left breast   H/O mastectomy, right  with immediate reconstruction with fat grafting  H/O left mastectomy         FAMILY HISTORY:  Family history of temporal arteritis (Father)  father  Family history of stroke (Mother)  mother        Social Hx:  Nonsmoker, no drug or alcohol use      MEDICATIONS  (STANDING):  atorvastatin 20 milliGRAM(s) Oral at bedtime  DULoxetine 60 milliGRAM(s) Oral daily  heparin   Injectable 5000 Unit(s) SubCutaneous every 12 hours  influenza  Vaccine (HIGH DOSE) 0.5 milliLiter(s) IntraMuscular once  meropenem Injectable 1000 milliGRAM(s) IV Push every 8 hours  polyethylene glycol 3350 17 Gram(s) Oral two times a day  senna 2 Tablet(s) Oral at bedtime       Allergies  No Known Allergies        ROS: Pertinent positives in HPI, all other ROS were reviewed and are negative.        Vital Signs Last 24 Hrs  T(C): 36.6 (2025 07:36), Max: 36.8 (2025 15:24)  T(F): 97.8 (2025 07:36), Max: 98.2 (2025 15:24)  HR: 79 (2025 07:36) (75 - 79)  BP: 139/50 (2025 07:36) (99/52 - 139/50)  BP(mean): --  RR: 17 (2025 07:36) (17 - 18)  SpO2: 98% (2025 07:36) (98% - 100%)    Parameters below as of 2025 07:36  Patient On (Oxygen Delivery Method): room air      GEN:  Constitutional: awake and alert.  HEAD: Normocephalic  Neck: Supple.  Extremities:  no edema  Musculoskeletal: no abnormal movements  Skin: No rashes      Neurological exam:  HF: A x Oriented to month only.  Appropriately interactive. Speech fluent, No Aphasia or paraphasic errors. Naming /repetition intact   CN: POLLY, EOMI, VFF, facial sensation normal, no NLFD, tongue midline, no dysarthria  Motor: No pronator drift, Strength 5/5 in all 4 ext, normal bulk and tone, no tremors, rigidity  Sens: Intact to light touch  Reflexes: Symmetric and normal,  downgoing toes b/l  Coord:  No FNFA, HTS intact b/l  Gait/Balance: Cannot test        Labs:       138  |  108  |  18  ----------------------------<  100[H]  4.2   |  26  |  0.54    Ca    10.0      2025 06:58                                12.4   7.82  )-----------( 390      ( 2025 06:58 )             37.3       Radiology:  < from: CT Head No Cont (25 @ 10:22) >  IMPRESSION:  1.  No acute intracranial hemorrhage, mass effect, or midline shift.  2.  Abnormal heterogeneous left retrobulbar soft tissue. Recommend MRI   orbits with and without contrast, if there is no contraindication.  3.  Multiple nonspecific lucent lesions in the calvarium, some increased   in size since 2020. For example, there is a 1.5 cm lucent lesion in the   right parietal bone (3:25), previously 1.1 cm in 2020.    < from: MR Orbits w/wo IV Cont (25 @ 11:38) >    IMPRESSION:    1.  ORBITS:   Indistinct infiltration and enhancement involves the left   medial rectus muscle, the left inferior rectus muscle and adjacent   intraconal fat inferior nasal quadrant of the left orbit retrobulbar   region. Soft tissue metastasis is suspected. Optic nerve atrophy    2.  BRAIN:  No brain metastasis. Numerous calvarial and skull base   metastases

## 2025-01-06 NOTE — CONSULT NOTE ADULT - ASSESSMENT
85 y/o female with hx of dementia and metastatic breast cancer, currently on Xeloda and Xgeva, under the care of Dr. Joyce.  Found on recent imaging to have soft tissue infiltration of left orbit including the inferior rectus muscle and the medial rectus muscle as well as the intraconal fat of the nasal inferior quadrant of the left orbital retrobulbar region.  At present, ethel denies any visual symptoms or impairment.  Recommend neurology and ophthalmalogy evaluations, preferably subspecialist eval by neuor-ophthalmalogist to be obtained as an outpatient.  Ethel could be considered for palliative RT to the left orbit, given strong suspicion that this is metastatic breast cancer in the orbit, especially if orbital soft tissue infiltration is increasing despite systemic therapy.

## 2025-01-06 NOTE — PROGRESS NOTE ADULT - ASSESSMENT
85 y/o female with h/o obesity, mild dementia, breast cancer s/p mastectomies in 2016 with mets to bone, spinal stenosis of lumbar region, BCC, HLD, OA, GERD, and macular degeneration was admitted on  from home for increased confusion x 2d. Pt was in [facility] on Dec 22nd for a fall, diagnosed with a UTI and subsequently finished oral antibiotics. Daughter also requesting sutures from the fall be taken out since patient is due, and is also concerned for another UTI. According to daughter, patient has been talking to her  parents. Daughter also reports that gait is more unsteady than baseline, has kept her downstairs. This morning, daughter found patient in closet packing for an upcoming trip that is nonexistent, and also reports that patient is more confused at night.     #UTI with ESBL E.coli and ENFA  #Metabolic encephalopathy  #Breast Ca  -cultures reviewed  -on meropenem 1 gm IV q8h # 2  -tolerating abx well so far; no side effects noted  -continue abx coverage  -plan to complete abx tomorrow  -monitor temps  -f/u CBC  -supportive care  2. Other issues:   -care per medicine    d/w Dr. Delgado     There is no good oral abx choice. Will need to complete abx therapy with IV formulation

## 2025-01-06 NOTE — PROGRESS NOTE ADULT - SUBJECTIVE AND OBJECTIVE BOX
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 , endocrine resistant now on Xeloda with stable / posssible slow progression: no h/o visceral metastases  She was due for PET imaging a few months ago; since she was doing well ( No bone pain etc) Daughter helld off on any immediate imaging    She has a known h/o demential, imbalanced gait, numerous falls ( Several years) No evidence of brain metastases; Seen by Dr Mitchell and diagnosed with normal  Pressure Hydrocephalus;  Did not follow through with treatment   Sh has had progressive dementia and is now admitted for such    Brain imaging calvarial lesions ( This is bone, and not unusual for breast cancer)  and L orbital muscle infiltration: She has no visual complaints'  Of note neither of these would explain worsening dementia or her present neurological status      Allergies    No Known Allergies    Intolerances        MEDICATIONS  (STANDING):  atorvastatin 20 milliGRAM(s) Oral at bedtime  DULoxetine 60 milliGRAM(s) Oral daily  heparin   Injectable 5000 Unit(s) SubCutaneous every 12 hours  influenza  Vaccine (HIGH DOSE) 0.5 milliLiter(s) IntraMuscular once  meropenem Injectable 1000 milliGRAM(s) IV Push every 8 hours  polyethylene glycol 3350 17 Gram(s) Oral two times a day  senna 2 Tablet(s) Oral at bedtime    MEDICATIONS  (PRN):  acetaminophen     Tablet .. 650 milliGRAM(s) Oral every 6 hours PRN Temp greater or equal to 38C (100.4F), Mild Pain (1 - 3)  aluminum hydroxide/magnesium hydroxide/simethicone Suspension 30 milliLiter(s) Oral every 4 hours PRN Dyspepsia  melatonin 3 milliGRAM(s) Oral at bedtime PRN Insomnia  ondansetron Injectable 4 milliGRAM(s) IV Push every 8 hours PRN Nausea and/or Vomiting      Vital Signs Last 24 Hrs  T(C): 36.3 (06 Jan 2025 15:36), Max: 36.6 (06 Jan 2025 07:36)  T(F): 97.4 (06 Jan 2025 15:36), Max: 97.8 (06 Jan 2025 07:36)  HR: 77 (06 Jan 2025 15:36) (75 - 79)  BP: 113/64 (06 Jan 2025 15:36) (113/64 - 139/50)  BP(mean): --  RR: 18 (06 Jan 2025 15:36) (17 - 18)  SpO2: 96% (06 Jan 2025 15:36) (96% - 100%)    Parameters below as of 06 Jan 2025 15:36  Patient On (Oxygen Delivery Method): room air        PHYSICAL EXAM:  Pleasantly confused  [place]  Recognizes me   Appears frail/  GENERAL: NAD,   HEAD:  Atraumatic, Normocephalic  EYES: EOMI, PERRLA, conjunctiva and sclera clear    NECK: Supple, No JVD, Normal thyroid  NERVOUS SYSTEM:    CHEST/LUNG: Clear to percussion bilaterally; No rales, rhonchi,   HEART: Regular rate and rhythm;   ABDOMEN: Soft, Nontender.  EXTREMITIES:   edema:-  LYMPH: No lymphadenopathy noted        LABS:                        12.4   7.82  )-----------( 390      ( 06 Jan 2025 06:58 )             37.3     01-06    138  |  108  |  18  ----------------------------<  100[H]  4.2   |  26  |  0.54    Ca    10.0      06 Jan 2025 06:58        Urinalysis Basic - ( 06 Jan 2025 06:58 )    Color: x / Appearance: x / SG: x / pH: x  Gluc: 100 mg/dL / Ketone: x  / Bili: x / Urobili: x   Blood: x / Protein: x / Nitrite: x   Leuk Esterase: x / RBC: x / WBC x   Sq Epi: x / Non Sq Epi: x / Bacteria: x          RADIOLOGY & ADDITIONAL STUDIES:    < from: CT Abdomen and Pelvis w/ IV Cont (01.04.25 @ 11:34) >  ACC: [id number] EXAM:  CT ABDOMEN AND PELVIS IC   ORDERED BY: KOFI BERNARDO     ACC: [id number] EXAM:  CT CHEST IC   ORDERED BY: KOFI BERNARDO     PROCEDURE DATE:  01/04/2025          INTERPRETATION:  CLINICAL INFORMATION: Malignancy workup    COMPARISON: None.    CONTRAST/COMPLICATIONS:  IV Contrast: Omnipaque 350 (accession [id number]), NONE (accession   [id number])  90 cc administered   0 cc discarded  Oral Contrast: NONE.    PROCEDURE:  CT of the Chest, Abdomen and Pelvis was performed.  Sagittal and coronal reformats were performed.    FINDINGS:  CHEST:  LUNGS AND LARGE AIRWAYS: The central airways are patent. The lungs are   clear. No suspicious mass.  PLEURA: No pleural abnormality.  VESSELS: Aortic atherosclerosis without aneurysm. Mainpulmonary arteries   are patent.  HEART: No cardiomegaly. No pericardial effusion. Coronary and mitral   annular calcification.  MEDIASTINUM AND JIM: No adenopathy.  CHEST WALL AND LOWER NECK: No masses. Status post left mastectomy.    ABDOMEN AND PELVIS:  LIVER: A few scattered tiny cysts.  BILE DUCTS: Nondilated.  GALLBLADDER: Normal.  SPLEEN: Normal.  PANCREAS: Diffuse atrophy.  ADRENALS: Normal.  KIDNEYS/URETERS: No hydronephrosis or urinary tract calculi. Bilateral   parapelvic cysts.    BLADDER: Normal.  REPRODUCTIVE ORGANS: Small calcified fibroids. No adnexal mass.    BOWEL: No bowel obstruction. Redundant cecum. Normal appendix. Moderate   fecal retention in the rectum with evidence of stercoral proctitis.  PERITONEUM: No ascites orimplants.  VESSELS: Aortoiliac atherosclerosis without aneurysm.  RETROPERITONEUM/LYMPH NODES: No adenopathy.  ABDOMINAL WALL: Rectus diastasis.  BONES: Lytic expansile lesion in the right posterolateral ninth rib.   Possible lytic lesion in T12 vertebral body.    IMPRESSION:  *  Lytic expansile lesion in the right posterolateral ninth rib. Possible   lytic lesion in T12 vertebral body.  *  No other masses or evidence of metastatic disease elsewhere.  *  Moderate fecal retention in the rectum with evidence of stercoral   proctitis.      < end of copied text >    < from: MR Orbits w/wo IV Cont (01.03.25 @ 11:38) >    ACC: [id number] EXAM:  MR ORBITS ONLY WAWIC   ORDERED BY: RANULFO IZQUIERDO     PROCEDURE DATE:  01/03/2025          INTERPRETATION:  Two examinations were performed on this patient:  1. MR of the orbits with and without gadolinium contrast  2. MR of the brain with and without gadolinium contrast    CLINICAL INFORMATION:   Left retrobublar mass  JMR    TECHNIQUE:     MR orbits:   Coronal and axial T1-weighted and coronal and axial    fat-saturated T2-weighted images of the orbits were obtained.  Following   gadolinium administration 5 cc administered axial and coronal   fat-saturated T1-weighted images were obtained.     MR brain:   Sagittal T1-weighted images, axial FLAIR images, axial   T2-weighted images and axial diffusion weighted images of the brain were   obtained.  Following gadolinium administration axial volumetric gradient   echo T1-weighted images were obtained.  This data set was reconstructed   in the sagittal and coronal planes.  CONTRAST:    Gadavist:     5 cc administered  ;  2.5 cc discarded    COMPARISON:   None      FINDINGS:    ORBITS    The optic nerves demonstrate moderate atrophy, somewhat more pronounced   on the left than the right, without abnormal signal intensity,   enhancement or focal lesion.   Optic nervesheath fluid is   correspondingly increased, near symmetric and within physiologic limits..     The orbital apices are intact.  The cisternal segments of the optic   nerves, the optic chiasm and the optic tracts appear intact.    The orbits are significant for asymmetric thickening and heterogeneous   enhancement involving the left medial rectus and inferior rectus muscles.   This is associated with indistinct infiltration within the adjacent   retrobulbar fat. Well defined margins of the lesion are not seen.  No   preseptal abnormality is found.   The globes maintain intact contours.   Lens surgery was performed. On the right intraconal and retrobulbar fat   are preserved.  Extraconal fat is preserved.   The right-sided   extraocular musclesappear intact.  The superior ophthalmic veins are   symmetric in caliber.    The sella, cavernous sinuses and para cavernous regions appear intact.      The cavernous segments of the internal carotid arteries demonstrate   expected flow-void indicating patency.   The central skull base,   calvarium and visualized upper cervical spine demonstrate patchy marrow   replacement. These lesions enhance with gadolinium.    The paranasal sinuses are clear.   The nasal cavity appears intact.  The   deep facial spaces are intact.    < end of copied text >  < from: CT Head No Cont (01.02.25 @ 10:22) >  ACC: [id number] EXAM:  CT BRAIN   ORDERED BY: HOLLY JOSE     PROCEDURE DATE:  01/02/2025          INTERPRETATION:  CLINICAL INFORMATION: AMS    COMPARISON: CT head 12/22/2024.  MRI brain 8/6/2021.  CT head 12/24/2020    CONTRAST:  IV Contrast: NONE  .    TECHNIQUE:  Serial axial images were obtained from the skull base to the   vertex using multi-slice helical technique. Sagittal and coronal   reformats were obtained.    FINDINGS:    VENTRICLES AND SULCI: Normal in size and configuration.  INTRA-AXIAL: No mass effect, acute hemorrhage, or midline shift.  There   are periventricular and subcortical white matter hypodensities,   consistent with microvascular type changes.  EXTRA-AXIAL: No mass or fluid collection. Basal cisterns are normal in   appearance.    VISUALIZED SINUSES:  Clear.  TYMPANOMASTOID CAVITIES:  Clear.  VISUALIZED ORBITS: Bilateral lens replacement. Abnormal heterogeneous   left retrobulbar soft tissue.  CALVARIUM: Multiple nonspecific lucent lesions in the calvarium, some   increased in size since 2020. For example, there is a 1.5 cm lucent   lesion in the right parietal bone (3:25), previously 1.1 cm in 2020.    MISCELLANEOUS: None.      IMPRESSION:  1.  No acute intracranial hemorrhage, mass effect, or midline shift.  2.  Abnormal heterogeneous left retrobulbar soft tissue. Recommend MRI   orbits with and without contrast, if there is no contraindication.  3.  Multiple nonspecific lucent lesions in the calvarium, some increased   in size since 2020. Forexample, there is a 1.5 cm lucent lesion in the   right parietal bone (3:25), previously 1.1 cm in 2020.      < end of copied text >    PATHOLOGY:

## 2025-01-07 LAB
ANION GAP SERPL CALC-SCNC: 6 MMOL/L — SIGNIFICANT CHANGE UP (ref 5–17)
BUN SERPL-MCNC: 16 MG/DL — SIGNIFICANT CHANGE UP (ref 7–23)
CALCIUM SERPL-MCNC: 9.7 MG/DL — SIGNIFICANT CHANGE UP (ref 8.5–10.1)
CHLORIDE SERPL-SCNC: 104 MMOL/L — SIGNIFICANT CHANGE UP (ref 96–108)
CO2 SERPL-SCNC: 26 MMOL/L — SIGNIFICANT CHANGE UP (ref 22–31)
CREAT SERPL-MCNC: 0.5 MG/DL — SIGNIFICANT CHANGE UP (ref 0.5–1.3)
CULTURE RESULTS: SIGNIFICANT CHANGE UP
CULTURE RESULTS: SIGNIFICANT CHANGE UP
EGFR: 92 ML/MIN/1.73M2 — SIGNIFICANT CHANGE UP
GLUCOSE SERPL-MCNC: 87 MG/DL — SIGNIFICANT CHANGE UP (ref 70–99)
HCT VFR BLD CALC: 37.8 % — SIGNIFICANT CHANGE UP (ref 34.5–45)
HGB BLD-MCNC: 12.3 G/DL — SIGNIFICANT CHANGE UP (ref 11.5–15.5)
MCHC RBC-ENTMCNC: 32.5 G/DL — SIGNIFICANT CHANGE UP (ref 32–36)
MCHC RBC-ENTMCNC: 32.6 PG — SIGNIFICANT CHANGE UP (ref 27–34)
MCV RBC AUTO: 100.3 FL — HIGH (ref 80–100)
PLATELET # BLD AUTO: 419 K/UL — HIGH (ref 150–400)
POTASSIUM SERPL-MCNC: 4.1 MMOL/L — SIGNIFICANT CHANGE UP (ref 3.5–5.3)
POTASSIUM SERPL-SCNC: 4.1 MMOL/L — SIGNIFICANT CHANGE UP (ref 3.5–5.3)
RBC # BLD: 3.77 M/UL — LOW (ref 3.8–5.2)
RBC # FLD: 15 % — HIGH (ref 10.3–14.5)
SODIUM SERPL-SCNC: 136 MMOL/L — SIGNIFICANT CHANGE UP (ref 135–145)
SPECIMEN SOURCE: SIGNIFICANT CHANGE UP
SPECIMEN SOURCE: SIGNIFICANT CHANGE UP
TSH SERPL-MCNC: 2.24 UU/ML — SIGNIFICANT CHANGE UP (ref 0.34–4.82)
VIT B12 SERPL-MCNC: >2000 PG/ML — HIGH (ref 232–1245)
WBC # BLD: 6.74 K/UL — SIGNIFICANT CHANGE UP (ref 3.8–10.5)
WBC # FLD AUTO: 6.74 K/UL — SIGNIFICANT CHANGE UP (ref 3.8–10.5)

## 2025-01-07 PROCEDURE — 99232 SBSQ HOSP IP/OBS MODERATE 35: CPT

## 2025-01-07 PROCEDURE — 99233 SBSQ HOSP IP/OBS HIGH 50: CPT

## 2025-01-07 RX ORDER — QUETIAPINE FUMARATE 100 MG/1
12.5 TABLET, FILM COATED ORAL AT BEDTIME
Refills: 0 | Status: DISCONTINUED | OUTPATIENT
Start: 2025-01-07 | End: 2025-01-09

## 2025-01-07 RX ADMIN — HEPARIN SODIUM 5000 UNIT(S): 1000 INJECTION, SOLUTION INTRAVENOUS; SUBCUTANEOUS at 22:27

## 2025-01-07 RX ADMIN — DULOXETINE HYDROCHLORIDE 60 MILLIGRAM(S): 30 CAPSULE, DELAYED RELEASE ORAL at 09:02

## 2025-01-07 RX ADMIN — ATORVASTATIN CALCIUM 20 MILLIGRAM(S): 40 TABLET, FILM COATED ORAL at 22:26

## 2025-01-07 RX ADMIN — Medication 17 GRAM(S): at 09:02

## 2025-01-07 RX ADMIN — MEROPENEM 1000 MILLIGRAM(S): 1 INJECTION, POWDER, FOR SOLUTION INTRAVENOUS at 13:35

## 2025-01-07 RX ADMIN — HEPARIN SODIUM 5000 UNIT(S): 1000 INJECTION, SOLUTION INTRAVENOUS; SUBCUTANEOUS at 09:02

## 2025-01-07 RX ADMIN — SENNOSIDES 2 TABLET(S): 8.6 TABLET, FILM COATED ORAL at 22:28

## 2025-01-07 RX ADMIN — QUETIAPINE FUMARATE 12.5 MILLIGRAM(S): 100 TABLET, FILM COATED ORAL at 22:27

## 2025-01-07 RX ADMIN — Medication 17 GRAM(S): at 22:27

## 2025-01-07 RX ADMIN — MEROPENEM 1000 MILLIGRAM(S): 1 INJECTION, POWDER, FOR SOLUTION INTRAVENOUS at 05:28

## 2025-01-07 RX ADMIN — Medication 3 MILLIGRAM(S): at 22:28

## 2025-01-07 NOTE — DIETITIAN INITIAL EVALUATION ADULT - PERTINENT MEDS FT
MEDICATIONS  (STANDING):  atorvastatin 20 milliGRAM(s) Oral at bedtime  DULoxetine 60 milliGRAM(s) Oral daily  heparin   Injectable 5000 Unit(s) SubCutaneous every 12 hours  influenza  Vaccine (HIGH DOSE) 0.5 milliLiter(s) IntraMuscular once  meropenem Injectable 1000 milliGRAM(s) IV Push every 8 hours  polyethylene glycol 3350 17 Gram(s) Oral two times a day  senna 2 Tablet(s) Oral at bedtime    MEDICATIONS  (PRN):  acetaminophen     Tablet .. 650 milliGRAM(s) Oral every 6 hours PRN Temp greater or equal to 38C (100.4F), Mild Pain (1 - 3)  aluminum hydroxide/magnesium hydroxide/simethicone Suspension 30 milliLiter(s) Oral every 4 hours PRN Dyspepsia  melatonin 3 milliGRAM(s) Oral at bedtime PRN Insomnia  ondansetron Injectable 4 milliGRAM(s) IV Push every 8 hours PRN Nausea and/or Vomiting

## 2025-01-07 NOTE — DIETITIAN INITIAL EVALUATION ADULT - ADD RECOMMEND
Maintain regular diet  Add Ensure plus BID day  MVI w/ minerals daily to ensure 100% RDA met   Record PO intake in EMR after each meal (flowsheet, nursing.)   Consider adding thiamine 100 mg daily 2/2 poor PO intake/ malnutrition  Monitor bowel movements, if no BM for >3 days, consider implementing bowel regimen.   suggest Confirm Goals of Care regarding nutrition support. Will provide nutrition/ hydration within goals of care.   Monitor PO intake, tolerance, labs and weight.

## 2025-01-07 NOTE — PROGRESS NOTE ADULT - ASSESSMENT
Breast cancer Bone mets  Orbital lesion / Opthalmology eval / RT if malignant   Dementia / ataxia/ falls/ incontinence/ : as per Neurology  Hold Xeloda  PET as  out patient ( This was previously ordered )

## 2025-01-07 NOTE — PROGRESS NOTE ADULT - SUBJECTIVE AND OBJECTIVE BOX
HPI: Pt is a 84y old Female with a past medical history of obesity, early dementia, breast cancer  s/p mastectomies in 2016 with mets to bone, spinal stenosis of lumbar region, BCC, HLD, OA, GERD, and macular degeneration presents BIB daughter from home for increased confusion x2d. Pt was in [facility] on Dec 22nd for a fall, diagnosed with a UTI and subsequently finished oral antibiotics. Daughter also reports that gait is more unsteady than baseline, has kept her downstairs. Palliative Medicine Consult to further establish GOC  1/6/25 Seen and examined at bedside. OOB/chair confused and disoriented to time and current situation. Denies pain or dyspnea  1/7/25 Seen and examined at bedside. OOB/chair. Remains disoriented to time and place.    PAIN: ( )Yes   (X )No  DYSPNEA: ( ) Yes  ( X) No      PAST MEDICAL & SURGICAL HISTORY:  Bilateral malignant neoplasm of breast in female, unspecified site of breast  b/l  History of macular degeneration  bilateral  Gastroesophageal reflux disease without esophagitis  Osteoarthritis of both knees, unspecified osteoarthritis type  Spinal stenosis of lumbar region  Basal cell carcinoma in situ of skin  removed from neck  Malignant neoplasm of left female breast, unspecified site of breast  with Lymph node involvement  Insomnia, unspecified type  Urinary frequency  Neoplasm by body site  left anterior chest wall  Obesity  Cataract  b/l  Macular Hole  repair b/l eyes  S/P left knee arthroscopy  2009  Early stage skin cancer  basal cell carcinoma removed from left side of neck.  History of lumpectomy of left breast  1990  H/O mastectomy, right  1993 with immediate reconstruction with fat grafting  H/O colonoscopy  multiple  History of esophagogastroduodenoscopy (EGD)  H/O left mastectomy  2016  H/O bilateral breast biopsy    SOCIAL HX:  Lives with daughter  Hx opiate tolerance ( )YES  (X )NO    Baseline ADLs  (Prior to Admission)  ( ) Independent   (X )Dependent    FAMILY HISTORY:  Family history of temporal arteritis (Father)  father    Family history of stroke (Mother)  mother        Review of Systems:    All other systems reviewed and negative  Unable to obtain/Limited due to: AMS      PHYSICAL EXAM:  ICU Vital Signs Last 24 Hrs  T(C): 36.4 (07 Jan 2025 07:45), Max: 36.4 (07 Jan 2025 07:45)  T(F): 97.6 (07 Jan 2025 07:45), Max: 97.6 (07 Jan 2025 07:45)  HR: 77 (07 Jan 2025 07:45) (72 - 77)  BP: 104/41 (07 Jan 2025 07:45) (104/41 - 113/64)  RR: 18 (07 Jan 2025 07:45) (18 - 18)  SpO2: 99% (07 Jan 2025 07:45) (94% - 99%)    O2 Parameters below as of 07 Jan 2025 07:45  Patient On (Oxygen Delivery Method): room air        PPSV2: 40 %  FAST: 7    General: Elderly female in chair  Mental Status: A&O X2  HEENT: oral mucosa dry  Lungs: clear to auscultation salvatore  Cardiac: S1S2+  GI: abd soft NT ND + BS  : voids  Ext: BILLINGSLEY=strength  Neuro: confusion      LABS:                                 12.3   6.74  )-----------( 419      ( 07 Jan 2025 07:14 )             37.8            01-07    136  |  104  |  16  ----------------------------<  87  4.1   |  26  |  0.50    Ca    9.7      07 Jan 2025 07:14        Albumin: Albumin: 2.9 g/dL (01-02 @ 09:42)      Allergies    No Known Allergies    Intolerances    MEDICATIONS  (STANDING):  atorvastatin 20 milliGRAM(s) Oral at bedtime  DULoxetine 60 milliGRAM(s) Oral daily  heparin   Injectable 5000 Unit(s) SubCutaneous every 12 hours  influenza  Vaccine (HIGH DOSE) 0.5 milliLiter(s) IntraMuscular once  meropenem Injectable 1000 milliGRAM(s) IV Push every 8 hours  polyethylene glycol 3350 17 Gram(s) Oral two times a day  senna 2 Tablet(s) Oral at bedtime    MEDICATIONS  (PRN):  acetaminophen     Tablet .. 650 milliGRAM(s) Oral every 6 hours PRN Temp greater or equal to 38C (100.4F), Mild Pain (1 - 3)  aluminum hydroxide/magnesium hydroxide/simethicone Suspension 30 milliLiter(s) Oral every 4 hours PRN Dyspepsia  melatonin 3 milliGRAM(s) Oral at bedtime PRN Insomnia  ondansetron Injectable 4 milliGRAM(s) IV Push every 8 hours PRN Nausea and/or Vomiting      RADIOLOGY/ADDITIONAL STUDIES:

## 2025-01-07 NOTE — GOALS OF CARE CONVERSATION - ADVANCED CARE PLANNING - CONVERSATION DETAILS
HPI:  85 y/o female with past medical history of obesity, early dementia, breast cancer  s/p mastectomies in 2016 with mets to bone, spinal stenosis of lumbar region, BCC, HLD, OA, GERD, and macular degeneration presents BIB daughter from home for increased confusion x2d. Pt was in [facility] on Dec 22nd for a fall, diagnosed with a UTI and subsequently finished oral antibiotics. Daughter also requesting sutures from the fall be taken out since patient is due, and is also concerned for another UTI. According to daughter, patient has been talking to her  parents. Daughter also reports that gait is more unsteady than baseline, has kept her downstairs. This morning, daughter found patient in closet packing for an upcoming trip that is nonexistent, and also reports that patient is more confused at night. NKDA. No other complaints at this time. (2025 14:39)      PERTINENT PMH REVIEWED:  [ x ] YES [ ] NO           Primary Contact:    Leyla alberto    HCP [ x ] Surrogate [   ] Guardian [   ]    Mental Status: Pt lacks capacity  Concerns of Depression [  ] -not identified  Anxiety [   ] -not identified  Baseline ADLs (prior to admission):  Independent [ x ] moderately [ ] fully   Dependent   [ ] moderately [ ]fully    Family Meeting attendees: GOC started to be discussed    Anticipated Grief: Patient[  ] Family [ x ]    Caregiver [place] Assessed: Yes [ x ] No [  ]    Yazdanism: Muslim.    Spiritual Concerns: Not identified,  available for support    Goals of Care: Comfort [  ] Rehabilitation [ x ] Curative [  ] Life Prolonging [  ]    Previous Services: Outpatient oncology     ADVANCE DIRECTIVES:    -Pt lacks capacity  -HCP names daughter as primary  -Full Code    Anticipated D/C Plan: To be further determined                     Summary:  Palliative team reached out to daughter to discuss GOC, assist with planning and provide supportive counseling.  Palliative role explained.  Emotional support provided.  We attempted to meet with the daughter at the bedside numerous times however daughter shared she was awaiting a phone call therefore we missed her.  Daughter is at [facility] during our conversation and noted she was stressed and couldn't talk long.  She shared what she has discussed with the medical team and notes the plan for a pet scan asa.  We notified daughter pet scan is not covered while under a medicare part A stay at [facility].  Daughter states she is considering ST to LT placement at [facility].  We discussed setting up the pet scan and figuring out coverage with [facility], daughter confused by this information and states she has to drive therefore unable to further discuss.  Daughter desires to meet with our team tomorrow at 3 pm.  We did note at some point during our conversation the option for pt to return to the community, daughter states Pt needs [facility] and she will not take her home.      Plan to meet with daughter tomorrow at 3 pm in the lobby to go to the caregiver center.  Aware of palliative team availability.  Our team to continue to follow. HPI:  85 y/o female with past medical history of obesity, early dementia, breast cancer  s/p mastectomies in 2016 with mets to bone, spinal stenosis of lumbar region, BCC, HLD, OA, GERD, and macular degeneration presents BIB daughter from home for increased confusion x2d. Pt was in [facility] on Dec 22nd for a fall, diagnosed with a UTI and subsequently finished oral antibiotics. Daughter also requesting sutures from the fall be taken out since patient is due, and is also concerned for another UTI. According to daughter, patient has been talking to her  parents. Daughter also reports that gait is more unsteady than baseline, has kept her downstairs. This morning, daughter found patient in closet packing for an upcoming trip that is nonexistent, and also reports that patient is more confused at night. NKDA. No other complaints at this time. (2025 14:39)      PERTINENT PMH REVIEWED:  [ x ] YES [ ] NO           Primary Contact:    Leylaalberto    HCP [ x ] Surrogate [   ] Guardian [   ]    Mental Status: Pt lacks capacity  Concerns of Depression [  ] -not identified  Anxiety [   ] -not identified  Baseline ADLs (prior to admission):  Independent [ x ] moderately [ ] fully   Dependent   [ ] moderately [ ]fully    Family Meeting attendees: GOC started to be discussed    Anticipated Grief: Patient[  ] Family [ x ]    Caregiver [place] Assessed: Yes [ x ] No [  ]    Shinto: Jehovah's witness.    Spiritual Concerns: Not identified,  available for support    Goals of Care: Comfort [  ] Rehabilitation [ x ] Curative [  ] Life Prolonging [  ]    Previous Services: Outpatient oncology     ADVANCE DIRECTIVES:    -Pt lacks capacity  -HCP names daughter as primary  -Full Code    Anticipated D/C Plan: To be further determined                     Summary:  Palliative team reached out to daughter to discuss GOC, assist with planning and provide supportive counseling.  Palliative role explained.  Emotional support provided.  We attempted to meet with the daughter at the bedside numerous times however daughter shared she was awaiting a phone call therefore we missed her.  Daughter is at [facility] during our conversation and noted she was stressed and couldn't talk long.  She shared what she has discussed with the medical team and notes the plan for a pet scan asap.  Daughter feels she is getting conflicting medical opinions.  We notified daughter pet scan is not covered while under a medicare part A stay at [facility].  Daughter states she is considering ST to LT placement at [facility].  We discussed setting up the pet scan and figuring out coverage with [facility], daughter confused by this information and states she has to drive therefore unable to further discuss.  Daughter desires to meet with our team tomorrow at 3 pm.  We did note at some point during our conversation the option for pt to return to the community, daughter states Pt needs [facility] and she will not take her home.      Plan to meet with daughter tomorrow at 3 pm in the lobby to go to the [facility] center.  Aware of palliative team availability.  Our team to continue to follow.

## 2025-01-07 NOTE — DIETITIAN INITIAL EVALUATION ADULT - NAME AND PHONE
Daija Jarrell RDN, CDN, [facility]      [phone]   [email or web address]

## 2025-01-07 NOTE — DIETITIAN INITIAL EVALUATION ADULT - PERTINENT LABORATORY DATA
01-07    136  |  104  |  16  ----------------------------<  87  4.1   |  26  |  0.50    Ca    9.7      07 Jan 2025 07:14

## 2025-01-07 NOTE — DIETITIAN INITIAL EVALUATION ADULT - NSFNSGIASSESSMENTFT_GEN_A_CORE
[FreeTextEntry1] : Constitutional: alert and in no acute distress. \par \par Orientation: oriented to person. \par Speech: minimal verbal output, mild dysarthria.  answers questions appropriately\par Cranial Nerves:. R quadrantanopia. 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 \par Sensory exam: light touch was intact.   BM last 1/5, pt has fecal incontinence

## 2025-01-07 NOTE — DIETITIAN INITIAL EVALUATION ADULT - ENERGY INTAKE
This patient's history, physical and tests  were reviewed with the resident.  I agree with the assessment and plan of care.   Fair (50-75%)

## 2025-01-07 NOTE — DIETITIAN INITIAL EVALUATION ADULT - ORAL INTAKE PTA/DIET HISTORY
When asked who shops and cooks for patient, she replies "My mother."  Then adds, "and me."  Pt is somewhat confused.  She indicates she has fair PO intake.  PO intake estimated < 75% ENN > one month.

## 2025-01-07 NOTE — PROGRESS NOTE ADULT - SUBJECTIVE AND OBJECTIVE BOX
HOSPITALIST ATTENDING PROGRESS NOTE    Chart and meds reviewed.  Patient seen and examined.    CC: ams    Subjective: NAEO. Pt seen and examined this am aox2. Discussed with daughter today plan for discharge she would like to hear from oncologist Dr. Joyce prior to deciding. Extensive attempts at [facility] with daughter however she would like to know prognosis off of chemo which discussed w/ Dr. Joyce I cannot determine.  Dr. Joyce told daughter he would like her to get PET scan done outpatient. Ongoing discussion with the daughter and social work and palliative care as to finding a jennifer that would allow for pt to be taken to outpt visits and get pet scan     All other systems reviewed and found to be negative with the exception of what has been described above.    MEDICATIONS  (STANDING):  atorvastatin 20 milliGRAM(s) Oral at bedtime  DULoxetine 60 milliGRAM(s) Oral daily  heparin   Injectable 5000 Unit(s) SubCutaneous every 12 hours  influenza  Vaccine (HIGH DOSE) 0.5 milliLiter(s) IntraMuscular once  polyethylene glycol 3350 17 Gram(s) Oral two times a day  QUEtiapine 12.5 milliGRAM(s) Oral at bedtime  senna 2 Tablet(s) Oral at bedtime    MEDICATIONS  (PRN):  acetaminophen     Tablet .. 650 milliGRAM(s) Oral every 6 hours PRN Temp greater or equal to 38C (100.4F), Mild Pain (1 - 3)  aluminum hydroxide/magnesium hydroxide/simethicone Suspension 30 milliLiter(s) Oral every 4 hours PRN Dyspepsia  melatonin 3 milliGRAM(s) Oral at bedtime PRN Insomnia  ondansetron Injectable 4 milliGRAM(s) IV Push every 8 hours PRN Nausea and/or Vomiting      VITALS:  T(F): 97.6 (01-07-25 @ 07:45), Max: 97.6 (01-07-25 @ 07:45)  HR: 77 (01-07-25 @ 07:45) (72 - 77)  BP: 104/41 (01-07-25 @ 07:45) (104/41 - 109/44)  RR: 18 (01-07-25 @ 07:45) (18 - 18)  SpO2: 99% (01-07-25 @ 07:45) (94% - 99%)  Wt(kg): --    I&O's Summary      CAPILLARY BLOOD GLUCOSE          PHYSICAL EXAM:  Gen: NAD  HEENT:  pupils equal and reactive, EOMI, no oropharyngeal lesions, erythema, exudates, oral thrush  NECK:   supple, no carotid bruits, no palpable lymph nodes, no thyromegaly  CV:  +S1, +S2, regular, no murmurs or rubs  RESP:   lungs clear to auscultation bilaterally, no wheezing, rales, rhonchi, good air entry bilaterally  BREAST:  not examined  GI:  abdomen soft  EXT:  no clubbing  NEURO:  AOX2     LABS:                            12.3   6.74  )-----------( 419      ( 07 Jan 2025 07:14 )             37.8     01-07    136  |  104  |  16  ----------------------------<  87  4.1   |  26  |  0.50    Ca    9.7      07 Jan 2025 07:14              Urinalysis Basic - ( 07 Jan 2025 07:14 )    Color: x / Appearance: x / SG: x / pH: x  Gluc: 87 mg/dL / Ketone: x  / Bili: x / Urobili: x   Blood: x / Protein: x / Nitrite: x   Leuk Esterase: x / RBC: x / WBC x   Sq Epi: x / Non Sq Epi: x / Bacteria: x              CULTURES:  esbl in urine culture     Additional results/Imaging, I have personally reviewed:    Telemetry, personally reviewed:

## 2025-01-07 NOTE — PROGRESS NOTE ADULT - SUBJECTIVE AND OBJECTIVE BOX
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 , endocrine resistant now on Xeloda with stable / posssible slow progression: no h/o visceral metastases  She was due for PET imaging a few months ago; since she was doing well ( No bone pain etc) Daughter helld off on any immediate imaging    She has a known h/o demential, imbalanced gait, numerous falls ( Several years) No evidence of brain metastases; Seen by Dr Mitchell and diagnosed with normal  Pressure Hydrocephalus;  Did not follow through with treatment   Sh has had progressive dementia and is now admitted for such    Brain imaging calvarial lesions ( This is bone, and not unusual for breast cancer)  and L orbital muscle infiltration: She has no visual complaints'  Of note neither of these would explain worsening dementia or her present neurological status    I spoke to her daughter today : 1) Current condition and performance status are a function of dementia/ NPH  2) Status of breast cancer needs reevaluation: CT PET had been scheduled before  3) If she has progressed significantly   we would not offer alternat chemotherapy given performance status: she may be a candidate for targeted therapy / novel agents if liquid biopsy reveals   ESR 1 mutation ( oral therapy/ non chemo) 4) Orbital lesion: no symptoms/ can see opthalmology in or out patient / if malignant : RT : presently no symptoms.  The management of dementia   etc takes precedent at this point / a declining functional / performance status  would make other therapies difficult to administer   If family / patient keen on supportive care only would recommend palliative consultation        Allergies    No Known Allergies    Intolerances        MEDICATIONS  (STANDING):  atorvastatin 20 milliGRAM(s) Oral at bedtime  DULoxetine 60 milliGRAM(s) Oral daily  heparin   Injectable 5000 Unit(s) SubCutaneous every 12 hours  influenza  Vaccine (HIGH DOSE) 0.5 milliLiter(s) IntraMuscular once  polyethylene glycol 3350 17 Gram(s) Oral two times a day  QUEtiapine 12.5 milliGRAM(s) Oral at bedtime  senna 2 Tablet(s) Oral at bedtime    MEDICATIONS  (PRN):  acetaminophen     Tablet .. 650 milliGRAM(s) Oral every 6 hours PRN Temp greater or equal to 38C (100.4F), Mild Pain (1 - 3)  aluminum hydroxide/magnesium hydroxide/simethicone Suspension 30 milliLiter(s) Oral every 4 hours PRN Dyspepsia  melatonin 3 milliGRAM(s) Oral at bedtime PRN Insomnia  ondansetron Injectable 4 milliGRAM(s) IV Push every 8 hours PRN Nausea and/or Vomiting      Vital Signs Last 24 Hrs  T(C): 36.7 (07 Jan 2025 16:26), Max: 36.7 (07 Jan 2025 16:26)  T(F): 98.1 (07 Jan 2025 16:26), Max: 98.1 (07 Jan 2025 16:26)  HR: 77 (07 Jan 2025 16:26) (72 - 77)  BP: 105/52 (07 Jan 2025 16:26) (104/41 - 109/44)  BP(mean): --  RR: 18 (07 Jan 2025 16:26) (18 - 18)  SpO2: 99% (07 Jan 2025 16:26) (94% - 99%)    Parameters below as of 07 Jan 2025 16:26  Patient On (Oxygen Delivery Method): room air        PHYSICAL EXAM:    GENERAL: NAD,   HEAD:  Atraumatic, Normocephalic  EYES: EOMI, PERRLA, conjunctiva and sclera clear    NECK: Supple, No JVD, Normal thyroid  NERVOUS SYSTEM:    CHEST/LUNG: Clear to percussion bilaterally; No rales, rhonchi,   HEART: Regular rate and rhythm;   ABDOMEN: Soft, Nontender.  EXTREMITIES:   edema:-  LYMPH: No lymphadenopathy noted        LABS:                        12.3   6.74  )-----------( 419      ( 07 Jan 2025 07:14 )             37.8     01-07    136  |  104  |  16  ----------------------------<  87  4.1   |  26  |  0.50    Ca    9.7      07 Jan 2025 07:14        Urinalysis Basic - ( 07 Jan 2025 07:14 )    Color: x / Appearance: x / SG: x / pH: x  Gluc: 87 mg/dL / Ketone: x  / Bili: x / Urobili: x   Blood: x / Protein: x / Nitrite: x   Leuk Esterase: x / RBC: x / WBC x   Sq Epi: x / Non Sq Epi: x / Bacteria: x          RADIOLOGY & ADDITIONAL STUDIES:    PATHOLOGY:

## 2025-01-07 NOTE — DIETITIAN INITIAL EVALUATION ADULT - OTHER INFO
83 y/o female with past medical history of obesity, early dementia, breast cancer  s/p mastectomies in 2016 with mets to bone, spinal stenosis of lumbar region, BCC, HLD, OA, GERD, and macular degeneration presents BIB daughter from home for increased confusion x2d. Pt was in [facility] on Dec 22nd for a fall, diagnosed with a UTI and subsequently finished oral antibiotics. Daughter also requesting sutures from the fall be taken out since patient is due, and is also concerned for another UTI. According to daughter, patient has been talking to her  parents. Daughter also reports that gait is more unsteady than baseline, has kept her downstairs. This morning, daughter found patient in closet packing for an upcoming trip that is nonexistent, and also reports that patient is more confused at night. NKDA. No other complaints at this time.    Admit dx  Increased confusion and difficulty ambulating  RD bedscale wt   58 kg   128#     2025                            55.6 kg  122#    2024 85 y/o female with past medical history of obesity, early dementia, breast cancer  s/p mastectomies in 2016 with mets to bone, spinal stenosis of lumbar region, BCC, HLD, OA, GERD, and macular degeneration presents BIB daughter from home for increased confusion x2d. Pt was in [facility] on Dec 22nd for a fall, diagnosed with a UTI and subsequently finished oral antibiotics. Daughter also requesting sutures from the fall be taken out since patient is due, and is also concerned for another UTI. According to daughter, patient has been talking to her  parents. Daughter also reports that gait is more unsteady than baseline, has kept her downstairs. This morning, daughter found patient in closet packing for an upcoming trip that is nonexistent, and also reports that patient is more confused at night. NKDA. No other complaints at this time.    Admit dx  Increased confusion and difficulty ambulating  RD bedscale wt   58 kg   128#     2025                            55.6 kg  122#    2024  Pt is confused.    Pt on regular diet, suggest add Ensure plus BID day.  Pt does not report N/V, appears to have no issues chewing and swallowing  suggest Confirm Goals of Care regarding nutrition support. Will provide nutrition/ hydration within goals of care.   Recommendations to follow in Plan/Intervention

## 2025-01-07 NOTE — PROGRESS NOTE ADULT - ASSESSMENT
83 y/o female with past medical history of obesity, early dementia, breast cancer  s/p mastectomies in 2016 with mets to bone, spinal stenosis of lumbar region, BCC, HLD, OA, GERD, and macular degeneration presents BIB daughter from home for increased confusion x2d.    # Increasing Confusion and worsening of Dementia  Daughter reports worsening of dementia at home   - UC growing ESBL -> started on meropenem to complete 5 day course today   - id consulted for approval   - CTH notable only for left retrobulbar soft tissue   - covid flu rsv negative   - Admit to med surg  - start seroquel at bedtime   - neurology consulted, appreciate recs     #Unsteady gait  - PT  - Longterm placement in [facility] vs. 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

## 2025-01-07 NOTE — PROGRESS NOTE ADULT - CONVERSATION DETAILS
Palliative team reached out to daughter to discuss GOC, assist with planning and provide supportive counseling.  Palliative role explained.  Emotional support provided.  We attempted to meet with the daughter at the bedside numerous times however daughter shared she was awaiting a phone call therefore we missed her.  Daughter is at [facility] during our conversation and noted she was stressed and couldn't talk long.  She shared what she has discussed with the medical team and notes the plan for a pet scan asa.  Daughter feels she is getting conflicting medical opinions.  We notified daughter pet scan is not covered while under a medicare part A stay at [facility].  Daughter states she is considering ST to LT placement at [facility].  We discussed setting up the pet scan and figuring out coverage with [facility], daughter confused by this information and states she has to drive therefore unable to further discuss.  Daughter desires to meet with our team tomorrow at 3 pm.  We did note at some point during our conversation the option for pt to return to the community, daughter states Pt needs [facility] and she will not take her home.      Plan to meet with daughter tomorrow at 3 pm in the lobby to go to the [facility] center.  Aware of palliative team availability.  Our team to continue to follow.

## 2025-01-07 NOTE — PROGRESS NOTE ADULT - ASSESSMENT
HPI: Pt is a 84y old Female with a past medical history of obesity, early dementia, breast cancer  s/p mastectomies in 2016 with mets to bone, spinal stenosis of lumbar region, BCC, HLD, OA, GERD, and macular degeneration presents BIB daughter from home for increased confusion x2d. Pt was in [facility] on Dec 22nd for a fall, diagnosed with a UTI and subsequently finished oral antibiotics. Daughter also reports that gait is more unsteady than baseline, has kept her downstairs. Palliative Medicine Consult to further establish GOC  1/6/25 Seen and examined at bedside. OOB/chair confused and disoriented to time and current situation. Denies pain or dyspnea    Assessment and Plan:    1) Increasing Confusion   -Daughter reports worsening of dementia at home   -UC growing ESBL    -CTH notable only for left retrobulbar soft tissue   -Baseline dementia    2) Metastatic breast cancer  -Metastatic hormone positive breast cancer  -s/p multiple anti-estrogen therapies with progression  -most recently on xelod  -PET scan was planned for this month as well as foundation liquid testing  -s/p MRI with following findings: 1.  ORBITS:   Indistinct infiltration and enhancement involves the left medial rectus muscle, the left inferior rectus muscle and adjacent intraconal fat inferior nasal quadrant of the left orbit retrobulbar region. Soft tissue metastasis is suspected. Optic nerve atrophy  -consult radiation onc to see if radiation possible  -Ct scan performed c/w bone mets  -s/p xgeva recently in office for bone mets  -outpt liquid foundation testing will be sent  -Dr Joyce to f/u tomorrow    3) Debility  -Increased confusion  -Metastatic cancer  -PT eval  -Poor PO intake  -Mod protein magui malnutrition    4) Advanced Directives  -Pt without capacity  -Daughter Leyla named HCP  -GOC 1/7    Time Spent: 75 minutes including the care, coordination and counseling of this patient, 50% of which was spent coordinating and counseling.          HPI: Pt is a 84y old Female with a past medical history of obesity, early dementia, breast cancer  s/p mastectomies in 2016 with mets to bone, spinal stenosis of lumbar region, BCC, HLD, OA, GERD, and macular degeneration presents BIB daughter from home for increased confusion x2d. Pt was in [facility] on Dec 22nd for a fall, diagnosed with a UTI and subsequently finished oral antibiotics. Daughter also reports that gait is more unsteady than baseline, has kept her downstairs. Palliative Medicine Consult to further establish GOC  1/6/25 Seen and examined at bedside. OOB/chair confused and disoriented to time and current situation. Denies pain or dyspnea    Assessment and Plan:    1) Increasing Confusion   -Daughter reports worsening of dementia at home   -UC growing ESBL    -CTH notable only for left retrobulbar soft tissue   -Baseline dementia    2) Metastatic breast cancer  -Metastatic hormone positive breast cancer  -s/p multiple anti-estrogen therapies with progression  -most recently on xelod  -PET scan was planned for this month as well as foundation liquid testing  -s/p MRI with following findings: 1.  ORBITS:   Indistinct infiltration and enhancement involves the left medial rectus muscle, the left inferior rectus muscle and adjacent intraconal fat inferior nasal quadrant of the left orbit retrobulbar region. Soft tissue metastasis is suspected. Optic nerve atrophy  -consult radiation onc to see if radiation possible  -Ct scan performed c/w bone mets  -s/p xgeva recently in office for bone mets  -outpt liquid foundation testing will be sent  -Dr Joyce to f/u tomorrow    3) Debility  -Increased confusion  -Metastatic cancer  -PT eval  -Poor PO intake  -Mod protein magui malnutrition    4) Advanced Directives  -Pt without capacity  -Daughter Leyla named HCP  -[facility] 1/7  -Daughter stated that she thought meeting was via phone  -Unable to make any decisions at this time  -Will meet tomorrow 3p    Time Spent: 75 minutes including the care, coordination and counseling of this patient, 50% of which was spent coordinating and counseling.

## 2025-01-08 PROCEDURE — 99233 SBSQ HOSP IP/OBS HIGH 50: CPT

## 2025-01-08 PROCEDURE — 99232 SBSQ HOSP IP/OBS MODERATE 35: CPT

## 2025-01-08 RX ADMIN — DULOXETINE HYDROCHLORIDE 60 MILLIGRAM(S): 30 CAPSULE, DELAYED RELEASE ORAL at 10:18

## 2025-01-08 RX ADMIN — ATORVASTATIN CALCIUM 20 MILLIGRAM(S): 40 TABLET, FILM COATED ORAL at 21:53

## 2025-01-08 RX ADMIN — SENNOSIDES 2 TABLET(S): 8.6 TABLET, FILM COATED ORAL at 21:52

## 2025-01-08 RX ADMIN — Medication 17 GRAM(S): at 10:18

## 2025-01-08 RX ADMIN — HEPARIN SODIUM 5000 UNIT(S): 1000 INJECTION, SOLUTION INTRAVENOUS; SUBCUTANEOUS at 10:19

## 2025-01-08 RX ADMIN — HEPARIN SODIUM 5000 UNIT(S): 1000 INJECTION, SOLUTION INTRAVENOUS; SUBCUTANEOUS at 21:51

## 2025-01-08 RX ADMIN — QUETIAPINE FUMARATE 12.5 MILLIGRAM(S): 100 TABLET, FILM COATED ORAL at 21:52

## 2025-01-08 NOTE — PROGRESS NOTE ADULT - CONVERSATION DETAILS
Palliative team reached out to daughter to discuss GOC, assist with planning and provide supportive counseling.  Palliative role explained.  Emotional support provided.  We attempted to meet with the daughter at the bedside numerous times however daughter shared she was awaiting a phone call therefore we missed her.  Daughter is at [facility] during our conversation and noted she was stressed and couldn't talk long.  She shared what she has discussed with the medical team and notes the plan for a pet scan asa.  Daughter feels she is getting conflicting medical opinions.  We notified daughter pet scan is not covered while under a medicare part A stay at [facility].  Daughter states she is considering ST to LT placement at [facility].  We discussed setting up the pet scan and figuring out coverage with [facility], daughter confused by this information and states she has to drive therefore unable to further discuss.  Daughter desires to meet with our team tomorrow at 3 pm.  We did note at some point during our conversation the option for pt to return to the community, daughter states Pt needs [facility] and she will not take her home.      Plan to meet with daughter tomorrow at 3 pm in the lobby to go to the [facility] center.  Aware of palliative team availability.  Our team to continue to follow. Palliative team met with Pts daughterLeyla in the caregiver center to discuss GOC, assist with planning and provide supportive counseling.  Palliative role explained.  Emotional support provided.  We discussed the plan for [facility].  Daughter considering ST to LT placement at SNF.  Daughter desires pet scan to further determine treatment options.  We discussed following the pet scan determining the risks and benefits of treatment.  We noted the option to change the focus to comfort from cure and treatment if Pt declines in the future.  Medicare coverage reviewed in details.  Daughters feelings explored.  Support provided.      Advance directives reviewed.  CPR vs DNR/DNI discussed.  Daughter states her mom would not want any aggressive medical interventions.  We discussed that a DNR would be recommended given Pts underlying medical condition.  We noted that a DNR does not mean do not treat.  Daughter states her mom would not want resuscitation or intubation.  We also discussed if a peg tube would be a quality of life to Pt and noted peg would not be recommended for Pts with dementia as it does not take away the risk of aspiration and their is a risk she would pull it out.  Daughter states that her mom would not want a feeding tube or dialysis as they would not be a quality of life to her.  MOLST completed to reflect DNR/DNI.    Plan for [facility], possible ST to LT placement.  DNR/DNI in place.  Daughter aware of palliative team availability.  Our team to continue to follow.

## 2025-01-08 NOTE — PROGRESS NOTE ADULT - SUBJECTIVE AND OBJECTIVE BOX
Date of service: 01-08-25 @ 10:31    Lying in bed in NAD  Weak looking  Exposure to norovirus --> roommate had norovirus  No loose stools or N/V reported at this time    ROS: no fever or chills; denies dizziness, no HA, no SOB or cough, no abdominal pain, no diarrhea or constipation; no dysuria, no legs pain, no rashes    MEDICATIONS  (STANDING):  atorvastatin 20 milliGRAM(s) Oral at bedtime  DULoxetine 60 milliGRAM(s) Oral daily  heparin   Injectable 5000 Unit(s) SubCutaneous every 12 hours  influenza  Vaccine (HIGH DOSE) 0.5 milliLiter(s) IntraMuscular once  polyethylene glycol 3350 17 Gram(s) Oral two times a day  QUEtiapine 12.5 milliGRAM(s) Oral at bedtime  senna 2 Tablet(s) Oral at bedtime    Vital Signs Last 24 Hrs  T(C): 36.7 (08 Jan 2025 07:15), Max: 36.7 (07 Jan 2025 16:26)  T(F): 98 (08 Jan 2025 07:15), Max: 98.1 (07 Jan 2025 16:26)  HR: 73 (08 Jan 2025 07:15) (71 - 77)  BP: 155/65 (08 Jan 2025 07:15) (105/52 - 155/65)  BP(mean): --  RR: 18 (08 Jan 2025 07:15) (18 - 18)  SpO2: 95% (08 Jan 2025 07:15) (95% - 99%)    Parameters below as of 08 Jan 2025 07:15  Patient On (Oxygen Delivery Method): room air     Physical exam:    Constitutional:  No acute distress  HEENT: NC/AT, EOMI, PERRLA, conjunctivae clear; ears and nose atraumatic; pharynx benign  Neck: supple; thyroid not palpable  Back: no tenderness  Respiratory: respiratory effort normal; clear to auscultation  Cardiovascular: S1S2 regular, no murmurs  Abdomen: soft, not tender, not distended, positive BS; no liver or spleen organomegaly  Genitourinary: no suprapubic tenderness  Lymphatic: no LN palpable  Musculoskeletal: no muscle tenderness, no joint swelling or tenderness  Extremities: no pedal edema  Neurological/ Psychiatric: Alert, judgement and insight impaired; moving all extremities  Skin: no rashes; no palpable lesions    Labs: reviewed                        12.3   6.74  )-----------( 419      ( 07 Jan 2025 07:14 )             37.8     01-07    136  |  104  |  16  ----------------------------<  87  4.1   |  26  |  0.50    Ca    9.7      07 Jan 2025 07:14                        11.9   8.91  )-----------( 391      ( 05 Jan 2025 07:13 )             36.1     01-05    135  |  106  |  17  ----------------------------<  92  4.1   |  24  |  0.55    Ca    8.9      05 Jan 2025 07:13    Culture - Blood (collected 02 Jan 2025 11:04)  Source: .Blood BLOOD  Preliminary Report (04 Jan 2025 17:01):    No growth at 48 Hours    Culture - Blood (collected 02 Jan 2025 11:03)  Source: .Blood BLOOD  Preliminary Report (04 Jan 2025 17:01):    No growth at 48 Hours    Urinalysis with Rflx Culture (collected 02 Jan 2025 10:40)    Culture - Urine (collected 02 Jan 2025 10:40)  Source: .Urine None  Final Report (04 Jan 2025 14:55):    10,000 - 49,000 CFU/mL Escherichia coli ESBL    10,000 - 49,000 CFU/mL Enterococcus faecalis  Organism: Escherichia coli ESBL  Enterococcus faecalis (04 Jan 2025 14:55)  Organism: Enterococcus faecalis (04 Jan 2025 14:55)      Method Type: DAVIS      -  Ampicillin: S <=2 Predicts results to ampicillin/sulbactam, amoxacillin-clavulanate and  piperacillin-tazobactam.      -  Ciprofloxacin: S <=1      -  Levofloxacin: S <=1      -  Nitrofurantoin: S <=32 Should not be used to treat pyelonephritis.      -  Tetracycline: R >8      -  Vancomycin: S 1  Organism: Escherichia coli ESBL (04 Jan 2025 14:55)      Method Type: DAVIS      -  Ampicillin: R >16 These ampicillin results predict results for amoxicillin      -  Ampicillin/Sulbactam: I 16/8      -  Aztreonam: R >16      -  Cefazolin: R >16 For uncomplicated UTI with K. pneumoniae, E. coli, or P. mirablis: DAVIS <=16 is sensitive and DAVIS >=32 is resistant. This also predicts results for oral agents cefaclor, cefdinir, cefpodoxime, cefprozil, cefuroxime axetil, cephalexin and locarbef for uncomplicated UTI. Note that some isolates may be susceptible to these agents while testing resistant to cefazolin.      -  Cefepime: R >16      -  Ceftriaxone: R >32      -  Cefuroxime: R >16      -  Ciprofloxacin: R >2      -  Ertapenem: S <=0.5      -  Gentamicin: S <=2      -  Imipenem: S <=1      -  Levofloxacin: R >4      -  Meropenem: S <=1      -  Nitrofurantoin: S <=32 Should not be used to treat pyelonephritis      -  Piperacillin/Tazobactam: S <=8      -  Tobramycin: S <=2      -  Trimethoprim/Sulfamethoxazole: R >2/38    Radiology: all available radiological tests reviewed    < from: CT Abdomen and Pelvis w/ IV Cont (01.04.25 @ 11:34) >  *  Lytic expansile lesion in the right posterolateral ninth rib. Possible lytic lesion in T12 vertebral body.  *  No other masses or evidence of metastatic disease elsewhere.  *  Moderate fecal retention in the rectum with evidence of stercoral   < end of copied text >      Advanced directives addressed: full resuscitation

## 2025-01-08 NOTE — PROGRESS NOTE ADULT - SUBJECTIVE AND OBJECTIVE BOX
HOSPITALIST ATTENDING PROGRESS NOTE    Chart and meds reviewed.  Patient seen and examined.    CC: confusion    Subjective: Daughter had GOC mtg w pall care today, now DNR/DNI, plan for CATE.     All other systems reviewed and found to be negative with the exception of what has been described above.    MEDICATIONS  (STANDING):  atorvastatin 20 milliGRAM(s) Oral at bedtime  DULoxetine 60 milliGRAM(s) Oral daily  heparin   Injectable 5000 Unit(s) SubCutaneous every 12 hours  influenza  Vaccine (HIGH DOSE) 0.5 milliLiter(s) IntraMuscular once  polyethylene glycol 3350 17 Gram(s) Oral two times a day  QUEtiapine 12.5 milliGRAM(s) Oral at bedtime  senna 2 Tablet(s) Oral at bedtime    MEDICATIONS  (PRN):  acetaminophen     Tablet .. 650 milliGRAM(s) Oral every 6 hours PRN Temp greater or equal to 38C (100.4F), Mild Pain (1 - 3)  aluminum hydroxide/magnesium hydroxide/simethicone Suspension 30 milliLiter(s) Oral every 4 hours PRN Dyspepsia  melatonin 3 milliGRAM(s) Oral at bedtime PRN Insomnia  ondansetron Injectable 4 milliGRAM(s) IV Push every 8 hours PRN Nausea and/or Vomiting      VITALS:  T(F): 98 (01-08-25 @ 07:15), Max: 98.1 (01-07-25 @ 23:37)  HR: 73 (01-08-25 @ 07:15) (71 - 73)  BP: 155/65 (01-08-25 @ 07:15) (110/84 - 155/65)  RR: 18 (01-08-25 @ 07:15) (18 - 18)  SpO2: 95% (01-08-25 @ 07:15) (95% - 98%)  Wt(kg): --    I&O's Summary      CAPILLARY BLOOD GLUCOSE          PHYSICAL EXAM:  Gen: NAD  HEENT:  pupils equal and reactive, EOMI, no oropharyngeal lesions, erythema, exudates, oral thrush  NECK:   supple, no carotid bruits, no palpable lymph nodes, no thyromegaly  CV:  +S1, +S2, regular, no murmurs or rubs  RESP:   lungs clear to auscultation bilaterally, no wheezing, rales, rhonchi, good air entry bilaterally  BREAST:  not examined  GI:  abdomen soft, non-tender, non-distended, normal BS, no bruits, no abdominal masses, no palpable masses  RECTAL:  not examined  :  not examined  MSK:   normal muscle tone, no atrophy, no rigidity, no contractions  EXT:  no clubbing, no cyanosis, no edema, no calf pain, swelling or erythema  VASCULAR:  pulses equal and symmetric in the upper and lower extremities  NEURO:  Awake, no focal neurological deficits, follows all commands, able to move extremities spontaneously  SKIN:  no ulcers, lesions or rashes    LABS:                            12.3   6.74  )-----------( 419      ( 07 Jan 2025 07:14 )             37.8     01-07    136  |  104  |  16  ----------------------------<  87  4.1   |  26  |  0.50    Ca    9.7      07 Jan 2025 07:14              Urinalysis Basic - ( 07 Jan 2025 07:14 )    Color: x / Appearance: x / SG: x / pH: x  Gluc: 87 mg/dL / Ketone: x  / Bili: x / Urobili: x   Blood: x / Protein: x / Nitrite: x   Leuk Esterase: x / RBC: x / WBC x   Sq Epi: x / Non Sq Epi: x / Bacteria: x    CULTURES:  no new    Additional results/Imaging, I have personally reviewed:  no new

## 2025-01-08 NOTE — GOALS OF CARE CONVERSATION - ADVANCED CARE PLANNING - CONVERSATION DETAILS
Palliative team met with Pts daughterLeyla in the caregiver center to discuss GOC, assist with planning and provide supportive counseling.  Palliative role explained.  Emotional support provided.  We discussed the plan for CATE.  Daughter considering ST to LT placement at SNF.  Daughter desires pet scan to further determine treatment options.  We discussed following the pet scan determining the risks and benefits of treatment.  We noted the option to change the focus to comfort from cure and treatment if Pt declines in the future.  Medicare coverage reviewed in details.  Daughters feelings explored.  Support provided.      Advance directives reviewed.  CPR vs DNR/DNI discussed.  Daughter states her mom would not want any aggressive medical interventions.  We discussed that a DNR would be recommended given Pts underlying medical condition.  We noted that a DNR does not mean do not treat.  Daughter states her mom would not want resuscitation or intubation.  We also discussed if a peg tube would be a quality of life to Pt and noted peg would not be recommended for Pts with dementia as it does not take away the risk of aspiration and their is a risk she would pull it out.  Daughter states that her mom would not want a feeding tube or dialysis as they would not be a quality of life to her.  MOLST completed to reflect DNR/DNI.    Plan for CATE, possible ST to LT placement.  DNR/DNI in place.  Daughter aware of palliative team availability.  Our team to continue to follow.

## 2025-01-08 NOTE — PROGRESS NOTE ADULT - TIME BILLING
Time spent  coordinating the patient's care. This includes reviewing documentation pertinent to this admission, results and imaging. Further tests, medications, and procedures have been ordered as indicated. Laboratory results and the plan of care were communicated to the patient. Discussed care plan with consultants including [facility] care. Supporting documentation was completed and added to the patient's chart.

## 2025-01-08 NOTE — PROGRESS NOTE ADULT - ASSESSMENT
HPI: Pt is a 84y old Female with a past medical history of obesity, early dementia, breast cancer  s/p mastectomies in 2016 with mets to bone, spinal stenosis of lumbar region, BCC, HLD, OA, GERD, and macular degeneration presents BIB daughter from home for increased confusion x2d. Pt was in [facility] on Dec 22nd for a fall, diagnosed with a UTI and subsequently finished oral antibiotics. Daughter also reports that gait is more unsteady than baseline, has kept her downstairs. Palliative Medicine Consult to further establish GOC  1/6/25 Seen and examined at bedside. OOB/chair confused and disoriented to time and current situation. Denies pain or dyspnea    Assessment and Plan:    1) Increasing Confusion   -Daughter reports worsening of dementia at home   -UC growing ESBL    -CTH notable only for left retrobulbar soft tissue   -Baseline dementia    2) Metastatic breast cancer  -Metastatic hormone positive breast cancer  -s/p multiple anti-estrogen therapies with progression  -most recently on xelod  -PET scan was planned for this month as well as foundation liquid testing  -s/p MRI with following findings: 1.  ORBITS:   Indistinct infiltration and enhancement involves the left medial rectus muscle, the left inferior rectus muscle and adjacent intraconal fat inferior nasal quadrant of the left orbit retrobulbar region. Soft tissue metastasis is suspected. Optic nerve atrophy  -consult radiation onc to see if radiation possible  -Ct scan performed c/w bone mets  -s/p xgeva recently in office for bone mets  -outpt liquid foundation testing will be sent  -Dr Joyce to f/u     3) Debility  -Increased confusion  -Metastatic cancer  -PT eval  -Poor PO intake  -Mod protein magui malnutrition    4) Advanced Directives  -Pt without capacity  -Daughter Leyla named HCP  -[facility] 1/7  -Daughter stated that she thought meeting was via phone  -Unable to make any decisions at this time  -Will meet today 3p    Time Spent: 35 minutes including the care, coordination and counseling of this patient, 50% of which was spent coordinating and counseling.          HPI: Pt is a 84y old Female with a past medical history of obesity, early dementia, breast cancer  s/p mastectomies in 2016 with mets to bone, spinal stenosis of lumbar region, BCC, HLD, OA, GERD, and macular degeneration presents BIB daughter from home for increased confusion x2d. Pt was in [facility] on Dec 22nd for a fall, diagnosed with a UTI and subsequently finished oral antibiotics. Daughter also reports that gait is more unsteady than baseline, has kept her downstairs. Palliative Medicine Consult to further establish GOC  1/6/25 Seen and examined at bedside. OOB/chair confused and disoriented to time and current situation. Denies pain or dyspnea    Assessment and Plan:    1) Increasing Confusion   -Daughter reports worsening of dementia at home   -UC growing ESBL    -CTH notable only for left retrobulbar soft tissue   -Baseline dementia    2) Metastatic breast cancer  -Metastatic hormone positive breast cancer  -s/p multiple anti-estrogen therapies with progression  -most recently on xelod  -PET scan was planned for this month as well as foundation liquid testing  -s/p MRI with following findings: 1.  ORBITS:   Indistinct infiltration and enhancement involves the left medial rectus muscle, the left inferior rectus muscle and adjacent intraconal fat inferior nasal quadrant of the left orbit retrobulbar region. Soft tissue metastasis is suspected. Optic nerve atrophy  -consult radiation onc to see if radiation possible  -Ct scan performed c/w bone mets  -s/p xgeva recently in office for bone mets  -outpt liquid foundation testing will be sent  -Dr Joyce to f/u     3) Debility  -Increased confusion  -Metastatic cancer  -PT eval  -Poor PO intake  -Mod protein magui malnutrition    4) Advanced Directives  -Pt without capacity  -Daughter Leyla named HCP  -GOC 1/7 and 1/8  -MOLST DNR/DNI  -Transition to CATE    Time Spent: 35 minutes including the care, coordination and counseling of this patient, 50% of which was spent coordinating and counseling.

## 2025-01-08 NOTE — PROGRESS NOTE ADULT - SUBJECTIVE AND OBJECTIVE BOX
HPI: Pt is a 84y old Female with a past medical history of obesity, early dementia, breast cancer  s/p mastectomies in 2016 with mets to bone, spinal stenosis of lumbar region, BCC, HLD, OA, GERD, and macular degeneration presents BIB daughter from home for increased confusion x2d. Pt was in [facility] on Dec 22nd for a fall, diagnosed with a UTI and subsequently finished oral antibiotics. Daughter also reports that gait is more unsteady than baseline, has kept her downstairs. Palliative Medicine Consult to further establish GOC  1/6/25 Seen and examined at bedside. OOB/chair confused and disoriented to time and current situation. Denies pain or dyspnea  1/7/25 Seen and examined at bedside. OOB/chair. Remains disoriented to time and place.  1/8/25 Seen and examined at bedside. Remains confused    PAIN: ( )Yes   (X )No  DYSPNEA: ( ) Yes  ( X) No      PAST MEDICAL & SURGICAL HISTORY:  Bilateral malignant neoplasm of breast in female, unspecified site of breast  b/l  History of macular degeneration  bilateral  Gastroesophageal reflux disease without esophagitis  Osteoarthritis of both knees, unspecified osteoarthritis type  Spinal stenosis of lumbar region  Basal cell carcinoma in situ of skin  removed from neck  Malignant neoplasm of left female breast, unspecified site of breast  with Lymph node involvement  Insomnia, unspecified type  Urinary frequency  Neoplasm by body site  left anterior chest wall  Obesity  Cataract  b/l  Macular Hole  repair b/l eyes  S/P left knee arthroscopy  2009  Early stage skin cancer  basal cell carcinoma removed from left side of neck.  History of lumpectomy of left breast  1990  H/O mastectomy, right  1993 with immediate reconstruction with fat grafting  H/O colonoscopy  multiple  History of esophagogastroduodenoscopy (EGD)  H/O left mastectomy  2016  H/O bilateral breast biopsy    SOCIAL HX:  Lives with daughter  Hx opiate tolerance ( )YES  (X )NO    Baseline ADLs  (Prior to Admission)  ( ) Independent   (X )Dependent    FAMILY HISTORY:  Family history of temporal arteritis (Father)  father    Family history of stroke (Mother)  mother        Review of Systems:    All other systems reviewed and negative  Unable to obtain/Limited due to: AMS      PHYSICAL EXAM:    ICU Vital Signs Last 24 Hrs  T(C): 36.7 (08 Jan 2025 07:15), Max: 36.7 (07 Jan 2025 16:26)  T(F): 98 (08 Jan 2025 07:15), Max: 98.1 (07 Jan 2025 16:26)  HR: 73 (08 Jan 2025 07:15) (71 - 77)  BP: 155/65 (08 Jan 2025 07:15) (105/52 - 155/65)  RR: 18 (08 Jan 2025 07:15) (18 - 18)  SpO2: 95% (08 Jan 2025 07:15) (95% - 99%)    O2 Parameters below as of 08 Jan 2025 07:15  Patient On (Oxygen Delivery Method): room air    PPSV2: 40 %  FAST: 7    General: Elderly female in chair  Mental Status: A&O X2  HEENT: oral mucosa dry  Lungs: clear to auscultation salvatore  Cardiac: S1S2+  GI: abd soft NT ND + BS  : voids  Ext: BILLINGSLEY=strength  Neuro: confusion      LABS:                              12.3   6.74  )-----------( 419      ( 07 Jan 2025 07:14 )             37.8                         Albumin: Albumin: 2.9 g/dL (01-02 @ 09:42)      Allergies    No Known Allergies    Intolerances      MEDICATIONS  (STANDING):  atorvastatin 20 milliGRAM(s) Oral at bedtime  DULoxetine 60 milliGRAM(s) Oral daily  heparin   Injectable 5000 Unit(s) SubCutaneous every 12 hours  influenza  Vaccine (HIGH DOSE) 0.5 milliLiter(s) IntraMuscular once  polyethylene glycol 3350 17 Gram(s) Oral two times a day  QUEtiapine 12.5 milliGRAM(s) Oral at bedtime  senna 2 Tablet(s) Oral at bedtime    MEDICATIONS  (PRN):  acetaminophen     Tablet .. 650 milliGRAM(s) Oral every 6 hours PRN Temp greater or equal to 38C (100.4F), Mild Pain (1 - 3)  aluminum hydroxide/magnesium hydroxide/simethicone Suspension 30 milliLiter(s) Oral every 4 hours PRN Dyspepsia  melatonin 3 milliGRAM(s) Oral at bedtime PRN Insomnia  ondansetron Injectable 4 milliGRAM(s) IV Push every 8 hours PRN Nausea and/or Vomiting      RADIOLOGY/ADDITIONAL STUDIES:

## 2025-01-08 NOTE — PROGRESS NOTE ADULT - ASSESSMENT
85 y/o female with h/o obesity, mild dementia, breast cancer s/p mastectomies in 2016 with mets to bone, spinal stenosis of lumbar region, BCC, HLD, OA, GERD, and macular degeneration was admitted on  from home for increased confusion x 2d. Pt was in [facility] on Dec 22nd for a fall, diagnosed with a UTI and subsequently finished oral antibiotics. Daughter also requesting sutures from the fall be taken out since patient is due, and is also concerned for another UTI. According to daughter, patient has been talking to her  parents. Daughter also reports that gait is more unsteady than baseline, has kept her downstairs. This morning, daughter found patient in closet packing for an upcoming trip that is nonexistent, and also reports that patient is more confused at night.     #UTI with ESBL E.coli and ENFA  #Metabolic encephalopathy  #Breast Ca  #Eposure to contagious illness (norovirus)  -cultures reviewed  -s/p meropenem 1 gm IV q8h # 3  -tolerating abx well so far; no side effects noted  -concern for exposure/ infection with norovirus was raised. Prior cultures reviewed. An epidemiologic assessment was performed. There is a significant risk for viral microorganisms to spread to family members, and/or healthcare staff. The patient will be placed on contact isolation according to infection control policy. Will reconsider isolation measures based on new culture results and other clinical data as appropriate.   -monitor for GI symptoms  -monitor temps  -f/u CBC  -supportive care  2. Other issues:   -care per medicine    d/w medicine team

## 2025-01-08 NOTE — PROGRESS NOTE ADULT - ASSESSMENT
83 y/o female with past medical history of obesity, early dementia, breast cancer  s/p mastectomies in 2016 with mets to bone, spinal stenosis of lumbar region, BCC, HLD, OA, GERD, and macular degeneration presents BIB daughter from home for increased confusion x2d.    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

## 2025-01-08 NOTE — PROGRESS NOTE ADULT - NS ATTEND AMEND GEN_ALL_CORE FT
case reviewed and discussed w/ above NP  pt w/ confusion   GOC held and awaiting input from oncologist as family would like to touch base prior to any further limitations  pt currently DNR DNI
case reviewed and discussed w/ above NP  pt w/ confusion   GOC held and awaiting input from oncologist as family would like to touch base prior to any further limitations  pt currently DNR DNI

## 2025-01-09 ENCOUNTER — TRANSCRIPTION ENCOUNTER (OUTPATIENT)
Age: 85
End: 2025-01-09

## 2025-01-09 VITALS
TEMPERATURE: 99 F | RESPIRATION RATE: 18 BRPM | SYSTOLIC BLOOD PRESSURE: 121 MMHG | OXYGEN SATURATION: 96 % | DIASTOLIC BLOOD PRESSURE: 52 MMHG | HEART RATE: 76 BPM

## 2025-01-09 PROCEDURE — 99239 HOSP IP/OBS DSCHRG MGMT >30: CPT

## 2025-01-09 RX ORDER — QUETIAPINE FUMARATE 100 MG/1
12.5 TABLET, FILM COATED ORAL
Qty: 0 | Refills: 0 | DISCHARGE
Start: 2025-01-09

## 2025-01-09 RX ORDER — DENOSUMAB 60 MG/ML
0 INJECTION SUBCUTANEOUS
Refills: 0 | DISCHARGE

## 2025-01-09 RX ORDER — POLYETHYLENE GLYCOL 3350 17 G/DOSE
17 POWDER (GRAM) ORAL
Qty: 0 | Refills: 0 | DISCHARGE
Start: 2025-01-09

## 2025-01-09 RX ORDER — SENNOSIDES 8.6 MG/1
2 TABLET, FILM COATED ORAL
Qty: 0 | Refills: 0 | DISCHARGE
Start: 2025-01-09

## 2025-01-09 RX ORDER — CAPECITABINE 150 MG/1
2 TABLET ORAL
Refills: 0 | DISCHARGE

## 2025-01-09 RX ORDER — ACETAMINOPHEN 80 MG/.8ML
2 SOLUTION/ DROPS ORAL
Qty: 0 | Refills: 0 | DISCHARGE
Start: 2025-01-09

## 2025-01-09 RX ADMIN — HEPARIN SODIUM 5000 UNIT(S): 1000 INJECTION, SOLUTION INTRAVENOUS; SUBCUTANEOUS at 11:34

## 2025-01-09 RX ADMIN — Medication 17 GRAM(S): at 11:34

## 2025-01-09 RX ADMIN — DULOXETINE HYDROCHLORIDE 60 MILLIGRAM(S): 30 CAPSULE, DELAYED RELEASE ORAL at 11:34

## 2025-01-09 NOTE — DISCHARGE NOTE NURSING/CASE MANAGEMENT/SOCIAL WORK - NSDCPEFALRISK_GEN_ALL_CORE
For information on Fall & Injury Prevention, visit: [email or web address] OR  [email or web address] OR  https://www.cdc.gov/steadi/patient.html

## 2025-01-09 NOTE — PROGRESS NOTE ADULT - ASSESSMENT
Breast cancer Bone mets  Orbital lesion / Opthalmology eval / RT if malignant   Dementia / ataxia/ falls/ incontinence/ : as per Neurology  Hold Xeloda  PET as  out patient ( This was previously ordered )  Palliative care f/u as well, likely CATE  now DNR/DNI    please call if any questions    Manuelito Morrissey MD  [facility]  Cell: [phone]

## 2025-01-09 NOTE — DISCHARGE NOTE PROVIDER - NSDCMRMEDTOKEN_GEN_ALL_CORE_FT
acetaminophen 325 mg oral tablet: 2 tab(s) orally every 6 hours As needed Temp greater or equal to 38C (100.4F), Mild Pain (1 - 3)  atorvastatin 20 mg oral tablet: 1 tab(s) orally once a day  DULoxetine 60 mg oral delayed release capsule: 1 cap(s) orally once a day  polyethylene glycol 3350 oral powder for reconstitution: 17 gram(s) orally 2 times a day  QUEtiapine: 12.5 milligram(s) orally once a day (at bedtime)  senna leaf extract oral tablet: 2 tab(s) orally once a day (at bedtime)

## 2025-01-09 NOTE — PROGRESS NOTE ADULT - REASON FOR ADMISSION
Increased confusion and difficulty ambulating

## 2025-01-09 NOTE — DISCHARGE NOTE NURSING/CASE MANAGEMENT/SOCIAL WORK - NSDCVIVACCINE_GEN_ALL_CORE_FT
Tdap; 22-Nov-2019 20:23; Mayra Alicea (RN); [employer]; [id number] (Exp. Date: 22-Nov-2021); IntraMuscular; Deltoid Right.; 0.5 milliLiter(s); VIS (VIS Published: 09-May-2013, VIS Presented: 22-Nov-2019);   Tdap; 25-Dec-2020 00:55; Kristen Johnson (RN); [employer]; [id number] (Exp. Date: 31-Jul-2022); IntraMuscular; Deltoid Right.; 0.5 milliLiter(s); VIS (VIS Published: 09-May-2013, VIS Presented: 25-Dec-2020);   Tdap; 05-Aug-2021 22:35; Stellwag, Emily (RN); [employer]; [id number] (Exp. Date: 18-Nov-2022); IntraMuscular; Deltoid Right.; 0.5 milliLiter(s); VIS (VIS Published: 09-May-2013, VIS Presented: 05-Aug-2021);   Tdap; 29-Nov-2021 17:14; Jasmin Stewart (RN); [employer]; [id number] (Exp. Date: 09-Sep-2023); IntraMuscular; Deltoid Left.; 0.5 milliLiter(s); VIS (VIS Published: 09-May-2013, VIS Presented: 29-Nov-2021);   Tdap; 19-Feb-2023 22:34; Minor Burgos (RN); [employer]; [id number] (Exp. Date: 08-Dec-2024); IntraMuscular; Deltoid Right.; 0.5 milliLiter(s); VIS (VIS Published: 09-May-2013, VIS Presented: 19-Feb-2023);   Tdap; 22-Dec-2024 23:03; Yani Graham (RN); [employer]; [id number] (Exp. Date: 08-Aug-2026); IntraMuscular; Deltoid Right.; 0.5 milliLiter(s); VIS (VIS Published: 09-May-2013, VIS Presented: 22-Dec-2024);

## 2025-01-09 NOTE — DISCHARGE NOTE PROVIDER - NSDCCAREPROVSEEN_GEN_ALL_CORE_FT
Milli Antonio (hospital medicine)  Manuelito Morrissey (oncology)  Bing Montalvo (radiation oncology)  Jerri Monzon (palliative care)  Miki Luna (ID)  Grisel Mattson (neurology)

## 2025-01-09 NOTE — DISCHARGE NOTE PROVIDER - DETAILS OF MALNUTRITION DIAGNOSIS/DIAGNOSES
This patient has been assessed with a concern for Malnutrition and was treated during this hospitalization for the following Nutrition diagnosis/diagnoses:     -  01/07/2025: Moderate protein-calorie malnutrition

## 2025-01-09 NOTE — DISCHARGE NOTE NURSING/CASE MANAGEMENT/SOCIAL WORK - FINANCIAL ASSISTANCE
[facility] provides services at a reduced cost to those who are determined to be eligible through [facility]’s financial assistance program. Information regarding [facility]’s financial assistance program can be found by going to [email or web address] or by calling [phone].

## 2025-01-09 NOTE — DISCHARGE NOTE PROVIDER - CARE PROVIDER_API CALL
Eren Reinoso  Internal Medicine  [address] [place] [address]  Phone: [phone]  Fax: [phone]  Follow Up Time: 1 week

## 2025-01-09 NOTE — DISCHARGE NOTE PROVIDER - HOSPITAL COURSE
CC:  HPI and Hospital Course: 85 y/o female with past medical history of obesity, early dementia, breast cancer  s/p mastectomies in 2016 with mets to bone, spinal stenosis of lumbar region, BCC, HLD, OA, GERD, and macular degeneration presents BIB daughter from home for increased confusion x2d.    Found to have UTI, treated w meropenem course for ESBL. Started on Seroquel. Also found on imaging to incidentally have L retrobulbar soft tissue mass c/f met, eval'd by onc and rad onc, for outpt f/u.    DNR/DNI  Outpt onc f/u.    D/c to [facility].  I have spent 33 min on day of d/c coordinating care.  See progress note for problem based plan.

## 2025-01-09 NOTE — DISCHARGE NOTE PROVIDER - NSDCCPCAREPLAN_GEN_ALL_CORE_FT
PRINCIPAL DISCHARGE DIAGNOSIS  Diagnosis: Orbital mass  Assessment and Plan of Treatment: Follow up with oncology as an outpatient

## 2025-01-09 NOTE — PROGRESS NOTE ADULT - PROVIDER SPECIALTY LIST ADULT
Heme/Onc
Hospitalist
Heme/Onc
Hospitalist
Infectious Disease
Hospitalist
Heme/Onc
Hospitalist
Hospitalist
Infectious Disease
Palliative Care
Hospitalist
Palliative Care

## 2025-01-09 NOTE — DISCHARGE NOTE PROVIDER - NSDCPNSUBOBJ_GEN_ALL_CORE
VITALS:  T(F): 98.6 (01-09-25 @ 07:49), Max: 98.6 (01-09-25 @ 07:49)  HR: 76 (01-09-25 @ 07:49) (67 - 84)  BP: 121/52 (01-09-25 @ 07:49) (107/52 - 121/52)  RR: 18 (01-09-25 @ 07:49) (18 - 18)  SpO2: 96% (01-09-25 @ 07:49) (96% - 96%)    PHYSICAL EXAM:  Gen: NAD  HEENT:  pupils equal and reactive, EOMI, no oropharyngeal lesions, erythema, exudates, oral thrush  NECK:   supple, no carotid bruits, no palpable lymph nodes, no thyromegaly  CV:  +S1, +S2, regular, no murmurs or rubs  RESP:   lungs clear to auscultation bilaterally, no wheezing, rales, rhonchi, good air entry bilaterally  BREAST:  not examined  GI:  abdomen soft, non-tender, non-distended, normal BS, no bruits, no abdominal masses, no palpable masses  RECTAL:  not examined  :  not examined  MSK:   normal muscle tone, no atrophy, no rigidity, no contractions  EXT:  no clubbing, no cyanosis, no edema, no calf pain, swelling or erythema  VASCULAR:  pulses equal and symmetric in the upper and lower extremities  NEURO:  Awake, no focal neurological deficits, follows all commands, able to move extremities spontaneously  SKIN:  no ulcers, lesions or rashes      # Increasing Confusion and worsening of Dementia  Daughter reports worsening of dementia at home   - UC growing ESBL ->completed meropenem course  - CTH notable only for left retrobulbar soft tissue   - covid flu rsv negative   - start seroquel at bedtime   - neurology consulted, appreciate recs     #Unsteady gait  - PT  -plan for [facility]    # Abnormal heterogeneous left retrobulbar soft tissue concern for metastasis  - MR with and without IV contrast orbits b/l found to have 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

## 2025-01-09 NOTE — DISCHARGE NOTE NURSING/CASE MANAGEMENT/SOCIAL WORK - PATIENT PORTAL LINK FT
You can access the FollowMyHealth Patient Portal offered by [facility] by registering at the following website: [email or web address]. By joining [facility]’s FollowMyHealth portal, you will also be able to view your health information using other applications (apps) compatible with our system.

## 2025-01-09 NOTE — PROGRESS NOTE ADULT - SUBJECTIVE AND OBJECTIVE BOX
Pt seen, comfortable, resting in bed    MEDICATIONS  (STANDING):  atorvastatin 20 milliGRAM(s) Oral at bedtime  DULoxetine 60 milliGRAM(s) Oral daily  heparin   Injectable 5000 Unit(s) SubCutaneous every 12 hours  influenza  Vaccine (HIGH DOSE) 0.5 milliLiter(s) IntraMuscular once  polyethylene glycol 3350 17 Gram(s) Oral two times a day  QUEtiapine 12.5 milliGRAM(s) Oral at bedtime  senna 2 Tablet(s) Oral at bedtime    MEDICATIONS  (PRN):  acetaminophen     Tablet .. 650 milliGRAM(s) Oral every 6 hours PRN Temp greater or equal to 38C (100.4F), Mild Pain (1 - 3)  aluminum hydroxide/magnesium hydroxide/simethicone Suspension 30 milliLiter(s) Oral every 4 hours PRN Dyspepsia  melatonin 3 milliGRAM(s) Oral at bedtime PRN Insomnia  ondansetron Injectable 4 milliGRAM(s) IV Push every 8 hours PRN Nausea and/or Vomiting      ROS  No fever, sweats, chills     Vital Signs Last 24 Hrs  T(C): 37 (09 Jan 2025 07:49), Max: 37 (09 Jan 2025 07:49)  T(F): 98.6 (09 Jan 2025 07:49), Max: 98.6 (09 Jan 2025 07:49)  HR: 76 (09 Jan 2025 07:49) (67 - 84)  BP: 121/52 (09 Jan 2025 07:49) (107/52 - 121/52)  BP(mean): --  RR: 18 (09 Jan 2025 07:49) (18 - 18)  SpO2: 96% (09 Jan 2025 07:49) (96% - 96%)    Parameters below as of 09 Jan 2025 07:49  Patient On (Oxygen Delivery Method): room air        PE  NAD  lethargic, resting     No rash grossly  FROM

## 2025-01-15 DIAGNOSIS — M19.90 UNSPECIFIED OSTEOARTHRITIS, UNSPECIFIED SITE: ICD-10-CM

## 2025-01-15 DIAGNOSIS — M48.061 SPINAL STENOSIS, LUMBAR REGION WITHOUT NEUROGENIC CLAUDICATION: ICD-10-CM

## 2025-01-15 DIAGNOSIS — E87.6 HYPOKALEMIA: ICD-10-CM

## 2025-01-15 DIAGNOSIS — Z79.899 OTHER LONG TERM (CURRENT) DRUG THERAPY: ICD-10-CM

## 2025-01-15 DIAGNOSIS — Z85.3 PERSONAL HISTORY OF MALIGNANT NEOPLASM OF BREAST: ICD-10-CM

## 2025-01-15 DIAGNOSIS — K21.9 GASTRO-ESOPHAGEAL REFLUX DISEASE WITHOUT ESOPHAGITIS: ICD-10-CM

## 2025-01-15 DIAGNOSIS — C79.49 SECONDARY MALIGNANT NEOPLASM OF OTHER PARTS OF NERVOUS SYSTEM: ICD-10-CM

## 2025-01-15 DIAGNOSIS — E78.5 HYPERLIPIDEMIA, UNSPECIFIED: ICD-10-CM

## 2025-01-15 DIAGNOSIS — N39.0 URINARY TRACT INFECTION, SITE NOT SPECIFIED: ICD-10-CM

## 2025-01-15 DIAGNOSIS — B96.20 UNSPECIFIED ESCHERICHIA COLI [E. COLI] AS THE CAUSE OF DISEASES CLASSIFIED ELSEWHERE: ICD-10-CM

## 2025-01-15 DIAGNOSIS — Z90.13 ACQUIRED ABSENCE OF BILATERAL BREASTS AND NIPPLES: ICD-10-CM

## 2025-01-15 DIAGNOSIS — G47.00 INSOMNIA, UNSPECIFIED: ICD-10-CM

## 2025-01-15 DIAGNOSIS — C79.51 SECONDARY MALIGNANT NEOPLASM OF BONE: ICD-10-CM

## 2025-01-15 DIAGNOSIS — Z66 DO NOT RESUSCITATE: ICD-10-CM

## 2025-01-15 DIAGNOSIS — K62.89 OTHER SPECIFIED DISEASES OF ANUS AND RECTUM: ICD-10-CM

## 2025-01-15 DIAGNOSIS — R26.0 ATAXIC GAIT: ICD-10-CM

## 2025-01-15 DIAGNOSIS — H35.30 UNSPECIFIED MACULAR DEGENERATION: ICD-10-CM

## 2025-01-15 DIAGNOSIS — Z16.12 EXTENDED SPECTRUM BETA LACTAMASE (ESBL) RESISTANCE: ICD-10-CM

## 2025-01-15 DIAGNOSIS — G93.41 METABOLIC ENCEPHALOPATHY: ICD-10-CM

## 2025-01-15 DIAGNOSIS — Z85.828 PERSONAL HISTORY OF OTHER MALIGNANT NEOPLASM OF SKIN: ICD-10-CM

## 2025-01-15 DIAGNOSIS — E44.0 MODERATE PROTEIN-CALORIE MALNUTRITION: ICD-10-CM

## 2025-04-10 RX ORDER — CEFEPIME 2 G/20ML
1 INJECTION, POWDER, FOR SOLUTION INTRAVENOUS
Refills: 0 | DISCHARGE
Start: 2025-04-10 | End: 2025-04-17

## 2025-04-16 ENCOUNTER — INPATIENT (INPATIENT)
Facility: HOSPITAL | Age: 85
LOS: 5 days | Discharge: NURSING, REHAB OR LONG-TERM CARE | DRG: 948 | End: 2025-04-22
Attending: HOSPITALIST | Admitting: INTERNAL MEDICINE
Payer: MEDICARE

## 2025-04-16 VITALS
OXYGEN SATURATION: 100 % | SYSTOLIC BLOOD PRESSURE: 145 MMHG | HEART RATE: 84 BPM | TEMPERATURE: 98 F | DIASTOLIC BLOOD PRESSURE: 71 MMHG | RESPIRATION RATE: 18 BRPM

## 2025-04-16 DIAGNOSIS — Z98.890 OTHER SPECIFIED POSTPROCEDURAL STATES: Chronic | ICD-10-CM

## 2025-04-16 DIAGNOSIS — Z90.12 ACQUIRED ABSENCE OF LEFT BREAST AND NIPPLE: Chronic | ICD-10-CM

## 2025-04-16 DIAGNOSIS — Z90.11 ACQUIRED ABSENCE OF RIGHT BREAST AND NIPPLE: Chronic | ICD-10-CM

## 2025-04-16 DIAGNOSIS — R41.82 ALTERED MENTAL STATUS, UNSPECIFIED: ICD-10-CM

## 2025-04-16 DIAGNOSIS — D04.9 CARCINOMA IN SITU OF SKIN, UNSPECIFIED: Chronic | ICD-10-CM

## 2025-04-16 LAB
ALBUMIN SERPL ELPH-MCNC: 2.5 G/DL — LOW (ref 3.3–5)
ALP SERPL-CCNC: 136 U/L — HIGH (ref 40–120)
ALT FLD-CCNC: 16 U/L — SIGNIFICANT CHANGE UP (ref 12–78)
ANION GAP SERPL CALC-SCNC: 2 MMOL/L — LOW (ref 5–17)
ANION GAP SERPL CALC-SCNC: 4 MMOL/L — LOW (ref 5–17)
APPEARANCE UR: CLEAR — SIGNIFICANT CHANGE UP
APTT BLD: 27.2 SEC — SIGNIFICANT CHANGE UP (ref 24.5–35.6)
AST SERPL-CCNC: 27 U/L — SIGNIFICANT CHANGE UP (ref 15–37)
BACTERIA # UR AUTO: NEGATIVE /HPF — SIGNIFICANT CHANGE UP
BASOPHILS # BLD AUTO: 0.04 K/UL — SIGNIFICANT CHANGE UP (ref 0–0.2)
BASOPHILS NFR BLD AUTO: 0.3 % — SIGNIFICANT CHANGE UP (ref 0–2)
BILIRUB SERPL-MCNC: 0.7 MG/DL — SIGNIFICANT CHANGE UP (ref 0.2–1.2)
BILIRUB UR-MCNC: NEGATIVE — SIGNIFICANT CHANGE UP
BUN SERPL-MCNC: 21 MG/DL — SIGNIFICANT CHANGE UP (ref 7–23)
BUN SERPL-MCNC: 26 MG/DL — HIGH (ref 7–23)
CALCIUM SERPL-MCNC: 14.5 MG/DL — CRITICAL HIGH (ref 8.5–10.1)
CALCIUM SERPL-MCNC: 15.1 MG/DL — CRITICAL HIGH (ref 8.5–10.1)
CAST: 10 /LPF — HIGH (ref 0–4)
CHLORIDE SERPL-SCNC: 113 MMOL/L — HIGH (ref 96–108)
CHLORIDE SERPL-SCNC: 116 MMOL/L — HIGH (ref 96–108)
CO2 SERPL-SCNC: 32 MMOL/L — HIGH (ref 22–31)
CO2 SERPL-SCNC: 33 MMOL/L — HIGH (ref 22–31)
COLOR SPEC: YELLOW — SIGNIFICANT CHANGE UP
CREAT SERPL-MCNC: 0.76 MG/DL — SIGNIFICANT CHANGE UP (ref 0.5–1.3)
CREAT SERPL-MCNC: 0.83 MG/DL — SIGNIFICANT CHANGE UP (ref 0.5–1.3)
DIFF PNL FLD: ABNORMAL
EGFR: 69 ML/MIN/1.73M2 — SIGNIFICANT CHANGE UP
EGFR: 69 ML/MIN/1.73M2 — SIGNIFICANT CHANGE UP
EGFR: 77 ML/MIN/1.73M2 — SIGNIFICANT CHANGE UP
EGFR: 77 ML/MIN/1.73M2 — SIGNIFICANT CHANGE UP
EOSINOPHIL # BLD AUTO: 0.17 K/UL — SIGNIFICANT CHANGE UP (ref 0–0.5)
EOSINOPHIL NFR BLD AUTO: 1.2 % — SIGNIFICANT CHANGE UP (ref 0–6)
EPI CELLS # UR: PRESENT
FLUAV AG NPH QL: SIGNIFICANT CHANGE UP
FLUBV AG NPH QL: SIGNIFICANT CHANGE UP
GLUCOSE SERPL-MCNC: 100 MG/DL — HIGH (ref 70–99)
GLUCOSE SERPL-MCNC: 92 MG/DL — SIGNIFICANT CHANGE UP (ref 70–99)
GLUCOSE UR QL: NEGATIVE MG/DL — SIGNIFICANT CHANGE UP
HCT VFR BLD CALC: 40.5 % — SIGNIFICANT CHANGE UP (ref 34.5–45)
HGB BLD-MCNC: 13.4 G/DL — SIGNIFICANT CHANGE UP (ref 11.5–15.5)
HYALINE CASTS # UR AUTO: PRESENT
IMM GRANULOCYTES # BLD AUTO: 0.12 K/UL — HIGH (ref 0–0.07)
IMM GRANULOCYTES NFR BLD AUTO: 0.8 % — SIGNIFICANT CHANGE UP (ref 0–0.9)
INR BLD: 1.09 RATIO — SIGNIFICANT CHANGE UP (ref 0.85–1.16)
KETONES UR-MCNC: NEGATIVE MG/DL — SIGNIFICANT CHANGE UP
LACTATE SERPL-SCNC: 1.2 MMOL/L — SIGNIFICANT CHANGE UP (ref 0.7–2)
LEUKOCYTE ESTERASE UR-ACNC: ABNORMAL
LYMPHOCYTES # BLD AUTO: 2.85 K/UL — SIGNIFICANT CHANGE UP (ref 1–3.3)
LYMPHOCYTES NFR BLD AUTO: 19.9 % — SIGNIFICANT CHANGE UP (ref 13–44)
MAGNESIUM SERPL-MCNC: 1.9 MG/DL — SIGNIFICANT CHANGE UP (ref 1.6–2.6)
MCHC RBC-ENTMCNC: 32.9 PG — SIGNIFICANT CHANGE UP (ref 27–34)
MCHC RBC-ENTMCNC: 33.1 G/DL — SIGNIFICANT CHANGE UP (ref 32–36)
MCV RBC AUTO: 99.5 FL — SIGNIFICANT CHANGE UP (ref 80–100)
MONOCYTES # BLD AUTO: 1.45 K/UL — HIGH (ref 0–0.9)
MONOCYTES NFR BLD AUTO: 10.1 % — SIGNIFICANT CHANGE UP (ref 2–14)
NEUTROPHILS # BLD AUTO: 9.66 K/UL — HIGH (ref 1.8–7.4)
NEUTROPHILS NFR BLD AUTO: 67.7 % — SIGNIFICANT CHANGE UP (ref 43–77)
NITRITE UR-MCNC: NEGATIVE — SIGNIFICANT CHANGE UP
NRBC # BLD AUTO: 0.02 K/UL — HIGH (ref 0–0)
NRBC # FLD: 0.02 K/UL — HIGH (ref 0–0)
NRBC BLD AUTO-RTO: 0 /100 WBCS — SIGNIFICANT CHANGE UP (ref 0–0)
PH UR: 6 — SIGNIFICANT CHANGE UP (ref 5–8)
PLATELET # BLD AUTO: 226 K/UL — SIGNIFICANT CHANGE UP (ref 150–400)
PMV BLD: 10.6 FL — SIGNIFICANT CHANGE UP (ref 7–13)
POTASSIUM SERPL-MCNC: 2.6 MMOL/L — CRITICAL LOW (ref 3.5–5.3)
POTASSIUM SERPL-MCNC: 3 MMOL/L — LOW (ref 3.5–5.3)
POTASSIUM SERPL-SCNC: 2.6 MMOL/L — CRITICAL LOW (ref 3.5–5.3)
POTASSIUM SERPL-SCNC: 3 MMOL/L — LOW (ref 3.5–5.3)
PROT SERPL-MCNC: 6.5 GM/DL — SIGNIFICANT CHANGE UP (ref 6–8.3)
PROT UR-MCNC: 30 MG/DL
PROTHROM AB SERPL-ACNC: 12.5 SEC — SIGNIFICANT CHANGE UP (ref 9.9–13.4)
RBC # BLD: 4.07 M/UL — SIGNIFICANT CHANGE UP (ref 3.8–5.2)
RBC # FLD: 18 % — HIGH (ref 10.3–14.5)
RBC CASTS # UR COMP ASSIST: 60 /HPF — HIGH (ref 0–4)
RSV RNA NPH QL NAA+NON-PROBE: SIGNIFICANT CHANGE UP
SARS-COV-2 RNA SPEC QL NAA+PROBE: SIGNIFICANT CHANGE UP
SODIUM SERPL-SCNC: 150 MMOL/L — HIGH (ref 135–145)
SODIUM SERPL-SCNC: 150 MMOL/L — HIGH (ref 135–145)
SOURCE RESPIRATORY: SIGNIFICANT CHANGE UP
SP GR SPEC: 1.01 — SIGNIFICANT CHANGE UP (ref 1–1.03)
SQUAMOUS # UR AUTO: 4 /HPF — SIGNIFICANT CHANGE UP (ref 0–5)
UROBILINOGEN FLD QL: 0.2 MG/DL — SIGNIFICANT CHANGE UP (ref 0.2–1)
WBC # BLD: 14.29 K/UL — HIGH (ref 3.8–10.5)
WBC # FLD AUTO: 14.29 K/UL — HIGH (ref 3.8–10.5)
WBC UR QL: 36 /HPF — HIGH (ref 0–5)

## 2025-04-16 PROCEDURE — 80053 COMPREHEN METABOLIC PANEL: CPT

## 2025-04-16 PROCEDURE — 97162 PT EVAL MOD COMPLEX 30 MIN: CPT | Mod: GP

## 2025-04-16 PROCEDURE — 36415 COLL VENOUS BLD VENIPUNCTURE: CPT

## 2025-04-16 PROCEDURE — 85025 COMPLETE CBC W/AUTO DIFF WBC: CPT

## 2025-04-16 PROCEDURE — 97116 GAIT TRAINING THERAPY: CPT | Mod: GP

## 2025-04-16 PROCEDURE — 82040 ASSAY OF SERUM ALBUMIN: CPT

## 2025-04-16 PROCEDURE — 93010 ELECTROCARDIOGRAM REPORT: CPT

## 2025-04-16 PROCEDURE — 83735 ASSAY OF MAGNESIUM: CPT

## 2025-04-16 PROCEDURE — 71045 X-RAY EXAM CHEST 1 VIEW: CPT | Mod: 26

## 2025-04-16 PROCEDURE — 99285 EMERGENCY DEPT VISIT HI MDM: CPT

## 2025-04-16 PROCEDURE — 84100 ASSAY OF PHOSPHORUS: CPT

## 2025-04-16 PROCEDURE — 80048 BASIC METABOLIC PNL TOTAL CA: CPT

## 2025-04-16 PROCEDURE — 82330 ASSAY OF CALCIUM: CPT

## 2025-04-16 PROCEDURE — 85027 COMPLETE CBC AUTOMATED: CPT

## 2025-04-16 PROCEDURE — 84145 PROCALCITONIN (PCT): CPT

## 2025-04-16 PROCEDURE — 99223 1ST HOSP IP/OBS HIGH 75: CPT

## 2025-04-16 PROCEDURE — 97530 THERAPEUTIC ACTIVITIES: CPT | Mod: GP

## 2025-04-16 RX ORDER — CALCITONIN,SALMON,SYNTHETIC 200/SPRAY
190 AEROSOL, SPRAY WITH PUMP (ML) NASAL EVERY 12 HOURS
Refills: 0 | Status: COMPLETED | OUTPATIENT
Start: 2025-04-16 | End: 2025-04-17

## 2025-04-16 RX ORDER — HEPARIN SODIUM 1000 [USP'U]/ML
5000 INJECTION INTRAVENOUS; SUBCUTANEOUS EVERY 12 HOURS
Refills: 0 | Status: DISCONTINUED | OUTPATIENT
Start: 2025-04-16 | End: 2025-04-20

## 2025-04-16 RX ORDER — VANCOMYCIN HCL IN 5 % DEXTROSE 1.5G/250ML
500 PLASTIC BAG, INJECTION (ML) INTRAVENOUS EVERY 12 HOURS
Refills: 0 | Status: DISCONTINUED | OUTPATIENT
Start: 2025-04-16 | End: 2025-04-17

## 2025-04-16 RX ORDER — VANCOMYCIN HCL IN 5 % DEXTROSE 1.5G/250ML
750 PLASTIC BAG, INJECTION (ML) INTRAVENOUS ONCE
Refills: 0 | Status: COMPLETED | OUTPATIENT
Start: 2025-04-16 | End: 2025-04-16

## 2025-04-16 RX ORDER — PIPERACILLIN-TAZO-DEXTROSE,ISO 2.25G/50ML
3.38 IV SOLUTION, PIGGYBACK PREMIX FROZEN(ML) INTRAVENOUS ONCE
Refills: 0 | Status: COMPLETED | OUTPATIENT
Start: 2025-04-16 | End: 2025-04-16

## 2025-04-16 RX ORDER — CEFEPIME 2 G/20ML
2000 INJECTION, POWDER, FOR SOLUTION INTRAVENOUS EVERY 12 HOURS
Refills: 0 | Status: DISCONTINUED | OUTPATIENT
Start: 2025-04-17 | End: 2025-04-17

## 2025-04-16 RX ORDER — CEFEPIME 2 G/20ML
INJECTION, POWDER, FOR SOLUTION INTRAVENOUS
Refills: 0 | Status: DISCONTINUED | OUTPATIENT
Start: 2025-04-16 | End: 2025-04-16

## 2025-04-16 RX ORDER — ZOLEDRONIC ACID 0.05 MG/ML
4 INJECTION, SOLUTION INTRAVENOUS ONCE
Refills: 0 | Status: COMPLETED | OUTPATIENT
Start: 2025-04-16 | End: 2025-04-17

## 2025-04-16 RX ORDER — CALCITONIN,SALMON,SYNTHETIC 200/SPRAY
100 AEROSOL, SPRAY WITH PUMP (ML) NASAL
Refills: 0 | DISCHARGE
Start: 2025-04-16 | End: 2025-04-18

## 2025-04-16 RX ORDER — MEMANTINE HYDROCHLORIDE 21 MG/1
1 CAPSULE, EXTENDED RELEASE ORAL
Refills: 0 | DISCHARGE

## 2025-04-16 RX ORDER — CEFEPIME 2 G/20ML
INJECTION, POWDER, FOR SOLUTION INTRAVENOUS
Refills: 0 | Status: DISCONTINUED | OUTPATIENT
Start: 2025-04-16 | End: 2025-04-17

## 2025-04-16 RX ORDER — SODIUM CHLORIDE 9 G/1000ML
1000 INJECTION, SOLUTION INTRAVENOUS
Refills: 0 | Status: DISCONTINUED | OUTPATIENT
Start: 2025-04-16 | End: 2025-04-17

## 2025-04-16 RX ORDER — SODIUM CHLORIDE 9 G/1000ML
1000 INJECTION, SOLUTION INTRAVENOUS
Refills: 0 | Status: DISCONTINUED | OUTPATIENT
Start: 2025-04-16 | End: 2025-04-16

## 2025-04-16 RX ORDER — CEFEPIME 2 G/20ML
2000 INJECTION, POWDER, FOR SOLUTION INTRAVENOUS ONCE
Refills: 0 | Status: COMPLETED | OUTPATIENT
Start: 2025-04-16 | End: 2025-04-16

## 2025-04-16 RX ADMIN — SODIUM CHLORIDE 75 MILLILITER(S): 9 INJECTION, SOLUTION INTRAVENOUS at 22:24

## 2025-04-16 RX ADMIN — Medication 190 INTERNATIONAL UNIT(S): at 22:43

## 2025-04-16 RX ADMIN — Medication 250 MILLIGRAM(S): at 14:53

## 2025-04-16 RX ADMIN — Medication 100 MILLIEQUIVALENT(S): at 22:44

## 2025-04-16 RX ADMIN — Medication 200 GRAM(S): at 14:19

## 2025-04-16 RX ADMIN — CEFEPIME 2000 MILLIGRAM(S): 2 INJECTION, POWDER, FOR SOLUTION INTRAVENOUS at 22:44

## 2025-04-16 RX ADMIN — Medication 100 MILLIGRAM(S): at 22:42

## 2025-04-16 RX ADMIN — Medication 2000 MILLILITER(S): at 14:19

## 2025-04-16 NOTE — ED ADULT TRIAGE NOTE - ISOLATION TYPE:
Arrived to PACU from OR with pressure dressing to R-groin secured with foam tape.  Dressing clean, dry and intact.  R-leg remains warm with brisk refill.  
None

## 2025-04-16 NOTE — ED PROVIDER NOTE - PHYSICAL EXAMINATION
GEN: Patient lethargic   HEENT: normocephalic, atraumatic, EOMI, no scleral icterus, dry MM  CARDIAC: RRR, S1, S2, no murmur.   PULM: CTA B/L no wheeze, rhonchi, rales.   ABD: soft NT, ND, no rebound no guarding,   MSK: Moving all extremities, no edema.  NEURO: A&Ox0, no focal neurological deficits,  SKIN: warm, dry, no rash.

## 2025-04-16 NOTE — H&P ADULT - ASSESSMENT
#Severe Hypercalcemia  - Calcium upon admission: 15.1    - When corrected for hypoalbuminemia: 16.3   - s/p 2L NS bolus in the ED with repeat of 14.5   - Start calcitonin 190 IU q12   - F/u repeat BMP     #PNA   - Will treat as HCAP as patient is from a nursing home   - Presenting with increased lethargy, elevated WBC and CXR with R sided infiltrate   - s/p IV Zosyn and vancomycin in the ED - would like to continue IV cefepime and vancomycin (Pseudomonas and MRSA coverage)   - F/u procalcitionin   - F/u blood cultures   - F/u ID consult     #Hypenatremia   - Upon admission with Na of 150   - s/p 2L NS bolus in the ED   - Will start D5 maintenance fluids   - F/u AM BMP    #Hypokalemia   - Upon admission with K+ of 3.0   - Replete and f/u AM labs     #Protein Calorie Malnutrition   - Albumin upon admission 2.5   - F/u nutrition consult     #DVT ppx   - Heparin SubQ   #Severe Hypercalcemia   - Likely due to bony metastasis from malignancy  - Calcium upon admission: 15.1    - When corrected for hypoalbuminemia: 16.3   - s/p 2L NS bolus in the ED with repeat of 14.5   - Start calcitonin 190 IU q12 and IV Zometa   - F/u repeat BMP   - F/u heme/onc consult     #PNA   - Will treat as HCAP as patient is from a nursing home   - Presenting with increased lethargy, elevated WBC and CXR with R sided infiltrate   - s/p IV Zosyn and vancomycin in the ED - would like to continue IV cefepime and vancomycin (Pseudomonas and MRSA coverage)   - F/u procalcitonin   - F/u blood cultures   - F/u ID consult     #Hypernatremia   - Upon admission with Na of 150   - s/p 2L NS bolus in the ED   - Will start D5 maintenance fluids   - F/u AM BMP    #Hypokalemia   - Upon admission with K+ of 3.0   - Replete and f/u AM labs     #Protein Calorie Malnutrition   - Albumin upon admission 2.5   - F/u nutrition consult     #DVT ppx   - Heparin SubQ

## 2025-04-16 NOTE — ED PROVIDER NOTE - CLINICAL SUMMARY MEDICAL DECISION MAKING FREE TEXT BOX
85-year-old female past ministry of dementia, breast CA status post cystectomy,  hyperlipidemia presents ED brought in by EMS from SNF for worsening altered mental status for the last 4 days.    Patient very lethargic but does respond to questions, afebrile hemodynamically stable.  Concern for worsening UTI, pneumonia, electrolyte abnormality.  Plan for septic workup, antibiotics, IV fluids.  Reassess

## 2025-04-16 NOTE — PATIENT PROFILE ADULT - HAS THE PATIENT USED TOBACCO IN THE PAST 30 DAYS?
Radiology Post-Procedure Note     Pre Op Diagnosis: Recurrent painful, tense ascites  Post Op Diagnosis: Same     Procedure: 1. US-guided percutaneous RLQ-approach therapeutic LVP     Procedure performed by: Zach Cha MD     Written Informed Consent Obtained: Yes  Specimen Removed: YES, 4,100-L of slightly cloudy, serous fluid  Estimated Blood Loss: none     Findings:   Successful US-guided percutaneous RLQ-approach therapeutic LVP with local anesthetic only and albumin infusion post PRN as indicated per institutional protocol. Patient tolerated the procedure well. No immediate post-procedural complications noted.      Thank you for considering IR for the care of your patient.      Zach Cha MD  Interventional Radiology     No

## 2025-04-16 NOTE — H&P ADULT - NSHPPHYSICALEXAM_GEN_ALL_CORE
Vital Signs Last 24 Hrs  T(C): 36.3 (16 Apr 2025 21:58), Max: 37.4 (16 Apr 2025 13:59)  T(F): 97.4 (16 Apr 2025 21:58), Max: 99.4 (16 Apr 2025 13:59)  HR: 77 (16 Apr 2025 21:58) (66 - 85)  BP: 155/63 (16 Apr 2025 21:58) (123/53 - 157/65)  BP(mean): 84 (16 Apr 2025 21:58) (74 - 100)  RR: 15 (16 Apr 2025 21:58) (15 - 18)  SpO2: 99% (16 Apr 2025 21:58) (99% - 100%)    Parameters below as of 16 Apr 2025 21:58  Patient On (Oxygen Delivery Method): nasal cannula  O2 Flow (L/min): 1    GENERAL: NAD, lethargic but can be aroused   HEAD:  Atraumatic  EYES: EOMI  ENT: dry mucous membranes; appears to have dried blood around her lower lip; possible mouth sores  CHEST/LUNG: Unlabored respirations   HEART: Regular rate and rhythm  ABDOMEN: Soft, Nontender  EXTREMITIES:  No clubbing, cyanosis, or edema  NERVOUS SYSTEM:  Alert & Oriented X 1

## 2025-04-16 NOTE — ED ADULT NURSE REASSESSMENT NOTE - NS ED NURSE REASSESS COMMENT FT1
report received from Ezekiel SOLIMAN. pt resting in bed comfortably at baseline mental status A&Ox1-2, full linen change and carolann care performed. VSS, awaiting bed placement on telemetry.

## 2025-04-16 NOTE — ED PROVIDER NOTE - OBJECTIVE STATEMENT
85-year-old female past ministry of dementia, breast CA status post cystectomy,  hyperlipidemia presents ED brought in by EMS from SNF for worsening altered mental status for the last 4 days.  Patient is currently being treated with unknown antibiotic for UTI.   Patient cannot provide history.

## 2025-04-16 NOTE — ED ADULT NURSE NOTE - NSFALLHARMRISKINTERV_ED_ALL_ED
Assistance OOB with selected safe patient handling equipment if applicable/Assistance with ambulation/Communicate risk of Fall with Harm to all staff, patient, and family/Monitor gait and stability/Provide visual cue: red socks, yellow wristband, yellow gown, etc/Reinforce activity limits and safety measures with patient and family/Bed in lowest position, wheels locked, appropriate side rails in place/Call bell, personal items and telephone in reach/Instruct patient to call for assistance before getting out of bed/chair/stretcher/Non-slip footwear applied when patient is off stretcher/[place] to call system/Physically safe environment - no spills, clutter or unnecessary equipment/Purposeful Proactive Rounding/Room/bathroom lighting operational, light cord in reach

## 2025-04-16 NOTE — PATIENT PROFILE ADULT - FALL HARM RISK - HARM RISK INTERVENTIONS
Assistance with ambulation/Assistance OOB with selected safe patient handling equipment/Communicate Risk of Fall with Harm to all staff/Discuss with provider need for PT consult/Monitor for mental status changes/Monitor gait and stability/Move patient closer to nurses' station/Provide patient with walking aids - walker, cane, crutches/Reinforce activity limits and safety measures with patient and family/Reorient to person, place and time as needed/Tailored Fall Risk Interventions/Toileting schedule using arm’s reach rule for commode and bathroom/Use of alarms - bed, chair and/or voice tab/Visual Cue: Yellow wristband and red socks/Bed in lowest position, wheels locked, appropriate side rails in place/Call bell, personal items and telephone in reach/Instruct patient to call for assistance before getting out of bed or chair/Non-slip footwear when patient is out of bed/[place] to call system/Physically safe environment - no spills, clutter or unnecessary equipment/Purposeful Proactive Rounding/Room/bathroom lighting operational, light cord in reach

## 2025-04-16 NOTE — ED PROVIDER NOTE - ED STEMI HIDDEN
Left message for patient at      Telephone Information:   Mobile [phone]    to schedule procedure.  Patient to return call to Rosa M [phone].  MD is currently booking into July.  MD rbuin ssurgery on Tues and Thurs.  Patient to call with any questions or concerns.  Rafa Leanne, to CB to [facility].       hide

## 2025-04-16 NOTE — ED ADULT TRIAGE NOTE - CHIEF COMPLAINT QUOTE
Pt A&OX1 with history of dementia. Patient responsive to voice in triage. BIBEMS with c/o lethargy. EMS reports the patient has been lethargic for the past 4 days. On antibiotics at the facility for UTI. Upon arrival patient was hypoxic and placed on 3L via nasal cannula by EMS. BGM en route was 124. IV in the right hand placed by facility.   Taken for STAT EKG and cardiac monitor. patient placed in view of nursing station

## 2025-04-16 NOTE — ED ADULT NURSE NOTE - OBJECTIVE STATEMENT
BIBEMS with c/o lethargy. EMS reports the patient has been lethargic for the past 4 days. On antibiotics at the facility for UTI. Upon arrival patient was hypoxic and placed on 3L via nasal cannula by EMS. pt placed in front of nursing station.

## 2025-04-16 NOTE — ED PROVIDER NOTE - CARE PLAN
Principal Discharge DX:	AMS (altered mental status)  Secondary Diagnosis:	Hypernatremia  Secondary Diagnosis:	Hypercalcemia   1

## 2025-04-16 NOTE — H&P ADULT - HISTORY OF PRESENT ILLNESS
Patient is an 86 y/o F with a PMH of dementia, breast cancer s/p mastectomies in 2016 with mets to bone, spinal stenosis of lumbar region, HLD and macular degeneration presenting to  ED on 4/16/25 from [place] for increased lethargy over the past 4 days. Per medication reconciliation from facility she has been receiving 1g of cefepime for a UTI. She was noted to be hypoxic upon presentation and placed on 3L NC by EMS.   Upon arrival to the ED, vitals were /71 HR 84 RR 18 T97.8F and SpO2 of 100% on 3L NC. Labs significant for WBC of 14.29, Na of 150, and K+ of 3.0. CXR with R lowerlobe infiltrate/atelectasis. She received IV Zosyn, IV Vancomycin and 2L NS bolus.

## 2025-04-17 LAB
ALBUMIN SERPL ELPH-MCNC: 2.7 G/DL — LOW (ref 3.3–5)
ALP SERPL-CCNC: 143 U/L — HIGH (ref 40–120)
ALT FLD-CCNC: 16 U/L — SIGNIFICANT CHANGE UP (ref 12–78)
ANION GAP SERPL CALC-SCNC: 2 MMOL/L — LOW (ref 5–17)
AST SERPL-CCNC: 23 U/L — SIGNIFICANT CHANGE UP (ref 15–37)
BILIRUB SERPL-MCNC: 0.9 MG/DL — SIGNIFICANT CHANGE UP (ref 0.2–1.2)
BUN SERPL-MCNC: 20 MG/DL — SIGNIFICANT CHANGE UP (ref 7–23)
CALCIUM SERPL-MCNC: 14.5 MG/DL — CRITICAL HIGH (ref 8.5–10.1)
CHLORIDE SERPL-SCNC: 112 MMOL/L — HIGH (ref 96–108)
CO2 SERPL-SCNC: 35 MMOL/L — HIGH (ref 22–31)
CREAT SERPL-MCNC: 0.74 MG/DL — SIGNIFICANT CHANGE UP (ref 0.5–1.3)
CULTURE RESULTS: SIGNIFICANT CHANGE UP
EGFR: 79 ML/MIN/1.73M2 — SIGNIFICANT CHANGE UP
EGFR: 79 ML/MIN/1.73M2 — SIGNIFICANT CHANGE UP
GLUCOSE SERPL-MCNC: 129 MG/DL — HIGH (ref 70–99)
HCT VFR BLD CALC: 39.7 % — SIGNIFICANT CHANGE UP (ref 34.5–45)
HGB BLD-MCNC: 13 G/DL — SIGNIFICANT CHANGE UP (ref 11.5–15.5)
MAGNESIUM SERPL-MCNC: 1.3 MG/DL — LOW (ref 1.6–2.6)
MCHC RBC-ENTMCNC: 31.9 PG — SIGNIFICANT CHANGE UP (ref 27–34)
MCHC RBC-ENTMCNC: 32.7 G/DL — SIGNIFICANT CHANGE UP (ref 32–36)
MCV RBC AUTO: 97.5 FL — SIGNIFICANT CHANGE UP (ref 80–100)
NRBC # BLD AUTO: 0 K/UL — SIGNIFICANT CHANGE UP (ref 0–0)
NRBC # FLD: 0 K/UL — SIGNIFICANT CHANGE UP (ref 0–0)
NRBC BLD AUTO-RTO: 0 /100 WBCS — SIGNIFICANT CHANGE UP (ref 0–0)
PHOSPHATE SERPL-MCNC: 2.2 MG/DL — LOW (ref 2.5–4.5)
PLATELET # BLD AUTO: 217 K/UL — SIGNIFICANT CHANGE UP (ref 150–400)
PMV BLD: 10.7 FL — SIGNIFICANT CHANGE UP (ref 7–13)
POTASSIUM SERPL-MCNC: 2.6 MMOL/L — CRITICAL LOW (ref 3.5–5.3)
POTASSIUM SERPL-SCNC: 2.6 MMOL/L — CRITICAL LOW (ref 3.5–5.3)
PROCALCITONIN SERPL-MCNC: 0.22 NG/ML — HIGH (ref 0.02–0.1)
PROT SERPL-MCNC: 6.5 GM/DL — SIGNIFICANT CHANGE UP (ref 6–8.3)
RBC # BLD: 4.07 M/UL — SIGNIFICANT CHANGE UP (ref 3.8–5.2)
RBC # FLD: 17.6 % — HIGH (ref 10.3–14.5)
SODIUM SERPL-SCNC: 149 MMOL/L — HIGH (ref 135–145)
SPECIMEN SOURCE: SIGNIFICANT CHANGE UP
WBC # BLD: 15.21 K/UL — HIGH (ref 3.8–10.5)
WBC # FLD AUTO: 15.21 K/UL — HIGH (ref 3.8–10.5)

## 2025-04-17 PROCEDURE — 99232 SBSQ HOSP IP/OBS MODERATE 35: CPT

## 2025-04-17 RX ORDER — PIPERACILLIN-TAZO-DEXTROSE,ISO 2.25G/50ML
3.38 IV SOLUTION, PIGGYBACK PREMIX FROZEN(ML) INTRAVENOUS EVERY 8 HOURS
Refills: 0 | Status: COMPLETED | OUTPATIENT
Start: 2025-04-17 | End: 2025-04-21

## 2025-04-17 RX ORDER — SODIUM CHLORIDE 9 G/1000ML
1000 INJECTION, SOLUTION INTRAVENOUS
Refills: 0 | Status: DISCONTINUED | OUTPATIENT
Start: 2025-04-17 | End: 2025-04-18

## 2025-04-17 RX ORDER — MAGNESIUM SULFATE 500 MG/ML
2 SYRINGE (ML) INJECTION ONCE
Refills: 0 | Status: COMPLETED | OUTPATIENT
Start: 2025-04-17 | End: 2025-04-17

## 2025-04-17 RX ORDER — PIPERACILLIN-TAZO-DEXTROSE,ISO 2.25G/50ML
3.38 IV SOLUTION, PIGGYBACK PREMIX FROZEN(ML) INTRAVENOUS ONCE
Refills: 0 | Status: COMPLETED | OUTPATIENT
Start: 2025-04-17 | End: 2025-04-17

## 2025-04-17 RX ORDER — SOD PHOS DI, MONO/K PHOS MONO 250 MG
1 TABLET ORAL ONCE
Refills: 0 | Status: COMPLETED | OUTPATIENT
Start: 2025-04-17 | End: 2025-04-17

## 2025-04-17 RX ADMIN — Medication 40 MILLIEQUIVALENT(S): at 12:16

## 2025-04-17 RX ADMIN — Medication 1 PACKET(S): at 16:51

## 2025-04-17 RX ADMIN — Medication 25 GRAM(S): at 09:55

## 2025-04-17 RX ADMIN — SODIUM CHLORIDE 75 MILLILITER(S): 9 INJECTION, SOLUTION INTRAVENOUS at 14:21

## 2025-04-17 RX ADMIN — Medication 190 INTERNATIONAL UNIT(S): at 10:19

## 2025-04-17 RX ADMIN — HEPARIN SODIUM 5000 UNIT(S): 1000 INJECTION INTRAVENOUS; SUBCUTANEOUS at 09:46

## 2025-04-17 RX ADMIN — ZOLEDRONIC ACID 420 MILLIGRAM(S): 0.05 INJECTION, SOLUTION INTRAVENOUS at 00:15

## 2025-04-17 RX ADMIN — Medication 25 GRAM(S): at 22:41

## 2025-04-17 RX ADMIN — Medication 100 MILLIEQUIVALENT(S): at 08:34

## 2025-04-17 RX ADMIN — Medication 100 MILLIEQUIVALENT(S): at 09:41

## 2025-04-17 RX ADMIN — Medication 100 MILLIGRAM(S): at 09:54

## 2025-04-17 RX ADMIN — Medication 25 GRAM(S): at 16:51

## 2025-04-17 RX ADMIN — SODIUM CHLORIDE 75 MILLILITER(S): 9 INJECTION, SOLUTION INTRAVENOUS at 16:13

## 2025-04-17 RX ADMIN — HEPARIN SODIUM 5000 UNIT(S): 1000 INJECTION INTRAVENOUS; SUBCUTANEOUS at 22:40

## 2025-04-17 RX ADMIN — Medication 200 GRAM(S): at 13:15

## 2025-04-17 RX ADMIN — Medication 100 MILLIEQUIVALENT(S): at 07:40

## 2025-04-17 RX ADMIN — CEFEPIME 2000 MILLIGRAM(S): 2 INJECTION, POWDER, FOR SOLUTION INTRAVENOUS at 05:00

## 2025-04-17 NOTE — DIETITIAN NUTRITION RISK NOTIFICATION - ADDITIONAL COMMENTS/DIETITIAN RECOMMENDATIONS
1) C/w Puree diet and consider SLP eval to assess for safest/ least restrictive diet consistency/ texture   - advance diet to Regular + SLP recs  2) Add ensure plus high protein TID to optimize PO intake (provides 350 kcal, 20g protein/ shake)  3) Obtain vitamin D 25OH level to assess nutriture  4) Please obtain weekly weights  5) Consider adding thiamine 200 mg daily 2/2 poor PO intake/ malnutrition   6) MVI w/ minerals daily to ensure 100% RDA met  7) Encourage protein-rich foods, maximize food preferences  8) Monitor bowel movements, if no BM for >3 days, consider implementing bowel regimen.  9) Monitor closely for refeeding syndrome - please obtain BMP, Mg, and Phos levels  10) Consider adding appetite stimulant such as Remeron or Marinol 2/2 chronically poor appetite/ PO intake  11) Confirm goals of care regarding nutrition support - Nutrition support is not recommended due to overall declining medical status which evidenced based studies indicate EN is not effective in prolonging survival and improving quality of life. It can also increase risk of aspiration pneumonia as well as other related issues (infection, GI complications, and worsening/ non-healing PI's). However, will provide nutrition/ hydration within GOC.  Please see additional recommendations below.

## 2025-04-17 NOTE — DIETITIAN INITIAL EVALUATION ADULT - FUNCTIONAL SCREEN CURRENT LEVEL: SWALLOWING (IF SCORE 2 OR MORE FOR ANY ITEM, CONSULT REHAB SERVICES), MLM)
Ear Wedge Repair Text: A wedge excision was completed by carrying down an excision through the full thickness of the ear and cartilage with an inward facing Burow's triangle. The wound was then closed in a layered fashion. 2 = difficulty swallowing foods

## 2025-04-17 NOTE — DIETITIAN INITIAL EVALUATION ADULT - OTHER INFO
86 y/o F with a PMHx of dementia, breast cancer s/p mastectomies in 2016 with mets to bone, spinal stenosis of lumbar region, HLD and macular degeneration presented to  ED from [place] for increased lethargy over the past 4 days. Per medication reconciliation from facility she has been receiving 1g of cefepime for a UTI. She was noted to be hypoxic upon presentation and placed on 3L NC by EMS. CXR with RLL infiltrate/atelectasis. Admitted for severe hypercalcemia, PNA, PCM, and hypernatremia. Pt is DNR/DNI, however TF IS NOT ADDRESSED IN [facility]OL[facility].    Unable to obtain meaningful information 2/2 pt's mental status. RD obtained bedscale wt on 4/17 - 112#. Weight hx reviewed: 128# (taken by RD on 1/7/25; met criteria for moderate malnutrition), 122# (taken by RD on 1/14/24; met criteria for moderate malnutrition); weight loss of 16# (12.5%) x 3 mon - clinsig and severe. Pt thin, frail, martin appearing. NFPE reveals moderate to severe muscle/ fat wasting, pt continues to meet criteria for PCM at this time. Albumin is NOT an indicator for malnutrition - it is an acute-phase reactant protein, meaning that it will be decreased in the presence of inflammation. Chronic diseases (such as cancer, heart disease, hepatic failure), over- or underhydration, infection, surgery, trauma, burns, ascites, and nephrotic syndrome will all skew albumin levels making it an inaccurate measure of nutritional status. C/w Puree diet and consider SLP eval to assess for safest/ least restrictive diet consistency/ texture. Recommend to advance diet to Regular + SLP recs. Will add ensure plus high protein TID in effort to optimize PO intake. Monitor closely for refeeding syndrome - please obtain BMP, Mg, and Phos levels. Monitor and replete K, Phos, Mg prn if begins to downtrend. If nutrition support is initiated, recommend to check refeeding labs BID x 2-3 days upon initiation and then will reevaluate. Consider KPhos suppl BID in effort to supplement low lytes prior to initiating nutrition support. Consider adding 200mg of thiamine and MVI w/ minerals daily. Please see additional recommendations below.

## 2025-04-17 NOTE — DIETITIAN INITIAL EVALUATION ADULT - ORAL INTAKE PTA/DIET HISTORY
Unable to obtain intake/ diet hx pta 2/2 pt's mental status. From [facility]: Regular diet w/ thin liquids and Boost TID.

## 2025-04-17 NOTE — PROGRESS NOTE ADULT - SUBJECTIVE AND OBJECTIVE BOX
HOSPITALIST ATTENDING PROGRESS NOTE    Chart and meds reviewed.      Subjective: Patient seen and examined. patient remains confused. Discussed with heme/onc, s/p calcitonin and zometa. Will continue to monitor mental status. Continue IV abx.       Additional results/Imaging, I have personally reviewed:    LABS:                            13.0   15.21 )-----------( 217      ( 17 Apr 2025 05:40 )             39.7     04-17    149[H]  |  112[H]  |  20  ----------------------------<  129[H]  2.6[LL]   |  35[H]  |  0.74    Ca    14.5[HH]      17 Apr 2025 05:40  Phos  2.2     04-17  Mg     1.3     04-17    TPro  6.5  /  Alb  2.7[L]  /  TBili  0.9  /  DBili  x   /  AST  23  /  ALT  16  /  AlkPhos  143[H]  04-17        LIVER FUNCTIONS - ( 17 Apr 2025 05:40 )  Alb: 2.7 g/dL / Pro: 6.5 gm/dL / ALK PHOS: 143 U/L / ALT: 16 U/L / AST: 23 U/L / GGT: x           PT/INR - ( 16 Apr 2025 13:38 )   PT: 12.5 sec;   INR: 1.09 ratio         PTT - ( 16 Apr 2025 13:38 )  PTT:27.2 sec  Urinalysis Basic - ( 17 Apr 2025 05:40 )    Color: x / Appearance: x / SG: x / pH: x  Gluc: 129 mg/dL / Ketone: x  / Bili: x / Urobili: x   Blood: x / Protein: x / Nitrite: x   Leuk Esterase: x / RBC: x / WBC x   Sq Epi: x / Non Sq Epi: x / Bacteria: x              All other systems reviewed and found to be negative with the exception of what has been described above.    MEDICATIONS  (STANDING):  dextrose 5%. 1000 milliLiter(s) (75 mL/Hr) IV Continuous <Continuous>  heparin   Injectable 5000 Unit(s) SubCutaneous every 12 hours  piperacillin/tazobactam IVPB.- 3.375 Gram(s) IV Intermittent once  piperacillin/tazobactam IVPB.. 3.375 Gram(s) IV Intermittent every 8 hours    MEDICATIONS  (PRN):      VITALS:  T(F): 97.5 (04-17-25 @ 12:11), Max: 98 (04-16-25 @ 16:30)  HR: 67 (04-17-25 @ 12:11) (55 - 90)  BP: 134/53 (04-17-25 @ 12:11) (91/31 - 155/77)  RR: 18 (04-17-25 @ 12:11) (15 - 18)  SpO2: 100% (04-17-25 @ 12:11) (97% - 100%)  Wt(kg): --    I&O's Summary    16 Apr 2025 07:01  -  17 Apr 2025 07:00  --------------------------------------------------------  IN: 100 mL / OUT: 0 mL / NET: 100 mL    17 Apr 2025 07:01  -  17 Apr 2025 15:07  --------------------------------------------------------  IN: 1425 mL / OUT: 150 mL / NET: 1275 mL        CAPILLARY BLOOD GLUCOSE          PHYSICAL EXAM:  GENERAL: NAD, lethargic but can be aroused   HEAD:  Atraumatic  EYES: EOMI  ENT: dry mucous membranes; appears to have dried blood around her lower lip; possible mouth sores  CHEST/LUNG: Unlabored respirations   HEART: Regular rate and rhythm  ABDOMEN: Soft, Nontender  EXTREMITIES:  No clubbing, cyanosis, or edema  NERVOUS SYSTEM:  Alert & Oriented X 1      CULTURES:      Telemetry, personally reviewed

## 2025-04-17 NOTE — PROGRESS NOTE ADULT - ASSESSMENT
#Severe Hypercalcemia   - Likely due to bony metastasis from malignancy  - Calcium upon admission: 15.1    - When corrected for hypoalbuminemia: 16.3   - s/p 2L NS bolus in the ED with repeat of 14.5   - S/P calcitonin 190 IU q12 and IV Zometa   -  heme/onc consulted recommendations appreciated      #PNA   - Will treat as HCAP as patient is from a nursing home   - Presenting with increased lethargy, elevated WBC and CXR with R sided infiltrate   Seen by ID, recommendations appreciated   Transitioned to Zosyn   - F/u blood cultures   - F/u ID consult     #Hypernatremia   - Upon admission with Na of 150   - s/p 2L NS bolus in the ED   - continue D5W maintenance fluids   - F/u AM BMP    #Hypokalemia   - Upon admission with K+ of 3.0   - Replete and f/u AM labs     #Protein Calorie Malnutrition   - Albumin upon admission 2.5   - F/u nutrition consult     #DVT ppx   - Heparin SubQ

## 2025-04-17 NOTE — PHYSICAL THERAPY INITIAL EVALUATION ADULT - PERTINENT HX OF CURRENT PROBLEM, REHAB EVAL
pt admitted to  on 4/16/25 secondary to AMS, SOB. H/o dementia, breast CA with mets to bone. Pt from [place] [facility]. Pt confused, not following commands at this time.

## 2025-04-17 NOTE — PHYSICAL THERAPY INITIAL EVALUATION ADULT - MODALITIES TREATMENT COMMENTS
Attempted to sit patient up at EOB, Pt resisting therapist. Not following commands at this time. Confused.

## 2025-04-17 NOTE — DIETITIAN INITIAL EVALUATION ADULT - PERTINENT MEDS FT
MEDICATIONS  (STANDING):  cefepime  Injectable. 2000 milliGRAM(s) IV Push every 12 hours  cefepime  Injectable.      dextrose 5%. 1000 milliLiter(s) (75 mL/Hr) IV Continuous <Continuous>  heparin   Injectable 5000 Unit(s) SubCutaneous every 12 hours  potassium chloride    Tablet ER 40 milliEquivalent(s) Oral once  vancomycin  IVPB 500 milliGRAM(s) IV Intermittent every 12 hours    Home Medications:  acetaminophen 325 mg oral tablet: 2 tab(s) orally every 6 hours As needed Temp greater or equal to 38C (100.4F), Mild Pain (1 - 3) (16 Apr 2025 16:03)  atorvastatin 20 mg oral tablet: 1 tab(s) orally once a day (at bedtime) (16 Apr 2025 16:02)  calcitonin 200 intl units/mL injectable solution: 100 unit(s) subcutaneously every 12 hours for 2 days (16 Apr 2025 16:05)  cefepime 1 g intravenous injection: 1 gram(s) intravenously every 8 hours for 7 days for Cystitis (16 Apr 2025 16:05)  DULoxetine 60 mg oral delayed release capsule: 1 cap(s) orally once a day (16 Apr 2025 16:03)  memantine 10 mg oral tablet: 1 tab(s) orally once a day (at bedtime) (16 Apr 2025 16:08)  polyethylene glycol 3350 oral powder for reconstitution: 17 gram(s) orally 2 times a day (16 Apr 2025 16:03)  senna leaf extract oral tablet: 2 tab(s) orally once a day (at bedtime) (16 Apr 2025 16:03)  traZODone 50 mg oral tablet: orally once a day (at bedtime) (16 Apr 2025 16:08)

## 2025-04-17 NOTE — DIETITIAN INITIAL EVALUATION ADULT - PERTINENT LABORATORY DATA
04-17    149[H]  |  112[H]  |  20  ----------------------------<  129[H]  2.6[LL]   |  35[H]  |  0.74    Ca    14.5[HH]      17 Apr 2025 05:40  Phos  2.2     04-17  Mg     1.3     04-17    TPro  6.5  /  Alb  2.7[L]  /  TBili  0.9  /  DBili  x   /  AST  23  /  ALT  16  /  AlkPhos  143[H]  04-17    Vitamin B12, Serum: >2000 pg/mL (01-07-25 @ 07:14)

## 2025-04-17 NOTE — DIETITIAN INITIAL EVALUATION ADULT - CALCULATED FROM (CAL/KG)
Patient admitted to PACU # 8 from OR at 1453 post ROBOTIC-ASSISTED [address] [place] [address]  per Dr. El Flores. Attached to PACU monitoring system and report received from anesthesia provider. Patient was reported to be hemodynamically stable during procedure. Patient drowsy on admission and denied pain.
Patient up to chair with no complications. Patient has no complaints of dizziness at this time. Patient tolerating food and drink well. Patient does not have any complaints of nausea. Patient's visitor Yanet Garrett verbalized understanding of discharge instructions. They have no questions at this time.
[id number]

## 2025-04-17 NOTE — DIETITIAN INITIAL EVALUATION ADULT - NSFNSGIIOFT_GEN_A_CORE
I&O's Detail    16 Apr 2025 07:01  -  17 Apr 2025 07:00  --------------------------------------------------------  IN:    IV PiggyBack: 100 mL  Total IN: 100 mL    OUT:  Total OUT: 0 mL    Total NET: 100 mL      17 Apr 2025 07:01  -  17 Apr 2025 11:12  --------------------------------------------------------  IN:    dextrose 5%: 375 mL    IV PiggyBack: 350 mL  Total IN: 725 mL    OUT:    Voided (mL): 700 mL  Total OUT: 700 mL    Total NET: 25 mL

## 2025-04-17 NOTE — DIETITIAN INITIAL EVALUATION ADULT - NAME AND PHONE
Lachelle Edwards, MS, RDN, CDN, [facility] [phone]  Certified Nutrition

## 2025-04-18 LAB
ALBUMIN SERPL ELPH-MCNC: 2.3 G/DL — LOW (ref 3.3–5)
ALP SERPL-CCNC: 127 U/L — HIGH (ref 40–120)
ALT FLD-CCNC: 14 U/L — SIGNIFICANT CHANGE UP (ref 12–78)
ANION GAP SERPL CALC-SCNC: 6 MMOL/L — SIGNIFICANT CHANGE UP (ref 5–17)
AST SERPL-CCNC: 24 U/L — SIGNIFICANT CHANGE UP (ref 15–37)
BILIRUB SERPL-MCNC: 0.8 MG/DL — SIGNIFICANT CHANGE UP (ref 0.2–1.2)
BUN SERPL-MCNC: 18 MG/DL — SIGNIFICANT CHANGE UP (ref 7–23)
CALCIUM SERPL-MCNC: 12.4 MG/DL — HIGH (ref 8.5–10.1)
CHLORIDE SERPL-SCNC: 105 MMOL/L — SIGNIFICANT CHANGE UP (ref 96–108)
CO2 SERPL-SCNC: 31 MMOL/L — SIGNIFICANT CHANGE UP (ref 22–31)
CREAT SERPL-MCNC: 0.83 MG/DL — SIGNIFICANT CHANGE UP (ref 0.5–1.3)
EGFR: 69 ML/MIN/1.73M2 — SIGNIFICANT CHANGE UP
EGFR: 69 ML/MIN/1.73M2 — SIGNIFICANT CHANGE UP
GLUCOSE SERPL-MCNC: 120 MG/DL — HIGH (ref 70–99)
HCT VFR BLD CALC: 33.7 % — LOW (ref 34.5–45)
HGB BLD-MCNC: 11.4 G/DL — LOW (ref 11.5–15.5)
MAGNESIUM SERPL-MCNC: 1.6 MG/DL — SIGNIFICANT CHANGE UP (ref 1.6–2.6)
MCHC RBC-ENTMCNC: 32.7 PG — SIGNIFICANT CHANGE UP (ref 27–34)
MCHC RBC-ENTMCNC: 33.8 G/DL — SIGNIFICANT CHANGE UP (ref 32–36)
MCV RBC AUTO: 96.6 FL — SIGNIFICANT CHANGE UP (ref 80–100)
NRBC # BLD AUTO: 0 K/UL — SIGNIFICANT CHANGE UP (ref 0–0)
NRBC # FLD: 0 K/UL — SIGNIFICANT CHANGE UP (ref 0–0)
NRBC BLD AUTO-RTO: 0 /100 WBCS — SIGNIFICANT CHANGE UP (ref 0–0)
PLATELET # BLD AUTO: 194 K/UL — SIGNIFICANT CHANGE UP (ref 150–400)
PMV BLD: 11.1 FL — SIGNIFICANT CHANGE UP (ref 7–13)
POTASSIUM SERPL-MCNC: 2.7 MMOL/L — CRITICAL LOW (ref 3.5–5.3)
POTASSIUM SERPL-SCNC: 2.7 MMOL/L — CRITICAL LOW (ref 3.5–5.3)
PROT SERPL-MCNC: 5.7 GM/DL — LOW (ref 6–8.3)
RBC # BLD: 3.49 M/UL — LOW (ref 3.8–5.2)
RBC # FLD: 17.5 % — HIGH (ref 10.3–14.5)
SODIUM SERPL-SCNC: 142 MMOL/L — SIGNIFICANT CHANGE UP (ref 135–145)
WBC # BLD: 14.62 K/UL — HIGH (ref 3.8–10.5)
WBC # FLD AUTO: 14.62 K/UL — HIGH (ref 3.8–10.5)

## 2025-04-18 PROCEDURE — 99233 SBSQ HOSP IP/OBS HIGH 50: CPT

## 2025-04-18 RX ORDER — MAGNESIUM SULFATE 500 MG/ML
1 SYRINGE (ML) INJECTION ONCE
Refills: 0 | Status: COMPLETED | OUTPATIENT
Start: 2025-04-18 | End: 2025-04-18

## 2025-04-18 RX ADMIN — Medication 25 GRAM(S): at 05:31

## 2025-04-18 RX ADMIN — Medication 25 GRAM(S): at 21:39

## 2025-04-18 RX ADMIN — Medication 100 MILLIEQUIVALENT(S): at 11:25

## 2025-04-18 RX ADMIN — Medication 100 GRAM(S): at 09:29

## 2025-04-18 RX ADMIN — Medication 100 MILLIEQUIVALENT(S): at 10:17

## 2025-04-18 RX ADMIN — Medication 25 GRAM(S): at 13:01

## 2025-04-18 RX ADMIN — HEPARIN SODIUM 5000 UNIT(S): 1000 INJECTION INTRAVENOUS; SUBCUTANEOUS at 08:41

## 2025-04-18 RX ADMIN — HEPARIN SODIUM 5000 UNIT(S): 1000 INJECTION INTRAVENOUS; SUBCUTANEOUS at 21:38

## 2025-04-18 RX ADMIN — Medication 100 MILLIEQUIVALENT(S): at 09:29

## 2025-04-18 NOTE — PROGRESS NOTE ADULT - ASSESSMENT
84 y/o Female with h/o dementia, breast cancer s/p mastectomies in 2016 with mets to bone, spinal stenosis of lumbar region, HLD and macular degeneration was admitted on 4/16/25 from [place] for increased lethargy over the past 4 days. Per medication reconciliation from facility she has been receiving cefepime for a UTI. She was noted to be hypoxic upon presentation and placed on 3L NC by EMS. In ED, vitals were /71 HR 84 RR 18 T97.8F and SpO2 of 100% on 3L NC. Labs significant for WBC of 14.29, Na of 150. She received IV Zosyn, IV Vancomycin and 2L NS bolus.     #RLL pneumonia. Probable aspiration pneumonia  #Pyuria. Likely UTI  #Leukocytosis  #Metabolic encephalopathy  -BC x 2, urine c/s noted  -on zosyn 3.375 gm IV q8h # 2  -tolerating abx well so far; no side effects noted  -respiratory care  -continue abx coverage  -f/u cultures  -monitor temps  -f/u CBC  -supportive care  2. Other issues:   -care per medicine    d/w medicine team

## 2025-04-18 NOTE — PROGRESS NOTE ADULT - SUBJECTIVE AND OBJECTIVE BOX
HOSPITALIST ATTENDING PROGRESS NOTE    Chart and meds reviewed.      Subjective: Patient seen and examined. Resting comfortably still confused calcium down to 12.4. Continue IV abx. Transition to NS. Sodium 142      Additional results/Imaging, I have personally reviewed:    LABS:                            11.4   14.62 )-----------( 194      ( 18 Apr 2025 07:52 )             33.7     04-18    142  |  105  |  18  ----------------------------<  120[H]  2.7[LL]   |  31  |  0.83    Ca    12.4[H]      18 Apr 2025 07:52  Phos  2.2     04-17  Mg     1.6     04-18    TPro  5.7[L]  /  Alb  2.3[L]  /  TBili  0.8  /  DBili  x   /  AST  24  /  ALT  14  /  AlkPhos  127[H]  04-18        LIVER FUNCTIONS - ( 18 Apr 2025 07:52 )  Alb: 2.3 g/dL / Pro: 5.7 gm/dL / ALK PHOS: 127 U/L / ALT: 14 U/L / AST: 24 U/L / GGT: x             Urinalysis Basic - ( 18 Apr 2025 07:52 )    Color: x / Appearance: x / SG: x / pH: x  Gluc: 120 mg/dL / Ketone: x  / Bili: x / Urobili: x   Blood: x / Protein: x / Nitrite: x   Leuk Esterase: x / RBC: x / WBC x   Sq Epi: x / Non Sq Epi: x / Bacteria: x              All other systems reviewed and found to be negative with the exception of what has been described above.    MEDICATIONS  (STANDING):  dextrose 5%. 1000 milliLiter(s) (75 mL/Hr) IV Continuous <Continuous>  heparin   Injectable 5000 Unit(s) SubCutaneous every 12 hours  piperacillin/tazobactam IVPB.. 3.375 Gram(s) IV Intermittent every 8 hours    MEDICATIONS  (PRN):      VITALS:  T(F): 97.2 (04-18-25 @ 12:02), Max: 98.4 (04-18-25 @ 00:09)  HR: 59 (04-18-25 @ 12:02) (59 - 68)  BP: 107/88 (04-18-25 @ 12:02) (107/88 - 135/52)  RR: 18 (04-18-25 @ 12:02) (16 - 18)  SpO2: 92% (04-18-25 @ 12:02) (92% - 100%)  Wt(kg): --    I&O's Summary    17 Apr 2025 07:01  -  18 Apr 2025 07:00  --------------------------------------------------------  IN: 2725 mL / OUT: 870 mL / NET: 1855 mL    18 Apr 2025 07:01  -  18 Apr 2025 13:48  --------------------------------------------------------  IN: 1300 mL / OUT: 250 mL / NET: 1050 mL        CAPILLARY BLOOD GLUCOSE          PHYSICAL EXAM:  GENERAL: NAD, lethargic but can be aroused   HEAD:  Atraumatic  EYES: EOMI  ENT: dry mucous membranes; appears to have dried blood around her lower lip; possible mouth sores  CHEST/LUNG: Unlabored respirations   HEART: Regular rate and rhythm  ABDOMEN: Soft, Nontender  EXTREMITIES:  No clubbing, cyanosis, or edema  NERVOUS SYSTEM:  Alert & Oriented X 1        CULTURES:      Telemetry, personally reviewed

## 2025-04-18 NOTE — PROGRESS NOTE ADULT - ASSESSMENT
#Severe Hypercalcemia   - Likely due to bony metastasis from malignancy  - Calcium upon admission: 15.1    - When corrected for hypoalbuminemia: 16.3   - s/p 2L NS bolus in the ED with repeat of 14.5   - S/P calcitonin 190 IU q12 and IV Zometa   -  heme/onc consulted recommendations appreciated    Continue IV Fluids     #PNA   - Will treat as HCAP as patient is from a nursing home   - Presenting with increased lethargy, elevated WBC and CXR with R sided infiltrate   Seen by ID, recommendations appreciated   Transitioned to Zosyn   - F/u blood cultures   - F/u ID consult     #Hypernatremia   - Upon admission with Na of 150   - s/p 2L NS bolus in the ED   DC D5w  - Continue restart NS  -resolved       #Hypokalemia   - Upon admission with K+ of 3.0   - Replete and f/u AM labs     #Protein Calorie Malnutrition   - Albumin upon admission 2.5   - F/u nutrition consult     GOC  Consult Pall Care  Poor prognosis   DNR/DNI     #DVT ppx   - Heparin SubQ

## 2025-04-18 NOTE — PROGRESS NOTE ADULT - SUBJECTIVE AND OBJECTIVE BOX
Date of service: 04-18-25 @ 12:54    Lying in bed in NAD  Weak looking  Dry cough  No SOB at rest    ROS: no fever or chills; denies dizziness, no HA, no SOB or cough, no abdominal pain, no diarrhea or constipation; no dysuria, no legs pain, no rashes    MEDICATIONS  (STANDING):  dextrose 5%. 1000 milliLiter(s) (75 mL/Hr) IV Continuous <Continuous>  heparin   Injectable 5000 Unit(s) SubCutaneous every 12 hours  piperacillin/tazobactam IVPB.. 3.375 Gram(s) IV Intermittent every 8 hours    Vital Signs Last 24 Hrs  T(C): 36.2 (18 Apr 2025 12:02), Max: 36.9 (18 Apr 2025 00:09)  T(F): 97.2 (18 Apr 2025 12:02), Max: 98.4 (18 Apr 2025 00:09)  HR: 59 (18 Apr 2025 12:02) (59 - 68)  BP: 107/88 (18 Apr 2025 12:02) (107/88 - 135/52)  BP(mean): --  RR: 18 (18 Apr 2025 12:02) (16 - 18)  SpO2: 92% (18 Apr 2025 12:02) (92% - 100%)    Parameters below as of 18 Apr 2025 12:02  Patient On (Oxygen Delivery Method): room air     Physical exam:    Constitutional:  No acute distress  HEENT: NC/AT, EOMI, PERRLA, conjunctivae clear; ears and nose atraumatic; pharynx benign  Neck: supple; thyroid not palpable  Back: no tenderness  Respiratory: respiratory effort normal; clear to auscultation  Cardiovascular: S1S2 regular, no murmurs  Abdomen: soft, not tender, not distended, positive BS; no liver or spleen organomegaly  Genitourinary: no suprapubic tenderness  Lymphatic: no LN palpable  Musculoskeletal: no muscle tenderness, no joint swelling or tenderness  Extremities: no pedal edema  Neurological/ Psychiatric: AxOx3, judgement and insight normal; moving all extremities  Skin: no rashes; no palpable lesions    Labs: all available labs reviewed                        13.0   15.21 )-----------( 217      ( 17 Apr 2025 05:40 )             39.7     04-17    149[H]  |  112[H]  |  20  ----------------------------<  129[H]  2.6[LL]   |  35[H]  |  0.74    Ca    14.5[HH]      17 Apr 2025 05:40  Phos  2.2     04-17  Mg     1.3     04-17    TPro  6.5  /  Alb  2.7[L]  /  TBili  0.9  /  DBili  x   /  AST  23  /  ALT  16  /  AlkPhos  143[H]  04-17     LIVER FUNCTIONS - ( 17 Apr 2025 05:40 )  Alb: 2.7 g/dL / Pro: 6.5 gm/dL / ALK PHOS: 143 U/L / ALT: 16 U/L / AST: 23 U/L / GGT: x           Urinalysis with Rflx Culture (04-16 @ 13:54)  Urine Appearance: Clear  Protein, Urine: 30 mg/dL    Urine Microscopic-Add On (NC) (04-16 @ 13:54)  White Blood Cell - Urine: 36 /HPF  Red Blood Cell - Urine: 60 /HPF    Urinalysis with Rflx Culture (collected 16 Apr 2025 13:54)    Culture - Urine (collected 16 Apr 2025 13:54)  Source: Urine None  Final Report (17 Apr 2025 14:28):    <10,000 CFU/mL Normal Urogenital Yvonne    Culture - Blood (collected 16 Apr 2025 13:38)  Source: Blood Blood-Peripheral  Preliminary Report (17 Apr 2025 19:02):    No growth at 24 hours    Culture - Blood (collected 16 Apr 2025 13:38)  Source: Blood Blood-Peripheral  Preliminary Report (17 Apr 2025 19:02):    No growth at 24 hours    Radiology: all available radiological tests reviewed    < from: Xray Chest 1 View- PORTABLE-Urgent (04.16.25 @ 14:14) >  IMPRESSION: Right lower lobe infiltrate/atelectasis.  < end of copied text >      Advanced directives addressed: full resuscitation

## 2025-04-18 NOTE — PROVIDER CONTACT NOTE (CRITICAL VALUE NOTIFICATION) - BACKGROUND
Patient admitted for lethargy, hypercalcemia, hypokalemia, hypomagnesemia, hypernatremia, and pneumonia. Hx. of breast cancer - b/l breast mastectomy, osteoarthritis, spinal stenosis of lumbar region, GERD.

## 2025-04-19 LAB
ANION GAP SERPL CALC-SCNC: 5 MMOL/L — SIGNIFICANT CHANGE UP (ref 5–17)
BUN SERPL-MCNC: 22 MG/DL — SIGNIFICANT CHANGE UP (ref 7–23)
CALCIUM SERPL-MCNC: 11.9 MG/DL — HIGH (ref 8.5–10.1)
CHLORIDE SERPL-SCNC: 105 MMOL/L — SIGNIFICANT CHANGE UP (ref 96–108)
CO2 SERPL-SCNC: 31 MMOL/L — SIGNIFICANT CHANGE UP (ref 22–31)
CREAT SERPL-MCNC: 0.86 MG/DL — SIGNIFICANT CHANGE UP (ref 0.5–1.3)
EGFR: 66 ML/MIN/1.73M2 — SIGNIFICANT CHANGE UP
EGFR: 66 ML/MIN/1.73M2 — SIGNIFICANT CHANGE UP
GLUCOSE SERPL-MCNC: 89 MG/DL — SIGNIFICANT CHANGE UP (ref 70–99)
HCT VFR BLD CALC: 34.3 % — LOW (ref 34.5–45)
HGB BLD-MCNC: 11.7 G/DL — SIGNIFICANT CHANGE UP (ref 11.5–15.5)
MAGNESIUM SERPL-MCNC: 1.9 MG/DL — SIGNIFICANT CHANGE UP (ref 1.6–2.6)
MCHC RBC-ENTMCNC: 32.7 PG — SIGNIFICANT CHANGE UP (ref 27–34)
MCHC RBC-ENTMCNC: 34.1 G/DL — SIGNIFICANT CHANGE UP (ref 32–36)
MCV RBC AUTO: 95.8 FL — SIGNIFICANT CHANGE UP (ref 80–100)
NRBC # BLD AUTO: 0.02 K/UL — HIGH (ref 0–0)
NRBC # FLD: 0.02 K/UL — HIGH (ref 0–0)
NRBC BLD AUTO-RTO: 0 /100 WBCS — SIGNIFICANT CHANGE UP (ref 0–0)
PLATELET # BLD AUTO: 198 K/UL — SIGNIFICANT CHANGE UP (ref 150–400)
PMV BLD: 11.1 FL — SIGNIFICANT CHANGE UP (ref 7–13)
POTASSIUM SERPL-MCNC: 2.8 MMOL/L — CRITICAL LOW (ref 3.5–5.3)
POTASSIUM SERPL-SCNC: 2.8 MMOL/L — CRITICAL LOW (ref 3.5–5.3)
RBC # BLD: 3.58 M/UL — LOW (ref 3.8–5.2)
RBC # FLD: 17.3 % — HIGH (ref 10.3–14.5)
SODIUM SERPL-SCNC: 141 MMOL/L — SIGNIFICANT CHANGE UP (ref 135–145)
WBC # BLD: 13.18 K/UL — HIGH (ref 3.8–10.5)
WBC # FLD AUTO: 13.18 K/UL — HIGH (ref 3.8–10.5)

## 2025-04-19 PROCEDURE — 99233 SBSQ HOSP IP/OBS HIGH 50: CPT

## 2025-04-19 RX ORDER — MAGNESIUM SULFATE 500 MG/ML
1 SYRINGE (ML) INJECTION ONCE
Refills: 0 | Status: COMPLETED | OUTPATIENT
Start: 2025-04-19 | End: 2025-04-19

## 2025-04-19 RX ADMIN — Medication 25 GRAM(S): at 21:36

## 2025-04-19 RX ADMIN — Medication 100 MILLIEQUIVALENT(S): at 11:49

## 2025-04-19 RX ADMIN — Medication 25 GRAM(S): at 16:10

## 2025-04-19 RX ADMIN — Medication 100 MILLIEQUIVALENT(S): at 09:54

## 2025-04-19 RX ADMIN — Medication 25 GRAM(S): at 05:43

## 2025-04-19 RX ADMIN — Medication 100 MILLIEQUIVALENT(S): at 08:45

## 2025-04-19 RX ADMIN — HEPARIN SODIUM 5000 UNIT(S): 1000 INJECTION INTRAVENOUS; SUBCUTANEOUS at 09:56

## 2025-04-19 RX ADMIN — Medication 100 GRAM(S): at 14:35

## 2025-04-19 RX ADMIN — HEPARIN SODIUM 5000 UNIT(S): 1000 INJECTION INTRAVENOUS; SUBCUTANEOUS at 21:35

## 2025-04-19 NOTE — PROGRESS NOTE ADULT - SUBJECTIVE AND OBJECTIVE BOX
Patient is a 85y old  Female who presents with a chief complaint of confusion and hypercalcemia      HPI:  Patient is an 84 y/o F with a PMH of dementia, breast cancer s/p mastectomies in 2016 with mets to bone,   she is s/p multiple lines of endocrine therapies with disease progression. treating oncologist Dr Joyce.. she also haspinal stenosis of lumbar region, HLD and macular degeneration presenting to  ED on 4/16/25 from [place] for increased lethargy over the past 4 days. Per medication reconciliation from facility she has been receiving 1g of cefepime for a UTI. She was noted to be hypoxic upon presentation and placed on 3L NC by EMS.   Upon arrival to the ED, vitals were /71 HR 84 RR 18 T97.8F and SpO2 of 100% on 3L NC. Labs significant for WBC of 14.29, Na of 150, and K+ of 3.0. CXR with R lowerlobe infiltrate/atelectasis. She received IV Zosyn, IV Vancomycin and 2L NS bolus.    (16 Apr 2025 22:14)    noted to have severe hypercalcemia  started on calcitonin without improvement  very confused  also received zometa 4 mg   now calcium improved  mental status still not improved        PAST MEDICAL & SURGICAL HISTORY:  History of macular degeneration  bilateral      Gastroesophageal reflux disease without esophagitis      Osteoarthritis of both knees, unspecified osteoarthritis type      Spinal stenosis of lumbar region      Basal cell carcinoma in situ of skin  removed from neck      Malignant neoplasm of left female breast, unspecified site of breast  with Lymph node involvement      Insomnia, unspecified type      Urinary frequency      Neoplasm by body site  left anterior chest wall      Obesity      Bilateral malignant neoplasm of breast in female, unspecified site of breast  b/l      Cataract  b/l      Macular Hole  repair b/l eyes      S/P left knee arthroscopy  2009      Early stage skin cancer  basal cell carcinoma removed from left side of neck.      History of lumpectomy of left breast  1990      H/O mastectomy, right  1993 with immediate reconstruction with fat grafting      H/O colonoscopy  multiple      History of esophagogastroduodenoscopy (EGD)      H/O left mastectomy  2016      H/O bilateral breast biopsy          REVIEW OF SYSTEMS    General:	  confused  not oriented  denies pain    FAMILY HISTORY:  Family history of temporal arteritis (Father)  father    Family history of stroke (Mother)  mother        MEDICATIONS  (STANDING):  heparin   Injectable 5000 Unit(s) SubCutaneous every 12 hours  magnesium sulfate  IVPB 1 Gram(s) IV Intermittent once  piperacillin/tazobactam IVPB.. 3.375 Gram(s) IV Intermittent every 8 hours  potassium chloride  10 mEq/100 mL IVPB 10 milliEquivalent(s) IV Intermittent every 1 hour    MEDICATIONS  (PRN):    MEDICATIONS  (PRN):      Vital Signs Last 24 Hrs  T(C): 36.5 (19 Apr 2025 07:38), Max: 36.7 (18 Apr 2025 20:23)  T(F): 97.7 (19 Apr 2025 07:38), Max: 98.1 (18 Apr 2025 20:23)  HR: 65 (19 Apr 2025 07:38) (59 - 70)  BP: 137/56 (19 Apr 2025 07:38) (102/54 - 137/56)  BP(mean): 71 (19 Apr 2025 00:25) (71 - 71)  RR: 18 (19 Apr 2025 07:38) (16 - 18)  SpO2: 99% (19 Apr 2025 07:38) (92% - 100%)    Parameters below as of 19 Apr 2025 07:38  Patient On (Oxygen Delivery Method): room air          Gen:  confused  chest clear  no focal deficits                                11.7   13.18 )-----------( 198      ( 19 Apr 2025 06:01 )             34.3     04-19    141  |  105  |  22  ----------------------------<  89  2.8[LL]   |  31  |  0.86    Ca    11.9[H]      19 Apr 2025 06:01  Mg     1.9     04-19    TPro  5.7[L]  /  Alb  2.3[L]  /  TBili  0.8  /  DBili  x   /  AST  24  /  ALT  14  /  AlkPhos  127[H]  04-18    LIVER FUNCTIONS - ( 18 Apr 2025 07:52 )  Alb: 2.3 g/dL / Pro: 5.7 gm/dL / ALK PHOS: 127 U/L / ALT: 14 U/L / AST: 24 U/L / GGT: x                    PTT - ( 16 Apr 2025 13:38 )  PTT:27.2 sec      RADIOLOGY & ADDITIONAL STUDIES:  rib and spinal mets noted      ACC: [id number] EXAM:  CT ABDOMEN AND PELVIS IC   ORDERED BY: KOFI BERNARDO     ACC: [id number] EXAM:  CT CHEST IC   ORDERED BY: KOFI CHENGARTURO     PROCEDURE DATE:  01/04/2025          INTERPRETATION:  CLINICAL INFORMATION: Malignancy workup    COMPARISON: None.    CONTRAST/COMPLICATIONS:  IV Contrast: Omnipaque 350 (accession [id number]), NONE (accession   [id number])  90 cc administered   0 cc discarded  Oral Contrast: NONE.    PROCEDURE:  CT of the Chest, Abdomen and Pelvis was performed.  Sagittal and coronal reformats were performed.    FINDINGS:  CHEST:  LUNGS AND LARGE AIRWAYS: The central airways are patent. The lungs are   clear. No suspicious mass.  PLEURA: No pleural abnormality.  VESSELS: Aortic atherosclerosis without aneurysm. Main pulmonary arteries   are patent.  HEART: No cardiomegaly. No pericardial effusion. Coronary and mitral   annular calcification.  MEDIASTINUM AND JIM: No adenopathy.  CHEST WALL AND LOWER NECK: No masses. Status post left mastectomy.    ABDOMEN AND PELVIS:  LIVER: A few scattered tiny cysts.  BILE DUCTS: Nondilated.  GALLBLADDER: Normal.  SPLEEN: Normal.  PANCREAS: Diffuse atrophy.  ADRENALS: Normal.  KIDNEYS/URETERS: No hydronephrosis or urinary tract calculi. Bilateral   parapelvic cysts.    BLADDER: Normal.  REPRODUCTIVE ORGANS: Small calcified fibroids. No adnexal mass.    BOWEL: No bowel obstruction. Redundant cecum. Normal appendix. Moderate   fecal retention in the rectum with evidence of stercoral proctitis.  PERITONEUM: No ascites or implants.  VESSELS: Aortoiliac atherosclerosis without aneurysm.  RETROPERITONEUM/LYMPH NODES: No adenopathy.  ABDOMINAL WALL: Rectus diastasis.  BONES: Lytic expansile lesion in the right posterolateral ninth rib.   Possible lytic lesion in T12 vertebral body.    IMPRESSION:  *  Lytic expansile lesion in the right posterolateral ninth rib. Possible   lytic lesion in T12 vertebral body.  *  No other masses or evidence of metastatic disease elsewhere.  *  Moderate fecal retention in the rectum with evidence of stercoral   proctitis.      --- End of Report ---            PHILLIP CROWDER MD; Attending Radiologist

## 2025-04-19 NOTE — PROGRESS NOTE ADULT - ASSESSMENT
84 y/o F with a PMH of dementia, breast cancer s/p mastectomies in 2016 with mets to bone, spinal stenosis of lumbar region, HLD and macular degeneration presenting to  ED on 4/16/25 from [place] for increased lethargy over the past 4 days. admitted for ams hypercalcemia,  pneumonia     #Severe Hypercalcemia   - Likely due to bony metastasis from malignancy  - Calcium upon admission: 15.1    - When corrected for hypoalbuminemia: 16.3   - s/p 2L NS bolus in the ED with repeat of 14.5   - S/P calcitonin 190 IU q12 and IV Zometa   -  heme/onc consulted recommendations appreciated    Continue IV Fluids     #PNA , leukocytosis   - Will treat as HCAP as patient is from a nursing home   - Presenting with increased lethargy, elevated WBC and CXR with R sided infiltrate   Seen by ID, recommendations appreciated   Transitioned to Zosyn   - F/u blood cultures neg x 48 hours    - F/u ID consult     #Hypernatremia   - Upon admission with Na of 150   - s/p 2L NS bolus in the ED   s/p D5W  - Continue  NS  -resolved       #Hypokalemia /Hypomagnesemia   - Upon admission with K+ of 3.0   - Replete and f/u AM labs     #Protein Calorie Malnutrition   - Albumin upon admission 2.5   - F/u nutrition consult     GOC  Consult Pall Care  Poor prognosis   No further breast cancer treatments available.   DNR/DNI     #DVT ppx   - Heparin SubQ    86 y/o F with a PMH of dementia, breast cancer s/p mastectomies in 2016 with mets to bone, spinal stenosis of lumbar region, HLD and macular degeneration presenting to  ED on 4/16/25 from [place] for increased lethargy over the past 4 days. admitted for ams hypercalcemia,  pneumonia     #Severe Hypercalcemia   - Likely due to bony metastasis from malignancy  - Calcium upon admission: 15.1    - When corrected for hypoalbuminemia: 16.3   - s/p 2L NS bolus in the ED with repeat of 14.5   - S/P calcitonin 190 IU q12 and IV Zometa   -  heme/onc consulted recommendations appreciated    Continue IV Fluids     #PNA , leukocytosis   - Will treat as HCAP as patient is from a nursing home   - Presenting with increased lethargy, elevated WBC and CXR with R sided infiltrate   Seen by ID, recommendations appreciated   Transitioned to Zosyn   - F/u blood cultures neg x 48 hours    - F/u ID consult     #Hypernatremia   - Upon admission with Na of 150   - s/p 2L NS bolus in the ED   s/p D5W  - Continue  NS  -resolved       #Hypokalemia /Hypomagnesemia   - Upon admission with K+ of 3.0   - Replete and f/u AM labs     #Protein Calorie Malnutrition   - Albumin upon admission 2.5   - F/u nutrition consult     GOC  Consult Pall Care recs appreciated   Poor prognosis   No further breast cancer treatments available.   Code Status DNR/DNI     #DVT ppx   - Heparin SubQ        55 minutes spent on total encounter. The necessity of the time spent during the encounter on this date of service was due to:     Time spent coordinating the patient's care. This includes reviewing documentation pertinent to this admission, results and imaging. Further tests, medications, and procedures have been ordered as indicated. Laboratory results and the plan of care were communicated to the patient and family.  Discussed care plan with nursing and will discuss plan and care with appropriate consultant(s).

## 2025-04-19 NOTE — PROGRESS NOTE ADULT - ASSESSMENT
1. progressive breast cancer with bone mets  2. hypercalcemia of malignancy with bone mets  s/p zometa with decreasing calcium  no significant improvement of MS yet     DW team  palliative care evaluation   DW Dr Joyce  no further treatment available for breast cancer

## 2025-04-19 NOTE — PROGRESS NOTE ADULT - ASSESSMENT
86 y/o Female with h/o dementia, breast cancer s/p mastectomies in 2016 with mets to bone, spinal stenosis of lumbar region, HLD and macular degeneration was admitted on 4/16/25 from [place] for increased lethargy over the past 4 days. Per medication reconciliation from facility she has been receiving cefepime for a UTI. She was noted to be hypoxic upon presentation and placed on 3L NC by EMS. In ED, vitals were /71 HR 84 RR 18 T97.8F and SpO2 of 100% on 3L NC. Labs significant for WBC of 14.29, Na of 150. She received IV Zosyn, IV Vancomycin and 2L NS bolus.     #RLL pneumonia. Probable aspiration pneumonia  #Pyuria. Likely UTI  #Leukocytosis  #Metabolic encephalopathy  -respiratory frail  -BC x 2, urine c/s noted  -on zosyn 3.375 gm IV q8h # 3  -tolerating abx well so far; no side effects noted  -respiratory care  -continue abx coverage  -f/u cultures  -monitor temps  -f/u CBC  -supportive care  2. Other issues:   -care per medicine    d/w medicine team

## 2025-04-19 NOTE — PROGRESS NOTE ADULT - SUBJECTIVE AND OBJECTIVE BOX
Date of service: 04-19-25 @ 10:23    Lying in bed in NAD  Alert and confused  No fever    ROS: no fever or chills; limited     MEDICATIONS  (STANDING):  heparin   Injectable 5000 Unit(s) SubCutaneous every 12 hours  magnesium sulfate  IVPB 1 Gram(s) IV Intermittent once  piperacillin/tazobactam IVPB.. 3.375 Gram(s) IV Intermittent every 8 hours  potassium chloride  10 mEq/100 mL IVPB 10 milliEquivalent(s) IV Intermittent every 1 hour    Vital Signs Last 24 Hrs  T(C): 36.5 (19 Apr 2025 07:38), Max: 36.7 (18 Apr 2025 20:23)  T(F): 97.7 (19 Apr 2025 07:38), Max: 98.1 (18 Apr 2025 20:23)  HR: 65 (19 Apr 2025 07:38) (59 - 70)  BP: 137/56 (19 Apr 2025 07:38) (102/54 - 137/56)  BP(mean): 71 (19 Apr 2025 00:25) (71 - 71)  RR: 18 (19 Apr 2025 07:38) (16 - 18)  SpO2: 99% (19 Apr 2025 07:38) (92% - 100%)    Parameters below as of 19 Apr 2025 07:38  Patient On (Oxygen Delivery Method): room air     Physical exam:    Constitutional:  No acute distress  HEENT: NC/AT, EOMI, PERRLA, conjunctivae clear; ears and nose atraumatic; pharynx benign  Neck: supple; thyroid not palpable  Back: no tenderness  Respiratory: respiratory effort normal; decreased BS at bases  Cardiovascular: S1S2 regular, no murmurs  Abdomen: soft, not tender, not distended, positive BS; no liver or spleen organomegaly  Genitourinary: no suprapubic tenderness  Lymphatic: no LN palpable  Musculoskeletal: no muscle tenderness, no joint swelling or tenderness  Extremities: no pedal edema  Neurological/ Psychiatric: Alert, moving all extremities  Skin: no rashes; no palpable lesions    Labs: reviewed                        11.7 13.18 )-----------( 198      ( 19 Apr 2025 06:01 )             34.3     04-19    141  |  105  |  22  ----------------------------<  89  2.8[LL]   |  31  |  0.86    Ca    11.9[H]      19 Apr 2025 06:01  Mg     1.9     04-19    TPro  5.7[L]  /  Alb  2.3[L]  /  TBili  0.8  /  DBili  x   /  AST  24  /  ALT  14  /  AlkPhos  127[H]  04-18                        13.0   15.21 )-----------( 217      ( 17 Apr 2025 05:40 )             39.7     04-17    149[H]  |  112[H]  |  20  ----------------------------<  129[H]  2.6[LL]   |  35[H]  |  0.74    Ca    14.5[HH]      17 Apr 2025 05:40  Phos  2.2     04-17  Mg     1.3     04-17    TPro  6.5  /  Alb  2.7[L]  /  TBili  0.9  /  DBili  x   /  AST  23  /  ALT  16  /  AlkPhos  143[H]  04-17     LIVER FUNCTIONS - ( 17 Apr 2025 05:40 )  Alb: 2.7 g/dL / Pro: 6.5 gm/dL / ALK PHOS: 143 U/L / ALT: 16 U/L / AST: 23 U/L / GGT: x           Urinalysis with Rflx Culture (04-16 @ 13:54)  Urine Appearance: Clear  Protein, Urine: 30 mg/dL  Urine Microscopic-Add On (NC) (04-16 @ 13:54)  White Blood Cell - Urine: 36 /HPF  Red Blood Cell - Urine: 60 /HPF    Culture - Urine (collected 16 Apr 2025 13:54)  Source: Urine None  Final Report (17 Apr 2025 14:28):    <10,000 CFU/mL Normal Urogenital Yvonne    Culture - Blood (collected 16 Apr 2025 13:38)  Source: Blood Blood-Peripheral  Preliminary Report (17 Apr 2025 19:02):    No growth at 24 hours    Culture - Blood (collected 16 Apr 2025 13:38)  Source: Blood Blood-Peripheral  Preliminary Report (17 Apr 2025 19:02):    No growth at 24 hours    Radiology: all available radiological tests reviewed    < from: Xray Chest 1 View- PORTABLE-Urgent (04.16.25 @ 14:14) >  IMPRESSION: Right lower lobe infiltrate/atelectasis.  < end of copied text >    Advanced directives addressed: full resuscitation

## 2025-04-20 LAB
ADD ON TEST-SPECIMEN IN LAB: SIGNIFICANT CHANGE UP
ALBUMIN SERPL ELPH-MCNC: 2.1 G/DL — LOW (ref 3.3–5)
ALP SERPL-CCNC: 139 U/L — HIGH (ref 40–120)
ALT FLD-CCNC: 21 U/L — SIGNIFICANT CHANGE UP (ref 12–78)
ANION GAP SERPL CALC-SCNC: 4 MMOL/L — LOW (ref 5–17)
AST SERPL-CCNC: 28 U/L — SIGNIFICANT CHANGE UP (ref 15–37)
BILIRUB SERPL-MCNC: 0.8 MG/DL — SIGNIFICANT CHANGE UP (ref 0.2–1.2)
BUN SERPL-MCNC: 18 MG/DL — SIGNIFICANT CHANGE UP (ref 7–23)
CALCIUM SERPL-MCNC: 11.3 MG/DL — HIGH (ref 8.5–10.1)
CHLORIDE SERPL-SCNC: 111 MMOL/L — HIGH (ref 96–108)
CO2 SERPL-SCNC: 26 MMOL/L — SIGNIFICANT CHANGE UP (ref 22–31)
CREAT SERPL-MCNC: 0.63 MG/DL — SIGNIFICANT CHANGE UP (ref 0.5–1.3)
EGFR: 87 ML/MIN/1.73M2 — SIGNIFICANT CHANGE UP
EGFR: 87 ML/MIN/1.73M2 — SIGNIFICANT CHANGE UP
GLUCOSE SERPL-MCNC: 88 MG/DL — SIGNIFICANT CHANGE UP (ref 70–99)
HCT VFR BLD CALC: 33.2 % — LOW (ref 34.5–45)
HGB BLD-MCNC: 11.1 G/DL — LOW (ref 11.5–15.5)
MAGNESIUM SERPL-MCNC: 1.8 MG/DL — SIGNIFICANT CHANGE UP (ref 1.6–2.6)
MCHC RBC-ENTMCNC: 32.5 PG — SIGNIFICANT CHANGE UP (ref 27–34)
MCHC RBC-ENTMCNC: 33.4 G/DL — SIGNIFICANT CHANGE UP (ref 32–36)
MCV RBC AUTO: 97.1 FL — SIGNIFICANT CHANGE UP (ref 80–100)
NRBC # BLD AUTO: 0 K/UL — SIGNIFICANT CHANGE UP (ref 0–0)
NRBC # FLD: 0 K/UL — SIGNIFICANT CHANGE UP (ref 0–0)
NRBC BLD AUTO-RTO: 0 /100 WBCS — SIGNIFICANT CHANGE UP (ref 0–0)
PLATELET # BLD AUTO: 202 K/UL — SIGNIFICANT CHANGE UP (ref 150–400)
PMV BLD: 11.4 FL — SIGNIFICANT CHANGE UP (ref 7–13)
POTASSIUM SERPL-MCNC: 2.8 MMOL/L — CRITICAL LOW (ref 3.5–5.3)
POTASSIUM SERPL-SCNC: 2.8 MMOL/L — CRITICAL LOW (ref 3.5–5.3)
PROT SERPL-MCNC: 5.9 GM/DL — LOW (ref 6–8.3)
RBC # BLD: 3.42 M/UL — LOW (ref 3.8–5.2)
RBC # FLD: 17.7 % — HIGH (ref 10.3–14.5)
SODIUM SERPL-SCNC: 141 MMOL/L — SIGNIFICANT CHANGE UP (ref 135–145)
WBC # BLD: 9.94 K/UL — SIGNIFICANT CHANGE UP (ref 3.8–10.5)
WBC # FLD AUTO: 9.94 K/UL — SIGNIFICANT CHANGE UP (ref 3.8–10.5)

## 2025-04-20 PROCEDURE — 99233 SBSQ HOSP IP/OBS HIGH 50: CPT

## 2025-04-20 RX ORDER — SODIUM CHLORIDE 9 G/1000ML
1000 INJECTION, SOLUTION INTRAVENOUS
Refills: 0 | Status: DISCONTINUED | OUTPATIENT
Start: 2025-04-20 | End: 2025-04-21

## 2025-04-20 RX ORDER — ENOXAPARIN SODIUM 100 MG/ML
30 INJECTION SUBCUTANEOUS EVERY 24 HOURS
Refills: 0 | Status: DISCONTINUED | OUTPATIENT
Start: 2025-04-20 | End: 2025-04-20

## 2025-04-20 RX ORDER — MAGNESIUM SULFATE 500 MG/ML
1 SYRINGE (ML) INJECTION ONCE
Refills: 0 | Status: COMPLETED | OUTPATIENT
Start: 2025-04-20 | End: 2025-04-20

## 2025-04-20 RX ORDER — CYST/ALA/Q10/PHOS.SER/DHA/BROC 100-20-50
1 POWDER (GRAM) ORAL DAILY
Refills: 0 | Status: DISCONTINUED | OUTPATIENT
Start: 2025-04-20 | End: 2025-04-22

## 2025-04-20 RX ORDER — ENOXAPARIN SODIUM 100 MG/ML
40 INJECTION SUBCUTANEOUS EVERY 24 HOURS
Refills: 0 | Status: DISCONTINUED | OUTPATIENT
Start: 2025-04-20 | End: 2025-04-22

## 2025-04-20 RX ADMIN — HEPARIN SODIUM 5000 UNIT(S): 1000 INJECTION INTRAVENOUS; SUBCUTANEOUS at 09:23

## 2025-04-20 RX ADMIN — Medication 100 MILLIEQUIVALENT(S): at 14:29

## 2025-04-20 RX ADMIN — Medication 25 GRAM(S): at 21:33

## 2025-04-20 RX ADMIN — Medication 75 MILLILITER(S): at 05:06

## 2025-04-20 RX ADMIN — Medication 25 GRAM(S): at 05:06

## 2025-04-20 RX ADMIN — Medication 100 MILLIEQUIVALENT(S): at 16:24

## 2025-04-20 RX ADMIN — SODIUM CHLORIDE 60 MILLILITER(S): 9 INJECTION, SOLUTION INTRAVENOUS at 14:02

## 2025-04-20 RX ADMIN — Medication 1 TABLET(S): at 13:06

## 2025-04-20 RX ADMIN — Medication 100 MILLIEQUIVALENT(S): at 13:05

## 2025-04-20 RX ADMIN — Medication 40 MILLIEQUIVALENT(S): at 18:31

## 2025-04-20 RX ADMIN — ENOXAPARIN SODIUM 40 MILLIGRAM(S): 100 INJECTION SUBCUTANEOUS at 21:33

## 2025-04-20 RX ADMIN — Medication 100 GRAM(S): at 11:57

## 2025-04-20 RX ADMIN — Medication 25 GRAM(S): at 13:05

## 2025-04-20 RX ADMIN — Medication 40 MILLIEQUIVALENT(S): at 11:57

## 2025-04-20 RX ADMIN — Medication 100 MILLIGRAM(S): at 13:06

## 2025-04-20 NOTE — PROGRESS NOTE ADULT - SUBJECTIVE AND OBJECTIVE BOX
Date of service: 04-20-25 @ 09:50    Lying in bed in NAD  SOB is improving  Dry cough    ROS: no fever or chills; denies dizziness, no HA, no abdominal pain, no diarrhea or constipation; no dysuria, no legs pain, no rashes    MEDICATIONS  (STANDING):  heparin   Injectable 5000 Unit(s) SubCutaneous every 12 hours  piperacillin/tazobactam IVPB.. 3.375 Gram(s) IV Intermittent every 8 hours  sodium chloride 0.9%. 1000 milliLiter(s) (75 mL/Hr) IV Continuous <Continuous>    Vital Signs Last 24 Hrs  T(C): 36.3 (20 Apr 2025 08:24), Max: 36.6 (19 Apr 2025 12:22)  T(F): 97.3 (20 Apr 2025 08:24), Max: 97.9 (19 Apr 2025 12:22)  HR: 68 (20 Apr 2025 08:24) (67 - 78)  BP: 105/77 (20 Apr 2025 08:24) (105/77 - 168/95)  BP(mean): 910 (20 Apr 2025 04:15) (910 - 910)  RR: 18 (20 Apr 2025 08:24) (17 - 18)  SpO2: 96% (20 Apr 2025 08:24) (96% - 100%)    Parameters below as of 20 Apr 2025 08:24  Patient On (Oxygen Delivery Method): room air     Physical exam:    Constitutional:  No acute distress  HEENT: NC/AT, EOMI, PERRLA, conjunctivae clear; ears and nose atraumatic; pharynx benign  Neck: supple; thyroid not palpable  Back: no tenderness  Respiratory: respiratory effort normal; decreased BS at bases  Cardiovascular: S1S2 regular, no murmurs  Abdomen: soft, not tender, not distended, positive BS; no liver or spleen organomegaly  Genitourinary: no suprapubic tenderness  Lymphatic: no LN palpable  Musculoskeletal: no muscle tenderness, no joint swelling or tenderness  Extremities: no pedal edema  Neurological/ Psychiatric: Alert, moving all extremities  Skin: no rashes; no palpable lesions    Labs: reviewed                        11.1   9.94  )-----------( 202      ( 20 Apr 2025 06:42 )             33.2     04-20    141  |  111[H]  |  18  ----------------------------<  88  2.8[LL]   |  26  |  0.63    Ca    11.3[H]      20 Apr 2025 06:42  Mg     1.9     04-19    TPro  5.9[L]  /  Alb  2.1[L]  /  TBili  0.8  /  DBili  x   /  AST  28  /  ALT  21  /  AlkPhos  139[H]  04-20                        13.0   15.21 )-----------( 217      ( 17 Apr 2025 05:40 )             39.7     04-17    149[H]  |  112[H]  |  20  ----------------------------<  129[H]  2.6[LL]   |  35[H]  |  0.74    Ca    14.5[HH]      17 Apr 2025 05:40  Phos  2.2     04-17  Mg     1.3     04-17    TPro  6.5  /  Alb  2.7[L]  /  TBili  0.9  /  DBili  x   /  AST  23  /  ALT  16  /  AlkPhos  143[H]  04-17     LIVER FUNCTIONS - ( 17 Apr 2025 05:40 )  Alb: 2.7 g/dL / Pro: 6.5 gm/dL / ALK PHOS: 143 U/L / ALT: 16 U/L / AST: 23 U/L / GGT: x           Urinalysis with Rflx Culture (04-16 @ 13:54)  Urine Appearance: Clear  Protein, Urine: 30 mg/dL  Urine Microscopic-Add On (NC) (04-16 @ 13:54)  White Blood Cell - Urine: 36 /HPF  Red Blood Cell - Urine: 60 /HPF    Culture - Urine (collected 16 Apr 2025 13:54)  Source: Urine None  Final Report (17 Apr 2025 14:28):    <10,000 CFU/mL Normal Urogenital Yvonne    Culture - Blood (collected 16 Apr 2025 13:38)  Source: Blood Blood-Peripheral  Preliminary Report (17 Apr 2025 19:02):    No growth at 24 hours    Culture - Blood (collected 16 Apr 2025 13:38)  Source: Blood Blood-Peripheral  Preliminary Report (17 Apr 2025 19:02):    No growth at 24 hours    Radiology: all available radiological tests reviewed    < from: Xray Chest 1 View- PORTABLE-Urgent (04.16.25 @ 14:14) >  IMPRESSION: Right lower lobe infiltrate/atelectasis.  < end of copied text >    Advanced directives addressed: full resuscitation

## 2025-04-20 NOTE — PROGRESS NOTE ADULT - TIME BILLING
Time spent coordinating the patient's care. This includes reviewing documentation pertinent to this admission, results and imaging. Further tests, medications, and procedures have been ordered as indicated. Laboratory results and the plan of care were communicated to the patient and family.  Discussed care plan with nursing and will discuss plan and care with appropriate consultant(s) including Palliative Care Team, Infectious Disease, and Hematology Oncology.

## 2025-04-20 NOTE — PROGRESS NOTE ADULT - ASSESSMENT
86 y/o Female with h/o dementia, breast cancer s/p mastectomies in 2016 with mets to bone, spinal stenosis of lumbar region, HLD and macular degeneration was admitted on 4/16/25 from [place] for increased lethargy over the past 4 days. Per medication reconciliation from facility she has been receiving cefepime for a UTI. She was noted to be hypoxic upon presentation and placed on 3L NC by EMS. In ED, vitals were /71 HR 84 RR 18 T97.8F and SpO2 of 100% on 3L NC. Labs significant for WBC of 14.29, Na of 150. She received IV Zosyn, IV Vancomycin and 2L NS bolus.     #RLL pneumonia. Probable aspiration pneumonia  #Pyuria. Likely UTI  #Leukocytosis  #Metabolic encephalopathy  -respiratory frail  -BC x 2, urine c/s noted  -on zosyn 3.375 gm IV q8h # 4  -tolerating abx well so far; no side effects noted  -respiratory care  -continue abx coverage  -f/u cultures  -monitor temps  -f/u CBC  -supportive care  2. Other issues:   -care per medicine    d/w medicine team

## 2025-04-20 NOTE — PROGRESS NOTE ADULT - SUBJECTIVE AND OBJECTIVE BOX
Chief Complaint: Lethargy    Interval Hx: Patient seen and examined. History limited due to dementia and encephalopathy. Patient resting comfortably. Still drowsy but arousable. Respiratory status has improved, as patient is now breathing comfortably with SPO2 96% on room air at rest.      ROS: Limited due to dementia and acute medical illness resulting in encephalopathy    Vitals:  T(F): 97.3 (20 Apr 2025 08:24), Max: 97.9 (19 Apr 2025 12:22)  HR: 68 (20 Apr 2025 08:24) (67 - 78)  BP: 105/77 (20 Apr 2025 08:24) (105/77 - 168/95)  RR: 18 (20 Apr 2025 08:24) (17 - 18)  SpO2: 96% (20 Apr 2025 08:24) (96% - 100%) on room air    Exam:  GENERAL: NAD, drowsy but can be aroused   HEAD: Atraumatic  EYES: EOMI  ENT: Dry mucous membranes  CHEST/LUNG: Unlabored respirations   HEART: S1 S2 normal, regular rate and rhythm  ABDOMEN: +BS, soft, nontender  EXTREMITIES:  No clubbing, cyanosis, or edema  SKIN: Warm, dry  NERVOUS SYSTEM:  Alert & Oriented X 2    Labs:                       11.1   9.42 )--------( 202                  33.2       141  |  111  |  22  -----------------------<  89  2.8   |   26   |  0.86    Ca    11.9    Mg     1.9         TPro  5.7 /  Alb  2.3  /  TBili  0.8  /  DBili  x   /  AST  24  /  ALT  14  /  AlkPhos  127    Procalcitonin 0.22   Lactate 1.2    UA 4/16: Clear, yellow, prot 30, gluc-, ket-, bld large, LE mod, N-, WBC 36, RBC 60, bact none, epi cells 4, squamous epi present    Micro:  Blood culture 4/16: Negative  Urine culture 4/16: Negative  COVID19, flu, RSV PCR 4/16: Negative    Imaging:  CXR 4/16: Single frontal view of chest. Right lower lobe infiltrate/atelectasis. Cardiomediastinal silhouette is unremarkable. Osteopenia. Chronic bilateral rib fractures. No acute osseous abnormalities. Right axillary surgical clips    Cardiac Testing:  EKG 4/16: Rate 81.  Normal sinus rhythm. Minimal voltage criteria for LVH, may be normal variant ( Sokolow-Valenzuela ). Nonspecific ST and T wave abnormality    Meds:  MEDICATIONS  (STANDING):  dextrose 5% + sodium chloride 0.45% 1000 milliLiter(s) (60 mL/Hr) IV Continuous <Continuous>  enoxaparin Injectable 40 milliGRAM(s) SubCutaneous every 24 hours  magnesium sulfate  IVPB 1 Gram(s) IV Intermittent once  multivitamin/minerals 1 Tablet(s) Oral daily  piperacillin/tazobactam IVPB.. 3.375 Gram(s) IV Intermittent every 8 hours  potassium chloride   Powder 40 milliEquivalent(s) Oral every 6 hours  potassium chloride  10 mEq/100 mL IVPB 10 milliEquivalent(s) IV Intermittent every 1 hour  thiamine 100 milliGRAM(s) Oral daily   Chief Complaint: Lethargy    Interval Hx: Patient seen and examined. History limited due to dementia and encephalopathy. Patient resting comfortably. Awake and alert. Respiratory status has improved, as patient is now breathing comfortably with SPO2 96% on room air at rest. Does have intermittent cough. No fever. No chills. No other complaints.       ROS: Limited due to dementia and acute medical illness resulting in encephalopathy    Vitals:  T(F): 97.3 (20 Apr 2025 08:24), Max: 97.9 (19 Apr 2025 12:22)  HR: 68 (20 Apr 2025 08:24) (67 - 78)  BP: 105/77 (20 Apr 2025 08:24) (105/77 - 168/95)  RR: 18 (20 Apr 2025 08:24) (17 - 18)  SpO2: 96% (20 Apr 2025 08:24) (96% - 100%) on room air    Exam:  GENERAL: No acute distress  HEENT: NCAT PERRL EOMI dry oral mucosa  NECK: Supple  CHEST: Normal resp effort at rest, decreased breath sounds at bases B/L  CVS: S1 S2 normal, regular rate and rhythm  ABD: +BS, soft, nontender  EXT: No clubbing, cyanosis, or edema  SKIN: Warm, dry  NEURO: Alert & Oriented X 2. Follows simple commands. Answers simple questions    Labs:                       11.1   9.42 )--------( 202                  33.2       141  |  111  |  22  -----------------------<  89  2.8   |   26   |  0.86    Ca    11.9    Mg     1.9         TPro  5.7 /  Alb  2.3  /  TBili  0.8  /  DBili  x   /  AST  24  /  ALT  14  /  AlkPhos  127    Procalcitonin 0.22   Lactate 1.2    UA 4/16: Clear, yellow, prot 30, gluc-, ket-, bld large, LE mod, N-, WBC 36, RBC 60, bact none, epi cells 4, squamous epi present    Micro:  Blood culture 4/16: Negative  Urine culture 4/16: Negative  COVID19, flu, RSV PCR 4/16: Negative    Imaging:  CXR 4/16: Single frontal view of chest. Right lower lobe infiltrate/atelectasis. Cardiomediastinal silhouette is unremarkable. Osteopenia. Chronic bilateral rib fractures. No acute osseous abnormalities. Right axillary surgical clips    Cardiac Testing:  EKG 4/16: Rate 81.  Normal sinus rhythm. Minimal voltage criteria for LVH, may be normal variant ( Sokolow-Valenzuela ). Nonspecific ST and T wave abnormality    Meds:  MEDICATIONS  (STANDING):  dextrose 5% + sodium chloride 0.45% 1000 milliLiter(s) (60 mL/Hr) IV Continuous <Continuous>  enoxaparin Injectable 40 milliGRAM(s) SubCutaneous every 24 hours  magnesium sulfate  IVPB 1 Gram(s) IV Intermittent once  multivitamin/minerals 1 Tablet(s) Oral daily  piperacillin/tazobactam IVPB.. 3.375 Gram(s) IV Intermittent every 8 hours  potassium chloride   Powder 40 milliEquivalent(s) Oral every 6 hours  potassium chloride  10 mEq/100 mL IVPB 10 milliEquivalent(s) IV Intermittent every 1 hour  thiamine 100 milliGRAM(s) Oral daily

## 2025-04-20 NOTE — PROGRESS NOTE ADULT - ASSESSMENT
84 y/o woman with dementia, progressive breast cancer with bone metastases, hypercalcemia of malignancy, spinal stenosis of lumbar region, macular degeneration, presented to  ED on 4/16/25 from [place] for increased lethargy over the 4 days. Per medication reconciliation from facility, she has been receiving cefepime for a UTI. She was noted to be hypoxic per EMS, placed on 3L NC. Upon arrival to the ED, vitals were /71 HR 84 RR 18 T97.8F and SpO2 of 100% on 3L NC. She was lethargic but arousable. No focal deficits. Labs were significant for WBC 14.29, Na 150, K 3.0. Ca ~16. CXR with R lower lobe infiltrate/atelectasis. She received IV Zosyn, IV Vancomycin and 2L NS bolus and was admitted to Medicine.     Acute metabolic encephalopathy   Sent from [facility] for lethargy, likely multi factorial as patient was found to have severe hypercalcemia, hypernatremia, also hypoxia and pneumonia. Continue to treat underlying issues. See below.     Acute respiratory failure with hypoxia  Due to pneumonia, pneumonitis.     RLL pneumonia, unable to rule out aspiration pneumonia/pneumonitis  Encephalopathy, hypoxia, leukocytosis, CXR with R sided infiltrate. Seen by ID, recommendations appreciated. On Zosyn. Blood cultures neg x 48 hours      Severe hypercalcemia  Calcium upon admission 15.1, when corrected for hypoalbuminemia 16.3. Likely hypercalcemia of malignancy as patient with bony metastasis from progressing breat cancer. S/p 2L NS bolus in the ED with repeat of 14.5. Heme/onc consulted recommendations appreciated. S/p calcitonin 190 IU q12 and IV Zometa. Continue IV fluid. Continue to trend Ca.     Hypernatremia  Due to free water deficit. IV fluids given. Na improved.     Hypokalemia  K 2.8. Replete to goal K ~4. Check Mg level    Progressing breast cancer with metastases  Follows with Dr Joyce. Appreciate input from Oncology. No further treatment available for patient's advanced breast cancer.     Severe protein calorie malnutrition   Appreciate dietician input. Regular diet. Texture modified for dysphagia. Ensure HP TID. Added thiamine and MVI daily.         Advanced directives: Poor prognosis. No further breast cancer treatments available. Code Status DNR/DNI    86 y/o woman with dementia, progressive breast cancer with bone metastases, hypercalcemia of malignancy, spinal stenosis of lumbar region, macular degeneration, presented to  ED on 4/16/25 from [place] for increased lethargy over the 4 days. Per medication reconciliation from facility, she has been receiving cefepime for a UTI. She was noted to be hypoxic per EMS, placed on 3L NC. Upon arrival to the ED, vitals were /71 HR 84 RR 18 T97.8F and SpO2 of 100% on 3L NC. She was lethargic but arousable. No focal deficits. Labs were significant for WBC 14.29, Na 150, K 3.0. Ca ~16. CXR with R lower lobe infiltrate/atelectasis. She received IV Zosyn, IV Vancomycin and 2L NS bolus and was admitted to Medicine.     Acute metabolic encephalopathy   Sent from [place] for lethargy, likely multi factorial as patient was found to have severe hypercalcemia, hypernatremia, also hypoxia and pneumonia. Continue to treat underlying issues. See below.     Acute respiratory failure with hypoxia  Due to pneumonia, pneumonitis. Continue antibiotics. O2 supplementation PRN. Weaned to room air today.     RLL pneumonia, unable to rule out Gram-negative organism, unable to rule out aspiration pneumonia/pneumonitis  As suggested by encephalopathy, hypoxia, leukocytosis, CXR with R sided infiltrate. Seen by ID, recommendations appreciated. On Zosyn. Blood cultures neg x 48 hours. Appears to be clinically improving.       Severe hypercalcemia  Calcium upon admission 15.1, when corrected for hypoalbuminemia 16.3. Likely hypercalcemia of malignancy as patient with bony metastasis from progressing breat cancer. S/p 2L NS bolus in the ED with repeat of 14.5. Heme/onc consulted recommendations appreciated. S/p calcitonin 190 IU q12 and IV Zometa. Continue IV fluid. Continue to trend Ca.     Hypernatremia  Due to free water deficit. IV fluids given. Na improved.     Hypokalemia  K 2.8. Replete to goal K ~4. Check Mg level    Progressing breast cancer with metastases  Follows with Dr Joyce. Appreciate input from Oncology. No further treatment available for patient's advanced breast cancer.     Severe protein calorie malnutrition   Appreciate dietician input. Regular diet. Texture modified for dysphagia. Ensure HP TID. Added thiamine and MVI daily.         Advanced directives: Poor prognosis. No further breast cancer treatments available. Code Status DNR/DNI

## 2025-04-21 DIAGNOSIS — R41.82 ALTERED MENTAL STATUS, UNSPECIFIED: ICD-10-CM

## 2025-04-21 LAB
ALBUMIN SERPL ELPH-MCNC: 2.2 G/DL — LOW (ref 3.3–5)
ANION GAP SERPL CALC-SCNC: 5 MMOL/L — SIGNIFICANT CHANGE UP (ref 5–17)
BASOPHILS # BLD AUTO: 0.04 K/UL — SIGNIFICANT CHANGE UP (ref 0–0.2)
BASOPHILS NFR BLD AUTO: 0.4 % — SIGNIFICANT CHANGE UP (ref 0–2)
BUN SERPL-MCNC: 13 MG/DL — SIGNIFICANT CHANGE UP (ref 7–23)
CA-I BLD-SCNC: 1.48 MMOL/L — HIGH (ref 1.15–1.33)
CALCIUM SERPL-MCNC: 10.9 MG/DL — HIGH (ref 8.5–10.1)
CHLORIDE SERPL-SCNC: 111 MMOL/L — HIGH (ref 96–108)
CO2 SERPL-SCNC: 24 MMOL/L — SIGNIFICANT CHANGE UP (ref 22–31)
CREAT SERPL-MCNC: 0.63 MG/DL — SIGNIFICANT CHANGE UP (ref 0.5–1.3)
CULTURE RESULTS: SIGNIFICANT CHANGE UP
CULTURE RESULTS: SIGNIFICANT CHANGE UP
EGFR: 87 ML/MIN/1.73M2 — SIGNIFICANT CHANGE UP
EGFR: 87 ML/MIN/1.73M2 — SIGNIFICANT CHANGE UP
EOSINOPHIL # BLD AUTO: 0.19 K/UL — SIGNIFICANT CHANGE UP (ref 0–0.5)
EOSINOPHIL NFR BLD AUTO: 1.8 % — SIGNIFICANT CHANGE UP (ref 0–6)
GLUCOSE SERPL-MCNC: 105 MG/DL — HIGH (ref 70–99)
HCT VFR BLD CALC: 33.6 % — LOW (ref 34.5–45)
HGB BLD-MCNC: 11.3 G/DL — LOW (ref 11.5–15.5)
IMM GRANULOCYTES # BLD AUTO: 0.08 K/UL — HIGH (ref 0–0.07)
IMM GRANULOCYTES NFR BLD AUTO: 0.8 % — SIGNIFICANT CHANGE UP (ref 0–0.9)
LYMPHOCYTES # BLD AUTO: 1.93 K/UL — SIGNIFICANT CHANGE UP (ref 1–3.3)
LYMPHOCYTES NFR BLD AUTO: 18.2 % — SIGNIFICANT CHANGE UP (ref 13–44)
MAGNESIUM SERPL-MCNC: 1.9 MG/DL — SIGNIFICANT CHANGE UP (ref 1.6–2.6)
MCHC RBC-ENTMCNC: 32.5 PG — SIGNIFICANT CHANGE UP (ref 27–34)
MCHC RBC-ENTMCNC: 33.6 G/DL — SIGNIFICANT CHANGE UP (ref 32–36)
MCV RBC AUTO: 96.6 FL — SIGNIFICANT CHANGE UP (ref 80–100)
MONOCYTES # BLD AUTO: 1.32 K/UL — HIGH (ref 0–0.9)
MONOCYTES NFR BLD AUTO: 12.4 % — SIGNIFICANT CHANGE UP (ref 2–14)
NEUTROPHILS # BLD AUTO: 7.06 K/UL — SIGNIFICANT CHANGE UP (ref 1.8–7.4)
NEUTROPHILS NFR BLD AUTO: 66.4 % — SIGNIFICANT CHANGE UP (ref 43–77)
NRBC # BLD AUTO: 0 K/UL — SIGNIFICANT CHANGE UP (ref 0–0)
NRBC # FLD: 0 K/UL — SIGNIFICANT CHANGE UP (ref 0–0)
NRBC BLD AUTO-RTO: 0 /100 WBCS — SIGNIFICANT CHANGE UP (ref 0–0)
PHOSPHATE SERPL-MCNC: 1.4 MG/DL — LOW (ref 2.5–4.5)
PLATELET # BLD AUTO: 228 K/UL — SIGNIFICANT CHANGE UP (ref 150–400)
PMV BLD: 10.8 FL — SIGNIFICANT CHANGE UP (ref 7–13)
POTASSIUM SERPL-MCNC: 3.5 MMOL/L — SIGNIFICANT CHANGE UP (ref 3.5–5.3)
POTASSIUM SERPL-SCNC: 3.5 MMOL/L — SIGNIFICANT CHANGE UP (ref 3.5–5.3)
RBC # BLD: 3.48 M/UL — LOW (ref 3.8–5.2)
RBC # FLD: 17.9 % — HIGH (ref 10.3–14.5)
SODIUM SERPL-SCNC: 140 MMOL/L — SIGNIFICANT CHANGE UP (ref 135–145)
SPECIMEN SOURCE: SIGNIFICANT CHANGE UP
SPECIMEN SOURCE: SIGNIFICANT CHANGE UP
WBC # BLD: 10.62 K/UL — HIGH (ref 3.8–10.5)
WBC # FLD AUTO: 10.62 K/UL — HIGH (ref 3.8–10.5)

## 2025-04-21 PROCEDURE — 99233 SBSQ HOSP IP/OBS HIGH 50: CPT

## 2025-04-21 PROCEDURE — 99223 1ST HOSP IP/OBS HIGH 75: CPT

## 2025-04-21 RX ORDER — POTASSIUM PHOSPHATE, MONOBASIC POTASSIUM PHOSPHATE, DIBASIC INJECTION, 236; 224 MG/ML; MG/ML
30 SOLUTION, CONCENTRATE INTRAVENOUS ONCE
Refills: 0 | Status: COMPLETED | OUTPATIENT
Start: 2025-04-21 | End: 2025-04-21

## 2025-04-21 RX ADMIN — Medication 25 GRAM(S): at 05:31

## 2025-04-21 RX ADMIN — Medication 1 TABLET(S): at 10:07

## 2025-04-21 RX ADMIN — Medication 100 MILLIGRAM(S): at 10:06

## 2025-04-21 RX ADMIN — Medication 25 GRAM(S): at 21:05

## 2025-04-21 RX ADMIN — ENOXAPARIN SODIUM 40 MILLIGRAM(S): 100 INJECTION SUBCUTANEOUS at 21:05

## 2025-04-21 RX ADMIN — POTASSIUM PHOSPHATE, MONOBASIC POTASSIUM PHOSPHATE, DIBASIC INJECTION, 83.33 MILLIMOLE(S): 236; 224 SOLUTION, CONCENTRATE INTRAVENOUS at 10:25

## 2025-04-21 RX ADMIN — Medication 25 GRAM(S): at 14:17

## 2025-04-21 RX ADMIN — SODIUM CHLORIDE 60 MILLILITER(S): 9 INJECTION, SOLUTION INTRAVENOUS at 05:30

## 2025-04-21 NOTE — PROGRESS NOTE ADULT - SUBJECTIVE AND OBJECTIVE BOX
Chief Complaint: Lethargy    Interval Hx: Patient seen and examined. History limited due to dementia and encephalopathy. Patient resting comfortably. Awake and alert. Respiratory status has improved, as patient is now breathing comfortably with SPO2 96% on room air at rest. Does have intermittent cough. No fever. No chills. No other complaints.       4/21 DC planning back to [facility]    ROS: Limited due to dementia and acute medical illness resulting in encephalopathy    Vitals:  T(F): 97.9 (21 Apr 2025 07:51), Max: 98.2 (21 Apr 2025 00:04)  HR: 75 (21 Apr 2025 07:51) (71 - 75)  BP: 145/51 (21 Apr 2025 07:51) (118/65 - 145/51)  RR: 18 (21 Apr 2025 07:51) (18 - 18)  SpO2: 100% (21 Apr 2025 07:51) (97% - 100%) on room air    Exam:  GENERAL: No acute distress  HEENT: NCAT PERRL EOMI dry oral mucosa  NECK: Supple  CHEST: Normal resp effort at rest, decreased breath sounds at bases B/L  CVS: S1 S2 normal, regular rate and rhythm  ABD: +BS, soft, nontender  EXT: No clubbing, cyanosis, or edema  SKIN: Warm, dry  NEURO: Alert & Oriented X 2. Follows simple commands. Answers simple questions    Labs:                       11.3   10.62 )--------( 228                   33.6       140  |  111  |  13  -----------------------<  105  3.5   |   24   |  0.63    Ca  10.9     Phos  1.4     Mg   1.9         TPro  5.7 /  Alb  2.3  /  TBili  0.8  /  DBili  x   /  AST  24  /  ALT  14  /  AlkPhos  127    Procalcitonin 0.22   Lactate 1.2    UA 4/16: Clear, yellow, prot 30, gluc-, ket-, bld large, LE mod, N-, WBC 36, RBC 60, bact none, epi cells 4, squamous epi present    Micro:  Blood culture 4/16: Negative  Urine culture 4/16: Negative  COVID19, flu, RSV PCR 4/16: Negative    Imaging:  CXR 4/16: Single frontal view of chest. Right lower lobe infiltrate/atelectasis. Cardiomediastinal silhouette is unremarkable. Osteopenia. Chronic bilateral rib fractures. No acute osseous abnormalities. Right axillary surgical clips    Cardiac Testing:  EKG 4/16: Rate 81.  Normal sinus rhythm. Minimal voltage criteria for LVH, may be normal variant ( Sokolow-Valenzuela ). Nonspecific ST and T wave abnormality    Meds:  MEDICATIONS  (STANDING):  enoxaparin Injectable 40 milliGRAM(s) SubCutaneous every 24 hours  multivitamin/minerals 1 Tablet(s) Oral daily  piperacillin/tazobactam IVPB.. 3.375 Gram(s) IV Intermittent every 8 hours  potassium phosphate IVPB 30 milliMole(s) IV Intermittent once  thiamine 100 milliGRAM(s) Oral daily Chief Complaint: Lethargy    Interval Hx: Patient seen and examined. History limited due to dementia and encephalopathy. Patient resting comfortably. Awake and alert. Respiratory status has improved, as patient is now breathing comfortably with SPO2 96% on room air at rest. Does have intermittent cough. No fever. No chills. No other complaints. On Zosyn as per ID, day 5 today. Labs today notable for hypophosphatemia. Ordered for IV phos repletion.       ROS: Limited due to dementia and acute medical illness resulting in encephalopathy    Vitals:  T(F): 97.9 (21 Apr 2025 07:51), Max: 98.2 (21 Apr 2025 00:04)  HR: 75 (21 Apr 2025 07:51) (71 - 75)  BP: 145/51 (21 Apr 2025 07:51) (118/65 - 145/51)  RR: 18 (21 Apr 2025 07:51) (18 - 18)  SpO2: 100% (21 Apr 2025 07:51) (97% - 100%) on room air    Exam:  GENERAL: No acute distress  HEENT: NCAT PERRL EOMI dry oral mucosa  NECK: Supple  CHEST: Normal resp effort at rest, decreased breath sounds at bases B/L  CVS: S1 S2 normal, regular rate and rhythm  ABD: +BS, soft, nontender  EXT: No clubbing, cyanosis, or edema  SKIN: Warm, dry  NEURO: Alert & Oriented X 2. Follows simple commands. Answers simple questions    Labs:                       11.3   10.62 )--------( 228                   33.6       140  |  111  |  13  -----------------------<  105  3.5   |   24   |  0.63    Ca  10.9     Phos  1.4     Mg   1.9         TPro  5.7 /  Alb  2.3  /  TBili  0.8  /  DBili  x   /  AST  24  /  ALT  14  /  AlkPhos  127    Procalcitonin 0.22   Lactate 1.2    UA 4/16: Clear, yellow, prot 30, gluc-, ket-, bld large, LE mod, N-, WBC 36, RBC 60, bact none, epi cells 4, squamous epi present    Micro:  Blood culture 4/16: Negative  Urine culture 4/16: Negative  COVID19, flu, RSV PCR 4/16: Negative    Imaging:  CXR 4/16: Single frontal view of chest. Right lower lobe infiltrate/atelectasis. Cardiomediastinal silhouette is unremarkable. Osteopenia. Chronic bilateral rib fractures. No acute osseous abnormalities. Right axillary surgical clips    Cardiac Testing:  EKG 4/16: Rate 81.  Normal sinus rhythm. Minimal voltage criteria for LVH, may be normal variant ( Sokolow-Valenzuela ). Nonspecific ST and T wave abnormality    Meds:  MEDICATIONS  (STANDING):  enoxaparin Injectable 40 milliGRAM(s) SubCutaneous every 24 hours  multivitamin/minerals 1 Tablet(s) Oral daily  piperacillin/tazobactam IVPB.. 3.375 Gram(s) IV Intermittent every 8 hours  thiamine 100 milliGRAM(s) Oral daily

## 2025-04-21 NOTE — CONSULT NOTE ADULT - CONVERSATION DETAILS
Summary: Palliative team reached out to daughter Leyla to introduce team and offer support. Palliative roles explained. Pt known to team from previous admissions. Pt unable to participate in discussion. Leyla explains that she is on her way home from [place] and will be back by this evening. She confirms that pt has been at [facility] but has "used up her 100 days" through Medicare. She reports that they have not yet begun a Medicaid application.     Hospice philosophy reviewed briefly and inpatient hospice explained. Leyla would be interested in inpatient hospice but team explained there is a criteria for inpatient vs home. Leyla would like to see patient in person and have medical update prior to making any decisions regarding plan going forward.    Plan to see how patient does overnight. Daughter to come see patient. CHINEDU on chart reflecting DNR/DNI. Our team will continue to follow.

## 2025-04-21 NOTE — CONSULT NOTE ADULT - ASSESSMENT
HPI: Pt is a 85y old Female with a H/O dementia, breast cancer s/p mastectomies in 2016 with mets to bone, spinal stenosis of lumbar region, HLD and macular degeneration presenting to on 4/16/25 from [place] for increased lethargy over the past 4 days. Per medication reconciliation from facility she has been receiving 1g of cefepime for a UTI. She was noted to be hypoxic upon presentation and placed on 3L NC by EMS.   Upon arrival to the ED, labs significant for WBC of 14.29, Na of 150, and K+ of 3.0. CXR with R lowerlobe infiltrate/atelectasis. Palliative medicne Consult to further establish GOC  4/21/25 Seen and examined at bedside. Lethargic and minimally conversive. Appears in NAD       Assessment and Plan:    1) AMS  -Acute metabolic encephalopathy   -Dementia  -? baseline  -Lethargy    2) Acute respiratory failure with hypoxia  -Due to pneumonia, pneumonitis  -Continue antibiotics  -O2 supplementation PRN  -Weaned to room air  -Imaging noted      3) Severe hypercalcemia  -Resolved. Calcium upon admission 15.1, when corrected for hypoalbuminemia 16.3  -Likely hypercalcemia of malignancy as patient with bony metastasis from progressing breat cancer  - Heme/onc consulted recommendations appreciated  -Continue  to trend Ca, now 10.9    4) Advanced Directives  -Pt lacks capacity  -Daughter Leyla named [facility]  -MOLST DNR/DNI on chart  -Will further discuss GOC    Time Spent: 75 minutes including the care, coordination and counseling of this patient, 50% of which was spent coordinating and counseling.     
1. progressive breast cancer with bone mets  2. hypercalcemia of malignancy with bone mets  recommendation  zoledronic acid 4 mg for above  DW team  palliative care evaluation   shall follow   shall discuss with Dr Joyce if any further treatment options remain if she recovers   current ECOG PS 4  
86 y/o Female with h/o dementia, breast cancer s/p mastectomies in 2016 with mets to bone, spinal stenosis of lumbar region, HLD and macular degeneration was admitted on 4/16/25 from [place] for increased lethargy over the past 4 days. Per medication reconciliation from facility she has been receiving cefepime for a UTI. She was noted to be hypoxic upon presentation and placed on 3L NC by EMS. In ED, vitals were /71 HR 84 RR 18 T97.8F and SpO2 of 100% on 3L NC. Labs significant for WBC of 14.29, Na of 150. She received IV Zosyn, IV Vancomycin and 2L NS bolus.     #RLL pneumonia. Probable aspiration pneumonia  #Pyuria. Likely UTI  #Leukocytosis  #Metabolic encephalopathy  -obtain BC x 2, urine c/s  -concern for infection with multiresistant organisms is raised since she failed outpatient IV abx therapy. Prior cultures reviewed. An epidemiologic assessment was performed. The risk of the microorganism spread to family members, healthcare staff is low. Standard isolation in place at present time. Will reconsider isolation measures according to infection control policy, further culture results and other new information. The patient will be monitored closely, cultures collected as appropriate. Broad spectrum abx therapy was started.  -start zosyn 3.375 gm IV q8h  -reason for abx use and side effects reviewed; monitor BMP  -respiratory care  -old chart reviewed to assess prior cultures  -monitor temps  -f/u CBC  -supportive care  2. Other issues:   -care per medicine    d/w medicine team

## 2025-04-21 NOTE — CONSULT NOTE ADULT - SUBJECTIVE AND OBJECTIVE BOX
Patient is a 85y old  Female who presents with a chief complaint of HCAP   severe hypercalcemia and other electrolyte abnormalities     HPI:  84 y/o Female with h/o dementia, breast cancer s/p mastectomies in 2016 with mets to bone, spinal stenosis of lumbar region, HLD and macular degeneration was admitted on 25 from [facility] for increased lethargy over the past 4 days. Per medication reconciliation from facility she has been receiving cefepime for a UTI. She was noted to be hypoxic upon presentation and placed on 3L NC by EMS. In ED, vitals were /71 HR 84 RR 18 T97.8F and SpO2 of 100% on 3L NC. Labs significant for WBC of 14.29, Na of 150. She received IV Zosyn, IV Vancomycin and 2L NS bolus.     PMH: as above  PSH: as above  Meds: per reconciliation sheet, noted below  MEDICATIONS  (STANDING):  cefepime  Injectable. 2000 milliGRAM(s) IV Push every 12 hours  cefepime  Injectable.      dextrose 5%. 1000 milliLiter(s) (75 mL/Hr) IV Continuous <Continuous>  heparin   Injectable 5000 Unit(s) SubCutaneous every 12 hours  vancomycin  IVPB 500 milliGRAM(s) IV Intermittent every 12 hours    MEDICATIONS  (PRN):    Allergies    No Known Allergies    Intolerances      Social: no smoking, no alcohol, no illegal drugs; no recent travel, no exposure to TB  FAMILY HISTORY:  Family history of temporal arteritis (Father)  father    Family history of stroke (Mother)  mother      no history of premature cardiovascular disease in first degree relatives    ROS: the patient is confused, unobtainable   All other systems reviewed and are negative    Vital Signs Last 24 Hrs  T(C): 36.4 (2025 12:11), Max: 37.4 (2025 13:59)  T(F): 97.5 (2025 12:11), Max: 99.4 (2025 13:59)  HR: 67 (2025 12:11) (55 - 90)  BP: 134/53 (2025 12:11) (91/31 - 157/65)  BP(mean): 84 (2025 21:58) (74 - 100)  RR: 18 (2025 12:11) (15 - 18)  SpO2: 100% (2025 12:11) (97% - 100%)    Parameters below as of 2025 12:11  Patient On (Oxygen Delivery Method): nasal cannula      Daily Height in cm: 162.56 (2025 21:58)    Daily Weight in k.4 (2025 21:58)    PE:    Constitutional:  No acute distress  HEENT: NC/AT, EOMI, PERRLA, conjunctivae clear; ears and nose atraumatic; pharynx benign  Neck: supple; thyroid not palpable  Back: no tenderness  Respiratory: respiratory effort normal; clear to auscultation  Cardiovascular: S1S2 regular, no murmurs  Abdomen: soft, not tender, not distended, positive BS; no liver or spleen organomegaly  Genitourinary: no suprapubic tenderness  Lymphatic: no LN palpable  Musculoskeletal: no muscle tenderness, no joint swelling or tenderness  Extremities: no pedal edema  Neurological/ Psychiatric: AxOx3, judgement and insight normal; moving all extremities  Skin: no rashes; no palpable lesions    Labs: all available labs reviewed                        13.0   15.21 )-----------( 217      ( 2025 05:40 )             39.7     04-17    149[H]  |  112[H]  |  20  ----------------------------<  129[H]  2.6[LL]   |  35[H]  |  0.74    Ca    14.5[HH]      2025 05:40  Phos  2.2     -  Mg     1.3     -    TPro  6.5  /  Alb  2.7[L]  /  TBili  0.9  /  DBili  x   /  AST  23  /  ALT  16  /  AlkPhos  143[H]  -17     LIVER FUNCTIONS - ( 2025 05:40 )  Alb: 2.7 g/dL / Pro: 6.5 gm/dL / ALK PHOS: 143 U/L / ALT: 16 U/L / AST: 23 U/L / GGT: x           Urinalysis with Rflx Culture ( @ 13:54)  Urine Appearance: Clear  Protein, Urine: 30 mg/dL    Urine Microscopic-Add On (NC) ( @ 13:54)  White Blood Cell - Urine: 36 /HPF  Red Blood Cell - Urine: 60 /HPF    Radiology: all available radiological tests reviewed    < from: Xray Chest 1 View- PORTABLE-Urgent (25 @ 14:14) >  IMPRESSION: Right lower lobe infiltrate/atelectasis.  < end of copied text >      Advanced directives addressed: full resuscitation
Patient is a 85y old  Female who presents with a chief complaint of confusion and hypercalcemia      HPI:  Patient is an 84 y/o F with a PMH of dementia, breast cancer s/p mastectomies in 2016 with mets to bone,   she is s/p multiple lines of endocrine therapies with disease progression. treating oncologist Dr Joyce.. she also haspinal stenosis of lumbar region, HLD and macular degeneration presenting to  ED on 4/16/25 from [place] for increased lethargy over the past 4 days. Per medication reconciliation from facility she has been receiving 1g of cefepime for a UTI. She was noted to be hypoxic upon presentation and placed on 3L NC by EMS.   Upon arrival to the ED, vitals were /71 HR 84 RR 18 T97.8F and SpO2 of 100% on 3L NC. Labs significant for WBC of 14.29, Na of 150, and K+ of 3.0. CXR with R lowerlobe infiltrate/atelectasis. She received IV Zosyn, IV Vancomycin and 2L NS bolus.    (16 Apr 2025 22:14)    noted to have severe hypercalcemia  started on calcitonin without improvement  very confused      PAST MEDICAL & SURGICAL HISTORY:  History of macular degeneration  bilateral      Gastroesophageal reflux disease without esophagitis      Osteoarthritis of both knees, unspecified osteoarthritis type      Spinal stenosis of lumbar region      Basal cell carcinoma in situ of skin  removed from neck      Malignant neoplasm of left female breast, unspecified site of breast  with Lymph node involvement      Insomnia, unspecified type      Urinary frequency      Neoplasm by body site  left anterior chest wall      Obesity      Bilateral malignant neoplasm of breast in female, unspecified site of breast  b/l      Cataract  b/l      Macular Hole  repair b/l eyes      S/P left knee arthroscopy  2009      Early stage skin cancer  basal cell carcinoma removed from left side of neck.      History of lumpectomy of left breast  1990      H/O mastectomy, right  1993 with immediate reconstruction with fat grafting      H/O colonoscopy  multiple      History of esophagogastroduodenoscopy (EGD)      H/O left mastectomy  2016      H/O bilateral breast biopsy          REVIEW OF SYSTEMS    General:	  confused  not oriented  denies pain    FAMILY HISTORY:  Family history of temporal arteritis (Father)  father    Family history of stroke (Mother)  mother        Home Medications:  acetaminophen 325 mg oral tablet: 2 tab(s) orally every 6 hours As needed Temp greater or equal to 38C (100.4F), Mild Pain (1 - 3) (16 Apr 2025 16:03)  atorvastatin 20 mg oral tablet: 1 tab(s) orally once a day (at bedtime) (16 Apr 2025 16:02)  calcitonin 200 intl units/mL injectable solution: 100 unit(s) subcutaneously every 12 hours for 2 days (16 Apr 2025 16:05)  cefepime 1 g intravenous injection: 1 gram(s) intravenously every 8 hours for 7 days for Cystitis (16 Apr 2025 16:05)  DULoxetine 60 mg oral delayed release capsule: 1 cap(s) orally once a day (16 Apr 2025 16:03)  memantine 10 mg oral tablet: 1 tab(s) orally once a day (at bedtime) (16 Apr 2025 16:08)  polyethylene glycol 3350 oral powder for reconstitution: 17 gram(s) orally 2 times a day (16 Apr 2025 16:03)  senna leaf extract oral tablet: 2 tab(s) orally once a day (at bedtime) (16 Apr 2025 16:03)  traZODone 50 mg oral tablet: orally once a day (at bedtime) (16 Apr 2025 16:08)      MEDICATIONS  (STANDING):  dextrose 5%. 1000 milliLiter(s) (75 mL/Hr) IV Continuous <Continuous>  heparin   Injectable 5000 Unit(s) SubCutaneous every 12 hours  piperacillin/tazobactam IVPB.. 3.375 Gram(s) IV Intermittent every 8 hours    MEDICATIONS  (PRN):      PHYSICAL EXAM:  Vital Signs Last 24 Hrs  T(C): 36.2 (17 Apr 2025 16:25), Max: 36.6 (16 Apr 2025 17:50)  T(F): 97.2 (17 Apr 2025 16:25), Max: 97.8 (16 Apr 2025 17:50)  HR: 62 (17 Apr 2025 16:25) (55 - 90)  BP: 131/57 (17 Apr 2025 16:25) (91/31 - 155/77)  BP(mean): 84 (16 Apr 2025 21:58) (84 - 100)  RR: 16 (17 Apr 2025 16:25) (15 - 18)  SpO2: 98% (17 Apr 2025 16:25) (97% - 100%)    Parameters below as of 17 Apr 2025 16:25  Patient On (Oxygen Delivery Method): room air      Gen:  confused  chest clear  no focal deficits          LABS:                        13.0   15.21 )-----------( 217      ( 17 Apr 2025 05:40 )             39.7     17 Apr 2025 05:40    149    |  112    |  20     ----------------------------<  129    2.6     |  35     |  0.74     Ca    14.5       17 Apr 2025 05:40  Phos  2.2       17 Apr 2025 05:40  Mg     1.3       17 Apr 2025 05:40    TPro  6.5    /  Alb  2.7    /  TBili  0.9    /  DBili  x      /  AST  23     /  ALT  16     /  AlkPhos  143    17 Apr 2025 05:40    LIVER FUNCTIONS - ( 17 Apr 2025 05:40 )  Alb: 2.7 g/dL / Pro: 6.5 gm/dL / ALK PHOS: 143 U/L / ALT: 16 U/L / AST: 23 U/L / GGT: x           PT/INR - ( 16 Apr 2025 13:38 )   PT: 12.5 sec;   INR: 1.09 ratio         PTT - ( 16 Apr 2025 13:38 )  PTT:27.2 sec      RADIOLOGY & ADDITIONAL STUDIES:  rib and spinal mets noted      ACC: [id number] EXAM:  CT ABDOMEN AND PELVIS IC   ORDERED BY: KOFI BERNARDO     ACC: [id number] EXAM:  CT CHEST IC   ORDERED BY: KOFI BERNARDO     PROCEDURE DATE:  01/04/2025          INTERPRETATION:  CLINICAL INFORMATION: Malignancy workup    COMPARISON: None.    CONTRAST/COMPLICATIONS:  IV Contrast: Omnipaque 350 (accession [id number]), NONE (accession   [id number])  90 cc administered   0 cc discarded  Oral Contrast: NONE.    PROCEDURE:  CT of the Chest, Abdomen and Pelvis was performed.  Sagittal and coronal reformats were performed.    FINDINGS:  CHEST:  LUNGS AND LARGE AIRWAYS: The central airways are patent. The lungs are   clear. No suspicious mass.  PLEURA: No pleural abnormality.  VESSELS: Aortic atherosclerosis without aneurysm. Main pulmonary arteries   are patent.  HEART: No cardiomegaly. No pericardial effusion. Coronary and mitral   annular calcification.  MEDIASTINUM AND JIM: No adenopathy.  CHEST WALL AND LOWER NECK: No masses. Status post left mastectomy.    ABDOMEN AND PELVIS:  LIVER: A few scattered tiny cysts.  BILE DUCTS: Nondilated.  GALLBLADDER: Normal.  SPLEEN: Normal.  PANCREAS: Diffuse atrophy.  ADRENALS: Normal.  KIDNEYS/URETERS: No hydronephrosis or urinary tract calculi. Bilateral   parapelvic cysts.    BLADDER: Normal.  REPRODUCTIVE ORGANS: Small calcified fibroids. No adnexal mass.    BOWEL: No bowel obstruction. Redundant cecum. Normal appendix. Moderate   fecal retention in the rectum with evidence of stercoral proctitis.  PERITONEUM: No ascites or implants.  VESSELS: Aortoiliac atherosclerosis without aneurysm.  RETROPERITONEUM/LYMPH NODES: No adenopathy.  ABDOMINAL WALL: Rectus diastasis.  BONES: Lytic expansile lesion in the right posterolateral ninth rib.   Possible lytic lesion in T12 vertebral body.    IMPRESSION:  *  Lytic expansile lesion in the right posterolateral ninth rib. Possible   lytic lesion in T12 vertebral body.  *  No other masses or evidence of metastatic disease elsewhere.  *  Moderate fecal retention in the rectum with evidence of stercoral   proctitis.      --- End of Report ---            PHILLIP CROWDER MD; Attending Radiologist  
  HPI: Pt is a 85y old Female with a H/O dementia, breast cancer s/p mastectomies in 2016 with mets to bone, spinal stenosis of lumbar region, HLD and macular degeneration presenting to on 4/16/25 from [place] for increased lethargy over the past 4 days. Per medication reconciliation from facility she has been receiving 1g of cefepime for a UTI. She was noted to be hypoxic upon presentation and placed on 3L NC by EMS.   Upon arrival to the ED, labs significant for WBC of 14.29, Na of 150, and K+ of 3.0. CXR with R lowerlobe infiltrate/atelectasis. Palliative medicne Consult to further establish GOC  4/21/25 Seen and examined at bedside. Lethargic and minimally conversive. Appears in NAD     PAIN: ( )Yes   (X )No    DYSPNEA: ( ) Yes  (X ) No      PAST MEDICAL & SURGICAL HISTORY:  Bilateral malignant neoplasm of breast in female, unspecified site of breast  b/l  History of macular degeneration  bilateral  Gastroesophageal reflux disease without esophagitis  Osteoarthritis of both knees, unspecified osteoarthritis type  Spinal stenosis of lumbar region  Basal cell carcinoma in situ of skin  removed from neck  Malignant neoplasm of left female breast, unspecified site of breast  with Lymph node involvement  Insomnia, unspecified type  Urinary frequency  Neoplasm by body site  left anterior chest wall  Obesity  Cataract  b/l  Macular Hole  repair b/l eyes  S/P left knee arthroscopy  2009  Early stage skin cancer  basal cell carcinoma removed from left side of neck.  History of lumpectomy of left breast  1990  H/O mastectomy, right  1993 with immediate reconstruction with fat grafting  H/O colonoscopy  multiple  History of esophagogastroduodenoscopy (EGD)  H/O left mastectomy  2016  H/O bilateral breast biopsy  SOCIAL HX:  Coming from [place]  Hx opiate tolerance ( )YES  (X )NO    Baseline ADLs  (Prior to Admission)  ( ) Independent   ( X)Dependent    FAMILY HISTORY:  Family history of temporal arteritis (Father)  father    Family history of stroke (Mother)  mother        Review of Systems:  Unable to obtain/Limited due to: AMS/lethargy      PHYSICAL EXAM:    Vital Signs Last 24 Hrs  T(C): 36.6 (21 Apr 2025 07:51), Max: 36.8 (21 Apr 2025 00:04)  T(F): 97.9 (21 Apr 2025 07:51), Max: 98.2 (21 Apr 2025 00:04)  HR: 75 (21 Apr 2025 07:51) (71 - 75)  BP: 145/51 (21 Apr 2025 07:51) (118/65 - 145/51)  RR: 18 (21 Apr 2025 07:51) (18 - 18)  SpO2: 100% (21 Apr 2025 07:51) (97% - 100%)    Parameters below as of 21 Apr 2025 07:51  Patient On (Oxygen Delivery Method): room air    PPSV2:  30 %  General: Elderly female in bed in NAD  Mental Status: Lethargic/confused  HEENT: oral mucosa dry  Lungs: clear to auscultation salvatore  Cardiac: S1S2+  GI: abd soft NT ND + BS  : voids  Ext: moves on bed  Neuro: confusion      LABS:                        11.3   10.62 )-----------( 228      ( 21 Apr 2025 07:44 )             33.6     04-21    140  |  111[H]  |  13  ----------------------------<  105[H]  3.5   |  24  |  0.63    Ca    10.9[H]      21 Apr 2025 07:44  Phos  1.4     04-21  Mg     1.9     04-21    TPro  x   /  Alb  2.2[L]  /  TBili  x   /  DBili  x   /  AST  x   /  ALT  x   /  AlkPhos  x   04-21      Albumin: Albumin: 2.2 g/dL (04-21 @ 07:44)      Allergies    No Known Allergies    Intolerances      MEDICATIONS  (STANDING):  enoxaparin Injectable 40 milliGRAM(s) SubCutaneous every 24 hours  multivitamin/minerals 1 Tablet(s) Oral daily  piperacillin/tazobactam IVPB.. 3.375 Gram(s) IV Intermittent every 8 hours  thiamine 100 milliGRAM(s) Oral daily    MEDICATIONS  (PRN):      RADIOLOGY/ADDITIONAL STUDIES:    < from: Xray Chest 1 View- PORTABLE-Urgent (04.16.25 @ 14:14) >    ACC: [id number] EXAM:  XR CHEST PORTABLE URGENT 1V   ORDERED BY: LEROY FOWLER     PROCEDURE DATE:  04/16/2025          INTERPRETATION:  TECHNIQUE: Single portable view of the chest.    COMPARISON:  1/4/2025    CLINICAL HISTORY: Sepsis    FINDINGS:    Single frontal view of the chest demonstrates right lower lobe   infiltrate/atelectasis. Osteopenia. Chronic bilateral rib fractures.   Right axillary surgical clips. Consider CT as clinically warranted. The   cardiomediastinal silhouette is unremarkable. No acute osseous   abnormalities. Overlying EKG leads and wires are noted    IMPRESSION: Right lower lobe infiltrate/atelectasis.    --- End of Report ---

## 2025-04-21 NOTE — PROGRESS NOTE ADULT - ASSESSMENT
Stage IV breast cancer  Bone metastases  Refractory to anti estrogen therapy  Hypercalcemia/ likely related to breast cancer  Rising tumor marker  Poor performance status    Plan    In terms of the breast cancer , no plans for chemotherapy given poor performance status  Recommend best supportive care  Palliative care  Hospice appropriate if family in agreement

## 2025-04-21 NOTE — PROGRESS NOTE ADULT - SUBJECTIVE AND OBJECTIVE BOX
Date of service: 04-21-25 @ 13:17    Lying in bed in NAD  Weak looking  No SOB at rest    ROS: no fever or chills; limited     MEDICATIONS  (STANDING):  enoxaparin Injectable 40 milliGRAM(s) SubCutaneous every 24 hours  multivitamin/minerals 1 Tablet(s) Oral daily  piperacillin/tazobactam IVPB.. 3.375 Gram(s) IV Intermittent every 8 hours  thiamine 100 milliGRAM(s) Oral daily    Vital Signs Last 24 Hrs  T(C): 36.3 (21 Apr 2025 11:44), Max: 36.8 (21 Apr 2025 00:04)  T(F): 97.4 (21 Apr 2025 11:44), Max: 98.2 (21 Apr 2025 00:04)  HR: 77 (21 Apr 2025 11:44) (71 - 77)  BP: 127/56 (21 Apr 2025 11:44) (118/65 - 145/51)  BP(mean): --  RR: 18 (21 Apr 2025 11:44) (18 - 18)  SpO2: 100% (21 Apr 2025 11:44) (97% - 100%)    Parameters below as of 21 Apr 2025 11:44  Patient On (Oxygen Delivery Method): room air     Physical exam:    Constitutional:  No acute distress  HEENT: NC/AT, EOMI, PERRLA, conjunctivae clear; ears and nose atraumatic; pharynx benign  Neck: supple; thyroid not palpable  Back: no tenderness  Respiratory: respiratory effort normal; decreased BS at bases  Cardiovascular: S1S2 regular, no murmurs  Abdomen: soft, not tender, not distended, positive BS; no liver or spleen organomegaly  Genitourinary: no suprapubic tenderness  Lymphatic: no LN palpable  Musculoskeletal: no muscle tenderness, no joint swelling or tenderness  Extremities: no pedal edema  Neurological/ Psychiatric: Alert, moving all extremities  Skin: no rashes; no palpable lesions    Labs: reviewed                        11.1   9.94  )-----------( 202      ( 20 Apr 2025 06:42 )             33.2     04-20    141  |  111[H]  |  18  ----------------------------<  88  2.8[LL]   |  26  |  0.63    Ca    11.3[H]      20 Apr 2025 06:42  Mg     1.9     04-19    TPro  5.9[L]  /  Alb  2.1[L]  /  TBili  0.8  /  DBili  x   /  AST  28  /  ALT  21  /  AlkPhos  139[H]  04-20                        13.0   15.21 )-----------( 217      ( 17 Apr 2025 05:40 )             39.7     04-17    149[H]  |  112[H]  |  20  ----------------------------<  129[H]  2.6[LL]   |  35[H]  |  0.74    Ca    14.5[HH]      17 Apr 2025 05:40  Phos  2.2     04-17  Mg     1.3     04-17    TPro  6.5  /  Alb  2.7[L]  /  TBili  0.9  /  DBili  x   /  AST  23  /  ALT  16  /  AlkPhos  143[H]  04-17     LIVER FUNCTIONS - ( 17 Apr 2025 05:40 )  Alb: 2.7 g/dL / Pro: 6.5 gm/dL / ALK PHOS: 143 U/L / ALT: 16 U/L / AST: 23 U/L / GGT: x           Urinalysis with Rflx Culture (04-16 @ 13:54)  Urine Appearance: Clear  Protein, Urine: 30 mg/dL  Urine Microscopic-Add On (NC) (04-16 @ 13:54)  White Blood Cell - Urine: 36 /HPF  Red Blood Cell - Urine: 60 /HPF    Culture - Urine (collected 16 Apr 2025 13:54)  Source: Urine None  Final Report (17 Apr 2025 14:28):    <10,000 CFU/mL Normal Urogenital Yvonne    Culture - Blood (collected 16 Apr 2025 13:38)  Source: Blood Blood-Peripheral  Preliminary Report (17 Apr 2025 19:02):    No growth at 24 hours    Culture - Blood (collected 16 Apr 2025 13:38)  Source: Blood Blood-Peripheral  Preliminary Report (17 Apr 2025 19:02):    No growth at 24 hours    Radiology: all available radiological tests reviewed    < from: Xray Chest 1 View- PORTABLE-Urgent (04.16.25 @ 14:14) >  IMPRESSION: Right lower lobe infiltrate/atelectasis.  < end of copied text >    Advanced directives addressed: full resuscitation

## 2025-04-21 NOTE — PROGRESS NOTE ADULT - SUBJECTIVE AND OBJECTIVE BOX
INTERVAL HISTORY:  HPI:    Patient is an 84 y/o F with normal pressure hydrocephalus, dementia, breast cancer with bone metastases who had exhausted anti estrogen based therapies and was recently on Capecitabine;   Performance status had been declining tumor marker rising and we had recommended PET imaging   Presented to  ED on 4/16/25 from [place] for increased lethargy over the past 4 days  Labs severe Hypercalcemia   She received IV Zosyn, IV Vancomycin and 2L NS bolus.    (16 Apr 2025 22:14)    Presently confused  In no apparent distress  Denies pain  + confused       REVIEW OF SYSTEMS:      Allergies    No Known Allergies    Intolerances        MEDICATIONS  (STANDING):  enoxaparin Injectable 40 milliGRAM(s) SubCutaneous every 24 hours  multivitamin/minerals 1 Tablet(s) Oral daily  piperacillin/tazobactam IVPB.. 3.375 Gram(s) IV Intermittent every 8 hours  thiamine 100 milliGRAM(s) Oral daily    MEDICATIONS  (PRN):      Vital Signs Last 24 Hrs  T(C): 36.7 (21 Apr 2025 16:00), Max: 36.8 (21 Apr 2025 00:04)  T(F): 98.1 (21 Apr 2025 16:00), Max: 98.2 (21 Apr 2025 00:04)  HR: 79 (21 Apr 2025 16:00) (71 - 79)  BP: 142/49 (21 Apr 2025 16:00) (118/65 - 145/51)  BP(mean): --  RR: 18 (21 Apr 2025 16:00) (18 - 18)  SpO2: 98% (21 Apr 2025 16:00) (98% - 100%)    Parameters below as of 21 Apr 2025 16:00  Patient On (Oxygen Delivery Method): room air        PHYSICAL EXAM:    GENERAL: NAD, Cachectic  ECOG 4  HEAD:  Atraumatic, Normocephalic  EYES: EOMI, PERRLA, conjunctiva and sclera clear    NECK: Supple, No JVD, Normal thyroid  NERVOUS SYSTEM:    CHEST/LUNG: Clear to percussion bilaterally; No rales, rhonchi,   HEART: Regular rate and rhythm;   ABDOMEN: Soft, Nontender.  EXTREMITIES:   edema:-  LYMPH: No lymphadenopathy noted        LABS:                        11.3   10.62 )-----------( 228      ( 21 Apr 2025 07:44 )             33.6     04-21    140  |  111[H]  |  13  ----------------------------<  105[H]  3.5   |  24  |  0.63    Ca    10.9[H]      21 Apr 2025 07:44  Phos  1.4     04-21  Mg     1.9     04-21    TPro  x   /  Alb  2.2[L]  /  TBili  x   /  DBili  x   /  AST  x   /  ALT  x   /  AlkPhos  x   04-21      Urinalysis Basic - ( 21 Apr 2025 07:44 )    Color: x / Appearance: x / SG: x / pH: x  Gluc: 105 mg/dL / Ketone: x  / Bili: x / Urobili: x   Blood: x / Protein: x / Nitrite: x   Leuk Esterase: x / RBC: x / WBC x   Sq Epi: x / Non Sq Epi: x / Bacteria: x          RADIOLOGY & ADDITIONAL STUDIES:      < from: CT Abdomen and Pelvis w/ IV Cont (01.04.25 @ 11:34) >  ACC: [id number] EXAM:  CT ABDOMEN AND PELVIS IC   ORDERED BY: KOFI BERNARDO     ACC: [id number] EXAM:  CT CHEST IC   ORDERED BY: KOFI BERNARDO     PROCEDURE DATE:  01/04/2025          INTERPRETATION:  CLINICAL INFORMATION: Malignancy workup    COMPARISON: None.    CONTRAST/COMPLICATIONS:  IV Contrast: Omnipaque 350 (accession [id number]), NONE (accession   [id number])  90 cc administered   0 cc discarded  Oral Contrast: NONE.    PROCEDURE:  CT of the Chest, Abdomen and Pelvis was performed.  Sagittal and coronal reformats were performed.    FINDINGS:  CHEST:  LUNGS AND LARGE AIRWAYS: The central airways are patent. The lungs are   clear. No suspicious mass.  PLEURA: No pleural abnormality.  VESSELS: Aortic atherosclerosis without aneurysm. Mainpulmonary arteries   are patent.  HEART: No cardiomegaly. No pericardial effusion. Coronary and mitral   annular calcification.  MEDIASTINUM AND JIM: No adenopathy.  CHEST WALL AND LOWER NECK: No masses. Status post left mastectomy.    ABDOMEN AND PELVIS:  LIVER: A few scattered tiny cysts.  BILE DUCTS: Nondilated.  GALLBLADDER: Normal.  SPLEEN: Normal.  PANCREAS: Diffuse atrophy.  ADRENALS: Normal.  KIDNEYS/URETERS: No hydronephrosis or urinary tract calculi. Bilateral   parapelvic cysts.    BLADDER: Normal.  REPRODUCTIVE ORGANS: Small calcified fibroids. No adnexal mass.    BOWEL: No bowel obstruction. Redundant cecum. Normal appendix. Moderate   fecal retention in the rectum with evidence of stercoral proctitis.  PERITONEUM: No ascites orimplants.  VESSELS: Aortoiliac atherosclerosis without aneurysm.  RETROPERITONEUM/LYMPH NODES: No adenopathy.  ABDOMINAL WALL: Rectus diastasis.  BONES: Lytic expansile lesion in the right posterolateral ninth rib.   Possible lytic lesion in T12 vertebral body.    IMPRESSION:  *  Lytic expansile lesion in the right posterolateral ninth rib. Possible   lytic lesion in T12 vertebral body.  *  No other masses or evidence of metastatic disease elsewhere.  *  Moderate fecal retention in the rectum with evidence of stercoral   proctitis.    < end of copied text >    PATHOLOGY:

## 2025-04-21 NOTE — CONSULT NOTE ADULT - REASON FOR ADMISSION
HCAP   severe hypercalcemia and other electrolyte abnormalities

## 2025-04-21 NOTE — PROGRESS NOTE ADULT - ASSESSMENT
86 y/o woman with dementia, progressive breast cancer with bone metastases, hypercalcemia of malignancy, spinal stenosis of lumbar region, macular degeneration, presented to  ED on 4/16/25 from [place] for increased lethargy over the 4 days. Per medication reconciliation from facility, she has been receiving cefepime for a UTI. She was noted to be hypoxic per EMS, placed on 3L NC. Upon arrival to the ED, vitals were /71 HR 84 RR 18 T97.8F and SpO2 of 100% on 3L NC. She was lethargic but arousable. No focal deficits. Labs were significant for WBC 14.29, Na 150, K 3.0. Ca ~16. CXR with R lower lobe infiltrate/atelectasis. She received IV Zosyn, IV Vancomycin and 2L NS bolus and was admitted to Medicine.     Acute metabolic encephalopathy   Sent from [place] for lethargy, likely multi factorial as patient was found to have severe hypercalcemia, hypernatremia, also hypoxia and pneumonia. Continue to treat underlying issues. See below.     Acute respiratory failure with hypoxia  Due to pneumonia, pneumonitis. Continue antibiotics. O2 supplementation PRN. Weaned to room air.     RLL pneumonia, unable to rule out Gram-negative organism, unable to rule out aspiration pneumonia/pneumonitis  As suggested by encephalopathy, hypoxia, leukocytosis, CXR with R sided infiltrate. Seen by ID, recommendations appreciated. On Zosyn. Blood cultures neg x 48 hours. Appears to be clinically improving.       Severe hypercalcemia  Resolved. Calcium upon admission 15.1, when corrected for hypoalbuminemia 16.3. Likely hypercalcemia of malignancy as patient with bony metastasis from progressing breat cancer. S/p 2L NS bolus in the ED with repeat of 14.5. Heme/onc consulted recommendations appreciated. S/p calcitonin 190 IU q12 and IV Zometa. Continue IV fluid. Continue to trend Ca, now 10.9    Hypernatremia  Resolved. Due to free water deficit. IV fluids given. Na improved.     Hypokalemia  Resolved. K 2.8. Repleted to goal K ~4. Mg WNL.     Progressing breast cancer with metastases  Follows with Dr Joyce. Appreciate input from Oncology. No further treatment available for patient's advanced breast cancer.     Severe protein calorie malnutrition   Appreciate dietician input. Regular diet. Texture modified for dysphagia. Ensure HP TID. Added thiamine and MVI daily.         Advanced directives: Poor prognosis. No further breast cancer treatments available. Code Status DNR/DNI    86 y/o woman with dementia, progressive breast cancer with bone metastases, hypercalcemia of malignancy, spinal stenosis of lumbar region, macular degeneration, presented to  ED on 4/16/25 from [place] for increased lethargy over the 4 days. Per medication reconciliation from facility, she has been receiving cefepime for a UTI. She was noted to be hypoxic per EMS, placed on 3L NC. Upon arrival to the ED, vitals were /71 HR 84 RR 18 T97.8F and SpO2 of 100% on 3L NC. She was lethargic but arousable. No focal deficits. Labs were significant for WBC 14.29, Na 150, K 3.0. Ca ~16. CXR with R lower lobe infiltrate/atelectasis. She received IV Zosyn, IV Vancomycin and 2L NS bolus and was admitted to Medicine.     Acute metabolic encephalopathy   Sent from [place] for lethargy, likely multi factorial as patient was found to have severe hypercalcemia, hypernatremia, also hypoxia and pneumonia. Continue to treat underlying issues. See below.     Acute respiratory failure with hypoxia  Due to pneumonia, pneumonitis. Continue antibiotics. O2 supplementation PRN. Weaned to room air.     RLL pneumonia, unable to rule out Gram-negative organism, unable to rule out aspiration pneumonia/pneumonitis  As suggested by encephalopathy, hypoxia, leukocytosis, CXR with R sided infiltrate. Seen by ID, recommendations appreciated. On Zosyn. day 5 today. Blood cultures neg x 48 hours. Appears to be clinically improving.       Severe hypercalcemia  Resolved. Calcium upon admission 15.1, when corrected for hypoalbuminemia 16.3. Likely hypercalcemia of malignancy as patient with bony metastasis from progressing breat cancer. S/p 2L NS bolus in the ED with repeat of 14.5. Heme/onc consulted recommendations appreciated. S/p calcitonin 190 IU q12 and IV Zometa. Continue IV fluid. Continue to trend Ca, now 10.9    Hypernatremia  Resolved. Due to free water deficit. IV fluids given. Na improved.     Hypokalemia  Resolved. K 2.8. Repleted to goal K ~4. Mg WNL.    Hypophosphatemia  Phos 1.4. Orderd for KPhos 30mmol.      Progressing breast cancer with metastases  Follows with Dr Joyce. Appreciate input from Oncology. No further treatment available for patient's advanced breast cancer.     Severe protein calorie malnutrition   Appreciate dietician input. Regular diet. Texture modified for dysphagia. Ensure HP TID. Added thiamine and MVI daily.         Advanced directives: Poor prognosis. No further breast cancer treatments available. Code Status DNR/DNI

## 2025-04-21 NOTE — PROGRESS NOTE ADULT - ASSESSMENT
86 y/o Female with h/o dementia, breast cancer s/p mastectomies in 2016 with mets to bone, spinal stenosis of lumbar region, HLD and macular degeneration was admitted on 4/16/25 from [place] for increased lethargy over the past 4 days. Per medication reconciliation from facility she has been receiving cefepime for a UTI. She was noted to be hypoxic upon presentation and placed on 3L NC by EMS. In ED, vitals were /71 HR 84 RR 18 T97.8F and SpO2 of 100% on 3L NC. Labs significant for WBC of 14.29, Na of 150. She received IV Zosyn, IV Vancomycin and 2L NS bolus.     #RLL pneumonia. Probable aspiration pneumonia  #Pyuria. Likely UTI  #Leukocytosis  #Metabolic encephalopathy  -respiratory frail  -BC x 2, urine c/s noted  -on zosyn 3.375 gm IV q8h # 5  -tolerating abx well so far; no side effects noted  -respiratory care  -may complete abx therapy today  -f/u cultures  -monitor temps  -f/u CBC  -supportive care  2. Other issues:   -care per medicine    d/w medicine team

## 2025-04-22 ENCOUNTER — TRANSCRIPTION ENCOUNTER (OUTPATIENT)
Age: 85
End: 2025-04-22

## 2025-04-22 VITALS
TEMPERATURE: 98 F | DIASTOLIC BLOOD PRESSURE: 59 MMHG | RESPIRATION RATE: 18 BRPM | HEART RATE: 76 BPM | OXYGEN SATURATION: 98 % | SYSTOLIC BLOOD PRESSURE: 149 MMHG

## 2025-04-22 LAB
ANION GAP SERPL CALC-SCNC: 6 MMOL/L — SIGNIFICANT CHANGE UP (ref 5–17)
BUN SERPL-MCNC: 16 MG/DL — SIGNIFICANT CHANGE UP (ref 7–23)
CALCIUM SERPL-MCNC: 10.4 MG/DL — HIGH (ref 8.5–10.1)
CHLORIDE SERPL-SCNC: 113 MMOL/L — HIGH (ref 96–108)
CO2 SERPL-SCNC: 25 MMOL/L — SIGNIFICANT CHANGE UP (ref 22–31)
CREAT SERPL-MCNC: 0.76 MG/DL — SIGNIFICANT CHANGE UP (ref 0.5–1.3)
EGFR: 77 ML/MIN/1.73M2 — SIGNIFICANT CHANGE UP
EGFR: 77 ML/MIN/1.73M2 — SIGNIFICANT CHANGE UP
GLUCOSE SERPL-MCNC: 87 MG/DL — SIGNIFICANT CHANGE UP (ref 70–99)
MAGNESIUM SERPL-MCNC: 1.7 MG/DL — SIGNIFICANT CHANGE UP (ref 1.6–2.6)
PHOSPHATE SERPL-MCNC: 2.6 MG/DL — SIGNIFICANT CHANGE UP (ref 2.5–4.5)
POTASSIUM SERPL-MCNC: 3.6 MMOL/L — SIGNIFICANT CHANGE UP (ref 3.5–5.3)
POTASSIUM SERPL-SCNC: 3.6 MMOL/L — SIGNIFICANT CHANGE UP (ref 3.5–5.3)
SODIUM SERPL-SCNC: 144 MMOL/L — SIGNIFICANT CHANGE UP (ref 135–145)

## 2025-04-22 PROCEDURE — 99239 HOSP IP/OBS DSCHRG MGMT >30: CPT

## 2025-04-22 PROCEDURE — 99232 SBSQ HOSP IP/OBS MODERATE 35: CPT

## 2025-04-22 RX ORDER — MAGNESIUM SULFATE 500 MG/ML
1 SYRINGE (ML) INJECTION ONCE
Refills: 0 | Status: COMPLETED | OUTPATIENT
Start: 2025-04-22 | End: 2025-04-22

## 2025-04-22 RX ORDER — TRAZODONE HCL 100 MG
1 TABLET ORAL
Qty: 0 | Refills: 0 | DISCHARGE

## 2025-04-22 RX ORDER — CYST/ALA/Q10/PHOS.SER/DHA/BROC 100-20-50
1 POWDER (GRAM) ORAL
Qty: 0 | Refills: 0 | DISCHARGE
Start: 2025-04-22

## 2025-04-22 RX ADMIN — Medication 40 MILLIEQUIVALENT(S): at 10:18

## 2025-04-22 RX ADMIN — Medication 100 MILLIGRAM(S): at 08:23

## 2025-04-22 RX ADMIN — Medication 100 GRAM(S): at 10:19

## 2025-04-22 RX ADMIN — Medication 1 TABLET(S): at 08:23

## 2025-04-22 NOTE — DISCHARGE NOTE PROVIDER - HOSPITAL COURSE
84 y/o woman with dementia, progressive breast cancer with bone metastases, hypercalcemia of malignancy, spinal stenosis of lumbar region, macular degeneration, presented to  ED on 4/16/25 from [place] for increased lethargy over the 4 days. Per medication reconciliation from facility, she has been receiving cefepime for a UTI. She was noted to be hypoxic per EMS, placed on 3L NC. Upon arrival to the ED, vitals were /71 HR 84 RR 18 T97.8F and SpO2 of 100% on 3L NC. She was lethargic but arousable. No focal deficits. Labs were significant for WBC 14.29, Na 150, K 3.0. Ca ~16. CXR with R lower lobe infiltrate/atelectasis. She received IV Zosyn, IV Vancomycin and 2L NS bolus and was admitted to Medicine.     Acute metabolic encephalopathy   Sent from [place] for lethargy, likely multi factorial as patient was found to have severe hypercalcemia, hypernatremia, also hypoxia and pneumonia. Treated these underlying issues. Electrolytes improved and oxygenation normalized. Patient remains generally very weak and frail, has progressing breast cancer with metastases and Oncology states no further treatment available for patient. Comfort care measures only. MOLST completed.     Acute respiratory failure with hypoxia  Due to pneumonia, pneumonitis. Continue antibiotics. O2 supplementation PRN. Weaned to room air.     Sepsis due to RLL pneumonia, unable to rule out Gram-negative organism, unable to rule out aspiration pneumonia/pneumonitis, present upon admission  As suggested by encephalopathy, hypoxia, leukocytosis, CXR with R sided infiltrate. Seen by ID, recommendations appreciated. Blood cultures neg x 48 hours. Weaned to room air as mentioned above. Completed course of Zosyn.     Severe hypercalcemia  Resolved. Calcium upon admission 15.1, when corrected for hypoalbuminemia 16.3. Likely hypercalcemia of malignancy as patient with bony metastasis from progressing breat cancer. S/p 2L NS bolus in the ED with repeat of 14.5. Heme/onc consulted recommendations appreciated. S/p calcitonin 190 IU q12 and IV Zometa and course of IV fluid hydration. Ca now much improved, 10.4.    Hypernatremia  Resolved. Due to free water deficit. IV fluids given and PO intake encouraged. Na now normal.    Hypokalemia  Resolved. Potassium level normalized with potassium supplementation.     Hypophosphatemia  Resolved. Phos level normalized with phos supplementation.    Progressing breast cancer with metastases  Follows with Dr Joyce. Appreciate input from Oncology. No further treatment available for patient's advanced breast cancer. Recommended palliative care, comfort care measures only. Family in agreement. MOLST completed.     Severe protein calorie malnutrition   Appreciate dietician input. Regular diet. Texture modified for dysphagia. Ensure HP TID. Added thiamine and MVI daily.       ADVANCED DIRECTIVE: DNR/DNI. COMFORT MEASURES ONLY. DO NOT SENT TO HOSPITAL AS PER MOLST.

## 2025-04-22 NOTE — GOALS OF CARE CONVERSATION - ADVANCED CARE PLANNING - CONVERSATION DETAILS
HPI: As per medical record,   "Patient is an 86 y/o F with a PMH of dementia, breast cancer s/p mastectomies in 2016 with mets to bone, spinal stenosis of lumbar region, HLD and macular degeneration presenting to  ED on 4/16/25 from [place] for increased lethargy over the past 4 days. Per medication reconciliation from facility she has been receiving 1g of cefepime for a UTI. She was noted to be hypoxic upon presentation and placed on 3L NC by EMS.   Upon arrival to the ED, vitals were /71 HR 84 RR 18 T97.8F and SpO2 of 100% on 3L NC. Labs significant for WBC of 14.29, Na of 150, and K+ of 3.0. CXR with R lowerlobe infiltrate/atelectasis. She received IV Zosyn, IV Vancomycin and 2L NS bolus.  (16 Apr 2025 22:14)"      PERTINENT PMH REVIEWED:  [ x ] YES [ ] NO           Primary Contact: alberto Mayer  ([phone])    HCP [ x ] Surrogate [   ] Guardian [   ]    Mental Status: Pt lacks capacity  Concerns of Depression [  ] -not identified  Anxiety [   ] -not identified  Baseline ADLs (prior to admission):  Independent [  ] moderately [ ] fully   Dependent   [ x ] moderately [ ]fully    Family Meeting attendees: GOC initiated and anticipate ongoing discussion    Anticipated Grief: Patient[  ] Family [ x ]    Caregiver [place] Assessed: Yes [ x ] No [  ]    Episcopal: [employer].    Spiritual Concerns: Not identified,  available for support    Goals of Care: To be further discussed    Previous Services: Outpatient oncology     ADVANCE DIRECTIVES:     - Pt lacks capacity   - HCP names alberto Mayer as primary AND  Ed as alternate   - MOLST on chart reflecting DNR/DNI    Anticipated D/C Plan: To be further determined                      Summary: Palliative team reached out to alberto Mayer to introduce team and offer support. Palliative roles explained. Pt known to team from previous admissions. Pt unable to participate in discussion. Leyla explains that she is on her way home from [place] and will be back by this evening. She confirms that pt has been at [facility] but has "used up her 100 days" through Medicare. She reports that they have not yet begun a Medicaid application.     Hospice philosophy reviewed briefly and inpatient hospice explained. Leyla would be interested in inpatient hospice but team explained there is a criteria for inpatient vs home. Leyla would like to see patient in person and have medical update prior to making any decisions regarding plan going forward.    Plan to see how patient does overnight. Daughter to come see patient. CHINEDU on chart reflecting DNR/DNI. Our team will continue to follow.
Received a call from daughter whom shared her decision to return to [place] to focus on Pts comfort.  MOLST completed to reflect DNR/DNI, DNH, comfort measures, no feeding tube, no iv fluids, determine use of antibiotics and no dialysis.  Daughter desires Pts medical care to focus on her comfort and treat symptoms as they arise to ensure Pt is not in distress.  Daughter provided verbal consent for MOLST.  All questions addressed.  Daughters feelings explored.  Daughter aware of palliative team availability.    Plan to return to [place].  MOLST placed on medical record.  MOLST reflects DNR/DNI, DNH, comfort measures, no feeding tube, no iv fluids, determine use of antibiotics and no dialysis.  Emotional support provided.  Our team to continue to follow.

## 2025-04-22 NOTE — DISCHARGE NOTE PROVIDER - NSDCMRMEDTOKEN_GEN_ALL_CORE_FT
acetaminophen 325 mg oral tablet: 2 tab(s) orally every 6 hours As needed Temp greater or equal to 38C (100.4F), Mild Pain (1 - 3)  atorvastatin 20 mg oral tablet: 1 tab(s) orally once a day (at bedtime)  DULoxetine 60 mg oral delayed release capsule: 1 cap(s) orally once a day  memantine 10 mg oral tablet: 1 tab(s) orally once a day (at bedtime)  Multiple Vitamins with Minerals oral tablet: 1 tab(s) orally once a day  polyethylene glycol 3350 oral powder for reconstitution: 17 gram(s) orally 2 times a day  senna leaf extract oral tablet: 2 tab(s) orally once a day (at bedtime)  thiamine 100 mg oral tablet: 1 tab(s) orally once a day  traZODone 50 mg oral tablet: 1 tab(s) orally once a day (at bedtime) as needed for sleep

## 2025-04-22 NOTE — PROGRESS NOTE ADULT - SUBJECTIVE AND OBJECTIVE BOX
HPI: Pt is a 85y old Female with a H/O dementia, breast cancer s/p mastectomies in 2016 with mets to bone, spinal stenosis of lumbar region, HLD and macular degeneration presenting to on 4/16/25 from [place] for increased lethargy over the past 4 days. Per medication reconciliation from facility she has been receiving 1g of cefepime for a UTI. She was noted to be hypoxic upon presentation and placed on 3L NC by EMS.   Upon arrival to the ED, labs significant for WBC of 14.29, Na of 150, and K+ of 3.0. CXR with R lowerlobe infiltrate/atelectasis. Palliative medicne Consult to further establish GOC  4/21/25 Seen and examined at bedside. Lethargic and minimally conversive. Appears in NAD   4/22/25 Seen and examined at bedside. Lethargic. Appears comfortable. 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  History of lumpectomy of left breast  1990  H/O mastectomy, right  1993 with immediate reconstruction with fat grafting  H/O colonoscopy  multiple  History of esophagogastroduodenoscopy (EGD)  H/O left mastectomy  2016  H/O bilateral breast biopsy  SOCIAL HX:  Coming from [place]  Hx opiate tolerance ( )YES  (X )NO    Baseline ADLs  (Prior to Admission)  ( ) Independent   ( X)Dependent    FAMILY HISTORY:  Family history of temporal arteritis (Father)  father    Family history of stroke (Mother)  mother        Review of Systems:  Unable to obtain/Limited due to: AMS/lethargy      PHYSICAL EXAM:  ICU Vital Signs Last 24 Hrs  T(C): 36.4 (22 Apr 2025 08:46), Max: 37.2 (21 Apr 2025 20:39)  T(F): 97.5 (22 Apr 2025 08:46), Max: 98.9 (21 Apr 2025 20:39)  HR: 88 (22 Apr 2025 08:46) (77 - 89)  BP: 120/58 (22 Apr 2025 08:46) (116/43 - 142/49)  RR: 18 (22 Apr 2025 08:46) (18 - 18)  SpO2: 93% (22 Apr 2025 08:46) (93% - 100%)    O2 Parameters below as of 22 Apr 2025 08:46  Patient On (Oxygen Delivery Method): room air      PPSV2:  30 %  General: Elderly female in bed in NAD  Mental Status: Lethargic/confused  HEENT: oral mucosa dry  Lungs: clear to auscultation salvatore  Cardiac: S1S2+  GI: abd soft NT ND + BS  : voids  Ext: moves on bed  Neuro: confusion      LABS:              Albumin: Albumin: 2.2 g/dL (04-21 @ 07:44)      Allergies    No Known Allergies    Intolerances    MEDICATIONS  (STANDING):  enoxaparin Injectable 40 milliGRAM(s) SubCutaneous every 24 hours  multivitamin/minerals 1 Tablet(s) Oral daily  thiamine 100 milliGRAM(s) Oral daily    MEDICATIONS  (PRN):        RADIOLOGY/ADDITIONAL STUDIES:

## 2025-04-22 NOTE — PROGRESS NOTE ADULT - ASSESSMENT
HPI: Pt is a 85y old Female with a H/O dementia, breast cancer s/p mastectomies in 2016 with mets to bone, spinal stenosis of lumbar region, HLD and macular degeneration presenting to on 4/16/25 from [place] for increased lethargy over the past 4 days. Per medication reconciliation from facility she has been receiving 1g of cefepime for a UTI. She was noted to be hypoxic upon presentation and placed on 3L NC by EMS.   Upon arrival to the ED, labs significant for WBC of 14.29, Na of 150, and K+ of 3.0. CXR with R lowerlobe infiltrate/atelectasis. Palliative medicne Consult to further establish GOC  4/21/25 Seen and examined at bedside. Lethargic and minimally conversive. Appears in NAD       Assessment and Plan:    1) AMS  -Acute metabolic encephalopathy   -Dementia  -? baseline  -Lethargy    2) Acute respiratory failure with hypoxia  -Due to pneumonia, pneumonitis  -Continue antibiotics  -O2 supplementation PRN  -Weaned to room air  -Imaging noted      3) Severe hypercalcemia  -Resolved. Calcium upon admission 15.1, when corrected for hypoalbuminemia 16.3  -Likely hypercalcemia of malignancy as patient with bony metastasis from progressing breat cancer  - Heme/onc consulted recommendations appreciated  -Continue  to trend Ca, now 10.9    4) Advanced Directives  -Pt lacks capacity  -Daughter Leyla named [facility]  -CHINEDU DNR/DNI on chart  -GOC discussion 4/21 to discuss GOC  -Follow up call today  -Daughter Leyla states she can not talk until after 230P and will call back    Time Spent: 45 minutes including the care, coordination and counseling of this patient, 50% of which was spent coordinating and counseling.

## 2025-04-22 NOTE — DISCHARGE NOTE PROVIDER - REASON FOR ADMISSION
Acute metabolic encephalopathy   Acute respiratory failure with hypoxia  Pneumonia  Hypercalcemia  Hypernatremia

## 2025-04-22 NOTE — PROGRESS NOTE ADULT - REASON FOR ADMISSION
Breast cancer   severe hypercalcemia and other electrolyte abnormalities
HCAP   severe hypercalcemia and other electrolyte abnormalities
Acute metabolic encephalopathy   Acute respiratory failure with hypoxia  RLL pneumonia, unable to rule out aspiration pneumonia/pnuemonitis  Severe hypercalcemia  Hypernatremia    severe hypercalcemia and other electrolyte abnormalities
HCAP   severe hypercalcemia and other electrolyte abnormalities
Acute metabolic encephalopathy   Acute respiratory failure with hypoxia  RLL pneumonia, unable to rule out aspiration pneumonia/pnuemonitis  Severe hypercalcemia  Hypernatremia

## 2025-04-22 NOTE — DISCHARGE NOTE PROVIDER - NSDCCPCAREPLAN_GEN_ALL_CORE_FT
PRINCIPAL DISCHARGE DIAGNOSIS  Diagnosis: Acute metabolic encephalopathy  Assessment and Plan of Treatment: You were admitted to the hospital for lethargy, likely multi factorial due to severe hypercalcemia, hypernatremia, also hypoxia and pneumonia. Treated these underlying issues. Electrolytes improved and oxygenation normalized. Unfortunately you remain generally very weak and frail, have progressing breast cancer with metastases and Oncology states no further treatment available for you, recommended palliative care. You and family are in agreement with comfort care measures. MOLST completed. Stable for discharge.      SECONDARY DISCHARGE DIAGNOSES  Diagnosis: Acute respiratory failure with hypoxia  Assessment and Plan of Treatment: Due to pneumonia, pneumonitis. You completed a course of broad spectrum intravenous antibiotics and supportive care with oxygen supplementation. Oxygenation has since improved and youve been weaned to room air and are continuing to tolerate that well.    Diagnosis: Pneumonia  Assessment and Plan of Treatment: You were found on admission to have right lower lobe pneumonia treated with IV antibiotics course, which you completed this admission here. Blood cultures negative. Successfully weaned to room air.    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 You were treated with calcitonin, Zometa and course of intravenous fluid hydration. Calcium level is now much improved, 10.4.    Diagnosis: Hypernatremia  Assessment and Plan of Treatment: You were also found to have elevated sodium concentration upon admission, due to free water deficit (dehydration). You were given intravenous fluids and oral intake was encouraged. Sodium concentration has returned to normal.    Diagnosis: Hypokalemia  Assessment and Plan of Treatment: Potassium levels were low as well. Potassium level normalized with potassium supplementation.    Diagnosis: Hypophosphatemia  Assessment and Plan of Treatment: Phosphorus levels were noted to be low during the admission too. Phos level normalized with phos supplementation.    Diagnosis: Stage IV breast cancer in female  Assessment and Plan of Treatment: Progressing breast cancer with metastases. Appreciate input from Dr Joyce and his partners. No further treatment available for your advanced breast cancer. Recommended palliative care, comfort care measures only. Family in agreement. MOLST completed.    Diagnosis: Malnutrition  Assessment and Plan of Treatment: Appreciate dietician input. Regular diet. Texture modified for dysphagia. Ensure High Protein three times per day. Thiamine and multivitamin daily.

## 2025-04-22 NOTE — DISCHARGE NOTE PROVIDER - CARE PROVIDER_API CALL
Eren Reinoso  Internal Medicine  [address] [place] [address]  Phone: [phone]  Fax: [phone]  Follow Up Time: 2 weeks  
101

## 2025-04-22 NOTE — DISCHARGE NOTE NURSING/CASE MANAGEMENT/SOCIAL WORK - NSDCVIVACCINE_GEN_ALL_CORE_FT
Tdap; 22-Nov-2019 20:23; Mayra Alicea (RN); [employer]; [id number] (Exp. Date: 22-Nov-2021); IntraMuscular; Deltoid Right.; 0.5 milliLiter(s); VIS (VIS Published: 09-May-2013, VIS Presented: 22-Nov-2019);   Tdap; 25-Dec-2020 00:55; Kristen Johnson (RN); [employer]; [id number] (Exp. Date: 31-Jul-2022); IntraMuscular; Deltoid Right.; 0.5 milliLiter(s); VIS (VIS Published: 09-May-2013, VIS Presented: 25-Dec-2020);   Tdap; 05-Aug-2021 22:35; Stellwag, Emily (RN); [employer]; [id number] (Exp. Date: 18-Nov-2022); IntraMuscular; Deltoid Right.; 0.5 milliLiter(s); VIS (VIS Published: 09-May-2013, VIS Presented: 05-Aug-2021);   Tdap; 29-Nov-2021 17:14; Jasmin Stewart (RN); [employer]; [id number] (Exp. Date: 09-Sep-2023); IntraMuscular; Deltoid Left.; 0.5 milliLiter(s); VIS (VIS Published: 09-May-2013, VIS Presented: 29-Nov-2021);   Tdap; 19-Feb-2023 22:34; Minor Burgos (RN); [employer]; [id number] (Exp. Date: 08-Dec-2024); IntraMuscular; Deltoid Right.; 0.5 milliLiter(s); VIS (VIS Published: 09-May-2013, VIS Presented: 19-Feb-2023);   Tdap; 22-Dec-2024 23:03; Yani Graham (RN); [employer]; [id number] (Exp. Date: 08-Aug-2026); IntraMuscular; Deltoid Right.; 0.5 milliLiter(s); VIS (VIS Published: 09-May-2013, VIS Presented: 22-Dec-2024);

## 2025-04-22 NOTE — DISCHARGE NOTE PROVIDER - NSDCCAREPROVSEEN_GEN_ALL_CORE_FT
Jerri Monzon (Palliative Care Team)  Miki Luna (Infectious Disease)  Joaquin Dixon (Medicine)  Omar Sorto (Medicine)  Manuelito Morrissey (Oncology)

## 2025-04-22 NOTE — DISCHARGE NOTE PROVIDER - NSDCPNSUBOBJ_GEN_ALL_CORE
Chief Complaint: Lethargy    Interval Hx: Patient seen and examined. History limited due to dementia and encephalopathy. Patient resting comfortably. Awake and alert. Respiratory status has improved, as patient is now breathing comfortably on room air. Rare intermittent cough. No fever. No chills. No other complaints. Completed course of Zosyn. Hypophosphatemia improved today. Stable for discharge to [place] with comfort care measures.    ROS: Limited due to dementia and acute medical illness resulting in encephalopathy    Vitals:  T(F): 97.9 (22 Apr 2025 12:28), Max: 98.9 (21 Apr 2025 20:39)  HR: 71 (22 Apr 2025 12:28) (71 - 89)  BP: 150/63 (22 Apr 2025 12:28) (116/43 - 150/63)  RR: 19 (22 Apr 2025 12:28) (18 - 19)  SpO2: 96% (22 Apr 2025 12:28) (93% - 96%) on room air    Exam:  GENERAL: No acute distress  HEENT: NCAT PERRL EOMI dry oral mucosa  NECK: Supple  CHEST: Normal resp effort at rest, decreased breath sounds at bases B/L  CVS: S1 S2 normal, regular rate and rhythm  ABD: +BS, soft, nontender  EXT: No clubbing, cyanosis, or edema  SKIN: Warm, dry  NEURO: Alert & Oriented X 2. Follows simple commands. Answers simple questions    Labs:                       11.3   10.62 )--------( 228                   33.6       144  |  113  |  16  ----------------------<  87  3.6   |  25  |  0.76    Ca  10.4     Phos  2.6     Mg   1.7         TPro  5.7 /  Alb  2.3  /  TBili  0.8  /  DBili  x   /  AST  24  /  ALT  14  /  AlkPhos  127    Procalcitonin 0.22   Lactate 1.2    UA 4/16: Clear, yellow, prot 30, gluc-, ket-, bld large, LE mod, N-, WBC 36, RBC 60, bact none, epi cells 4, squamous epi present    Micro:  Blood culture 4/16: Negative  Urine culture 4/16: Negative  COVID19, flu, RSV PCR 4/16: Negative    Imaging:  CXR 4/16: Single frontal view of chest. Right lower lobe infiltrate/atelectasis. Cardiomediastinal silhouette is unremarkable. Osteopenia. Chronic bilateral rib fractures. No acute osseous abnormalities. Right axillary surgical clips    Cardiac Testing:  EKG 4/16: Rate 81.  Normal sinus rhythm. Minimal voltage criteria for LVH, may be normal variant ( Sokolow-Valenzuela ). Nonspecific ST and T wave abnormality

## 2025-04-22 NOTE — DISCHARGE NOTE NURSING/CASE MANAGEMENT/SOCIAL WORK - FINANCIAL ASSISTANCE
[facility] provides services at a reduced cost to those who are determined to be eligible through [facility]’s financial assistance program. Information regarding [facility]’s financial assistance program can be found by going to [email or web address] or by calling [phone].

## 2025-04-22 NOTE — PROGRESS NOTE ADULT - NUTRITIONAL ASSESSMENT
This patient has been assessed with a concern for Malnutrition and has been determined to have a diagnosis/diagnoses of Severe protein-calorie malnutrition and Underweight (BMI < 19).    This patient is being managed with:   Diet Pureed-  Supplement Feeding Modality:  Oral  Ensure Plus High Protein Cans or Servings Per Day:  3       Frequency:  Three Times a day  Entered: Apr 20 2025 11:25AM  
This patient has been assessed with a concern for Malnutrition and has been determined to have a diagnosis/diagnoses of Severe protein-calorie malnutrition and Underweight (BMI < 19).    This patient is being managed with:   Diet Pureed-  Entered: Apr 16 2025  9:54PM  
This patient has been assessed with a concern for Malnutrition and has been determined to have a diagnosis/diagnoses of Severe protein-calorie malnutrition and Underweight (BMI < 19).    This patient is being managed with:   Diet Pureed-  Supplement Feeding Modality:  Oral  Ensure Plus High Protein Cans or Servings Per Day:  3       Frequency:  Three Times a day  Entered: Apr 20 2025 11:25AM  
This patient has been assessed with a concern for Malnutrition and has been determined to have a diagnosis/diagnoses of Severe protein-calorie malnutrition and Underweight (BMI < 19).    This patient is being managed with:   Diet Pureed-  Entered: Apr 16 2025  9:54PM  

## 2025-04-22 NOTE — DISCHARGE NOTE PROVIDER - DETAILS OF MALNUTRITION DIAGNOSIS/DIAGNOSES
This patient has been assessed with a concern for Malnutrition and was treated during this hospitalization for the following Nutrition diagnosis/diagnoses:     -  04/17/2025: Severe protein-calorie malnutrition   -  04/17/2025: Underweight (BMI < 19)

## 2025-04-22 NOTE — DISCHARGE NOTE NURSING/CASE MANAGEMENT/SOCIAL WORK - PATIENT PORTAL LINK FT
You can access the FollowMyHealth Patient Portal offered by [facility] by registering at the following website: [email or web address]. By joining [facility] FollowMyHealth portal, you will also be able to view your health information using other applications (apps) compatible with our system.

## 2025-04-22 NOTE — PROGRESS NOTE ADULT - PROVIDER SPECIALTY LIST ADULT
Hospitalist
Infectious Disease
Heme/Onc
Hospitalist
Hospitalist
Heme/Onc
Infectious Disease
Palliative Care

## 2025-04-30 NOTE — CDI QUERY NOTE - NSCDIOTHERTXTBX_GEN_ALL_CORE_HH
Although sepsis is documented in the medical record, it lacks the specific indicators to clinically substantiate and support the diagnosis.   In order to ensure accurate coding and accuracy of the clinical record, the documentation in this patient’s medical record requires additional clarification of the diagnosis.  For reference, please see the [facility] sepsis ED sepsis/severe sepsis management algorithm  located on the [facility] Intranet   Please clarify the status of sepsis in your progress note and/or discharge summary:    •	Sepsis POA ruled out after study  •	Sepsis POA ruled in (please document additional supporting information or mitigating factors to support this diagnosis)  •	Other (specify)    Supporting documentation:     -POA HR 84  RR 18 T 99.4 100 3 L NC WBC 14.29 only one  SIRS clinical criteria  POA  IMPRESSION: Right lower lobe infiltrate/atelectasis.    · Reason for AdmissionHCAP severe hypercalcemia and other electrolyte abnormalities    -[facility]'s sepsis criteria using SIRS (Temp. >101F or <96.8F,HR>90bpm, RR>20/min or, WBC> 12K, < 4K or Bands > 10%    -ED SEPSIS YES -  ED on 4/16/25 from [place] for increased lethargy over the past 4 days. Per medication reconciliation from facility she has been receiving 1g of cefepime for a UTI. She was noted to be hypoxic upon presentation and placed on 3L NC by EMS.   Upon arrival to the ED, vitals were /71 HR 84 RR 18 T97.8F and SpO2 of 100% on 3L NC. Labs significant for WBC of 14.29, Na of 150, and K+ of 3.0. CXR with R lowerlobe infiltrate/atelectasis. She received IV Zosyn, IV Vancomycin and 2L NS bolus. 2L NS bolus in the ED with repeat of 14.5     -H&P - Severe Hypercalcemia Likely due to bony metastasis from malignancy. #PNA - Will treat as HCAP as patient is from a nursing home - Presenting with increased lethargy, elevated WBC and CXR with R sided infiltrate - s/p IV Zosyn and vancomycin in the ED - would like to continue IV cefepime and vancomycin (Pseudomonas and MRSA coverage) #Hypernatremia Hypokalemia Protein Calorie Malnutrition    -ID-#RLL pneumonia. Probable aspiration pneumonia#Pyuria. Likely UTI#Leukocytosis#Metabolic encephalopathy-Broad spectrum abx therapy was started.-start zosyn 3.375 gm IV q8h    -Medicine PN-4/18-#PNA - Will treat as HCAP as patient is from a nursing home - Presenting with increased lethargy, elevated WBC and CXR with R sided infiltrate Seen by ID, recommendations appreciated Hem/onc-progressive breast cancer with bone mets2. hypercalcemia of malignancy with bone metsrecommendationzoledronic acid 4 mg for aboveSevere Hypercalcemia - Likely due to bony metastasis from malignancy    -MedicinePN 4/21-· Reason for AdmissionAcute metabolic encephalopathy Acute respiratory failure with hypoxiaRLL pneumonia, unable to rule out aspiration pneumonia/pnuemonitisSevere hypercalcemiaHypernatremia-Acute respiratory failure with hypoxia  Due to pneumonia, pneumonitis. Continue antibiotics. O2 supplementation PRN. Weaned to room air. RLL pneumonia, unable to rule out Gram-negative organism, unable to rule out aspiration pneumonia/pneumonitis    -DC summary-Sepsis due to RLL pneumonia, unable to rule out Gram-negative organism, unable to rule out aspiration pneumonia/pneumonitis, present upon admission-Acute respiratory failure with hypoxia  Due to pneumonia, pneumonitis. Continue antibiotics. O2 supplementation PRN. Weaned to room air. As suggested by encephalopathy, hypoxia, leukocytosis, CXR with R sided infiltrate. Seen by ID, recommendations appreciated. Blood cultures neg x 48 hours. Weaned to room air as mentioned above. Completed course of Zosyn.

## 2025-05-02 DIAGNOSIS — C79.51 SECONDARY MALIGNANT NEOPLASM OF BONE: ICD-10-CM

## 2025-05-02 DIAGNOSIS — E83.42 HYPOMAGNESEMIA: ICD-10-CM

## 2025-05-02 DIAGNOSIS — J15.69 PNEUMONIA DUE TO OTHER GRAM-NEGATIVE BACTERIA: ICD-10-CM

## 2025-05-02 DIAGNOSIS — Z90.13 ACQUIRED ABSENCE OF BILATERAL BREASTS AND NIPPLES: ICD-10-CM

## 2025-05-02 DIAGNOSIS — M48.061 SPINAL STENOSIS, LUMBAR REGION WITHOUT NEUROGENIC CLAUDICATION: ICD-10-CM

## 2025-05-02 DIAGNOSIS — J69.0 PNEUMONITIS DUE TO INHALATION OF FOOD AND VOMIT: ICD-10-CM

## 2025-05-02 DIAGNOSIS — Z11.52 ENCOUNTER FOR SCREENING FOR COVID-19: ICD-10-CM

## 2025-05-02 DIAGNOSIS — E83.52 HYPERCALCEMIA: ICD-10-CM

## 2025-05-02 DIAGNOSIS — J96.01 ACUTE RESPIRATORY FAILURE WITH HYPOXIA: ICD-10-CM

## 2025-05-02 DIAGNOSIS — K21.9 GASTRO-ESOPHAGEAL REFLUX DISEASE WITHOUT ESOPHAGITIS: ICD-10-CM

## 2025-05-02 DIAGNOSIS — N39.0 URINARY TRACT INFECTION, SITE NOT SPECIFIED: ICD-10-CM

## 2025-05-02 DIAGNOSIS — H35.30 UNSPECIFIED MACULAR DEGENERATION: ICD-10-CM

## 2025-05-02 DIAGNOSIS — Z85.828 PERSONAL HISTORY OF OTHER MALIGNANT NEOPLASM OF SKIN: ICD-10-CM

## 2025-05-02 DIAGNOSIS — J98.11 ATELECTASIS: ICD-10-CM

## 2025-05-02 DIAGNOSIS — Z85.3 PERSONAL HISTORY OF MALIGNANT NEOPLASM OF BREAST: ICD-10-CM

## 2025-05-02 DIAGNOSIS — E43 UNSPECIFIED SEVERE PROTEIN-CALORIE MALNUTRITION: ICD-10-CM

## 2025-05-02 DIAGNOSIS — E87.0 HYPEROSMOLALITY AND HYPERNATREMIA: ICD-10-CM

## 2025-05-02 DIAGNOSIS — A41.50 GRAM-NEGATIVE SEPSIS, UNSPECIFIED: ICD-10-CM

## 2025-05-02 DIAGNOSIS — E87.6 HYPOKALEMIA: ICD-10-CM

## 2025-05-02 DIAGNOSIS — Z51.5 ENCOUNTER FOR PALLIATIVE CARE: ICD-10-CM

## 2025-05-02 DIAGNOSIS — E78.5 HYPERLIPIDEMIA, UNSPECIFIED: ICD-10-CM

## 2025-05-02 DIAGNOSIS — R65.20 SEVERE SEPSIS WITHOUT SEPTIC SHOCK: ICD-10-CM

## 2025-05-02 DIAGNOSIS — E83.39 OTHER DISORDERS OF PHOSPHORUS METABOLISM: ICD-10-CM

## 2025-05-02 DIAGNOSIS — G93.41 METABOLIC ENCEPHALOPATHY: ICD-10-CM

## 2025-05-02 DIAGNOSIS — Z66 DO NOT RESUSCITATE: ICD-10-CM
